# Patient Record
Sex: FEMALE | Race: BLACK OR AFRICAN AMERICAN | Employment: OTHER | URBAN - METROPOLITAN AREA
[De-identification: names, ages, dates, MRNs, and addresses within clinical notes are randomized per-mention and may not be internally consistent; named-entity substitution may affect disease eponyms.]

---

## 2023-04-10 PROBLEM — J31.0 CHRONIC RHINITIS: Status: ACTIVE | Noted: 2023-04-10

## 2023-04-10 PROBLEM — R07.9 CHEST PAIN, EXERTIONAL: Status: ACTIVE | Noted: 2023-04-10

## 2023-04-10 PROBLEM — M50.90 CERVICAL DISC DISEASE: Status: ACTIVE | Noted: 2023-04-10

## 2023-04-10 PROBLEM — I27.20 PULMONARY HYPERTENSION (MULTI): Status: ACTIVE | Noted: 2023-04-10

## 2023-04-10 PROBLEM — D47.2 MONOCLONAL GAMMOPATHY OF UNDETERMINED SIGNIFICANCE: Status: ACTIVE | Noted: 2023-04-10

## 2023-04-10 PROBLEM — M53.9 MULTILEVEL DEGENERATIVE DISC DISEASE: Status: ACTIVE | Noted: 2023-04-10

## 2023-04-10 PROBLEM — R41.3 MEMORY LOSS: Status: ACTIVE | Noted: 2023-04-10

## 2023-04-10 PROBLEM — M79.605 PAIN OF LEFT LOWER EXTREMITY: Status: ACTIVE | Noted: 2023-04-10

## 2023-04-10 PROBLEM — M51.36 LUMBAR DEGENERATIVE DISC DISEASE: Status: ACTIVE | Noted: 2023-04-10

## 2023-04-10 PROBLEM — R06.09 EXERTIONAL DYSPNEA: Status: ACTIVE | Noted: 2023-04-10

## 2023-04-10 PROBLEM — B19.20 HEPATITIS C VIRUS: Status: ACTIVE | Noted: 2023-04-10

## 2023-04-10 PROBLEM — G56.03 CARPAL TUNNEL SYNDROME ON BOTH SIDES: Status: ACTIVE | Noted: 2023-04-10

## 2023-04-10 PROBLEM — N18.9 CHRONIC RENAL INSUFFICIENCY: Status: ACTIVE | Noted: 2023-04-10

## 2023-04-10 PROBLEM — G56.02 CARPAL TUNNEL SYNDROME OF LEFT WRIST: Status: ACTIVE | Noted: 2023-04-10

## 2023-04-10 PROBLEM — E66.01 PSYCHOLOGICAL FACTORS AFFECTING MORBID OBESITY (MULTI): Status: ACTIVE | Noted: 2023-04-10

## 2023-04-10 PROBLEM — L30.9 ECZEMA: Status: ACTIVE | Noted: 2023-04-10

## 2023-04-10 PROBLEM — L60.3 BRITTLE NAILS: Status: ACTIVE | Noted: 2023-04-10

## 2023-04-10 PROBLEM — M54.9 CHRONIC BACK PAIN: Status: ACTIVE | Noted: 2023-04-10

## 2023-04-10 PROBLEM — R51.9 HEADACHE: Status: ACTIVE | Noted: 2023-04-10

## 2023-04-10 PROBLEM — S09.90XA HEAD TRAUMA: Status: ACTIVE | Noted: 2023-04-10

## 2023-04-10 PROBLEM — G62.9 PERIPHERAL NEUROPATHY: Status: ACTIVE | Noted: 2023-04-10

## 2023-04-10 PROBLEM — R82.90 ABNORMAL URINE FINDINGS: Status: ACTIVE | Noted: 2023-04-10

## 2023-04-10 PROBLEM — H91.90 HEARING LOSS: Status: ACTIVE | Noted: 2023-04-10

## 2023-04-10 PROBLEM — M54.2 CERVICALGIA: Status: ACTIVE | Noted: 2023-04-10

## 2023-04-10 PROBLEM — K63.5 COLON POLYP: Status: ACTIVE | Noted: 2023-04-10

## 2023-04-10 PROBLEM — G89.29 CHRONIC BACK PAIN: Status: ACTIVE | Noted: 2023-04-10

## 2023-04-10 PROBLEM — I10 HYPERTENSION: Status: ACTIVE | Noted: 2023-04-10

## 2023-04-10 PROBLEM — M21.40 ACQUIRED PES PLANUS: Status: ACTIVE | Noted: 2023-04-10

## 2023-04-10 PROBLEM — M79.641 PAIN IN BOTH HANDS: Status: ACTIVE | Noted: 2023-04-10

## 2023-04-10 PROBLEM — M19.90 OSTEOARTHRITIS: Status: ACTIVE | Noted: 2023-04-10

## 2023-04-10 PROBLEM — R63.2 POLYPHAGIA: Status: ACTIVE | Noted: 2023-04-10

## 2023-04-10 PROBLEM — D64.9 ANEMIA: Status: ACTIVE | Noted: 2023-04-10

## 2023-04-10 PROBLEM — M76.10: Status: ACTIVE | Noted: 2023-04-10

## 2023-04-10 PROBLEM — R10.11 RIGHT UPPER QUADRANT ABDOMINAL PAIN: Status: ACTIVE | Noted: 2023-04-10

## 2023-04-10 PROBLEM — E04.1 THYROID NODULE: Status: ACTIVE | Noted: 2023-04-10

## 2023-04-10 PROBLEM — M25.552 HIP PAIN, LEFT: Status: ACTIVE | Noted: 2023-04-10

## 2023-04-10 PROBLEM — F54 PSYCHOLOGICAL FACTORS AFFECTING MORBID OBESITY (MULTI): Status: ACTIVE | Noted: 2023-04-10

## 2023-04-10 PROBLEM — L25.9 CONTACT DERMATITIS: Status: ACTIVE | Noted: 2023-04-10

## 2023-04-10 PROBLEM — E04.0 SIMPLE GOITER: Status: ACTIVE | Noted: 2023-04-10

## 2023-04-10 PROBLEM — M17.11 PRIMARY LOCALIZED OSTEOARTHRITIS OF RIGHT KNEE: Status: ACTIVE | Noted: 2023-04-10

## 2023-04-10 PROBLEM — E66.9 OBESITY: Status: ACTIVE | Noted: 2023-04-10

## 2023-04-10 PROBLEM — R73.02 IGT (IMPAIRED GLUCOSE TOLERANCE): Status: ACTIVE | Noted: 2023-04-10

## 2023-04-10 PROBLEM — I35.1 MILD AORTIC INSUFFICIENCY: Status: ACTIVE | Noted: 2023-04-10

## 2023-04-10 PROBLEM — M19.90 ARTHRITIS: Status: ACTIVE | Noted: 2023-04-10

## 2023-04-10 PROBLEM — R80.9 PROTEINURIA: Status: ACTIVE | Noted: 2023-04-10

## 2023-04-10 PROBLEM — M99.09 SEGMENTAL AND SOMATIC DYSFUNCTION: Status: ACTIVE | Noted: 2023-04-10

## 2023-04-10 PROBLEM — K59.09 CHRONIC CONSTIPATION: Status: ACTIVE | Noted: 2023-04-10
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
## 2023-04-10 PROBLEM — G47.33 OBSTRUCTIVE SLEEP APNEA OF ADULT: Status: ACTIVE | Noted: 2023-04-10

## 2023-04-10 PROBLEM — M54.31 SCIATICA OF RIGHT SIDE: Status: ACTIVE | Noted: 2023-04-10

## 2023-04-10 PROBLEM — R79.89 ABNORMAL LFTS: Status: ACTIVE | Noted: 2023-04-10

## 2023-04-10 PROBLEM — R32 URINARY INCONTINENCE: Status: ACTIVE | Noted: 2023-04-10

## 2023-04-10 PROBLEM — M79.642 PAIN IN BOTH HANDS: Status: ACTIVE | Noted: 2023-04-10

## 2023-04-10 PROBLEM — Z96.642 STATUS POST TOTAL REPLACEMENT OF LEFT HIP: Status: ACTIVE | Noted: 2023-04-10

## 2023-04-10 PROBLEM — M16.9 OSTEOARTHRITIS OF HIP: Status: ACTIVE | Noted: 2023-04-10

## 2023-04-10 PROBLEM — M19.041 PRIMARY OSTEOARTHRITIS OF BOTH HANDS: Status: ACTIVE | Noted: 2023-04-10

## 2023-04-10 PROBLEM — R09.89 BRUIT: Status: ACTIVE | Noted: 2023-04-10

## 2023-04-10 PROBLEM — L23.7 POISON IVY DERMATITIS: Status: ACTIVE | Noted: 2023-04-10

## 2023-04-10 PROBLEM — R09.1 PLEURISY: Status: ACTIVE | Noted: 2023-04-10

## 2023-04-10 PROBLEM — M25.561 KNEE PAIN, RIGHT: Status: ACTIVE | Noted: 2023-04-10

## 2023-04-10 PROBLEM — N18.30 CHRONIC RENAL IMPAIRMENT, STAGE 3 (MODERATE) (MULTI): Status: ACTIVE | Noted: 2023-04-10

## 2023-04-10 PROBLEM — M19.042 PRIMARY OSTEOARTHRITIS OF BOTH HANDS: Status: ACTIVE | Noted: 2023-04-10

## 2023-04-10 PROBLEM — M51.369 LUMBAR DEGENERATIVE DISC DISEASE: Status: ACTIVE | Noted: 2023-04-10

## 2023-04-10 PROBLEM — M54.17 LUMBOSACRAL RADICULITIS: Status: ACTIVE | Noted: 2023-04-10

## 2023-04-10 PROBLEM — M53.3 COCCYDYNIA: Status: ACTIVE | Noted: 2023-04-10

## 2023-04-10 PROBLEM — R06.02 SHORTNESS OF BREATH ON EXERTION: Status: ACTIVE | Noted: 2023-04-10

## 2023-04-10 PROBLEM — R63.8 ALTERATION IN NUTRITION: Status: ACTIVE | Noted: 2023-04-10

## 2023-04-10 PROBLEM — E03.9 HYPOTHYROIDISM: Status: ACTIVE | Noted: 2023-04-10

## 2023-04-10 PROBLEM — I65.29 CAROTID ATHEROSCLEROSIS: Status: ACTIVE | Noted: 2023-04-10

## 2023-04-10 PROBLEM — B18.2 CHRONIC HEPATITIS C (MULTI): Status: ACTIVE | Noted: 2023-04-10

## 2023-04-10 PROBLEM — R94.31 ABNORMAL ECG: Status: ACTIVE | Noted: 2023-04-10

## 2023-04-10 PROBLEM — N62 MACROMASTIA: Status: ACTIVE | Noted: 2023-04-10

## 2023-04-10 PROBLEM — M77.8 WRIST TENDONITIS: Status: ACTIVE | Noted: 2023-04-10

## 2023-04-10 RX ORDER — TRAMADOL HYDROCHLORIDE 50 MG/1
1-2 TABLET ORAL EVERY 6 HOURS PRN
COMMUNITY
Start: 2015-10-23 | End: 2023-05-05 | Stop reason: SDUPTHER

## 2023-04-10 RX ORDER — GLUCOSAM/CHONDRO/HERB 149/HYAL 750-100 MG
1 TABLET ORAL 2 TIMES DAILY
COMMUNITY
End: 2023-08-25 | Stop reason: ALTCHOICE

## 2023-04-10 RX ORDER — PEN NEEDLE, DIABETIC 29 G X1/2"
NEEDLE, DISPOSABLE MISCELLANEOUS
COMMUNITY
End: 2023-05-15 | Stop reason: ALTCHOICE

## 2023-04-10 RX ORDER — PROPRANOLOL/HYDROCHLOROTHIAZID 40 MG-25MG
1 TABLET ORAL DAILY
COMMUNITY
End: 2023-08-25 | Stop reason: ALTCHOICE

## 2023-04-10 RX ORDER — ATORVASTATIN CALCIUM 10 MG/1
1 TABLET, FILM COATED ORAL NIGHTLY
COMMUNITY
Start: 2022-07-29 | End: 2023-05-05 | Stop reason: SDUPTHER

## 2023-04-10 RX ORDER — GABAPENTIN 300 MG/1
1 CAPSULE ORAL 3 TIMES DAILY
COMMUNITY
Start: 2020-09-15 | End: 2024-01-02 | Stop reason: SDUPTHER

## 2023-04-10 RX ORDER — ASPIRIN 81 MG/1
1 TABLET ORAL DAILY
COMMUNITY

## 2023-04-10 RX ORDER — MULTIVIT-MIN/FA/LYCOPEN/LUTEIN .4-300-25
1 TABLET ORAL DAILY
COMMUNITY
End: 2023-05-15 | Stop reason: ALTCHOICE

## 2023-04-10 RX ORDER — ACETAMINOPHEN 500 MG/1
2 CAPSULE, LIQUID FILLED ORAL 3 TIMES DAILY PRN
COMMUNITY
Start: 2022-12-30

## 2023-04-10 RX ORDER — LINACLOTIDE 145 UG/1
145 CAPSULE, GELATIN COATED ORAL DAILY
COMMUNITY
Start: 2023-02-07 | End: 2023-05-15 | Stop reason: ALTCHOICE

## 2023-04-10 RX ORDER — LACTULOSE 10 G/15ML
SOLUTION ORAL; RECTAL
COMMUNITY
Start: 2022-07-11 | End: 2023-05-15 | Stop reason: ALTCHOICE

## 2023-04-10 RX ORDER — HYDROCORTISONE ACETATE 0.5 %
1 CREAM (GRAM) TOPICAL 2 TIMES DAILY
COMMUNITY
Start: 2015-09-21 | End: 2024-02-18 | Stop reason: WASHOUT

## 2023-04-10 RX ORDER — SEMAGLUTIDE 2.68 MG/ML
2 INJECTION, SOLUTION SUBCUTANEOUS
COMMUNITY
Start: 2022-10-25 | End: 2023-12-08 | Stop reason: SDUPTHER

## 2023-04-10 RX ORDER — BUTYROSPERMUM PARKII(SHEA BUTTER), SIMMONDSIA CHINENSIS (JOJOBA) SEED OIL, ALOE BARBADENSIS LEAF EXTRACT .01; 1; 3.5 G/100G; G/100G; G/100G
2 LIQUID TOPICAL
COMMUNITY
Start: 2022-12-01 | End: 2024-03-08 | Stop reason: WASHOUT

## 2023-04-10 RX ORDER — LEVOTHYROXINE SODIUM 88 UG/1
1 TABLET ORAL DAILY
COMMUNITY
Start: 2013-06-26 | End: 2024-01-16 | Stop reason: SDUPTHER

## 2023-04-10 RX ORDER — DOCUSATE SODIUM 100 MG/1
3 CAPSULE, LIQUID FILLED ORAL DAILY
COMMUNITY

## 2023-04-11 ENCOUNTER — TELEPHONE (OUTPATIENT)
Dept: PRIMARY CARE | Facility: CLINIC | Age: 78
End: 2023-04-11

## 2023-04-11 NOTE — TELEPHONE ENCOUNTER
Patient tested positive for covid yesterday she's having congestion,headache,body ache she just feel terrible no fever want to know if you can call something in for her

## 2023-05-05 ENCOUNTER — TELEPHONE (OUTPATIENT)
Dept: PRIMARY CARE | Facility: CLINIC | Age: 78
End: 2023-05-05
Payer: MEDICARE

## 2023-05-05 DIAGNOSIS — M54.17 LUMBOSACRAL RADICULITIS: ICD-10-CM

## 2023-05-05 DIAGNOSIS — E78.5 DYSLIPIDEMIA: ICD-10-CM

## 2023-05-05 RX ORDER — ATORVASTATIN CALCIUM 10 MG/1
10 TABLET, FILM COATED ORAL NIGHTLY
Qty: 30 TABLET | Refills: 0 | Status: SHIPPED | OUTPATIENT
Start: 2023-05-05 | End: 2023-11-17 | Stop reason: SDUPTHER

## 2023-05-05 RX ORDER — TRAMADOL HYDROCHLORIDE 50 MG/1
50-100 TABLET ORAL EVERY 6 HOURS PRN
Qty: 40 TABLET | Refills: 0 | Status: SHIPPED | OUTPATIENT
Start: 2023-05-05 | End: 2023-05-15 | Stop reason: SDUPTHER

## 2023-05-05 NOTE — TELEPHONE ENCOUNTER
Patient stated she has been calling since last week for medication refills. Tramadol, Hydrochlorothiazide, and amoxicillin. Pharmacy is in chart also sending an email to Ana.

## 2023-05-09 ENCOUNTER — APPOINTMENT (OUTPATIENT)
Dept: PRIMARY CARE | Facility: CLINIC | Age: 78
End: 2023-05-09
Payer: MEDICARE

## 2023-05-15 ENCOUNTER — OFFICE VISIT (OUTPATIENT)
Dept: PRIMARY CARE | Facility: CLINIC | Age: 78
End: 2023-05-15
Payer: MEDICARE

## 2023-05-15 ENCOUNTER — LAB (OUTPATIENT)
Dept: LAB | Facility: LAB | Age: 78
End: 2023-05-15
Payer: MEDICARE

## 2023-05-15 VITALS
BODY MASS INDEX: 33.31 KG/M2 | SYSTOLIC BLOOD PRESSURE: 122 MMHG | WEIGHT: 188 LBS | DIASTOLIC BLOOD PRESSURE: 80 MMHG | HEIGHT: 63 IN

## 2023-05-15 DIAGNOSIS — E55.9 VITAMIN D DEFICIENCY: ICD-10-CM

## 2023-05-15 DIAGNOSIS — M54.40 CHRONIC LOW BACK PAIN WITH SCIATICA, SCIATICA LATERALITY UNSPECIFIED, UNSPECIFIED BACK PAIN LATERALITY: ICD-10-CM

## 2023-05-15 DIAGNOSIS — F11.90 OPIOID USE: ICD-10-CM

## 2023-05-15 DIAGNOSIS — E03.9 HYPOTHYROIDISM, UNSPECIFIED TYPE: ICD-10-CM

## 2023-05-15 DIAGNOSIS — E78.5 HYPERLIPIDEMIA, UNSPECIFIED HYPERLIPIDEMIA TYPE: ICD-10-CM

## 2023-05-15 DIAGNOSIS — Z96.651 HISTORY OF RIGHT KNEE JOINT REPLACEMENT: ICD-10-CM

## 2023-05-15 DIAGNOSIS — M19.90 ARTHRITIS: ICD-10-CM

## 2023-05-15 DIAGNOSIS — M47.26 OTHER SPONDYLOSIS WITH RADICULOPATHY, LUMBAR REGION: ICD-10-CM

## 2023-05-15 DIAGNOSIS — I10 HTN (HYPERTENSION), BENIGN: ICD-10-CM

## 2023-05-15 DIAGNOSIS — M54.17 LUMBOSACRAL RADICULITIS: ICD-10-CM

## 2023-05-15 DIAGNOSIS — E66.09 OBESITY DUE TO EXCESS CALORIES, UNSPECIFIED CLASSIFICATION, UNSPECIFIED WHETHER SERIOUS COMORBIDITY PRESENT: Primary | ICD-10-CM

## 2023-05-15 DIAGNOSIS — G89.29 CHRONIC LOW BACK PAIN WITH SCIATICA, SCIATICA LATERALITY UNSPECIFIED, UNSPECIFIED BACK PAIN LATERALITY: ICD-10-CM

## 2023-05-15 LAB
AMPHETAMINE (PRESENCE) IN URINE BY SCREEN METHOD: NORMAL
BARBITURATES PRESENCE IN URINE BY SCREEN METHOD: NORMAL
BENZODIAZEPINE (PRESENCE) IN URINE BY SCREEN METHOD: NORMAL
CANNABINOIDS IN URINE BY SCREEN METHOD: NORMAL
COCAINE (PRESENCE) IN URINE BY SCREEN METHOD: NORMAL
DRUG SCREEN COMMENT URINE: NORMAL
FENTANYL URINE: NORMAL
METHADONE (PRESENCE) IN URINE BY SCREEN METHOD: NORMAL
OPIATES (PRESENCE) IN URINE BY SCREEN METHOD: NORMAL
OXYCODONE (PRESENCE) IN URINE BY SCREEN METHOD: NORMAL
PHENCYCLIDINE (PRESENCE) IN URINE BY SCREEN METHOD: NORMAL

## 2023-05-15 PROCEDURE — 99215 OFFICE O/P EST HI 40 MIN: CPT | Performed by: INTERNAL MEDICINE

## 2023-05-15 PROCEDURE — 3074F SYST BP LT 130 MM HG: CPT | Performed by: INTERNAL MEDICINE

## 2023-05-15 PROCEDURE — 1160F RVW MEDS BY RX/DR IN RCRD: CPT | Performed by: INTERNAL MEDICINE

## 2023-05-15 PROCEDURE — 80307 DRUG TEST PRSMV CHEM ANLYZR: CPT

## 2023-05-15 PROCEDURE — 1159F MED LIST DOCD IN RCRD: CPT | Performed by: INTERNAL MEDICINE

## 2023-05-15 PROCEDURE — 3079F DIAST BP 80-89 MM HG: CPT | Performed by: INTERNAL MEDICINE

## 2023-05-15 RX ORDER — LISINOPRIL AND HYDROCHLOROTHIAZIDE 10; 12.5 MG/1; MG/1
1 TABLET ORAL DAILY
COMMUNITY
Start: 2019-04-18 | End: 2023-05-15 | Stop reason: ALTCHOICE

## 2023-05-15 RX ORDER — ACETAMINOPHEN 500 MG
TABLET ORAL
COMMUNITY
End: 2024-03-08 | Stop reason: WASHOUT

## 2023-05-15 RX ORDER — TRAMADOL HYDROCHLORIDE 50 MG/1
50 TABLET ORAL EVERY 6 HOURS PRN
Qty: 40 TABLET | Refills: 2 | Status: SHIPPED | OUTPATIENT
Start: 2023-05-15 | End: 2023-08-25 | Stop reason: ALTCHOICE

## 2023-05-15 ASSESSMENT — ENCOUNTER SYMPTOMS
EYES NEGATIVE: 1
ALLERGIC/IMMUNOLOGIC NEGATIVE: 1
RESPIRATORY NEGATIVE: 1
MUSCULOSKELETAL NEGATIVE: 1
NEUROLOGICAL NEGATIVE: 1
ENDOCRINE NEGATIVE: 1
CONSTIPATION: 1
CARDIOVASCULAR NEGATIVE: 1
CONSTITUTIONAL NEGATIVE: 1

## 2023-05-15 ASSESSMENT — PATIENT HEALTH QUESTIONNAIRE - PHQ9
2. FEELING DOWN, DEPRESSED OR HOPELESS: NOT AT ALL
1. LITTLE INTEREST OR PLEASURE IN DOING THINGS: NOT AT ALL
SUM OF ALL RESPONSES TO PHQ9 QUESTIONS 1 AND 2: 0

## 2023-05-15 NOTE — PROGRESS NOTES
I reviewed with the resident the medical history and the resident’s findings on physical examination.  I discussed with the resident the patient’s diagnosis and concur with the treatment plan as documented in the resident note.     I saw and evaluated the patient. I personally obtained the key and critical portions of the history and physical exam or was physically present for key and critical portions performed by the trainee. I reviewed the trainee's documentation and discussed the patient with the trainee. I agree with the trainee's medical decision making, as documented on the trainee's note.     Comfort Montano MD

## 2023-05-15 NOTE — PROGRESS NOTES
"Subjective   Patient ID: Lucille Holloway is a 77 y.o. female who presents for Establish Care and Med Refill (Tramadol).    HPI   77 yrs old female who is here as new patient, she has remarkable hx of hypothyroidism, HLD, obesity on ozempic, Chronic low back pain on tramadol and gabapentin, she reported being healthy and well since her last visit to her PCP, she eats a healthy diet and exercise regularly, she her low back pain is well managed her current pain regimen, she cont to experience constipation, last colonoscopy on 2022 with polyps and tubular adenoma, advised to repeat in 5 yrs, last mammogram in 07/2022.      Review of Systems   Constitutional: Negative.    HENT: Negative.     Eyes: Negative.    Respiratory: Negative.     Cardiovascular: Negative.    Gastrointestinal:  Positive for constipation.   Endocrine: Negative.    Genitourinary: Negative.    Musculoskeletal: Negative.    Skin: Negative.    Allergic/Immunologic: Negative.    Neurological: Negative.        Objective   /80   Ht 1.6 m (5' 3\")   Wt 85.3 kg (188 lb)   BMI 33.30 kg/m²     Physical Exam  Constitutional:       Appearance: Normal appearance. She is normal weight.   HENT:      Head: Normocephalic and atraumatic.      Nose: Nose normal.      Mouth/Throat:      Mouth: Mucous membranes are dry.   Eyes:      Extraocular Movements: Extraocular movements intact.      Pupils: Pupils are equal, round, and reactive to light.   Cardiovascular:      Rate and Rhythm: Normal rate and regular rhythm.      Pulses: Normal pulses.      Heart sounds: Normal heart sounds.   Pulmonary:      Effort: Pulmonary effort is normal.      Breath sounds: Normal breath sounds.   Abdominal:      General: Abdomen is flat.      Palpations: Abdomen is soft.   Musculoskeletal:         General: Normal range of motion.      Cervical back: Normal range of motion and neck supple.   Skin:     General: Skin is warm and dry.      Capillary Refill: Capillary refill takes less " than 2 seconds.   Neurological:      General: No focal deficit present.      Mental Status: She is alert and oriented to person, place, and time. Mental status is at baseline.   Psychiatric:         Mood and Affect: Mood normal.         Judgment: Judgment normal.         Assessment/Plan   Diagnoses and all orders for this visit:  Obesity due to excess calories, unspecified classification, unspecified whether serious comorbidity present  Hypothyroidism, unspecified type  Arthritis  Hyperlipidemia, unspecified hyperlipidemia type  History of right knee joint replacement  Chronic low back pain with sciatica, sciatica laterality unspecified, unspecified back pain laterality  Lumbosacral radiculitis  -     traMADol (Ultram) 50 mg tablet; Take 1 tablet (50 mg) by mouth every 6 hours if needed (Pain).  Opioid use  -     Drug Screen, Urine With Reflex to Confirmation; Future  Vitamin D deficiency  HTN (hypertension), benign  Other spondylosis with radiculopathy, lumbar region  -     Drug Screen, Urine With Reflex to Confirmation; Future  -    opioids agreement was signed.

## 2023-06-08 ENCOUNTER — LAB (OUTPATIENT)
Dept: LAB | Facility: LAB | Age: 78
End: 2023-06-08
Payer: MEDICARE

## 2023-06-08 ENCOUNTER — OFFICE VISIT (OUTPATIENT)
Dept: PRIMARY CARE | Facility: CLINIC | Age: 78
End: 2023-06-08
Payer: MEDICARE

## 2023-06-08 VITALS
WEIGHT: 187.3 LBS | HEART RATE: 72 BPM | BODY MASS INDEX: 33.19 KG/M2 | TEMPERATURE: 97.2 F | DIASTOLIC BLOOD PRESSURE: 72 MMHG | RESPIRATION RATE: 12 BRPM | SYSTOLIC BLOOD PRESSURE: 130 MMHG | OXYGEN SATURATION: 98 % | HEIGHT: 63 IN

## 2023-06-08 DIAGNOSIS — E03.9 HYPOTHYROIDISM, UNSPECIFIED TYPE: ICD-10-CM

## 2023-06-08 DIAGNOSIS — Z76.89 ESTABLISHING CARE WITH NEW DOCTOR, ENCOUNTER FOR: Primary | ICD-10-CM

## 2023-06-08 DIAGNOSIS — Z78.0 ASYMPTOMATIC MENOPAUSAL STATE: ICD-10-CM

## 2023-06-08 DIAGNOSIS — E66.9 CLASS 1 OBESITY WITHOUT SERIOUS COMORBIDITY WITH BODY MASS INDEX (BMI) OF 33.0 TO 33.9 IN ADULT, UNSPECIFIED OBESITY TYPE: ICD-10-CM

## 2023-06-08 DIAGNOSIS — Z12.31 BREAST CANCER SCREENING BY MAMMOGRAM: ICD-10-CM

## 2023-06-08 PROBLEM — G89.29 ACUTE EXACERBATION OF CHRONIC LOW BACK PAIN: Status: ACTIVE | Noted: 2023-06-08

## 2023-06-08 PROBLEM — E03.8 OTHER SPECIFIED HYPOTHYROIDISM: Status: ACTIVE | Noted: 2023-01-02

## 2023-06-08 PROBLEM — R07.9 CHEST PAIN: Status: ACTIVE | Noted: 2023-06-08

## 2023-06-08 PROBLEM — M99.09 SEGMENTAL AND SOMATIC DYSFUNCTION: Status: ACTIVE | Noted: 2023-01-02

## 2023-06-08 PROBLEM — M54.50 ACUTE EXACERBATION OF CHRONIC LOW BACK PAIN: Status: ACTIVE | Noted: 2023-06-08

## 2023-06-08 PROBLEM — N18.9 CKD (CHRONIC KIDNEY DISEASE): Status: ACTIVE | Noted: 2023-01-02

## 2023-06-08 LAB
ALANINE AMINOTRANSFERASE (SGPT) (U/L) IN SER/PLAS: 29 U/L (ref 7–45)
ALBUMIN (G/DL) IN SER/PLAS: 3.9 G/DL (ref 3.4–5)
ALKALINE PHOSPHATASE (U/L) IN SER/PLAS: 61 U/L (ref 33–136)
ANION GAP IN SER/PLAS: 9 MMOL/L (ref 10–20)
ASPARTATE AMINOTRANSFERASE (SGOT) (U/L) IN SER/PLAS: 29 U/L (ref 9–39)
BASOPHILS (10*3/UL) IN BLOOD BY AUTOMATED COUNT: 0.03 X10E9/L (ref 0–0.1)
BASOPHILS/100 LEUKOCYTES IN BLOOD BY AUTOMATED COUNT: 0.4 % (ref 0–2)
BILIRUBIN TOTAL (MG/DL) IN SER/PLAS: 0.5 MG/DL (ref 0–1.2)
CALCIDIOL (25 OH VITAMIN D3) (NG/ML) IN SER/PLAS: 70 NG/ML
CALCIUM (MG/DL) IN SER/PLAS: 9.7 MG/DL (ref 8.6–10.6)
CARBON DIOXIDE, TOTAL (MMOL/L) IN SER/PLAS: 31 MMOL/L (ref 21–32)
CHLORIDE (MMOL/L) IN SER/PLAS: 104 MMOL/L (ref 98–107)
CREATININE (MG/DL) IN SER/PLAS: 1.05 MG/DL (ref 0.5–1.05)
EOSINOPHILS (10*3/UL) IN BLOOD BY AUTOMATED COUNT: 0.1 X10E9/L (ref 0–0.4)
EOSINOPHILS/100 LEUKOCYTES IN BLOOD BY AUTOMATED COUNT: 1.3 % (ref 0–6)
ERYTHROCYTE DISTRIBUTION WIDTH (RATIO) BY AUTOMATED COUNT: 12.8 % (ref 11.5–14.5)
ERYTHROCYTE MEAN CORPUSCULAR HEMOGLOBIN CONCENTRATION (G/DL) BY AUTOMATED: 31.7 G/DL (ref 32–36)
ERYTHROCYTE MEAN CORPUSCULAR VOLUME (FL) BY AUTOMATED COUNT: 95 FL (ref 80–100)
ERYTHROCYTES (10*6/UL) IN BLOOD BY AUTOMATED COUNT: 4.58 X10E12/L (ref 4–5.2)
GFR FEMALE: 54 ML/MIN/1.73M2
GLUCOSE (MG/DL) IN SER/PLAS: 74 MG/DL (ref 74–99)
HEMATOCRIT (%) IN BLOOD BY AUTOMATED COUNT: 43.5 % (ref 36–46)
HEMOGLOBIN (G/DL) IN BLOOD: 13.8 G/DL (ref 12–16)
IMMATURE GRANULOCYTES/100 LEUKOCYTES IN BLOOD BY AUTOMATED COUNT: 0.3 % (ref 0–0.9)
LEUKOCYTES (10*3/UL) IN BLOOD BY AUTOMATED COUNT: 7.7 X10E9/L (ref 4.4–11.3)
LYMPHOCYTES (10*3/UL) IN BLOOD BY AUTOMATED COUNT: 3.7 X10E9/L (ref 0.8–3)
LYMPHOCYTES/100 LEUKOCYTES IN BLOOD BY AUTOMATED COUNT: 48.3 % (ref 13–44)
MONOCYTES (10*3/UL) IN BLOOD BY AUTOMATED COUNT: 0.58 X10E9/L (ref 0.05–0.8)
MONOCYTES/100 LEUKOCYTES IN BLOOD BY AUTOMATED COUNT: 7.6 % (ref 2–10)
NEUTROPHILS (10*3/UL) IN BLOOD BY AUTOMATED COUNT: 3.23 X10E9/L (ref 1.6–5.5)
NEUTROPHILS/100 LEUKOCYTES IN BLOOD BY AUTOMATED COUNT: 42.1 % (ref 40–80)
NRBC (PER 100 WBCS) BY AUTOMATED COUNT: 0 /100 WBC (ref 0–0)
PLATELETS (10*3/UL) IN BLOOD AUTOMATED COUNT: 263 X10E9/L (ref 150–450)
POTASSIUM (MMOL/L) IN SER/PLAS: 4.3 MMOL/L (ref 3.5–5.3)
PROTEIN TOTAL: 7.2 G/DL (ref 6.4–8.2)
SODIUM (MMOL/L) IN SER/PLAS: 140 MMOL/L (ref 136–145)
THYROTROPIN (MIU/L) IN SER/PLAS BY DETECTION LIMIT <= 0.05 MIU/L: 1.01 MIU/L (ref 0.44–3.98)
UREA NITROGEN (MG/DL) IN SER/PLAS: 20 MG/DL (ref 6–23)

## 2023-06-08 PROCEDURE — 99213 OFFICE O/P EST LOW 20 MIN: CPT | Performed by: STUDENT IN AN ORGANIZED HEALTH CARE EDUCATION/TRAINING PROGRAM

## 2023-06-08 PROCEDURE — 85025 COMPLETE CBC W/AUTO DIFF WBC: CPT

## 2023-06-08 PROCEDURE — 82306 VITAMIN D 25 HYDROXY: CPT

## 2023-06-08 PROCEDURE — 80053 COMPREHEN METABOLIC PANEL: CPT

## 2023-06-08 PROCEDURE — 1160F RVW MEDS BY RX/DR IN RCRD: CPT | Performed by: STUDENT IN AN ORGANIZED HEALTH CARE EDUCATION/TRAINING PROGRAM

## 2023-06-08 PROCEDURE — 3075F SYST BP GE 130 - 139MM HG: CPT | Performed by: STUDENT IN AN ORGANIZED HEALTH CARE EDUCATION/TRAINING PROGRAM

## 2023-06-08 PROCEDURE — 36415 COLL VENOUS BLD VENIPUNCTURE: CPT

## 2023-06-08 PROCEDURE — 3078F DIAST BP <80 MM HG: CPT | Performed by: STUDENT IN AN ORGANIZED HEALTH CARE EDUCATION/TRAINING PROGRAM

## 2023-06-08 PROCEDURE — 1159F MED LIST DOCD IN RCRD: CPT | Performed by: STUDENT IN AN ORGANIZED HEALTH CARE EDUCATION/TRAINING PROGRAM

## 2023-06-08 PROCEDURE — 84443 ASSAY THYROID STIM HORMONE: CPT

## 2023-06-08 PROCEDURE — 1036F TOBACCO NON-USER: CPT | Performed by: STUDENT IN AN ORGANIZED HEALTH CARE EDUCATION/TRAINING PROGRAM

## 2023-06-08 ASSESSMENT — PATIENT HEALTH QUESTIONNAIRE - PHQ9
SUM OF ALL RESPONSES TO PHQ9 QUESTIONS 1 & 2: 0
2. FEELING DOWN, DEPRESSED OR HOPELESS: NOT AT ALL
1. LITTLE INTEREST OR PLEASURE IN DOING THINGS: NOT AT ALL

## 2023-06-08 ASSESSMENT — LIFESTYLE VARIABLES
SKIP TO QUESTIONS 9-10: 1
HOW OFTEN DO YOU HAVE SIX OR MORE DRINKS ON ONE OCCASION: NEVER
HOW MANY STANDARD DRINKS CONTAINING ALCOHOL DO YOU HAVE ON A TYPICAL DAY: 1 OR 2
HOW OFTEN DO YOU HAVE A DRINK CONTAINING ALCOHOL: MONTHLY OR LESS
AUDIT-C TOTAL SCORE: 1

## 2023-06-08 NOTE — PROGRESS NOTES
Subjective   Patient ID: Lucille Holloway is a pleasant 77 y.o. female with hx of HTN, KELLY , hypothyroid who presents for Establish Care.  HPI  She had a right knee replacement in Dec. Doing well.   She has had B/L carpal tunnel surgery  Hysterectomy, bladder reconstruction.   She sees Dr Jimenez for arthritis in the spine.   She take Tramadol as needed for back pain. Discussed about our no-narcotic policy in our office  She has lost some wt about 41 lb. She no longer needs BP medication.  She is not using her CPAP. She was seen by Dr Jurado in March and was advised to lose another 20 lb.   She is taking ozempic for weight loss. She was given the ozempic to lose weight prior to hip replacement.     HM: CN: 2021  [place]: due  Bone density : due    Immunization:   Reviewed and up-to-date    Review of Systems   All other systems reviewed and are negative.      Visit Vitals  /72   Pulse 72   Temp 36.2 °C (97.2 °F)   Resp 12          Objective   Physical Exam  Constitutional:       General: She is not in acute distress.     Appearance: Normal appearance.   HENT:      Head: Normocephalic and atraumatic.   Eyes:      General: No scleral icterus.     Conjunctiva/sclera: Conjunctivae normal.   Cardiovascular:      Rate and Rhythm: Normal rate and regular rhythm.      Heart sounds: Normal heart sounds.   Pulmonary:      Effort: Pulmonary effort is normal.      Breath sounds: Normal breath sounds. No wheezing.   Abdominal:      General: Bowel sounds are normal. There is no distension.      Palpations: Abdomen is soft.      Tenderness: There is no abdominal tenderness.   Musculoskeletal:      Cervical back: Neck supple.      Right lower leg: No edema.      Left lower leg: No edema.   Lymphadenopathy:      Cervical: No cervical adenopathy.   Skin:     General: Skin is warm and dry.   Neurological:      General: No focal deficit present.      Mental Status: She is alert and oriented to person, place, and time.   Psychiatric:          Mood and Affect: Mood normal.         Behavior: Behavior normal.         Assessment/Plan   Problem List Items Addressed This Visit          Endocrine/Metabolic    Obesity    Relevant Orders    Comprehensive Metabolic Panel    CBC and Auto Differential    Vitamin D, Total    Lipid Panel    Hypothyroidism    Relevant Orders    TSH with reflex to Free T4 if abnormal     Other Visit Diagnoses       Establishing care with new doctor, encounter for    -  Primary    Breast cancer screening by mammogram        Relevant Orders    BI mammo bilateral screening tomosynthesis    Asymptomatic menopausal state        Relevant Orders    XR DEXA bone density

## 2023-06-08 NOTE — PATIENT INSTRUCTIONS
Please see Dr Cornelio Jurado in 3 months  Discuss about repeat sleep study   Mammogram next month  Bone density ordered    Continue with current medications.  Blood work before your next visit.  All the nonurgent lab results will be discussed with you at your next office visit.  Please arrive 15 minutes before your appointment.   Return to office in 6 months or as needed   Schedule your MWV in Jan 2024.

## 2023-06-09 ENCOUNTER — LAB (OUTPATIENT)
Dept: LAB | Facility: LAB | Age: 78
End: 2023-06-09
Payer: MEDICARE

## 2023-06-09 DIAGNOSIS — E66.9 CLASS 1 OBESITY WITHOUT SERIOUS COMORBIDITY WITH BODY MASS INDEX (BMI) OF 33.0 TO 33.9 IN ADULT, UNSPECIFIED OBESITY TYPE: ICD-10-CM

## 2023-06-09 LAB
CHOLESTEROL (MG/DL) IN SER/PLAS: 113 MG/DL (ref 0–199)
CHOLESTEROL IN HDL (MG/DL) IN SER/PLAS: 70.7 MG/DL
CHOLESTEROL/HDL RATIO: 1.6
LDL: 25 MG/DL (ref 0–99)
TRIGLYCERIDE (MG/DL) IN SER/PLAS: 85 MG/DL (ref 0–149)
VLDL: 17 MG/DL (ref 0–40)

## 2023-06-09 PROCEDURE — 80061 LIPID PANEL: CPT

## 2023-06-09 PROCEDURE — 36415 COLL VENOUS BLD VENIPUNCTURE: CPT

## 2023-06-27 ENCOUNTER — TELEPHONE (OUTPATIENT)
Dept: PRIMARY CARE | Facility: CLINIC | Age: 78
End: 2023-06-27

## 2023-06-27 NOTE — TELEPHONE ENCOUNTER
Patient stated that she had a fall on Saturday and hit her head. Patient is having headaches and neck pain. Patient was seen by Rheumatology physician and was sent to have an x-ray of her head and neck. Patient requested to schedule an appointment.

## 2023-06-27 NOTE — TELEPHONE ENCOUNTER
Informed patient that she would need to be seen at the ER for the fall per Dr. Man. Patient agreed.

## 2023-07-27 ENCOUNTER — OFFICE VISIT (OUTPATIENT)
Dept: PRIMARY CARE | Facility: CLINIC | Age: 78
End: 2023-07-27
Payer: MEDICARE

## 2023-07-27 VITALS
HEIGHT: 63 IN | WEIGHT: 184 LBS | TEMPERATURE: 98 F | RESPIRATION RATE: 11 BRPM | HEART RATE: 75 BPM | BODY MASS INDEX: 32.6 KG/M2 | OXYGEN SATURATION: 97 % | DIASTOLIC BLOOD PRESSURE: 64 MMHG | SYSTOLIC BLOOD PRESSURE: 132 MMHG

## 2023-07-27 DIAGNOSIS — G44.52 NEW DAILY PERSISTENT HEADACHE: ICD-10-CM

## 2023-07-27 DIAGNOSIS — I10 PRIMARY HYPERTENSION: Primary | ICD-10-CM

## 2023-07-27 PROCEDURE — 3078F DIAST BP <80 MM HG: CPT | Performed by: STUDENT IN AN ORGANIZED HEALTH CARE EDUCATION/TRAINING PROGRAM

## 2023-07-27 PROCEDURE — 99214 OFFICE O/P EST MOD 30 MIN: CPT | Performed by: STUDENT IN AN ORGANIZED HEALTH CARE EDUCATION/TRAINING PROGRAM

## 2023-07-27 PROCEDURE — 1036F TOBACCO NON-USER: CPT | Performed by: STUDENT IN AN ORGANIZED HEALTH CARE EDUCATION/TRAINING PROGRAM

## 2023-07-27 PROCEDURE — 3075F SYST BP GE 130 - 139MM HG: CPT | Performed by: STUDENT IN AN ORGANIZED HEALTH CARE EDUCATION/TRAINING PROGRAM

## 2023-07-27 PROCEDURE — 1125F AMNT PAIN NOTED PAIN PRSNT: CPT | Performed by: STUDENT IN AN ORGANIZED HEALTH CARE EDUCATION/TRAINING PROGRAM

## 2023-07-27 PROCEDURE — 1159F MED LIST DOCD IN RCRD: CPT | Performed by: STUDENT IN AN ORGANIZED HEALTH CARE EDUCATION/TRAINING PROGRAM

## 2023-07-27 PROCEDURE — 1160F RVW MEDS BY RX/DR IN RCRD: CPT | Performed by: STUDENT IN AN ORGANIZED HEALTH CARE EDUCATION/TRAINING PROGRAM

## 2023-07-27 RX ORDER — BENAZEPRIL HYDROCHLORIDE AND HYDROCHLOROTHIAZIDE 5; 6.25 MG/1; MG/1
1 TABLET ORAL
COMMUNITY
End: 2023-07-27 | Stop reason: ALTCHOICE

## 2023-07-27 RX ORDER — VALSARTAN 40 MG/1
40 TABLET ORAL DAILY
Qty: 30 TABLET | Refills: 2 | Status: SHIPPED | OUTPATIENT
Start: 2023-07-27 | End: 2023-10-17

## 2023-07-27 RX ORDER — CALCIUM ACETATE 667 MG/1
1334 CAPSULE ORAL
COMMUNITY
End: 2024-03-08 | Stop reason: WASHOUT

## 2023-07-27 RX ORDER — ALBUTEROL SULFATE 90 UG/1
2 AEROSOL, METERED RESPIRATORY (INHALATION) EVERY 4 HOURS PRN
COMMUNITY
Start: 2022-08-04 | End: 2023-12-08 | Stop reason: SDUPTHER

## 2023-07-27 ASSESSMENT — LIFESTYLE VARIABLES
SKIP TO QUESTIONS 9-10: 1
HOW MANY STANDARD DRINKS CONTAINING ALCOHOL DO YOU HAVE ON A TYPICAL DAY: 1 OR 2
HOW OFTEN DO YOU HAVE SIX OR MORE DRINKS ON ONE OCCASION: NEVER
HOW OFTEN DO YOU HAVE A DRINK CONTAINING ALCOHOL: MONTHLY OR LESS
AUDIT-C TOTAL SCORE: 1

## 2023-07-27 ASSESSMENT — PATIENT HEALTH QUESTIONNAIRE - PHQ9
2. FEELING DOWN, DEPRESSED OR HOPELESS: NOT AT ALL
1. LITTLE INTEREST OR PLEASURE IN DOING THINGS: NOT AT ALL
SUM OF ALL RESPONSES TO PHQ9 QUESTIONS 1 & 2: 0

## 2023-07-27 NOTE — PATIENT INSTRUCTIONS
Hypertension  Your blood pressure is at goal today- BP goal <130/80  Discussed about starting a medication called valsartan  Blood work 1 week after starting this medication.  Check your BP daily at home 1-2 hours after your medications and keep a log of your BP readings for your next visit.   Please follow-up in the office in 4 to 6 weeks for repeat blood pressure check

## 2023-07-27 NOTE — PROGRESS NOTES
Subjective   Patient ID: Lucille Holloway is a pleasant 78 y.o. female with significant history of hypertension, KELLY, hypothyroidism who presents for bp concerns.  HPI  Patient is here to follow-up on hypertension.  She was seen in the office last month to establish care.  Today she reports that she is concerned about her hypertension.  BP stable today.  During her last visit she informed me that she had lost about 41 pounds and she no longer needed any blood pressure medications.  She was previously on benzapril -  hydrochlorothiazide.  She had a fall in June which resulted in head trauma.  She was seen in the emergency room had a CT of the head and cervical spine.  No fracture, dislocation, subluxation or bleeding noted on the CT.. Noted that her BP has been more elevated since then.  She has been checking her blood pressure at home that have been elevated.  She reports that she has daily persistent headache since the head trauma.  Would like to see neurology for this.    Review of Systems   All other systems reviewed and are negative.      Visit Vitals  /64   Pulse 75   Temp 36.7 °C (98 °F)   Resp 11          Objective   Physical Exam  Constitutional:       General: She is not in acute distress.     Appearance: Normal appearance.   HENT:      Head: Normocephalic and atraumatic.   Eyes:      General: No scleral icterus.     Conjunctiva/sclera: Conjunctivae normal.   Cardiovascular:      Rate and Rhythm: Normal rate and regular rhythm.      Heart sounds: Normal heart sounds.   Pulmonary:      Effort: Pulmonary effort is normal.      Breath sounds: Normal breath sounds. No wheezing.   Abdominal:      General: Bowel sounds are normal. There is no distension.      Palpations: Abdomen is soft.      Tenderness: There is no abdominal tenderness.   Musculoskeletal:      Cervical back: Neck supple.      Right lower leg: No edema.      Left lower leg: No edema.   Lymphadenopathy:      Cervical: No cervical adenopathy.    Skin:     General: Skin is warm and dry.   Neurological:      General: No focal deficit present.      Mental Status: She is alert and oriented to person, place, and time.   Psychiatric:         Mood and Affect: Mood normal.         Behavior: Behavior normal.         Assessment/Plan   Problem List Items Addressed This Visit       Headache    Relevant Orders    Referral to Neurology    Hypertension - Primary    Relevant Medications    valsartan (Diovan) 40 mg tablet    Other Relevant Orders    Comprehensive Metabolic Panel

## 2023-08-25 ENCOUNTER — LAB (OUTPATIENT)
Dept: LAB | Facility: LAB | Age: 78
End: 2023-08-25
Payer: MEDICARE

## 2023-08-25 ENCOUNTER — OFFICE VISIT (OUTPATIENT)
Dept: PRIMARY CARE | Facility: CLINIC | Age: 78
End: 2023-08-25
Payer: MEDICARE

## 2023-08-25 VITALS
SYSTOLIC BLOOD PRESSURE: 130 MMHG | RESPIRATION RATE: 16 BRPM | DIASTOLIC BLOOD PRESSURE: 78 MMHG | OXYGEN SATURATION: 97 % | WEIGHT: 184 LBS | HEIGHT: 63 IN | HEART RATE: 72 BPM | BODY MASS INDEX: 32.6 KG/M2 | TEMPERATURE: 97.9 F

## 2023-08-25 DIAGNOSIS — M19.90 ARTHRITIS: ICD-10-CM

## 2023-08-25 DIAGNOSIS — N18.30 CHRONIC RENAL IMPAIRMENT, STAGE 3 (MODERATE), UNSPECIFIED WHETHER STAGE 3A OR 3B CKD (MULTI): ICD-10-CM

## 2023-08-25 DIAGNOSIS — I10 PRIMARY HYPERTENSION: Primary | ICD-10-CM

## 2023-08-25 DIAGNOSIS — I10 PRIMARY HYPERTENSION: ICD-10-CM

## 2023-08-25 LAB
ALANINE AMINOTRANSFERASE (SGPT) (U/L) IN SER/PLAS: 31 U/L (ref 7–45)
ALBUMIN (G/DL) IN SER/PLAS: 3.5 G/DL (ref 3.4–5)
ALKALINE PHOSPHATASE (U/L) IN SER/PLAS: 70 U/L (ref 33–136)
ANION GAP IN SER/PLAS: 10 MMOL/L (ref 10–20)
ASPARTATE AMINOTRANSFERASE (SGOT) (U/L) IN SER/PLAS: 35 U/L (ref 9–39)
BILIRUBIN TOTAL (MG/DL) IN SER/PLAS: 0.3 MG/DL (ref 0–1.2)
CALCIUM (MG/DL) IN SER/PLAS: 8.8 MG/DL (ref 8.6–10.6)
CARBON DIOXIDE, TOTAL (MMOL/L) IN SER/PLAS: 29 MMOL/L (ref 21–32)
CHLORIDE (MMOL/L) IN SER/PLAS: 107 MMOL/L (ref 98–107)
CREATININE (MG/DL) IN SER/PLAS: 0.94 MG/DL (ref 0.5–1.05)
GFR FEMALE: 62 ML/MIN/1.73M2
GLUCOSE (MG/DL) IN SER/PLAS: 78 MG/DL (ref 74–99)
POTASSIUM (MMOL/L) IN SER/PLAS: 4.4 MMOL/L (ref 3.5–5.3)
PROTEIN TOTAL: 6.5 G/DL (ref 6.4–8.2)
SODIUM (MMOL/L) IN SER/PLAS: 142 MMOL/L (ref 136–145)
UREA NITROGEN (MG/DL) IN SER/PLAS: 13 MG/DL (ref 6–23)

## 2023-08-25 PROCEDURE — 1125F AMNT PAIN NOTED PAIN PRSNT: CPT | Performed by: STUDENT IN AN ORGANIZED HEALTH CARE EDUCATION/TRAINING PROGRAM

## 2023-08-25 PROCEDURE — 1160F RVW MEDS BY RX/DR IN RCRD: CPT | Performed by: STUDENT IN AN ORGANIZED HEALTH CARE EDUCATION/TRAINING PROGRAM

## 2023-08-25 PROCEDURE — 36415 COLL VENOUS BLD VENIPUNCTURE: CPT

## 2023-08-25 PROCEDURE — 80053 COMPREHEN METABOLIC PANEL: CPT

## 2023-08-25 PROCEDURE — 3078F DIAST BP <80 MM HG: CPT | Performed by: STUDENT IN AN ORGANIZED HEALTH CARE EDUCATION/TRAINING PROGRAM

## 2023-08-25 PROCEDURE — 1036F TOBACCO NON-USER: CPT | Performed by: STUDENT IN AN ORGANIZED HEALTH CARE EDUCATION/TRAINING PROGRAM

## 2023-08-25 PROCEDURE — 1159F MED LIST DOCD IN RCRD: CPT | Performed by: STUDENT IN AN ORGANIZED HEALTH CARE EDUCATION/TRAINING PROGRAM

## 2023-08-25 PROCEDURE — 99214 OFFICE O/P EST MOD 30 MIN: CPT | Performed by: STUDENT IN AN ORGANIZED HEALTH CARE EDUCATION/TRAINING PROGRAM

## 2023-08-25 PROCEDURE — 3075F SYST BP GE 130 - 139MM HG: CPT | Performed by: STUDENT IN AN ORGANIZED HEALTH CARE EDUCATION/TRAINING PROGRAM

## 2023-08-25 RX ORDER — HYDROCORTISONE 25 MG/G
OINTMENT TOPICAL
COMMUNITY
Start: 2020-09-03 | End: 2024-05-13 | Stop reason: ALTCHOICE

## 2023-08-25 RX ORDER — DULOXETIN HYDROCHLORIDE 30 MG/1
30 CAPSULE, DELAYED RELEASE ORAL DAILY
COMMUNITY
Start: 2023-08-22 | End: 2023-11-21 | Stop reason: SDUPTHER

## 2023-08-25 RX ORDER — KETOCONAZOLE 20 MG/G
CREAM TOPICAL
COMMUNITY
Start: 2019-04-08 | End: 2024-03-08 | Stop reason: WASHOUT

## 2023-08-25 ASSESSMENT — LIFESTYLE VARIABLES
HOW MANY STANDARD DRINKS CONTAINING ALCOHOL DO YOU HAVE ON A TYPICAL DAY: PATIENT DOES NOT DRINK
HOW OFTEN DO YOU HAVE SIX OR MORE DRINKS ON ONE OCCASION: NEVER
AUDIT-C TOTAL SCORE: 0
HOW OFTEN DO YOU HAVE A DRINK CONTAINING ALCOHOL: NEVER
SKIP TO QUESTIONS 9-10: 1

## 2023-08-25 NOTE — PATIENT INSTRUCTIONS
Use flonase daily to help with congestion.   Continue with current medications.  Blood work today  If blood work or imaging were ordered during your visit, all the nonurgent lab results will be discussed with you at your next office visit.  Please arrive 15 minutes before your appointment.   Return to office in 3-4 months for BP check or as needed

## 2023-08-25 NOTE — PROGRESS NOTES
Subjective   Patient ID: Lucille Holloway is a pleasant 78 y.o. female with significant history of hypertension, KELLY, hypothyroidism who presents for Hypertension and Earache.  HPI  Patient is here to follow-up on hypertension.  She was seen in the office last month and was started on valsartan for elevated blood pressure.  She has been taking the medication as directed   Her BP is around 130/70  She is also having pressure in bilateral ears since she returned from the cruise.  Denies any significant congestions.  Feels that her ears have not popped since her trip.  Denies any fever, chills, rhinorrhea.    She was started on Duloxetine 30 mg by Dr Jimenez instead of Tramadol.   She also takes Tylenol as needed.     She is on Ozempic for weight loss. She states she needs to cont w PCP for this medication .       Review of Systems   All other systems reviewed and are negative.      Visit Vitals  /78   Pulse 72   Temp 36.6 °C (97.9 °F)   Resp 16          Objective   Physical Exam  Constitutional:       General: She is not in acute distress.     Appearance: Normal appearance.   HENT:      Head: Normocephalic and atraumatic.   Eyes:      General: No scleral icterus.     Conjunctiva/sclera: Conjunctivae normal.   Cardiovascular:      Rate and Rhythm: Normal rate and regular rhythm.      Heart sounds: Normal heart sounds.   Pulmonary:      Effort: Pulmonary effort is normal.      Breath sounds: Normal breath sounds. No wheezing.   Abdominal:      General: Bowel sounds are normal. There is no distension.      Palpations: Abdomen is soft.      Tenderness: There is no abdominal tenderness.   Musculoskeletal:      Cervical back: Neck supple.      Right lower leg: No edema.      Left lower leg: No edema.   Lymphadenopathy:      Cervical: No cervical adenopathy.   Skin:     General: Skin is warm and dry.   Neurological:      General: No focal deficit present.      Mental Status: She is alert and oriented to person, place, and  time.   Psychiatric:         Mood and Affect: Mood normal.         Behavior: Behavior normal.         Assessment/Plan   Problem List Items Addressed This Visit       Arthritis     Started on Duloxetine by Dr Jimenez instead of Tramadol          Chronic renal impairment, stage 3 (moderate) (CMS/HCC)     Repeat blood work today         Hypertension - Primary     Started on valsartan 40.   Due for repeat blood work

## 2023-09-01 ENCOUNTER — APPOINTMENT (OUTPATIENT)
Dept: PRIMARY CARE | Facility: CLINIC | Age: 78
End: 2023-09-01
Payer: MEDICARE

## 2023-09-11 ENCOUNTER — TELEPHONE (OUTPATIENT)
Dept: PRIMARY CARE | Facility: CLINIC | Age: 78
End: 2023-09-11
Payer: MEDICARE

## 2023-09-15 ENCOUNTER — APPOINTMENT (OUTPATIENT)
Dept: PRIMARY CARE | Facility: CLINIC | Age: 78
End: 2023-09-15
Payer: MEDICARE

## 2023-09-19 RX ORDER — DOXYCYCLINE 100 MG/1
CAPSULE ORAL EVERY 12 HOURS
COMMUNITY
Start: 2023-02-10 | End: 2024-03-08 | Stop reason: WASHOUT

## 2023-10-14 DIAGNOSIS — I10 PRIMARY HYPERTENSION: ICD-10-CM

## 2023-10-17 RX ORDER — VALSARTAN 40 MG/1
40 TABLET ORAL DAILY
Qty: 30 TABLET | Refills: 2 | Status: SHIPPED | OUTPATIENT
Start: 2023-10-17 | End: 2023-11-17 | Stop reason: SDUPTHER

## 2023-10-18 ENCOUNTER — APPOINTMENT (OUTPATIENT)
Dept: PRIMARY CARE | Facility: CLINIC | Age: 78
End: 2023-10-18
Payer: MEDICARE

## 2023-11-17 DIAGNOSIS — E78.5 DYSLIPIDEMIA: ICD-10-CM

## 2023-11-17 DIAGNOSIS — I10 PRIMARY HYPERTENSION: ICD-10-CM

## 2023-11-17 RX ORDER — VALSARTAN 40 MG/1
40 TABLET ORAL DAILY
Qty: 30 TABLET | Refills: 3 | Status: SHIPPED | OUTPATIENT
Start: 2023-11-17 | End: 2024-02-15

## 2023-11-17 RX ORDER — ATORVASTATIN CALCIUM 10 MG/1
10 TABLET, FILM COATED ORAL NIGHTLY
Qty: 30 TABLET | Refills: 3 | Status: SHIPPED | OUTPATIENT
Start: 2023-11-17 | End: 2024-01-15

## 2023-11-21 DIAGNOSIS — M54.50 CHRONIC MIDLINE LOW BACK PAIN WITHOUT SCIATICA: Primary | ICD-10-CM

## 2023-11-21 DIAGNOSIS — G89.29 CHRONIC MIDLINE LOW BACK PAIN WITHOUT SCIATICA: Primary | ICD-10-CM

## 2023-11-21 RX ORDER — DULOXETIN HYDROCHLORIDE 30 MG/1
30 CAPSULE, DELAYED RELEASE ORAL DAILY
Qty: 90 CAPSULE | Refills: 2 | Status: SHIPPED | OUTPATIENT
Start: 2023-11-21 | End: 2024-01-02 | Stop reason: SDUPTHER

## 2023-11-30 ENCOUNTER — APPOINTMENT (OUTPATIENT)
Dept: CARDIOLOGY | Facility: CLINIC | Age: 78
End: 2023-11-30
Payer: MEDICARE

## 2023-12-07 ENCOUNTER — APPOINTMENT (OUTPATIENT)
Dept: CARDIOLOGY | Facility: CLINIC | Age: 78
End: 2023-12-07
Payer: MEDICARE

## 2023-12-08 ENCOUNTER — OFFICE VISIT (OUTPATIENT)
Dept: PRIMARY CARE | Facility: CLINIC | Age: 78
End: 2023-12-08
Payer: MEDICARE

## 2023-12-08 ENCOUNTER — PHARMACY VISIT (OUTPATIENT)
Dept: PHARMACY | Facility: CLINIC | Age: 78
End: 2023-12-08
Payer: COMMERCIAL

## 2023-12-08 VITALS
HEIGHT: 63 IN | WEIGHT: 184 LBS | BODY MASS INDEX: 32.6 KG/M2 | TEMPERATURE: 97.7 F | RESPIRATION RATE: 15 BRPM | OXYGEN SATURATION: 98 % | SYSTOLIC BLOOD PRESSURE: 120 MMHG | DIASTOLIC BLOOD PRESSURE: 62 MMHG | HEART RATE: 74 BPM

## 2023-12-08 DIAGNOSIS — R06.02 SHORTNESS OF BREATH ON EXERTION: ICD-10-CM

## 2023-12-08 DIAGNOSIS — I27.20 PULMONARY HYPERTENSION (MULTI): ICD-10-CM

## 2023-12-08 DIAGNOSIS — E55.9 VITAMIN D DEFICIENCY: ICD-10-CM

## 2023-12-08 DIAGNOSIS — Z23 NEED FOR PNEUMOCOCCAL VACCINATION: ICD-10-CM

## 2023-12-08 DIAGNOSIS — L60.0 INGROWING NAIL, RIGHT GREAT TOE: ICD-10-CM

## 2023-12-08 DIAGNOSIS — E03.9 HYPOTHYROIDISM, UNSPECIFIED TYPE: ICD-10-CM

## 2023-12-08 DIAGNOSIS — E66.9 CLASS 1 OBESITY WITH SERIOUS COMORBIDITY AND BODY MASS INDEX (BMI) OF 32.0 TO 32.9 IN ADULT, UNSPECIFIED OBESITY TYPE: ICD-10-CM

## 2023-12-08 DIAGNOSIS — Z00.00 MEDICARE ANNUAL WELLNESS VISIT, SUBSEQUENT: Primary | ICD-10-CM

## 2023-12-08 DIAGNOSIS — L98.7 EXCESS SKIN OF ABDOMINAL WALL: ICD-10-CM

## 2023-12-08 DIAGNOSIS — Z13.89 ENCOUNTER FOR SCREENING FOR OTHER DISORDER: ICD-10-CM

## 2023-12-08 PROCEDURE — 1160F RVW MEDS BY RX/DR IN RCRD: CPT | Performed by: STUDENT IN AN ORGANIZED HEALTH CARE EDUCATION/TRAINING PROGRAM

## 2023-12-08 PROCEDURE — RXMED WILLOW AMBULATORY MEDICATION CHARGE

## 2023-12-08 PROCEDURE — 3074F SYST BP LT 130 MM HG: CPT | Performed by: STUDENT IN AN ORGANIZED HEALTH CARE EDUCATION/TRAINING PROGRAM

## 2023-12-08 PROCEDURE — 99214 OFFICE O/P EST MOD 30 MIN: CPT | Performed by: STUDENT IN AN ORGANIZED HEALTH CARE EDUCATION/TRAINING PROGRAM

## 2023-12-08 PROCEDURE — G0439 PPPS, SUBSEQ VISIT: HCPCS | Performed by: STUDENT IN AN ORGANIZED HEALTH CARE EDUCATION/TRAINING PROGRAM

## 2023-12-08 PROCEDURE — 1036F TOBACCO NON-USER: CPT | Performed by: STUDENT IN AN ORGANIZED HEALTH CARE EDUCATION/TRAINING PROGRAM

## 2023-12-08 PROCEDURE — 3078F DIAST BP <80 MM HG: CPT | Performed by: STUDENT IN AN ORGANIZED HEALTH CARE EDUCATION/TRAINING PROGRAM

## 2023-12-08 PROCEDURE — 1125F AMNT PAIN NOTED PAIN PRSNT: CPT | Performed by: STUDENT IN AN ORGANIZED HEALTH CARE EDUCATION/TRAINING PROGRAM

## 2023-12-08 PROCEDURE — G0009 ADMIN PNEUMOCOCCAL VACCINE: HCPCS | Performed by: STUDENT IN AN ORGANIZED HEALTH CARE EDUCATION/TRAINING PROGRAM

## 2023-12-08 PROCEDURE — 1170F FXNL STATUS ASSESSED: CPT | Performed by: STUDENT IN AN ORGANIZED HEALTH CARE EDUCATION/TRAINING PROGRAM

## 2023-12-08 PROCEDURE — 1159F MED LIST DOCD IN RCRD: CPT | Performed by: STUDENT IN AN ORGANIZED HEALTH CARE EDUCATION/TRAINING PROGRAM

## 2023-12-08 PROCEDURE — G0444 DEPRESSION SCREEN ANNUAL: HCPCS | Performed by: STUDENT IN AN ORGANIZED HEALTH CARE EDUCATION/TRAINING PROGRAM

## 2023-12-08 PROCEDURE — 90677 PCV20 VACCINE IM: CPT | Performed by: STUDENT IN AN ORGANIZED HEALTH CARE EDUCATION/TRAINING PROGRAM

## 2023-12-08 PROCEDURE — 99397 PER PM REEVAL EST PAT 65+ YR: CPT | Performed by: STUDENT IN AN ORGANIZED HEALTH CARE EDUCATION/TRAINING PROGRAM

## 2023-12-08 RX ORDER — SEMAGLUTIDE 2.68 MG/ML
2 INJECTION, SOLUTION SUBCUTANEOUS
Qty: 3 ML | Refills: 3 | Status: SHIPPED | OUTPATIENT
Start: 2023-12-08 | End: 2024-01-09 | Stop reason: SDUPTHER

## 2023-12-08 RX ORDER — ALBUTEROL SULFATE 90 UG/1
2 AEROSOL, METERED RESPIRATORY (INHALATION) EVERY 4 HOURS PRN
Qty: 18 G | Refills: 3 | Status: SHIPPED | OUTPATIENT
Start: 2023-12-08 | End: 2024-01-22 | Stop reason: SDUPTHER

## 2023-12-08 ASSESSMENT — PATIENT HEALTH QUESTIONNAIRE - PHQ9
2. FEELING DOWN, DEPRESSED OR HOPELESS: NOT AT ALL
SUM OF ALL RESPONSES TO PHQ9 QUESTIONS 1 & 2: 0
1. LITTLE INTEREST OR PLEASURE IN DOING THINGS: NOT AT ALL

## 2023-12-08 ASSESSMENT — ACTIVITIES OF DAILY LIVING (ADL)
DRESSING: INDEPENDENT
DOING_HOUSEWORK: INDEPENDENT
GROCERY_SHOPPING: INDEPENDENT
BATHING: INDEPENDENT
MANAGING_FINANCES: INDEPENDENT
TAKING_MEDICATION: INDEPENDENT

## 2023-12-08 ASSESSMENT — ENCOUNTER SYMPTOMS
DEPRESSION: 0
LOSS OF SENSATION IN FEET: 0
OCCASIONAL FEELINGS OF UNSTEADINESS: 0

## 2023-12-08 NOTE — PATIENT INSTRUCTIONS
Prevnar vaccine today   Continue with current medications.  Blood work before your next visit.  If blood work or imaging were ordered during your visit, all the nonurgent lab results will be discussed with you at your next office visit.  Please arrive 15 minutes before your appointment.   Return to office in March or as needed

## 2023-12-08 NOTE — PROGRESS NOTES
"Subjective   Reason for Visit: Lucille Holloway is a pleasant 78 y.o. female here for a Medicare Wellness visit.     Past Medical, Surgical, and Family History reviewed and updated in chart.    Reviewed all medications by prescribing practitioner or clinical pharmacist (such as prescriptions, OTCs, herbal therapies and supplements) and documented in the medical record.    HPI    Health Maintenance:  -   Colonoscopy: 2021, repeat in 5 yrs   -   Mammogram: 6/2023  -   PAP: NA   -   Bone density DEXA: 7/2022  Immunizations:  - COVID vaccination status: UTD  - Influenza: UTD   - Shingles: UTD  - TDAP: 2019  - Pneumo Vaccine: due for prevnar 20 today     She also reports that her toenail has split in half and would like to see podiatry for that.  She also reports that as she is losing weight on Ozempic and she has excess skin in the abdomen that often is chafing.  We discussed about using zinc oxide for that.  She would like to see plastic surgery for further evaluation and possible removal of excess skin.    Patient Care Team:  Vero Man MD as PCP - General (Internal Medicine)  Vero Man MD as PCP - [employer] PCP     Review of Systems   All other systems reviewed and are negative.      Objective   Vitals:  /62   Pulse 74   Temp 36.5 °C (97.7 °F)   Resp 15   Ht 1.6 m (5' 3\")   Wt 83.5 kg (184 lb)   SpO2 98%   BMI 32.59 kg/m²       Physical Exam  Constitutional:       General: She is not in acute distress.     Appearance: Normal appearance.   HENT:      Head: Normocephalic and atraumatic.   Eyes:      General: No scleral icterus.     Conjunctiva/sclera: Conjunctivae normal.   Cardiovascular:      Rate and Rhythm: Normal rate and regular rhythm.      Heart sounds: Normal heart sounds.   Pulmonary:      Effort: Pulmonary effort is normal.      Breath sounds: Normal breath sounds. No wheezing.   Abdominal:      General: Bowel sounds are normal. There is no distension.      Palpations: " Abdomen is soft.      Tenderness: There is no abdominal tenderness.   Musculoskeletal:      Cervical back: Neck supple.      Right lower leg: No edema.      Left lower leg: No edema.   Lymphadenopathy:      Cervical: No cervical adenopathy.   Skin:     General: Skin is warm and dry.   Neurological:      General: No focal deficit present.      Mental Status: She is alert and oriented to person, place, and time.   Psychiatric:         Mood and Affect: Mood normal.         Behavior: Behavior normal.         Assessment/Plan   Problem List Items Addressed This Visit       Obesity    Relevant Medications    Ozempic 2 mg/dose (8 mg/3 mL) pen injector    Other Relevant Orders    Comprehensive Metabolic Panel    CBC and Auto Differential    Lipid Panel    Pulmonary hypertension (CMS/HCC)    Relevant Medications    albuterol 90 mcg/actuation inhaler    Shortness of breath on exertion    Relevant Medications    albuterol 90 mcg/actuation inhaler    Hypothyroidism    Relevant Orders    TSH with reflex to Free T4 if abnormal     Other Visit Diagnoses       Medicare annual wellness visit, subsequent    -  Primary    Encounter for screening for other disorder        Vitamin D deficiency        Need for pneumococcal vaccination        Relevant Orders    Pneumococcal conjugate vaccine, 20-valent (PREVNAR 20) (Completed)    Ingrowing nail, right great toe        Relevant Orders    Referral to Podiatry    Excess skin of abdominal wall        Relevant Orders    Referral to Plastic Surgery

## 2024-01-01 NOTE — PROGRESS NOTES
PP: 78 year-old female with history of hepatitis C, hypertension, osteoarthritis.  CC: She has been feeling well.   [place]: She had been having pain in the lower back intermittently with extension down the lateral aspect of either thigh.  Recently she has noted the discomfort to involve the left.  She has noted significant improvement in symptoms with taking Cymbalta and gabapentin.  She previously had pain in the lower back extending into the left lower extremity associated with numbness along the lateral aspect of the left lower extremity. The lower back pain and left leg pain improved following lumbar epidural nerve block. The last lumbar epidural nerve block was prior to her left hip arthroplasty. The left hip arthroplasty was January 13 2014. There is no significant joint swelling, rashes, or muscle weakness in the upper extremities.She notes some very sensitivity in her hands.  PH: Allergies: No known drug allergies; illnesses That: Hepatitis see genotype 1 A, hypertension, hypothyroidism, lumbar spondylosis, degenerative arthritis of the hip, peripheral neuropathy, urinary incontinence, right plantar fasciitis; surgeries That: Bladder suspension, hysterectomy, right carpal tunnel release surgery, left total hip arthroplasty (1/13/2014), epidural nerve blocks.  SH: She quit tobacco smoking. She denies alcohol occasionally. She denies any illicit drug use. She is retired.  FH: Father has coronary artery disease. Mother has diabetes mellitus. Her brother was killed. She has a sister who has diabetes mellitus and hypertension. She has 3 sons and 2 daughters all of whom are healthy.   PX:She is a well-developed, well-nourished, black female. The lungs, heart, abdomen, and extremities are benign. The musculoskeletal examination does not show any joint effusions. There is slight limitation to internal rotation and abduction of the left shoulder. That to finger drop test is normal. The straight leg raises normal  bilaterally in the seated position. There is normal range of motion of the cervical spine. There is mild tenderness in the left supraspinatus muscle. The neurological examination shows a deep tendon reflexes to be on + at the elbows and 2 + at the left patella and 1 + at the right patella.   CT scan cervical spine (6/27/20/2023) multilevel degenerative changes with mild spinal canal stenosis at C2/C3, trace anterior listhesis of C3 on C4, C4 on C5, and C5 on C6 and moderate canal stenosis at C7/T1.  Head CT scan (6/27/2023) normal.  Laboratory (8/25/2023) glucose 78, potassium 4.4,BUN 13, creatinine 0.94, calcium 8.8, albumin 3.5, alkaline phosphatase 70, AST 35, ALT 31.  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 She has slight normocytic anemia and has mildly elevated serum creatinine that may be related to the meloxicam.    Plan: She continue duloxetine 30 mg daily and gabapentin 300 mg 3 times per day.  She is to have laboratory for ESR and CRP.  She is to return at the next available office appointment.

## 2024-01-02 ENCOUNTER — OFFICE VISIT (OUTPATIENT)
Dept: RHEUMATOLOGY | Facility: CLINIC | Age: 79
End: 2024-01-02
Payer: MEDICARE

## 2024-01-02 VITALS
HEART RATE: 72 BPM | SYSTOLIC BLOOD PRESSURE: 132 MMHG | BODY MASS INDEX: 33.2 KG/M2 | TEMPERATURE: 96.1 F | HEIGHT: 63 IN | WEIGHT: 187.4 LBS | DIASTOLIC BLOOD PRESSURE: 76 MMHG

## 2024-01-02 DIAGNOSIS — M54.50 CHRONIC MIDLINE LOW BACK PAIN WITHOUT SCIATICA: ICD-10-CM

## 2024-01-02 DIAGNOSIS — G89.29 CHRONIC MIDLINE LOW BACK PAIN WITHOUT SCIATICA: ICD-10-CM

## 2024-01-02 PROCEDURE — 1159F MED LIST DOCD IN RCRD: CPT | Performed by: INTERNAL MEDICINE

## 2024-01-02 PROCEDURE — 99213 OFFICE O/P EST LOW 20 MIN: CPT | Performed by: INTERNAL MEDICINE

## 2024-01-02 PROCEDURE — 1036F TOBACCO NON-USER: CPT | Performed by: INTERNAL MEDICINE

## 2024-01-02 PROCEDURE — 3075F SYST BP GE 130 - 139MM HG: CPT | Performed by: INTERNAL MEDICINE

## 2024-01-02 PROCEDURE — 1126F AMNT PAIN NOTED NONE PRSNT: CPT | Performed by: INTERNAL MEDICINE

## 2024-01-02 PROCEDURE — 3078F DIAST BP <80 MM HG: CPT | Performed by: INTERNAL MEDICINE

## 2024-01-02 RX ORDER — DULOXETIN HYDROCHLORIDE 30 MG/1
30 CAPSULE, DELAYED RELEASE ORAL DAILY
Qty: 90 CAPSULE | Refills: 2 | Status: SHIPPED | OUTPATIENT
Start: 2024-01-02 | End: 2025-01-01

## 2024-01-02 RX ORDER — GABAPENTIN 300 MG/1
300 CAPSULE ORAL 3 TIMES DAILY
Qty: 270 CAPSULE | Refills: 3 | Status: SHIPPED | OUTPATIENT
Start: 2024-01-02 | End: 2025-01-01

## 2024-01-02 ASSESSMENT — PAIN SCALES - GENERAL: PAINLEVEL: 0-NO PAIN

## 2024-01-03 ENCOUNTER — TELEPHONE (OUTPATIENT)
Dept: PRIMARY CARE | Facility: CLINIC | Age: 79
End: 2024-01-03
Payer: MEDICARE

## 2024-01-03 DIAGNOSIS — E66.9 CLASS 1 OBESITY WITH SERIOUS COMORBIDITY AND BODY MASS INDEX (BMI) OF 32.0 TO 32.9 IN ADULT, UNSPECIFIED OBESITY TYPE: ICD-10-CM

## 2024-01-03 PROBLEM — L98.7 EXCESS SKIN OF ABDOMINAL WALL: Status: ACTIVE | Noted: 2024-01-03

## 2024-01-03 PROBLEM — L60.0 INGROWN NAIL OF GREAT TOE: Status: ACTIVE | Noted: 2024-01-03

## 2024-01-03 PROBLEM — H02.402 PTOSIS OF LEFT EYELID: Status: ACTIVE | Noted: 2024-01-03

## 2024-01-03 NOTE — TELEPHONE ENCOUNTER
Patient called in concerning Ozempic 2 mg/dose (8 mg/3 mL) pen injector but the Rx refill was Denied patient would like to know if their is something else that she may take in the place of that RX refill

## 2024-01-04 ENCOUNTER — OFFICE VISIT (OUTPATIENT)
Dept: CARDIOLOGY | Facility: CLINIC | Age: 79
End: 2024-01-04
Payer: MEDICARE

## 2024-01-04 ENCOUNTER — LAB (OUTPATIENT)
Dept: LAB | Facility: LAB | Age: 79
End: 2024-01-04
Payer: MEDICARE

## 2024-01-04 VITALS
HEIGHT: 63 IN | BODY MASS INDEX: 32.48 KG/M2 | SYSTOLIC BLOOD PRESSURE: 137 MMHG | OXYGEN SATURATION: 96 % | HEART RATE: 67 BPM | WEIGHT: 183.31 LBS | DIASTOLIC BLOOD PRESSURE: 85 MMHG

## 2024-01-04 DIAGNOSIS — R06.02 SHORTNESS OF BREATH ON EXERTION: ICD-10-CM

## 2024-01-04 DIAGNOSIS — G89.29 CHRONIC MIDLINE LOW BACK PAIN WITHOUT SCIATICA: ICD-10-CM

## 2024-01-04 DIAGNOSIS — I10 HYPERTENSION, UNSPECIFIED TYPE: Primary | ICD-10-CM

## 2024-01-04 DIAGNOSIS — E03.9 HYPOTHYROIDISM, UNSPECIFIED TYPE: ICD-10-CM

## 2024-01-04 DIAGNOSIS — E66.9 CLASS 1 OBESITY WITH SERIOUS COMORBIDITY AND BODY MASS INDEX (BMI) OF 32.0 TO 32.9 IN ADULT, UNSPECIFIED OBESITY TYPE: ICD-10-CM

## 2024-01-04 DIAGNOSIS — M54.50 CHRONIC MIDLINE LOW BACK PAIN WITHOUT SCIATICA: ICD-10-CM

## 2024-01-04 LAB
ALBUMIN SERPL BCP-MCNC: 3.6 G/DL (ref 3.4–5)
ALP SERPL-CCNC: 55 U/L (ref 33–136)
ALT SERPL W P-5'-P-CCNC: 40 U/L (ref 7–45)
ANION GAP SERPL CALC-SCNC: 12 MMOL/L (ref 10–20)
AST SERPL W P-5'-P-CCNC: 34 U/L (ref 9–39)
BASOPHILS # BLD AUTO: 0.03 X10*3/UL (ref 0–0.1)
BASOPHILS NFR BLD AUTO: 0.4 %
BILIRUB SERPL-MCNC: 0.5 MG/DL (ref 0–1.2)
BUN SERPL-MCNC: 22 MG/DL (ref 6–23)
CALCIUM SERPL-MCNC: 9.4 MG/DL (ref 8.6–10.6)
CHLORIDE SERPL-SCNC: 104 MMOL/L (ref 98–107)
CHOLEST SERPL-MCNC: 132 MG/DL (ref 0–199)
CHOLESTEROL/HDL RATIO: 1.8
CO2 SERPL-SCNC: 29 MMOL/L (ref 21–32)
CREAT SERPL-MCNC: 0.99 MG/DL (ref 0.5–1.05)
CRP SERPL-MCNC: <0.1 MG/DL
EOSINOPHIL # BLD AUTO: 0.2 X10*3/UL (ref 0–0.4)
EOSINOPHIL NFR BLD AUTO: 2.5 %
ERYTHROCYTE [DISTWIDTH] IN BLOOD BY AUTOMATED COUNT: 12.3 % (ref 11.5–14.5)
GFR SERPL CREATININE-BSD FRML MDRD: 58 ML/MIN/1.73M*2
GLUCOSE SERPL-MCNC: 83 MG/DL (ref 74–99)
HCT VFR BLD AUTO: 40.7 % (ref 36–46)
HDLC SERPL-MCNC: 74.2 MG/DL
HGB BLD-MCNC: 13.3 G/DL (ref 12–16)
IMM GRANULOCYTES # BLD AUTO: 0.02 X10*3/UL (ref 0–0.5)
IMM GRANULOCYTES NFR BLD AUTO: 0.2 % (ref 0–0.9)
LDLC SERPL CALC-MCNC: 39 MG/DL
LYMPHOCYTES # BLD AUTO: 3.44 X10*3/UL (ref 0.8–3)
LYMPHOCYTES NFR BLD AUTO: 42.5 %
MCH RBC QN AUTO: 30.7 PG (ref 26–34)
MCHC RBC AUTO-ENTMCNC: 32.7 G/DL (ref 32–36)
MCV RBC AUTO: 94 FL (ref 80–100)
MONOCYTES # BLD AUTO: 0.71 X10*3/UL (ref 0.05–0.8)
MONOCYTES NFR BLD AUTO: 8.8 %
NEUTROPHILS # BLD AUTO: 3.69 X10*3/UL (ref 1.6–5.5)
NEUTROPHILS NFR BLD AUTO: 45.6 %
NON HDL CHOLESTEROL: 58 MG/DL (ref 0–149)
NRBC BLD-RTO: 0 /100 WBCS (ref 0–0)
PLATELET # BLD AUTO: 275 X10*3/UL (ref 150–450)
POTASSIUM SERPL-SCNC: 4.7 MMOL/L (ref 3.5–5.3)
PROT SERPL-MCNC: 6.6 G/DL (ref 6.4–8.2)
RBC # BLD AUTO: 4.33 X10*6/UL (ref 4–5.2)
SODIUM SERPL-SCNC: 140 MMOL/L (ref 136–145)
TRIGL SERPL-MCNC: 93 MG/DL (ref 0–149)
TSH SERPL-ACNC: 1.61 MIU/L (ref 0.44–3.98)
VLDL: 19 MG/DL (ref 0–40)
WBC # BLD AUTO: 8.1 X10*3/UL (ref 4.4–11.3)

## 2024-01-04 PROCEDURE — 3079F DIAST BP 80-89 MM HG: CPT | Performed by: INTERNAL MEDICINE

## 2024-01-04 PROCEDURE — 1036F TOBACCO NON-USER: CPT | Performed by: INTERNAL MEDICINE

## 2024-01-04 PROCEDURE — 80061 LIPID PANEL: CPT

## 2024-01-04 PROCEDURE — 93010 ELECTROCARDIOGRAM REPORT: CPT | Performed by: INTERNAL MEDICINE

## 2024-01-04 PROCEDURE — 1126F AMNT PAIN NOTED NONE PRSNT: CPT | Performed by: INTERNAL MEDICINE

## 2024-01-04 PROCEDURE — 99213 OFFICE O/P EST LOW 20 MIN: CPT | Performed by: INTERNAL MEDICINE

## 2024-01-04 PROCEDURE — 86140 C-REACTIVE PROTEIN: CPT

## 2024-01-04 PROCEDURE — 36415 COLL VENOUS BLD VENIPUNCTURE: CPT

## 2024-01-04 PROCEDURE — 80053 COMPREHEN METABOLIC PANEL: CPT

## 2024-01-04 PROCEDURE — 85025 COMPLETE CBC W/AUTO DIFF WBC: CPT

## 2024-01-04 PROCEDURE — 93005 ELECTROCARDIOGRAM TRACING: CPT | Performed by: INTERNAL MEDICINE

## 2024-01-04 PROCEDURE — 1159F MED LIST DOCD IN RCRD: CPT | Performed by: INTERNAL MEDICINE

## 2024-01-04 PROCEDURE — 84443 ASSAY THYROID STIM HORMONE: CPT

## 2024-01-04 PROCEDURE — 3075F SYST BP GE 130 - 139MM HG: CPT | Performed by: INTERNAL MEDICINE

## 2024-01-04 ASSESSMENT — ENCOUNTER SYMPTOMS
OCCASIONAL FEELINGS OF UNSTEADINESS: 0
DEPRESSION: 0
LOSS OF SENSATION IN FEET: 0

## 2024-01-04 ASSESSMENT — COLUMBIA-SUICIDE SEVERITY RATING SCALE - C-SSRS
2. HAVE YOU ACTUALLY HAD ANY THOUGHTS OF KILLING YOURSELF?: NO
6. HAVE YOU EVER DONE ANYTHING, STARTED TO DO ANYTHING, OR PREPARED TO DO ANYTHING TO END YOUR LIFE?: NO
1. IN THE PAST MONTH, HAVE YOU WISHED YOU WERE DEAD OR WISHED YOU COULD GO TO SLEEP AND NOT WAKE UP?: NO

## 2024-01-04 ASSESSMENT — PAIN SCALES - GENERAL: PAINLEVEL: 0-NO PAIN

## 2024-01-04 NOTE — PATIENT INSTRUCTIONS
You were seen in the [facility] for your shortness of breath and recent echocardiogram findings.     Your July 2022 echocardiogram pictures showed that the right side of your heart is now normal sized. Continue to treat your obstructive sleep apnea to protect your heart. Your 2019 sleep study showed that your night time oxygen levels dropped to 82% during apnea events. During apnea, the soft tissues of the neck relax and block your airway. This causes the muscles of your chest wall to place negative pressure on your heart and lungs. Your sleep doctor is doing a great job helping you treat your sleep apnea.      Aerobic exercise 30 minutes 3-5 times a week can help lower blood pressure and protect your heart. Start with walking a few minutes a day and slowly build up your muscle conditioning after you complete the physical therapy program you are doing for after December right knee replacement surgery.     Your blood pressure is borderline controlled at our visit today with BP reading 137/85 mm Hg. Your primary care doctor held your lisinopril 10 mg and hydrochlorothiazide (HCTZ) 12.5 mg combination tablet before our summer 2022 office visit for low blood pressure. Monitor home blood pressure. Goal range 1201-30 mm Hg. We will need to restart medication for blood pressure > 140 mm Hg.     Atorvastatin 10 mg a day will help lower your cholesterol levels and protect your heart arteries.      Follow up with Dr. Rock in 6 months.

## 2024-01-04 NOTE — PROGRESS NOTES
Subjective   Lucille Holloway is a 78 y.o. female who presents to the [facility] for follow up of abnormal echocardiogram findings and dyspnea on exertion. Last seen in June 2023.     Since our last vist, Ms. Holloway has no active cardiac symptoms of chest pain, PND, orthopnea, MICHAEL, palpitations, syncope, or claudication. Her exertional dyspnea has improved compared to 4 months ago. Had been able to use CPAP full face mask prior to 12/30/2022 R TKR. Fully recovered from knee surgery with completion of post-op PT program. Now doing walking and daily exercise. Lost 50 lb taking Ozempic 1 mg injections since early 2022 through end of 2023.     BP was low normal at summer 2022 visit with Dr. Cardona and HCTZ/lisinopril combo held since then with SBP < 130 mm Hg. Recent office BP readings 130s/70s mm Hg. Home readings now 120s-130 mm Hg.     Prior dyspnea history:  She had a 6/29/2021 echocardiogram that showed slight enlargement of the right side of her heart with increased estimated PA pressure (RVSP 46 mm Hg) with mild-moderate TR. These findings were new compared to prior testing in 2019. Of note, she was diagnosed with obstructive sleep apnea on 2019 sleep study with AHI of 67 and SpO2 george of 82%. Repeat echocardiogram today shows normal RV/RA size and function and reduction in RVSP to borderline level of 35 mm Hg. Reduction due to improved KELLY treatment.     Past Medical History:  1. Hypertension  2. KELLY     Social History:  Nonsmoker     Family History:  No premature CAD in 1st degree relatives ( 55 years of age for male relatives, 65 years of age for female relatives)     Review of Systems    A 14 point review of systems was asked. All questions were negative except for pertinent positives listed in the HPI.     Current Outpatient Medications on File Prior to Visit   Medication Sig Dispense Refill    acetaminophen 500 mg capsule Take 2 capsules (1,000 mg) by mouth 3 times a day as  needed.      albuterol 90 mcg/actuation inhaler Inhale 2 puffs every 4 hours if needed for wheezing or shortness of breath. 18 g 3    aspirin 81 mg EC tablet Take 1 tablet (81 mg) by mouth once daily.      atorvastatin (Lipitor) 10 mg tablet Take 1 tablet (10 mg) by mouth once daily at bedtime. 30 tablet 3    docusate sodium (Colace) 100 mg capsule Take 3 capsules (300 mg) by mouth once daily.      DULoxetine (Cymbalta) 30 mg DR capsule Take 1 capsule (30 mg) by mouth once daily. AS DIRECTED 90 capsule 2    gabapentin (Neurontin) 300 mg capsule Take 1 capsule (300 mg) by mouth 3 times a day. 270 capsule 3    hydrocortisone 2.5 % ointment 1 Application      levothyroxine (Synthroid, Levoxyl) 88 mcg tablet Take 1 tablet (88 mcg) by mouth once daily.      multivitamin with minerals iron-free (Centrum Silver) Take 1 tablet by mouth once daily.      Ozempic 2 mg/dose (8 mg/3 mL) pen injector Inject 2 mg under the skin 1 (one) time per week. 3 mL 3    valsartan (Diovan) 40 mg tablet Take 1 tablet (40 mg) by mouth once daily. 30 tablet 3    calcium acetate (Phoslo) 667 mg capsule Take 2 capsules (1,334 mg) by mouth.      CALCIUM CITRATE-VITAMIN D3 ORAL Take 1 tablet by mouth once daily.      cholecalciferol (Vitamin D-3) 50 mcg (2,000 unit) capsule Take by mouth.      doxycycline (Vibramycin) 100 mg capsule Take by mouth every 12 hours.      glucosam acevedo dip-chondroit-C-Mn 015-527-29-3 mg capsule Take 1 capsule by mouth in the morning and 1 capsule before bedtime.      ketoconazole (NIZOral) 2 % cream 1 Application      magnesium citrate 100 mg capsule Take 2 capsules by mouth once daily.      [DISCONTINUED] DULoxetine (Cymbalta) 30 mg DR capsule Take 1 capsule (30 mg) by mouth once daily. AS DIRECTED 90 capsule 2    [DISCONTINUED] gabapentin (Neurontin) 300 mg capsule Take 1 capsule (300 mg) by mouth 3 times a day.       No current facility-administered medications on file prior to visit.          Objective   Physical  "Exam  BP Readings from Last 3 Encounters:   24 137/85   24 132/76   23 120/62      Wt Readings from Last 3 Encounters:   24 83.2 kg (183 lb 5 oz)   24 85 kg (187 lb 6.4 oz)   23 83.5 kg (184 lb)      BMI: Estimated body mass index is 32.47 kg/m² as calculated from the following:    Height as of this encounter: 1.6 m (5' 3\").    Weight as of this encounter: 83.2 kg (183 lb 5 oz).  BSA: Estimated body surface area is 1.92 meters squared as calculated from the following:    Height as of this encounter: 1.6 m (5' 3\").    Weight as of this encounter: 83.2 kg (183 lb 5 oz).    General: no acute distress  HEENT: EOMI, no scleral icterus.  Lungs: Clear to auscultation bilaterally without wheezing, rales, or rhonchi.  Cardiovascular: Regular rhythm and rate. Normal S1 and S2. No murmurs, rubs, or gallops are appreciated. JVP normal.  Abdomen: Soft, nontender, nondistended. Bowel sounds present.  Extremities: Warm and well perfused with equal 2+ pulses bilaterally.  No edema present.  Neurologic: Alert and oriented x3.    I have personally reviewed the following images and laboratory findings:  Last echocardiogram: 2021 TTE: LV EF 65-70%, LV conc remodeling (LVMI 72 gm/m2), impaired relaxation diastology (E/e' 10), RV size upper limits of normal, normal RV function (TAPSE 2.1 cm), mild-mod AI, mild MR, mild-mod TR, RVSP 46 mm Hg, normal IVC size (RA pressure 3 mm Hg).    Last cath / stress test / CACS: 10/21/2019 CT chest: protocol negative for PE, no coronary artery calcification, normal PA size, normal heart size, no pulmonary edema or fibrosis     Dyspnea Work up:  1. 2019 PFTs: FEV1 1.94 L (108% pred), FVC 2.72 L (117% pred), FEV1/FVC ratio 71%, TLC 4.38 L (104%), DLCO 12.95 (65% pred)    Most recent EC2024 ECG: Sinus rhythm, 67 bpm, normal ECG. Personally reviewed in office.    Lab Results   Component Value Date    CHOL 113 2023    CHOL 115 10/10/2022    CHOL " "202 (H) 07/11/2022     Lab Results   Component Value Date    HDL 70.7 06/09/2023    HDL 55.5 10/10/2022    HDL 63.8 07/11/2022     No results found for: \"LDLCALC\"  Lab Results   Component Value Date    TRIG 85 06/09/2023    TRIG 88 10/10/2022    TRIG 128 07/11/2022     No components found for: \"CHOLHDL\"   6/9/2023 LDL 25     Assessment/Plan   1. Shortness of breath on exertion:  Multi-factorial: KELLY, diet controlled hypertension, physical deconditioning.     Exertional dyspnea has improved with treating KELLY with nightly CPAP, good blood pressure control, and 50 lb weight loss from Ozempic. She had R TKR 12/30/2022 and is now fully recovered after doing post-surgery PT program. Repeat July 2022 echocardiogram showed that her RV size and function were normal and RVSP was reduced to 35 mm Hg from 46 mm Hg on prior study. I recommend that Ms. Holloway continue CPAP mask use for KELLY to help lower RVSP and prevent stress on the right side of the heart. She is holding BP medications since summer 2022 for BP < 130 mm Hg. Should restart for SBP > 140 mm Hg.      2. CAD risk factors / Dyslipidemia:  No coronary artery calcification on October 2019 CT chest scan. Blood pressure at goal. Cholesterol at goal for given risk level taking atorvastatin 10 mg a day. Low (< 5%) risk of MI in the next 10 years given current risks. June 2023 LDL well controlled at 25.     Follow up with Dr. Rock in 6 months.           SIGNATURE: Chas Rock MD PATIENT NAME: Lucille Holloway   DATE/TIME: January 4, 2024 9:49 AM MRN: [id number]     "

## 2024-01-09 RX ORDER — SEMAGLUTIDE 2.68 MG/ML
2 INJECTION, SOLUTION SUBCUTANEOUS
Qty: 3 ML | Refills: 3 | Status: SHIPPED | OUTPATIENT
Start: 2024-01-09 | End: 2024-05-13

## 2024-01-09 NOTE — TELEPHONE ENCOUNTER
Patient stated authorization is needed for Ozempic. She called on 1/3/24 and without medicine since 1/1/24.

## 2024-01-12 DIAGNOSIS — E78.5 DYSLIPIDEMIA: ICD-10-CM

## 2024-01-12 LAB
ATRIAL RATE: 67 BPM
P AXIS: 67 DEGREES
P OFFSET: 206 MS
P ONSET: 155 MS
PR INTERVAL: 134 MS
Q ONSET: 222 MS
QRS COUNT: 11 BEATS
QRS DURATION: 74 MS
QT INTERVAL: 394 MS
QTC CALCULATION(BAZETT): 416 MS
QTC FREDERICIA: 409 MS
R AXIS: 85 DEGREES
T AXIS: 85 DEGREES
T OFFSET: 419 MS
VENTRICULAR RATE: 67 BPM

## 2024-01-15 RX ORDER — ATORVASTATIN CALCIUM 10 MG/1
10 TABLET, FILM COATED ORAL NIGHTLY
Qty: 100 TABLET | Refills: 0 | Status: SHIPPED | OUTPATIENT
Start: 2024-01-15 | End: 2024-04-01

## 2024-01-16 ENCOUNTER — TELEPHONE (OUTPATIENT)
Dept: PRIMARY CARE | Facility: CLINIC | Age: 79
End: 2024-01-16
Payer: MEDICARE

## 2024-01-16 DIAGNOSIS — E03.9 HYPOTHYROIDISM, UNSPECIFIED TYPE: Primary | ICD-10-CM

## 2024-01-16 RX ORDER — LEVOTHYROXINE SODIUM 88 UG/1
88 TABLET ORAL DAILY
Qty: 90 TABLET | Refills: 1 | Status: SHIPPED | OUTPATIENT
Start: 2024-01-16 | End: 2024-02-02 | Stop reason: SDUPTHER

## 2024-01-16 NOTE — TELEPHONE ENCOUNTER
Patient called and said that she would like to know if she can a small supply of her pills until her mail order comes Rx refill  on levothyroxine (Synthroid, Levoxyl) 88 mcg tablet       Pharmacy; [facility]  [address] [address], [place]  [phone]

## 2024-01-16 NOTE — TELEPHONE ENCOUNTER
Rx refill.   levothyroxine (Synthroid, Levoxyl) 88 mcg tablet   Patient took last pill this morning.  [facility] - [place], [place]  Phone: [phone]   Fax: [phone]   Patient was last seen 8/25/23. Next appointment is 3/8/24.

## 2024-01-17 DIAGNOSIS — R06.02 SHORTNESS OF BREATH ON EXERTION: ICD-10-CM

## 2024-01-17 DIAGNOSIS — I27.20 PULMONARY HYPERTENSION (MULTI): ICD-10-CM

## 2024-01-19 DIAGNOSIS — E66.9 CLASS 1 OBESITY WITH SERIOUS COMORBIDITY AND BODY MASS INDEX (BMI) OF 32.0 TO 32.9 IN ADULT, UNSPECIFIED OBESITY TYPE: Primary | ICD-10-CM

## 2024-01-22 RX ORDER — ALBUTEROL SULFATE 90 UG/1
2 AEROSOL, METERED RESPIRATORY (INHALATION) EVERY 4 HOURS PRN
Qty: 51 G | Refills: 2 | Status: SHIPPED | OUTPATIENT
Start: 2024-01-22

## 2024-01-25 ENCOUNTER — HOSPITAL ENCOUNTER (OUTPATIENT)
Dept: RADIOLOGY | Facility: HOSPITAL | Age: 79
Discharge: HOME | End: 2024-01-25
Payer: MEDICARE

## 2024-01-25 ENCOUNTER — HOSPITAL ENCOUNTER (OUTPATIENT)
Dept: RADIOLOGY | Facility: CLINIC | Age: 79
Discharge: HOME | End: 2024-01-25
Payer: MEDICARE

## 2024-01-25 DIAGNOSIS — M21.70 UNEQUAL LIMB LENGTH (ACQUIRED), UNSPECIFIED SITE: ICD-10-CM

## 2024-01-25 PROCEDURE — 77073 BONE LENGTH STUDIES: CPT | Performed by: RADIOLOGY

## 2024-01-25 PROCEDURE — 77073 BONE LENGTH STUDIES: CPT

## 2024-02-02 DIAGNOSIS — E03.9 HYPOTHYROIDISM, UNSPECIFIED TYPE: ICD-10-CM

## 2024-02-02 RX ORDER — LEVOTHYROXINE SODIUM 88 UG/1
88 TABLET ORAL DAILY
Qty: 90 TABLET | Refills: 2 | Status: SHIPPED | OUTPATIENT
Start: 2024-02-02 | End: 2024-02-06 | Stop reason: SDUPTHER

## 2024-02-06 DIAGNOSIS — E03.9 HYPOTHYROIDISM, UNSPECIFIED TYPE: ICD-10-CM

## 2024-02-06 RX ORDER — LEVOTHYROXINE SODIUM 88 UG/1
88 TABLET ORAL DAILY
Qty: 90 TABLET | Refills: 2 | Status: SHIPPED | OUTPATIENT
Start: 2024-02-06 | End: 2024-02-19 | Stop reason: SDUPTHER

## 2024-02-13 DIAGNOSIS — I10 PRIMARY HYPERTENSION: ICD-10-CM

## 2024-02-15 RX ORDER — VALSARTAN 40 MG/1
40 TABLET ORAL DAILY
Qty: 100 TABLET | Refills: 2 | Status: SHIPPED | OUTPATIENT
Start: 2024-02-15

## 2024-02-16 ENCOUNTER — OFFICE VISIT (OUTPATIENT)
Dept: RHEUMATOLOGY | Facility: CLINIC | Age: 79
End: 2024-02-16
Payer: MEDICARE

## 2024-02-16 VITALS
TEMPERATURE: 96.3 F | HEART RATE: 71 BPM | SYSTOLIC BLOOD PRESSURE: 123 MMHG | BODY MASS INDEX: 33.66 KG/M2 | HEIGHT: 63 IN | DIASTOLIC BLOOD PRESSURE: 76 MMHG | WEIGHT: 190 LBS

## 2024-02-16 DIAGNOSIS — M17.12 PRIMARY OSTEOARTHRITIS OF LEFT KNEE: Primary | ICD-10-CM

## 2024-02-16 PROCEDURE — 3078F DIAST BP <80 MM HG: CPT | Performed by: INTERNAL MEDICINE

## 2024-02-16 PROCEDURE — 1125F AMNT PAIN NOTED PAIN PRSNT: CPT | Performed by: INTERNAL MEDICINE

## 2024-02-16 PROCEDURE — 3074F SYST BP LT 130 MM HG: CPT | Performed by: INTERNAL MEDICINE

## 2024-02-16 PROCEDURE — 99213 OFFICE O/P EST LOW 20 MIN: CPT | Performed by: INTERNAL MEDICINE

## 2024-02-16 PROCEDURE — 1036F TOBACCO NON-USER: CPT | Performed by: INTERNAL MEDICINE

## 2024-02-16 PROCEDURE — 1159F MED LIST DOCD IN RCRD: CPT | Performed by: INTERNAL MEDICINE

## 2024-02-16 PROCEDURE — 1160F RVW MEDS BY RX/DR IN RCRD: CPT | Performed by: INTERNAL MEDICINE

## 2024-02-16 PROCEDURE — 1123F ACP DISCUSS/DSCN MKR DOCD: CPT | Performed by: INTERNAL MEDICINE

## 2024-02-16 RX ORDER — AMOXICILLIN 500 MG/1
CAPSULE ORAL
COMMUNITY
Start: 2024-01-08

## 2024-02-16 ASSESSMENT — PAIN SCALES - GENERAL: PAINLEVEL: 4

## 2024-02-18 NOTE — PROGRESS NOTES
She complains of muscle cramps involving the lower extremities.  She does note some improvement in the musculoskeletal pain with taking Cymbalta 30 mg daily and gabapentin 300 mg 3 times per day. She stopped Ozempic due to abdominal discomfort.    The lungs, heart, abdomen, and extremities are benign.  Pedal pulses are 1+ bilaterally.  The musculoskeletal examination does not show any joint effusions.  The straight leg raise test is normal bilaterally in the seated position.    X-ray lower extremities (1/25/2024) left total hip and right total knee arthroplasty.  There is degenerative change in the left knee with chondrocalcinosis and degenerative change in the right hip.  No evidence of fracture.  Laboratory (1/4/2024) WBC 8.1, hemoglobin 13.3, hematocrit 40.7, MCV 94, MCHC 32.7, platelets 275, BUN 22, creatinine 0.99, glucose 83, sodium 140, potassium 4.7, alkaline phosphatase 55, ALT 40, AST 34, TSH 1.61, CRP <0.10.    Recurrent muscle cramping in the lower extremities may be neurogenic in etiology.  She has history of hepatitis C, hypertension, osteoarthritis.    She is to continue Cymbalta 30 mg daily and gabapentin 300 mg 3 times per day.  She is to do stretching exercises of the lower extremities as tolerated.  She is to return at the next available office appointment.

## 2024-02-19 DIAGNOSIS — E03.9 HYPOTHYROIDISM, UNSPECIFIED TYPE: ICD-10-CM

## 2024-02-19 RX ORDER — LEVOTHYROXINE SODIUM 88 UG/1
88 TABLET ORAL DAILY
Qty: 90 TABLET | Refills: 2 | Status: SHIPPED | OUTPATIENT
Start: 2024-02-19

## 2024-03-04 ENCOUNTER — OFFICE VISIT (OUTPATIENT)
Dept: NEUROLOGY | Facility: CLINIC | Age: 79
End: 2024-03-04
Payer: MEDICARE

## 2024-03-04 ENCOUNTER — LAB (OUTPATIENT)
Dept: LAB | Facility: LAB | Age: 79
End: 2024-03-04
Payer: MEDICARE

## 2024-03-04 VITALS
BODY MASS INDEX: 34.02 KG/M2 | WEIGHT: 192 LBS | DIASTOLIC BLOOD PRESSURE: 75 MMHG | HEIGHT: 63 IN | SYSTOLIC BLOOD PRESSURE: 129 MMHG | HEART RATE: 78 BPM

## 2024-03-04 DIAGNOSIS — R41.3 MEMORY DYSFUNCTION: Primary | ICD-10-CM

## 2024-03-04 DIAGNOSIS — Z86.69 HISTORY OF PTOSIS OF EYELID: ICD-10-CM

## 2024-03-04 DIAGNOSIS — R41.3 MEMORY DYSFUNCTION: ICD-10-CM

## 2024-03-04 DIAGNOSIS — Z87.820 HISTORY OF CONCUSSION: ICD-10-CM

## 2024-03-04 PROCEDURE — 83519 RIA NONANTIBODY: CPT

## 2024-03-04 PROCEDURE — 82607 VITAMIN B-12: CPT

## 2024-03-04 PROCEDURE — 3074F SYST BP LT 130 MM HG: CPT | Performed by: PSYCHIATRY & NEUROLOGY

## 2024-03-04 PROCEDURE — 99205 OFFICE O/P NEW HI 60 MIN: CPT | Performed by: PSYCHIATRY & NEUROLOGY

## 2024-03-04 PROCEDURE — 1123F ACP DISCUSS/DSCN MKR DOCD: CPT | Performed by: PSYCHIATRY & NEUROLOGY

## 2024-03-04 PROCEDURE — 1036F TOBACCO NON-USER: CPT | Performed by: PSYCHIATRY & NEUROLOGY

## 2024-03-04 PROCEDURE — 36415 COLL VENOUS BLD VENIPUNCTURE: CPT

## 2024-03-04 PROCEDURE — 1159F MED LIST DOCD IN RCRD: CPT | Performed by: PSYCHIATRY & NEUROLOGY

## 2024-03-04 PROCEDURE — 1125F AMNT PAIN NOTED PAIN PRSNT: CPT | Performed by: PSYCHIATRY & NEUROLOGY

## 2024-03-04 PROCEDURE — 3078F DIAST BP <80 MM HG: CPT | Performed by: PSYCHIATRY & NEUROLOGY

## 2024-03-04 PROCEDURE — 1160F RVW MEDS BY RX/DR IN RCRD: CPT | Performed by: PSYCHIATRY & NEUROLOGY

## 2024-03-04 NOTE — PROGRESS NOTES
"Subjective     Lucille Holloway is a 78 y.o. year old right handed female presenting as a new patient for memory concerns after concussion, transient postconcussion ptosis OS.    HPI    She has past medical history significant for hypertension, hypothyroidism on replacement, chronic kidney disease, MGUS, left hip replacement, obstructive sleep apnea, bilateral carpal tunnel syndrome, cataract surgery, knee replacement.    She is evaluated in the office today as a new patient referred by Alejandra Bunch CNP, previously of this department, who saw her for headache evaluation on 9/21/2023.    She indicates falling at home in June 2023, striking her head (left frontal vertex region) on a wall.  She is not sure if she lost consciousness.  She was helped up by her daughter.  If she did lose consciousness it was apparently very brief.  However, she indicates it was a hard impact.    After the above incident she noted \"terrible headaches\" for about 2 weeks with subsequent improvement.  She also noted a painful stiff neck with very limited range of motion for about 10 days, with resolution subsequently.    Additionally since the time of the head impact and for about 1 month thereafter she noted a severe, nonfluctuating ptosis OS which by her description was worse than half mast but not total; it sounds as if it was enough to occlude vision OS.  She did not note diplopia.  She saw an ophthalmologist and the option of corrective surgery for ptosis was apparently discussed but she is not sure that an etiology was determined.    Her ptosis has resolved and has not recurred.    She does not think she can attribute the ptosis to periorbital swelling/ecchymosis.    Additional symptoms that she noted after the concussion include blurry vision in both eyes when watching TV at night, tinnitus, and allodynia over the scalp bilaterally.    She denies any prior history of ptosis and any history of diplopia, dysphagia or dysarthria.    She is " not significantly bothered by headaches at this point.    She additionally presents for memory concerns.  She indicates some difficulty with word searching when she is doing Bible study.  She remembers subject matter but not specific Bible verses.  She has to write notes more frequently.  These difficulties are new since her concussion in June 2023.    She indicates no trouble with driving/navigation.  She remembers to take her medications.  She occasionally misplaces objects but not on a frequent basis.  She reports good quality of sleep which is restorative.  She does take occasional naps.    Formal education is an associates degree.  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  She retired in 2010.    I reviewed neuroimaging from 6/27/2023.  Noncontrast head CT shows a moderate burden of patchy subcortical white matter change, including right greater than left frontal shallow subcortical hypodensity.  No stigmata of hydrocephalus.  Cervical spine CT from the same date shows essentially preserved sagittal alignment but extensive multilevel spondylosis.    Review of Systems    Review of Systems:  Neurologic:  As per the history of present illness.  Constitutional:  Negative for fevers, chills, or weight loss.  Musculoskeletal: As per the history of present illness. Cardiovascular:  Negative for chest pain or palpitations.  Respiratory:  Negative for dyspnea.  Eyes:  Negative for vision loss or diplopia.  ENT:  Negative for hearing loss, positive for tinnitus.  GI:  Negative for bowel incontinence.  :  Negative for bladder incontinence.  Dermatologic:  Negative for rash.        Patient Active Problem List   Diagnosis    Abnormal ECG    Abnormal LFTs    Abnormal urine findings    Acquired pes planus    Chronic back pain    Anemia    Alteration in nutrition    Arthritis    Brittle nails    Bruit    Carotid atherosclerosis    Carpal tunnel  syndrome of left wrist    Carpal tunnel syndrome on both sides    Coccydynia    Chest pain, exertional    Chronic constipation    Chronic hepatitis C (CMS/HCC)    Hepatitis C virus    Chronic renal impairment, stage 3 (moderate) (CMS/HCC)    Chronic renal insufficiency    Chronic rhinitis    Colon polyp    Contact dermatitis    Eczema    Fall    Head trauma    Cervicalgia    Headache    Hearing loss    Hypertension    IGT (impaired glucose tolerance)    Knee pain, right    Cervical disc disease    Lumbar degenerative disc disease    Multilevel degenerative disc disease    Lumbosacral radiculitis    Macromastia    Memory loss    Mild aortic insufficiency    Monoclonal gammopathy of undetermined significance    Obesity    Obstructive sleep apnea of adult    Pain in both hands    Hip pain, left    Pain of left lower extremity    Peripheral neuropathy    Poison ivy dermatitis    Polyphagia    Primary osteoarthritis of both hands    Proteinuria    Psychological factors affecting morbid obesity (CMS/HCC)    Pulmonary hypertension (CMS/HCC)    Right upper quadrant abdominal pain    Sciatica of right side    Segmental and somatic dysfunction    Exertional dyspnea    Shortness of breath on exertion    Simple goiter    Status post total replacement of left hip    Hypothyroidism    Thyroid nodule    Urinary incontinence    Psoas tendonitis    Wrist tendonitis    Osteoarthritis of hip    Osteoarthritis    Pleurisy    Primary localized osteoarthritis of right knee    Chest pain    CKD (chronic kidney disease)    Acute exacerbation of chronic low back pain    Other specified hypothyroidism    Ptosis of left eyelid    Ingrown nail of great toe    Excess skin of abdominal wall     Past Medical History:   Diagnosis Date    Acute upper respiratory infection, unspecified 03/05/2019    Acute URI    Bariatric surgery status 11/20/2019    Bariatric surgery status    Encounter for general adult medical examination without abnormal  findings 08/05/2021    Encounter for preventive health examination    Other general symptoms and signs 03/05/2019    Flu-like symptoms    Other neuromuscular dysfunction of bladder     Hyperactivity of bladder    Pain in right foot 09/17/2015    Foot pain, right    Personal history of other diseases of the circulatory system     History of hypertension    Personal history of other diseases of the respiratory system 03/05/2019    History of acute sinusitis    Personal history of other endocrine, nutritional and metabolic disease     History of thyroid disease    Personal history of other infectious and parasitic diseases 08/28/2019    History of hepatitis    Plantar fascial fibromatosis 09/21/2015    Plantar fasciitis, right    Syncope and collapse 10/16/2015    Vasovagal syncope    Vitamin D deficiency, unspecified 08/28/2019    Mild vitamin D deficiency     Past Surgical History:   Procedure Laterality Date    BLADDER SURGERY  08/28/2019    Bladder Surgery    CARPAL TUNNEL RELEASE  08/28/2019    Neuroplasty Decompression Median Nerve At Carpal Tunnel    HYSTERECTOMY  08/28/2019    Hysterectomy    OTHER SURGICAL HISTORY  08/28/2019    Carpal tunnel surgery    TOTAL HIP ARTHROPLASTY  07/16/2019    Total Hip Replacement    TOTAL KNEE ARTHROPLASTY Right 12/30/2022     Social History     Tobacco Use    Smoking status: Former     Types: Cigarettes    Smokeless tobacco: Never   Substance Use Topics    Alcohol use: Not Currently     family history includes Diabetes in her mother; Hypertension in her mother; Thyroid disease in her mother.    Current Outpatient Medications:     acetaminophen 500 mg capsule, Take 2 capsules (1,000 mg) by mouth 3 times a day as needed., Disp: , Rfl:     albuterol 90 mcg/actuation inhaler, USE 2 INHALATIONS BY MOUTH EVERY 4 HOURS IF NEEDED FOR WHEEZING  OR SHORTNESS OF BREATH, Disp: 51 g, Rfl: 2    amoxicillin (Amoxil) 500 mg capsule, TAKE FOUR CAPSULES BY MOUTH 1 HOUR BEFORE APPOINTMENT, Disp:  , Rfl:     aspirin 81 mg EC tablet, Take 1 tablet (81 mg) by mouth once daily., Disp: , Rfl:     atorvastatin (Lipitor) 10 mg tablet, TAKE 1 TABLET BY MOUTH ONCE  DAILY AT BEDTIME, Disp: 100 tablet, Rfl: 0    calcium acetate (Phoslo) 667 mg capsule, Take 2 capsules (1,334 mg) by mouth., Disp: , Rfl:     CALCIUM CITRATE-VITAMIN D3 ORAL, Take 1 tablet by mouth once daily., Disp: , Rfl:     cholecalciferol (Vitamin D-3) 50 mcg (2,000 unit) capsule, Take by mouth., Disp: , Rfl:     docusate sodium (Colace) 100 mg capsule, Take 3 capsules (300 mg) by mouth once daily., Disp: , Rfl:     doxycycline (Vibramycin) 100 mg capsule, Take by mouth every 12 hours., Disp: , Rfl:     DULoxetine (Cymbalta) 30 mg DR capsule, Take 1 capsule (30 mg) by mouth once daily. AS DIRECTED, Disp: 90 capsule, Rfl: 2    gabapentin (Neurontin) 300 mg capsule, Take 1 capsule (300 mg) by mouth 3 times a day., Disp: 270 capsule, Rfl: 3    hydrocortisone 2.5 % ointment, 1 Application, Disp: , Rfl:     ketoconazole (NIZOral) 2 % cream, 1 Application, Disp: , Rfl:     levothyroxine (Synthroid, Levoxyl) 88 mcg tablet, Take 1 tablet (88 mcg) by mouth once daily., Disp: 90 tablet, Rfl: 2    magnesium citrate 100 mg capsule, Take 2 capsules by mouth once daily., Disp: , Rfl:     multivitamin with minerals iron-free (Centrum Silver), Take 1 tablet by mouth once daily., Disp: , Rfl:     Ozempic 2 mg/dose (8 mg/3 mL) pen injector, Inject 2 mg under the skin 1 (one) time per week. (Patient not taking: Reported on 2/16/2024), Disp: 3 mL, Rfl: 3    valsartan (Diovan) 40 mg tablet, TAKE 1 TABLET BY MOUTH ONCE  DAILY, Disp: 100 tablet, Rfl: 2  Allergies   Allergen Reactions    Soap Swelling and Rash     Oil of Olay       Objective   Neurological Exam  Physical Exam    Physical Examination:    General: Alert woman who was ambulatory without assistive devices.  Masked due to pandemic.    Cardiovascular: Carotid auscultation revealed no bruits.    Mental Status:  Clear sensorium without fluctuation.  Appropriate and oriented in conversation. Fluent unremarkable speech without paraphasic errors or hesitancy.  30/38 on Short Test of Mental Status.  7/8 orientation, 7/7 attention (digit span), 4/4 registration with 1 learning trial, 2/4 calculation, 3/3 abstraction, 2/2 clock, 1/2 cube, 4/4 fund of knowledge, 0/4 delayed recall without cueing.    Cranial Nerves: Exam was conducted with patient masked due to pandemic.  Funduscopic exam with sharp temporal disc margin OD and was not as well visualized OS on nondilated exam.  Pupils were equal, round and reactive to light with no relative afferent pupillary defect.  Extraocular movements were intact and conjugate without nystagmus.  No ptosis.  Visual fields were full to confrontation tested binocularly.  Facial sensation was symmetric to pin over the forehead and lateral to the mask.  Forehead and periocular facial motor function was symmetrically intact, but perioral facial motor function was not assessed due to mask.  Hearing was grossly intact.  No dysarthria.  Shoulder shrug was symmetric.  Tongue protrusion was not assessed due to mask.    Motor: Muscle bulk and tone were normal throughout. There was no pronator drift or asymmetry of finger taps. Confrontation strength was symmetrically 5/5 throughout the upper and lower extremities.     Coordination: Finger to finger, heel to shin, and alternating hand movements were rapid and accurate without dysmetria. There was no tremor, myoclonus or dystonic posturing.    Tendon Reflexes: Symmetrically trace biceps and triceps, 1+ brachioradialis, absent ankles, neutral plantars.  Patellar was trace right and absent left.    Sensation: Pin and light touch were symmetric over the hands. Vibration thresholds were normal at the index fingers. Romberg sign was absent.     Station: Intact and stable.    Gait: Stable and unremarkable.  Intact tandem.      Assessment/Plan     This woman  endorses new cognitive complaints since a concussion in June 2023.  She scores 30/38 on Short Test of Mental Status.  Given the prolonged nature of her complaints after concussion and some atypical appearing white matter hypodensities on CT I recommended further evaluation with a brain MRI.      I additionally recommended checking a vitamin B12 level with regard to her cognitive complaints.    She also endorses nonfluctuating ptosis lasting for roughly 1 month after the concussion, which was presumably a traumatic phenomenon.  It has not recurred but I did recommend checking acetylcholine receptor binding and modulating antibodies.    I will contact her with study results.    We will determine further evaluation and management steps and timing of her next office visit after her studies are completed.

## 2024-03-04 NOTE — PATIENT INSTRUCTIONS
As we discussed, your event in June 2023 was consistent with a concussion.    Probably the transient ptosis (drooping) of the left eyelid was due to trauma, but I am checking 2 blood tests to look into another possibility called myasthenia.    Because of the persistence of your memory issues after the concussion, and some patchy change in the white matter including both frontal lobes on the head CT, I recommend evaluating further with a brain MRI.  I am also checking a vitamin B12 level which is pertinent to your memory.    I will call with study results.    We will determine further evaluation and management steps and timing of your next office visit after the studies are completed.

## 2024-03-05 LAB — VIT B12 SERPL-MCNC: 1250 PG/ML (ref 211–911)

## 2024-03-07 LAB
ACHR BIND AB SER-SCNC: 0 NMOL/L (ref 0–0.4)
ACHR MOD AB/ACHR TOTAL SFR SER: 0 %

## 2024-03-08 ENCOUNTER — OFFICE VISIT (OUTPATIENT)
Dept: PRIMARY CARE | Facility: CLINIC | Age: 79
End: 2024-03-08
Payer: MEDICARE

## 2024-03-08 VITALS
HEART RATE: 73 BPM | TEMPERATURE: 97.2 F | HEIGHT: 63 IN | RESPIRATION RATE: 14 BRPM | DIASTOLIC BLOOD PRESSURE: 58 MMHG | OXYGEN SATURATION: 98 % | WEIGHT: 190 LBS | SYSTOLIC BLOOD PRESSURE: 120 MMHG | BODY MASS INDEX: 33.66 KG/M2

## 2024-03-08 DIAGNOSIS — E66.09 CLASS 1 OBESITY DUE TO EXCESS CALORIES WITH SERIOUS COMORBIDITY AND BODY MASS INDEX (BMI) OF 33.0 TO 33.9 IN ADULT: ICD-10-CM

## 2024-03-08 DIAGNOSIS — I27.20 PULMONARY HYPERTENSION (MULTI): ICD-10-CM

## 2024-03-08 DIAGNOSIS — N18.31 CHRONIC RENAL IMPAIRMENT, STAGE 3A (MULTI): ICD-10-CM

## 2024-03-08 DIAGNOSIS — Z00.00 MEDICARE ANNUAL WELLNESS VISIT, SUBSEQUENT: Primary | ICD-10-CM

## 2024-03-08 DIAGNOSIS — M06.9 RHEUMATOID ARTHRITIS, INVOLVING UNSPECIFIED SITE, UNSPECIFIED WHETHER RHEUMATOID FACTOR PRESENT (MULTI): ICD-10-CM

## 2024-03-08 DIAGNOSIS — Z13.89 ENCOUNTER FOR SCREENING FOR OTHER DISORDER: ICD-10-CM

## 2024-03-08 PROBLEM — F54 PSYCHOLOGICAL FACTORS AFFECTING MORBID OBESITY (MULTI): Status: RESOLVED | Noted: 2023-04-10 | Resolved: 2024-03-08

## 2024-03-08 PROBLEM — B18.2 CHRONIC HEPATITIS C (MULTI): Status: RESOLVED | Noted: 2023-04-10 | Resolved: 2024-03-08

## 2024-03-08 PROBLEM — E66.01 PSYCHOLOGICAL FACTORS AFFECTING MORBID OBESITY (MULTI): Status: RESOLVED | Noted: 2023-04-10 | Resolved: 2024-03-08

## 2024-03-08 PROBLEM — E66.811 CLASS 1 OBESITY DUE TO EXCESS CALORIES WITH SERIOUS COMORBIDITY AND BODY MASS INDEX (BMI) OF 33.0 TO 33.9 IN ADULT: Status: ACTIVE | Noted: 2023-04-10

## 2024-03-08 PROCEDURE — 1036F TOBACCO NON-USER: CPT | Performed by: STUDENT IN AN ORGANIZED HEALTH CARE EDUCATION/TRAINING PROGRAM

## 2024-03-08 PROCEDURE — 1159F MED LIST DOCD IN RCRD: CPT | Performed by: STUDENT IN AN ORGANIZED HEALTH CARE EDUCATION/TRAINING PROGRAM

## 2024-03-08 PROCEDURE — 1160F RVW MEDS BY RX/DR IN RCRD: CPT | Performed by: STUDENT IN AN ORGANIZED HEALTH CARE EDUCATION/TRAINING PROGRAM

## 2024-03-08 PROCEDURE — 1170F FXNL STATUS ASSESSED: CPT | Performed by: STUDENT IN AN ORGANIZED HEALTH CARE EDUCATION/TRAINING PROGRAM

## 2024-03-08 PROCEDURE — 1125F AMNT PAIN NOTED PAIN PRSNT: CPT | Performed by: STUDENT IN AN ORGANIZED HEALTH CARE EDUCATION/TRAINING PROGRAM

## 2024-03-08 PROCEDURE — 1158F ADVNC CARE PLAN TLK DOCD: CPT | Performed by: STUDENT IN AN ORGANIZED HEALTH CARE EDUCATION/TRAINING PROGRAM

## 2024-03-08 PROCEDURE — 3078F DIAST BP <80 MM HG: CPT | Performed by: STUDENT IN AN ORGANIZED HEALTH CARE EDUCATION/TRAINING PROGRAM

## 2024-03-08 PROCEDURE — 3074F SYST BP LT 130 MM HG: CPT | Performed by: STUDENT IN AN ORGANIZED HEALTH CARE EDUCATION/TRAINING PROGRAM

## 2024-03-08 PROCEDURE — G0439 PPPS, SUBSEQ VISIT: HCPCS | Performed by: STUDENT IN AN ORGANIZED HEALTH CARE EDUCATION/TRAINING PROGRAM

## 2024-03-08 PROCEDURE — 1123F ACP DISCUSS/DSCN MKR DOCD: CPT | Performed by: STUDENT IN AN ORGANIZED HEALTH CARE EDUCATION/TRAINING PROGRAM

## 2024-03-08 PROCEDURE — 99397 PER PM REEVAL EST PAT 65+ YR: CPT | Performed by: STUDENT IN AN ORGANIZED HEALTH CARE EDUCATION/TRAINING PROGRAM

## 2024-03-08 ASSESSMENT — PATIENT HEALTH QUESTIONNAIRE - PHQ9
2. FEELING DOWN, DEPRESSED OR HOPELESS: NOT AT ALL
SUM OF ALL RESPONSES TO PHQ9 QUESTIONS 1 AND 2: 0
1. LITTLE INTEREST OR PLEASURE IN DOING THINGS: NOT AT ALL

## 2024-03-08 ASSESSMENT — ENCOUNTER SYMPTOMS
DEPRESSION: 0
LOSS OF SENSATION IN FEET: 0
OCCASIONAL FEELINGS OF UNSTEADINESS: 1

## 2024-03-08 ASSESSMENT — LIFESTYLE VARIABLES
HOW OFTEN DO YOU HAVE SIX OR MORE DRINKS ON ONE OCCASION: NEVER
HOW MANY STANDARD DRINKS CONTAINING ALCOHOL DO YOU HAVE ON A TYPICAL DAY: PATIENT DOES NOT DRINK
AUDIT-C TOTAL SCORE: 0
SKIP TO QUESTIONS 9-10: 1
HOW OFTEN DO YOU HAVE A DRINK CONTAINING ALCOHOL: NEVER

## 2024-03-08 ASSESSMENT — ACTIVITIES OF DAILY LIVING (ADL)
MANAGING_FINANCES: INDEPENDENT
BATHING: INDEPENDENT
GROCERY_SHOPPING: INDEPENDENT
TAKING_MEDICATION: INDEPENDENT
DOING_HOUSEWORK: INDEPENDENT
DRESSING: INDEPENDENT

## 2024-03-08 NOTE — ASSESSMENT & PLAN NOTE
>>ASSESSMENT AND PLAN FOR CLASS 1 OBESITY DUE TO EXCESS CALORIES WITH SERIOUS COMORBIDITY AND BODY MASS INDEX (BMI) OF 33.0 TO 33.9 IN ADULT WRITTEN ON 3/8/2024 11:00 AM BY JOCY GLASGOW MD    Discussed with patient.  She was taking Ozempic 2 mg weekly until December 2023.  Due to insurance coverage patient has not been taking Ozempic.  She is requesting an appeal through her insurance company.  She reports that she is physically active and exercises regularly.  Discussed about adhering to a low calorie diet.  She has also been referred to endocrinology

## 2024-03-08 NOTE — PATIENT INSTRUCTIONS
BMI was above normal measurement. Current weight: 86.2 kg (190 lb)  Weight change since last visit (-) denotes wt loss -2 lbs   Weight loss needed to achieve BMI 25: 49.2 Lbs  Weight loss needed to achieve BMI 30: 21 Lbs  Provided instructions on dietary changes  Provided instructions on exercise  Advised to Increase physical activity  Referred to Endocrinology .     Continue with current medications.  If you receive medical information from My Chart, your results will be released into your online chart. This means you may view or see results before someone from our office contact you directly.  Please keep in mind that if blood work or imaging were ordered during your visit, all the nonurgent lab results will be discussed with you at your next office visit.  Please arrive 15 minutes before your appointment.   Follow-up with primary care in 6 months or as needed

## 2024-03-08 NOTE — ASSESSMENT & PLAN NOTE
Discussed with patient.  She was taking Ozempic 2 mg weekly until December 2023.  Due to insurance coverage patient has not been taking Ozempic.  She is requesting an appeal through her insurance company.  She reports that she is physically active and exercises regularly.  Discussed about adhering to a low calorie diet.  She has also been referred to endocrinology

## 2024-03-08 NOTE — PROGRESS NOTES
"Subjective   Reason for Visit: Lucille Holloway is an 78 y.o. female here for a Medicare Wellness visit.     Past Medical, Surgical, and Family History reviewed and updated in chart.    Reviewed all medications by prescribing practitioner or clinical pharmacist (such as prescriptions, OTCs, herbal therapies and supplements) and documented in the medical record.    HPI    Patient Care Team:  Vero Man MD as PCP - General (Internal Medicine)  Vero Man MD as PCP - [employer] PCP     Review of Systems   All other systems reviewed and are negative.      Objective   Vitals:  /58 (Patient Position: Sitting)   Pulse 73   Temp 36.2 °C (97.2 °F)   Resp 14   Ht 1.6 m (5' 3\")   Wt 86.2 kg (190 lb)   SpO2 98%   BMI 33.66 kg/m²       Physical Exam  Constitutional:       General: She is not in acute distress.     Appearance: Normal appearance. She is obese.   HENT:      Head: Normocephalic and atraumatic.   Eyes:      General: No scleral icterus.     Conjunctiva/sclera: Conjunctivae normal.   Cardiovascular:      Rate and Rhythm: Normal rate and regular rhythm.      Heart sounds: Normal heart sounds.   Pulmonary:      Effort: Pulmonary effort is normal.      Breath sounds: Normal breath sounds. No wheezing.   Abdominal:      General: Bowel sounds are normal. There is no distension.      Palpations: Abdomen is soft.      Tenderness: There is no abdominal tenderness.   Musculoskeletal:      Cervical back: Neck supple.      Right lower leg: No edema.      Left lower leg: No edema.   Lymphadenopathy:      Cervical: No cervical adenopathy.   Skin:     General: Skin is warm and dry.   Neurological:      General: No focal deficit present.      Mental Status: She is alert and oriented to person, place, and time.   Psychiatric:         Mood and Affect: Mood normal.         Behavior: Behavior normal.         Assessment/Plan   Problem List Items Addressed This Visit       Rheumatoid arthritis, involving " unspecified site, unspecified whether rheumatoid factor present (CMS/[facility])    Current Assessment & Plan     Continue to follow-up with rheumatology         Chronic renal impairment, stage 3 (moderate) (CMS/[facility])    Relevant Orders    Follow Up In Advanced Primary Care - Pharmacy    Class 1 obesity due to excess calories with serious comorbidity and body mass index (BMI) of 33.0 to 33.9 in adult    Current Assessment & Plan     Discussed with patient.  She was taking Ozempic 2 mg weekly until December 2023.  Due to insurance coverage patient has not been taking Ozempic.  She is requesting an appeal through her insurance company.  She reports that she is physically active and exercises regularly.  Discussed about adhering to a low calorie diet.  She has also been referred to endocrinology         Pulmonary hypertension (CMS/[facility])    Current Assessment & Plan     Continue with the current medication          Other Visit Diagnoses       Medicare annual wellness visit, subsequent    -  Primary    Encounter for screening for other disorder

## 2024-03-14 ENCOUNTER — HOSPITAL ENCOUNTER (OUTPATIENT)
Dept: RADIOLOGY | Facility: HOSPITAL | Age: 79
Discharge: HOME | End: 2024-03-14
Payer: MEDICARE

## 2024-03-14 DIAGNOSIS — R41.3 MEMORY DYSFUNCTION: ICD-10-CM

## 2024-03-14 DIAGNOSIS — Z87.820 HISTORY OF CONCUSSION: ICD-10-CM

## 2024-03-14 PROCEDURE — 70551 MRI BRAIN STEM W/O DYE: CPT

## 2024-03-14 PROCEDURE — 70551 MRI BRAIN STEM W/O DYE: CPT | Performed by: RADIOLOGY

## 2024-03-20 ENCOUNTER — OFFICE VISIT (OUTPATIENT)
Dept: ORTHOPEDIC SURGERY | Facility: CLINIC | Age: 79
End: 2024-03-20
Payer: MEDICARE

## 2024-03-20 ENCOUNTER — HOSPITAL ENCOUNTER (OUTPATIENT)
Dept: RADIOLOGY | Facility: CLINIC | Age: 79
Discharge: HOME | End: 2024-03-20
Payer: MEDICARE

## 2024-03-20 DIAGNOSIS — M25.552 CHRONIC HIP PAIN AFTER TOTAL REPLACEMENT OF LEFT HIP JOINT: Primary | ICD-10-CM

## 2024-03-20 DIAGNOSIS — G89.29 CHRONIC HIP PAIN AFTER TOTAL REPLACEMENT OF LEFT HIP JOINT: Primary | ICD-10-CM

## 2024-03-20 DIAGNOSIS — M25.552 HIP PAIN, LEFT: ICD-10-CM

## 2024-03-20 DIAGNOSIS — Z96.642 CHRONIC HIP PAIN AFTER TOTAL REPLACEMENT OF LEFT HIP JOINT: Primary | ICD-10-CM

## 2024-03-20 PROCEDURE — 1159F MED LIST DOCD IN RCRD: CPT | Performed by: STUDENT IN AN ORGANIZED HEALTH CARE EDUCATION/TRAINING PROGRAM

## 2024-03-20 PROCEDURE — 99214 OFFICE O/P EST MOD 30 MIN: CPT | Performed by: STUDENT IN AN ORGANIZED HEALTH CARE EDUCATION/TRAINING PROGRAM

## 2024-03-20 PROCEDURE — 73502 X-RAY EXAM HIP UNI 2-3 VIEWS: CPT | Mod: LEFT SIDE | Performed by: RADIOLOGY

## 2024-03-20 PROCEDURE — 1160F RVW MEDS BY RX/DR IN RCRD: CPT | Performed by: STUDENT IN AN ORGANIZED HEALTH CARE EDUCATION/TRAINING PROGRAM

## 2024-03-20 PROCEDURE — 73502 X-RAY EXAM HIP UNI 2-3 VIEWS: CPT | Mod: LT

## 2024-03-20 PROCEDURE — 1036F TOBACCO NON-USER: CPT | Performed by: STUDENT IN AN ORGANIZED HEALTH CARE EDUCATION/TRAINING PROGRAM

## 2024-03-20 PROCEDURE — 1123F ACP DISCUSS/DSCN MKR DOCD: CPT | Performed by: STUDENT IN AN ORGANIZED HEALTH CARE EDUCATION/TRAINING PROGRAM

## 2024-03-20 NOTE — PROGRESS NOTES
PRIMARY CARE PHYSICIAN: Vero Man MD  REFERRING PROVIDER: No referring provider defined for this encounter.       SUBJECTIVE  CHIEF COMPLAINT: Left hip pain    HPI: Lucille Holloway is a pleasant 78 y.o. year-old female who is seen today for evaluation of left hip pain.  Patient is known to me as I performed a right total knee arthroplasty on her in 2022.  She has recovered well from that surgery.  No complaints with respect to her right knee replacement.    Patient had her left hip replaced by an outside surgeon in 2011.  She has done well since then.  However, about 3 weeks ago, she started to notice pain in the groin, buttock and lateral hip.  She did  do a Pilates class which was first for her.  However, the pain has been persistent and interfering with her ability to exercise and perform certain activities.  She denies any recent infection.  She does have a split toenail which she has seen a podiatrist.  The podiatrist did not feel that there was an active infection in her toe.  No recent hospitalizations.  No drainage from surgical incision.    REVIEW OF SYSTEMS  There has been no interval change in this patient's past medical, surgical, medications, allergies, family history or social history since the most recent visit to a provider within our department.  14 point review of systems was performed, reviewed, and negative except for pertinent positives documented in the history of present illness.    Past Medical History:   Diagnosis Date    Acute upper respiratory infection, unspecified 03/05/2019    Acute URI    Bariatric surgery status 11/20/2019    Bariatric surgery status    Encounter for general adult medical examination without abnormal findings 08/05/2021    Encounter for preventive health examination    Other general symptoms and signs 03/05/2019    Flu-like symptoms    Other neuromuscular dysfunction of bladder     Hyperactivity of bladder    Pain in right foot 09/17/2015    Foot pain, right     Personal history of other diseases of the circulatory system     History of hypertension    Personal history of other diseases of the respiratory system 03/05/2019    History of acute sinusitis    Personal history of other endocrine, nutritional and metabolic disease     History of thyroid disease    Personal history of other infectious and parasitic diseases 08/28/2019    History of hepatitis    Plantar fascial fibromatosis 09/21/2015    Plantar fasciitis, right    Syncope and collapse 10/16/2015    Vasovagal syncope    Vitamin D deficiency, unspecified 08/28/2019    Mild vitamin D deficiency        Allergies   Allergen Reactions    Soap Swelling and Rash     Oil of Olay        Past Surgical History:   Procedure Laterality Date    BLADDER SURGERY  08/28/2019    Bladder Surgery    CARPAL TUNNEL RELEASE  08/28/2019    Neuroplasty Decompression Median Nerve At Carpal Tunnel    HYSTERECTOMY  08/28/2019    Hysterectomy    OTHER SURGICAL HISTORY  08/28/2019    Carpal tunnel surgery    TOTAL HIP ARTHROPLASTY  07/16/2019    Total Hip Replacement    TOTAL KNEE ARTHROPLASTY Right 12/30/2022        Family History   Problem Relation Name Age of Onset    Diabetes Mother      Hypertension Mother      Thyroid disease Mother          Social History     Socioeconomic History    Marital status:      Spouse name: Not on file    Number of children: Not on file    Years of education: Not on file    Highest education level: Not on file   Occupational History    Not on file   Tobacco Use    Smoking status: Former     Types: Cigarettes     Passive exposure: Never    Smokeless tobacco: Never   Substance and Sexual Activity    Alcohol use: Not Currently    Drug use: Never    Sexual activity: Not on file   Other Topics Concern    Not on file   Social History Narrative    Not on file     Social Determinants of Health     Financial Resource Strain: Not on file   Food Insecurity: Not on file   Transportation Needs: Not on file    Physical Activity: Not on file   Stress: Not on file   Social Connections: Not on file   Intimate Partner Violence: Not on file   Housing Stability: Not on file        CURRENT MEDICATIONS:   Current Outpatient Medications   Medication Sig Dispense Refill    acetaminophen 500 mg capsule Take 2 capsules (1,000 mg) by mouth 3 times a day as needed.      albuterol 90 mcg/actuation inhaler USE 2 INHALATIONS BY MOUTH EVERY 4 HOURS IF NEEDED FOR WHEEZING  OR SHORTNESS OF BREATH 51 g 2    amoxicillin (Amoxil) 500 mg capsule TAKE FOUR CAPSULES BY MOUTH 1 HOUR BEFORE APPOINTMENT      aspirin 81 mg EC tablet Take 1 tablet (81 mg) by mouth once daily.      atorvastatin (Lipitor) 10 mg tablet TAKE 1 TABLET BY MOUTH ONCE  DAILY AT BEDTIME 100 tablet 0    docusate sodium (Colace) 100 mg capsule Take 3 capsules (300 mg) by mouth once daily.      DULoxetine (Cymbalta) 30 mg DR capsule Take 1 capsule (30 mg) by mouth once daily. AS DIRECTED 90 capsule 2    gabapentin (Neurontin) 300 mg capsule Take 1 capsule (300 mg) by mouth 3 times a day. 270 capsule 3    hydrocortisone 2.5 % ointment 1 Application      levothyroxine (Synthroid, Levoxyl) 88 mcg tablet Take 1 tablet (88 mcg) by mouth once daily. 90 tablet 2    multivitamin with minerals iron-free (Centrum Silver) Take 1 tablet by mouth once daily.      Ozempic 2 mg/dose (8 mg/3 mL) pen injector Inject 2 mg under the skin 1 (one) time per week. (Patient not taking: Reported on 2/16/2024) 3 mL 3    valsartan (Diovan) 40 mg tablet TAKE 1 TABLET BY MOUTH ONCE  DAILY 100 tablet 2     No current facility-administered medications for this visit.        OBJECTIVE    PHYSICAL EXAM  There is no height or weight on file to calculate BMI.    General: Well-appearing female in no acute distress.  Awake, alert and oriented.  Pleasant and cooperative.  Respiratory: Non-labored breathing  Mood: Euthymic   Gait: Mild antalgia  Assistive Device: None     Affected Left Hip  Through lateral surgical  incision is well-healed without signs of infection  Range of motion:   Full and painless range of motion of the hip.  Hip Flexor Strength: 5/5  Abductor Strength: 5/5  Adductor Strength: 5/5  Tenderness: No tenderness in the area of the greater trochanter  Sensation: Intact to light touch distally  Motor function: Able to fire TA, EHL, G/S  Pulses: Palpable DP pulse    IMAGING:  Multiple views of the left hip were obtained independently reviewed.  I discussed results with patient.  She has a press-fit fully coated stem as well as screws in her acetabular component.  As compared to previous radiographs from 2 years ago, I cannot appreciate any change.  No evidence of eccentric polyethylene wear or osteolysis around a B cup or stem    ASSESSMENT & PLAN    IMPRESSION:  Lucille Holloway is a 78 y.o. female who presents to my office today with left hip pain in the setting of a previous total hip arthroplasty.  Radiographically, I cannot appreciate any signs of interval change which would suggest prosthetic loosening.  Clinically, I have a low suspicion for infection.  PLAN:  Given the onset of her symptoms, I recommended that we obtain an ESR and CRP.  While the likelihood of infection is low, I do believe it is medically necessary to obtain the lab test to rule out infection given the fact that she has had onset of pain so far out from total hip arthroplasty.    Radiographically, I do not believe that the patient has any evidence of prosthetic loosening.     The other item on the differential diagnosis is lumbar spine DJD.  Patient does have evidence of extensive arthritis in her lumbar spine.  The patient is maintained on gabapentin by her rheumatologist.  She does not have a provider who is physically responsible for her low back.  I have referred her to one of our spine colleagues to discuss surgical and nonsurgical treatments of her low back.    I will contact the patient with results of her inflammatory  markers.    Follow-up within 2 years for routine evaluation of her knee.  Radiographs of both the right knee and left hip at that time.    *This note was created using voice recognition software and was not corrected for typographical or grammatical errors.*

## 2024-03-22 ENCOUNTER — TELEPHONE (OUTPATIENT)
Dept: NEUROLOGY | Facility: CLINIC | Age: 79
End: 2024-03-22
Payer: MEDICARE

## 2024-03-25 ENCOUNTER — TELEPHONE (OUTPATIENT)
Dept: PHARMACY | Facility: HOSPITAL | Age: 79
End: 2024-03-25

## 2024-03-25 ENCOUNTER — LAB (OUTPATIENT)
Dept: LAB | Facility: LAB | Age: 79
End: 2024-03-25
Payer: MEDICARE

## 2024-03-25 DIAGNOSIS — M25.552 HIP PAIN, LEFT: ICD-10-CM

## 2024-03-25 DIAGNOSIS — N18.9 CHRONIC KIDNEY DISEASE, UNSPECIFIED CKD STAGE: Primary | ICD-10-CM

## 2024-03-25 LAB
CRP SERPL-MCNC: <0.1 MG/DL
ERYTHROCYTE [SEDIMENTATION RATE] IN BLOOD BY WESTERGREN METHOD: 10 MM/H (ref 0–30)

## 2024-03-25 PROCEDURE — 86140 C-REACTIVE PROTEIN: CPT

## 2024-03-25 PROCEDURE — 36415 COLL VENOUS BLD VENIPUNCTURE: CPT

## 2024-03-25 PROCEDURE — 85652 RBC SED RATE AUTOMATED: CPT

## 2024-03-25 NOTE — TELEPHONE ENCOUNTER
Patient ID: Lucille Holloway is a 78 y.o. female who presents for CKD medication check.     Referring Provider: Vero Man MD       Problem List Items Addressed This Visit       CKD (chronic kidney disease) - Primary    Relevant Orders    Albumin, urine, random   No medications changes are needed with current kidney function.  Monitor for albumin in urine.  Patient qualifies for  PAP to cover SGLT2i medications. If lab work comes back positive for protein in the urine we can start the process for  PAP.     Follow up as needed by patient or provider with clinical pharmacy team.    Rashard BallesterosD  PGY-1 Resident    Continue all meds under the continuation of care with the referring provider and clinical pharmacy team

## 2024-03-26 ENCOUNTER — APPOINTMENT (OUTPATIENT)
Dept: PHARMACY | Facility: HOSPITAL | Age: 79
End: 2024-03-26
Payer: MEDICARE

## 2024-03-26 ENCOUNTER — LAB (OUTPATIENT)
Dept: LAB | Facility: LAB | Age: 79
End: 2024-03-26
Payer: MEDICARE

## 2024-03-26 DIAGNOSIS — N18.9 CHRONIC KIDNEY DISEASE, UNSPECIFIED CKD STAGE: ICD-10-CM

## 2024-03-26 LAB
CREAT UR-MCNC: 123.9 MG/DL (ref 20–320)
MICROALBUMIN UR-MCNC: <7 MG/L
MICROALBUMIN/CREAT UR: NORMAL MG/G{CREAT}

## 2024-03-26 PROCEDURE — 82043 UR ALBUMIN QUANTITATIVE: CPT

## 2024-03-26 PROCEDURE — 82570 ASSAY OF URINE CREATININE: CPT

## 2024-03-28 ENCOUNTER — HOSPITAL ENCOUNTER (OUTPATIENT)
Dept: RADIOLOGY | Facility: CLINIC | Age: 79
Discharge: HOME | End: 2024-03-28
Payer: MEDICARE

## 2024-03-28 ENCOUNTER — OFFICE VISIT (OUTPATIENT)
Dept: ORTHOPEDIC SURGERY | Facility: CLINIC | Age: 79
End: 2024-03-28
Payer: MEDICARE

## 2024-03-28 VITALS — HEIGHT: 63 IN | BODY MASS INDEX: 33.66 KG/M2 | WEIGHT: 190 LBS

## 2024-03-28 DIAGNOSIS — M51.36 LUMBAR DEGENERATIVE DISC DISEASE: ICD-10-CM

## 2024-03-28 PROCEDURE — 1160F RVW MEDS BY RX/DR IN RCRD: CPT | Performed by: PHYSICIAN ASSISTANT

## 2024-03-28 PROCEDURE — 72110 X-RAY EXAM L-2 SPINE 4/>VWS: CPT

## 2024-03-28 PROCEDURE — 72110 X-RAY EXAM L-2 SPINE 4/>VWS: CPT | Performed by: RADIOLOGY

## 2024-03-28 PROCEDURE — 99213 OFFICE O/P EST LOW 20 MIN: CPT | Mod: ZK | Performed by: PHYSICIAN ASSISTANT

## 2024-03-28 PROCEDURE — 99213 OFFICE O/P EST LOW 20 MIN: CPT | Performed by: PHYSICIAN ASSISTANT

## 2024-03-28 PROCEDURE — 1159F MED LIST DOCD IN RCRD: CPT | Performed by: PHYSICIAN ASSISTANT

## 2024-03-28 PROCEDURE — 1123F ACP DISCUSS/DSCN MKR DOCD: CPT | Performed by: PHYSICIAN ASSISTANT

## 2024-03-28 PROCEDURE — 1036F TOBACCO NON-USER: CPT | Performed by: PHYSICIAN ASSISTANT

## 2024-03-28 PROCEDURE — 1125F AMNT PAIN NOTED PAIN PRSNT: CPT | Performed by: PHYSICIAN ASSISTANT

## 2024-03-28 RX ORDER — PREDNISONE 20 MG/1
20 TABLET ORAL DAILY
Qty: 7 TABLET | Refills: 0 | Status: SHIPPED | OUTPATIENT
Start: 2024-03-28 | End: 2024-04-04

## 2024-03-28 ASSESSMENT — PAIN SCALES - GENERAL: PAINLEVEL_OUTOF10: 3

## 2024-03-28 ASSESSMENT — PAIN DESCRIPTION - DESCRIPTORS: DESCRIPTORS: ACHING;DULL;DISCOMFORT;RADIATING

## 2024-03-28 ASSESSMENT — PAIN - FUNCTIONAL ASSESSMENT: PAIN_FUNCTIONAL_ASSESSMENT: 0-10

## 2024-03-28 NOTE — PROGRESS NOTES
Lucille is a 78-year-old female that presents today to be evaluated for her ongoing low back pain with left hip and groin pain and left lower extremity radiculopathy.      The patient was referred to our spine team by her hip surgeon, Dr. Walter Teran who did her left hip Surgery.  She followed up with him initially because the symptoms were presenting very hip like in nature and he ruled out any problems with her hardware.    The patient states that for the last 3 months or so she has been dealing with increasing low back pain predominantly left-sided in nature with left hip and groin pain and symptoms radiating in a posterior lateral distribution down to her knee.  Symptoms are worse with any activity and walking.  She can only walk about 15-20 minutes and then needs to take breaks.  She does state that she has fatigue in her legs and she often finds herself dragging due to pain as well.  Thankfully she is not tripping and has not had any falls.  She ambulates without assistance.  She has full control of her bowel and bladder.    From a treatment standpoint she has been on gabapentin and duloxetine but very minimal changes.  No formal PT or injections.    Family, social, and medical histories are obtained and reviewed.    I reviewed the complete 30-point review of systems that was documented on the scanned patient intake form.  All other systems are non-contributory except as defined in history of present illness.    Const: Well-appearing, well-nourished female in no distress.  Eyes: Normal appearing sclera and conjunctiva, no jaundice, pupils normal in appearance.  Resp: breathing comfortably, normal respiratory rate.  CV: No upper or lower extremity edema.  Musculoskeletal: Normal gait.  Able to heel/toe walk without difficulty.  Lumbar ROM is supple.  Strength exam of the lower extremities reveals 5/5 strength in all major muscle groups .  Positive straight leg raise left lower extremity.  Neuro: Sensation is  intact and equal bilaterally. Deep tendon reflexes are normal and symmetric.  No clonus.  Skin: Intact without any lesions, normal turgor.  Psych: Alert and oriented x3, normal mood and affect.      Patient had x-rays done today.  The results were reviewed.  It does show that she has significant degenerative changes throughout her lumbar spine.  She also has like a autofusion at the L5-S1.  She also has a spondylolisthesis at the L3-4.    Moving forward, informed the patient that I would like to manage this conservatively.  She was given a referral for physical therapy and will follow-up with us after my wedding in about 6 weeks.  If she does not improve we will then discuss ordering her an MRI.    This note was dictated using speech recognition software and was not corrected for spelling or grammatical errors

## 2024-03-29 ENCOUNTER — TELEPHONE (OUTPATIENT)
Dept: PRIMARY CARE | Facility: CLINIC | Age: 79
End: 2024-03-29
Payer: MEDICARE

## 2024-03-29 DIAGNOSIS — R76.8 ELEVATED SERUM IMMUNOGLOBULIN FREE LIGHT CHAIN LEVEL: Primary | ICD-10-CM

## 2024-03-29 NOTE — TELEPHONE ENCOUNTER
Oscar Rodriguez NP, stated patient needs to see a hematologist based on [facility] lab results from 3/20/24. Please, review the results. Will you place a hematology referral or can Jennifer send her to hematology at [facility]?  Please, call Jennifer at [phone].

## 2024-03-31 DIAGNOSIS — E78.5 DYSLIPIDEMIA: ICD-10-CM

## 2024-04-01 RX ORDER — ATORVASTATIN CALCIUM 10 MG/1
10 TABLET, FILM COATED ORAL NIGHTLY
Qty: 100 TABLET | Refills: 2 | Status: SHIPPED | OUTPATIENT
Start: 2024-04-01

## 2024-04-25 ENCOUNTER — EVALUATION (OUTPATIENT)
Dept: PHYSICAL THERAPY | Facility: CLINIC | Age: 79
End: 2024-04-25
Payer: MEDICARE

## 2024-04-25 DIAGNOSIS — M51.36 LUMBAR DEGENERATIVE DISC DISEASE: ICD-10-CM

## 2024-04-25 DIAGNOSIS — M54.16 LUMBAR RADICULOPATHY, CHRONIC: Primary | ICD-10-CM

## 2024-04-25 PROCEDURE — 97110 THERAPEUTIC EXERCISES: CPT | Mod: GP | Performed by: PHYSICAL THERAPIST

## 2024-04-25 PROCEDURE — 97161 PT EVAL LOW COMPLEX 20 MIN: CPT | Mod: GP | Performed by: PHYSICAL THERAPIST

## 2024-04-25 ASSESSMENT — ENCOUNTER SYMPTOMS
OCCASIONAL FEELINGS OF UNSTEADINESS: 0
DEPRESSION: 0
LOSS OF SENSATION IN FEET: 0

## 2024-04-25 ASSESSMENT — PAIN SCALES - GENERAL: PAINLEVEL_OUTOF10: 5 - MODERATE PAIN

## 2024-04-25 NOTE — PROGRESS NOTES
"  Physical Therapy  Physical Therapy Orthopedic Evaluation    Patient Name: Lucille Holloway  MRN: [id number]  Today's Date: 4/25/2024  Time Calculation  Start Time: 0710  Stop Time: 0749  Time Calculation (min): 39 min      Insurance:  Visit number: 1 of [place]  Authorization info: No auth needed  Insurance Type: [facility] Medicare  Medicare cert dates: 4/25/2024 - 7/24/2024    General:  Reason for visit:  LBP  Referred by: Pippa Peralta PA-C    Current Problem:  1. Lumbar radiculopathy, chronic        2. Lumbar degenerative disc disease  Referral to Physical Therapy          Precautions: OA, RA  Precautions  STEADI Fall Risk Score (The score of 4 or more indicates an increased risk of falling): 2      Medical History Form: Reviewed (scanned into chart)  H/o L hip replacement, R TKA    Subjective:     Chief Complaint: Patient presents to clinic with low back pain. She has both RA and OA, and was told she has arthritis in her spine. She has had ongoing pain for multiple years, and it has progressively worsened and spread in terms of location. Her most recent symptom exacerbation occurred in February of this year when she took a Yoga class and was doing a specific stretch when she feels that she may have either over stretched or irritated her symptoms. Her symptoms come and go, but never completely resolve.   Onset Date: 2/1/2024  SILVIA: Overuse    Current Condition:   Worse    Pain:  Pain Score: 5 - Moderate pain  Location: low back, radiates to the L buttock and posterior lateral thigh. Pain goes to the L knee   Description: tight  Aggravating Factors: Walking, standing, stairs  Relieving Factors:  sitting, lying down    Relevant Information (PMH & Previous Tests/Imaging): xray results per PA: \"significant degenerative changes throughout her lumbar spine. She also has like a autofusion at the L5-S1. She also has a spondylolisthesis at the L3-4. \"  Previous Interventions/Treatments: Injections and Chiropractic    Prior Level of " "Function (PLOF): 40%  Patient previously independent with all ADLs  Exercise/Physical Activity: exercising at the gym (walking), walking outdoors, yoga  Work/School: retired    Patients Living Environment: Reviewed and no concern    Primary Language: English    Patient's Goal(s) for Therapy: \"I would love to be able to walk without pain and do my exercises.\"    Red Flags: Do you have any of the following? Yes h/o night sweats since patient was in her 30s   Fever/chills, unexplained weight changes, dizziness/fainting, unexplained change in bowel or bladder functions, unexplained malaise or muscle weakness, night pain/sweats, numbness or tingling    Objective:  Objective       ROM    Lumbar AROM (%)    Flexion: WNL  Extension: to neutral   (L) Side Bend: 25% limitation   (R) Side Bend: 25% limitation  (L) Rotation: 25% limitation  (R) Rotation: 25% limitation      Hip AROM (Degrees)  WNL        Strength Testing    Core/Abdominals: 3/5    Hip    (R)  (L)  Flexion: 5-/5  5-/5     Extension: 4/5  4/5    Abduction: 4+/5  4+/5    Adduction: 5/5  5/5    ER:  4+/5  4+/5    IR:  4+/5  4+/5        Posture: mild kyphotic posture       Balance:   R SL balance: 5 seconds  L SL balance: 9 seconds       Palpation: +TTP L lumbar paraspinals, glute med, pifirom      Flexibility: Mild hamstring flexibility limitation BL       Orthotics: Custom orthotics       Gait: mild L trendelenburg     SLR test: negative bilaterally     Radicular Symptoms: radiating pain to L posterior thigh and lateral L knee       Outcome Measures:  Other Measures  Oswestry Disablity Index (RACHID): 30%       EDUCATION: Home exercise program, plan of care, activity modifications, pain management, and injury pathology       Goals: Set and discussed today  Active       PT Problem       PT Goal 1       Start:  04/25/24    Expected End:  07/24/24       In 4 weeks, patient will improve hamstring flexibility to minimal limitation.  In 4 weeks, patient will improve pain " rating to <3/10 in order to tolerate walking for exercise or grocery shopping.  In 4 weeks, patient will report centralization of radicular symptoms to at least her hips.  In 4 weeks, patient will demonstrate improved standing tolerance in order to perform household tasks such as laundry in her basement.   In 4 weeks, patient will improve Modified RACHID score by 6.  In 4 weeks, patient will be independent with a final HEP.                Plan of care was developed with input and agreement by the patient      Treatment Performed:    Access Code: [email or web address]  URL: [email or web address]  Date: 04/25/2024  Prepared by: Beti Langley    Exercises  - Supine Posterior Pelvic Tilt  - 2 x daily - 7 x weekly - 2 sets - 10 reps  - Supine Lower Trunk Rotation  - 2 x daily - 7 x weekly - 2 sets - 10 reps  - Seated Hamstring Stretch  - 3 x daily - 7 x weekly - 1 sets - 2 reps - 30 second hold      Assessment: Patient presents with signs and symptoms consistent with lumbar radiculopathy, resulting in limited participation in pain-free ADLs and inability to perform at their prior level of function. Pt would benefit from physical therapy to address the impairments found & listed previously in the objective section in order to return to safe and pain-free ADLs and prior level of function.         Plan:     Planned Interventions include: therapeutic exercise, self-care home management, manual therapy, therapeutic activities, gait training, neuromuscular coordination, vasopneumatic, dry needling, aquatic therapy  Frequency: 1-2 x Week  Duration: 8 Weeks    Start with aquatics, transition to land as able.      Beti Langley, PT

## 2024-04-29 ENCOUNTER — APPOINTMENT (OUTPATIENT)
Dept: RHEUMATOLOGY | Facility: CLINIC | Age: 79
End: 2024-04-29
Payer: MEDICARE

## 2024-04-30 ENCOUNTER — TREATMENT (OUTPATIENT)
Dept: PHYSICAL THERAPY | Facility: CLINIC | Age: 79
End: 2024-04-30
Payer: MEDICARE

## 2024-04-30 DIAGNOSIS — M51.36 LUMBAR DEGENERATIVE DISC DISEASE: Primary | ICD-10-CM

## 2024-04-30 DIAGNOSIS — M54.16 LUMBAR RADICULOPATHY, CHRONIC: ICD-10-CM

## 2024-04-30 PROCEDURE — 97113 AQUATIC THERAPY/EXERCISES: CPT | Mod: GP,CQ | Performed by: SPECIALIST/TECHNOLOGIST

## 2024-04-30 ASSESSMENT — PAIN SCALES - GENERAL: PAINLEVEL_OUTOF10: 7

## 2024-04-30 ASSESSMENT — PAIN - FUNCTIONAL ASSESSMENT: PAIN_FUNCTIONAL_ASSESSMENT: 0-10

## 2024-04-30 NOTE — PROGRESS NOTES
Physical Therapy  Physical Therapy Treatment    Patient Name: Lucille Holloway  MRN: [id number]  Today's Date: 4/30/2024  Time Calculation  Start Time: 0746  Stop Time: 0829  Time Calculation (min): 43 min    Insurance:  Visit number: 2 of Med [employer]  Authorization info: No Auth Needed  Insurance Type: [facility] Medicare  Cert date start: 4/25/24        Cert date end: 7/24/24                General  Reason for Referral: LBP  Referred By: JOSSUE Peralta PA-C    Current Problem  1. Lumbar degenerative disc disease        2. Lumbar radiculopathy, chronic            Precautions  STEADI Fall Risk Score (The score of 4 or more indicates an increased risk of falling): 2    Pain Assessment: 0-10  Pain Score: 7  Pain Location: Back    Subjective:   Patient reports back pain still present and moves side to side. Patient notes pain down L leg from hip.  Patient noted lifetime changed senior memberships to allow them to be present 10-3 daily which has made it more challenging.  (Lucille usually works out earlier in AM). HEP Performed:  Yes    Objective:   Slightly forward posture.       Treatments:   Pool Depth: 4 foot, Temperature 92°  Forward/retro walk 3'   Side Step 4'   MIP 4'    5 foot with white noodle  Hang 4'   Alt Hams 4'  Hip ext R/L 2' each   Hip abd/add R/L 2' each   Hang 4'     Standing HF stretch R/L 1'  Standing Hams stretch on stair R/L 1'     Charges: AQ x3     Assessment:   Patient tolerated intensity noting no pain post treatment and no hip symptoms post treatment including with trunk rotation.  Since patient had back pain and is a non-swimmer, did deep water work in 5 foot rather than 7 foot.     Plan: Continue aquatic sessions to decrease back pain. Once symptoms improve transition to land based therapy.     Bart Vital, PTA

## 2024-05-07 ENCOUNTER — TREATMENT (OUTPATIENT)
Dept: PHYSICAL THERAPY | Facility: CLINIC | Age: 79
End: 2024-05-07
Payer: MEDICARE

## 2024-05-07 PROCEDURE — 97113 AQUATIC THERAPY/EXERCISES: CPT | Mod: GP,CQ | Performed by: SPECIALIST/TECHNOLOGIST

## 2024-05-07 ASSESSMENT — PAIN SCALES - GENERAL: PAINLEVEL_OUTOF10: 6

## 2024-05-07 ASSESSMENT — PAIN - FUNCTIONAL ASSESSMENT: PAIN_FUNCTIONAL_ASSESSMENT: 0-10

## 2024-05-07 NOTE — PROGRESS NOTES
Physical Therapy  Physical Therapy Treatment    Patient Name: Lucille Holloway  MRN: [id number]  Today's Date: 5/7/2024  Time Calculation  Start Time: 0745  Stop Time: 0826  Time Calculation (min): 41 min    Insurance:  Visit number: 2 of [facility] Nec  Authorization info: No Auth Needed  Insurance Type: [facility] Medicare  Cert date start: 4/25/24        Cert date end: 7/24/24                General  Reason for Referral: LBP  Referred By: JOSSUE Peralta PA-C    Current Problem  No diagnosis found.      Precautions  STEADI Fall Risk Score (The score of 4 or more indicates an increased risk of falling): 2    Pain Assessment: 0-10  Pain Score: 6  Pain Location: Back    Subjective:   Patient noted feeling great immediately after last session although did have soreness the morning after.  Patient notes pain mildly improved on arrival today.      HEP Performed:  Yes    Objective:   Slightly forward posture.       Treatments:   Pool Depth: 4 foot, Temperature 92°  Forward/retro walk 5'   Side Step 5'     5 foot with white noodle  Hang 4'   Alt Hams 4'  Hip ext R/L 2.5' each   Hip abd/add R/L 2.5' each   Hang 4'     Standing HF stretch R/L 1'  Standing Hams stretch on stair R/L 1'     Charges: AQ x3     Assessment:   Patient tolerated intensity without problems feeling good post treatment.   Since patient had back pain and is a non-swimmer, did deep water work in 5 foot rather than 7 foot.     Plan: Continue aquatic sessions to decrease back pain. Once symptoms improve transition to land based therapy.     Bart Vital, PTA

## 2024-05-13 ENCOUNTER — OFFICE VISIT (OUTPATIENT)
Dept: ENDOCRINOLOGY | Facility: CLINIC | Age: 79
End: 2024-05-13
Payer: MEDICARE

## 2024-05-13 VITALS
HEIGHT: 63 IN | TEMPERATURE: 98.1 F | SYSTOLIC BLOOD PRESSURE: 123 MMHG | DIASTOLIC BLOOD PRESSURE: 62 MMHG | BODY MASS INDEX: 35.24 KG/M2 | WEIGHT: 198.9 LBS | HEART RATE: 72 BPM

## 2024-05-13 DIAGNOSIS — Z76.89 ENCOUNTER FOR WEIGHT MANAGEMENT: ICD-10-CM

## 2024-05-13 DIAGNOSIS — E66.9 CLASS 1 OBESITY WITH SERIOUS COMORBIDITY AND BODY MASS INDEX (BMI) OF 32.0 TO 32.9 IN ADULT, UNSPECIFIED OBESITY TYPE: ICD-10-CM

## 2024-05-13 PROCEDURE — 1123F ACP DISCUSS/DSCN MKR DOCD: CPT | Performed by: INTERNAL MEDICINE

## 2024-05-13 PROCEDURE — 3074F SYST BP LT 130 MM HG: CPT | Performed by: INTERNAL MEDICINE

## 2024-05-13 PROCEDURE — 99204 OFFICE O/P NEW MOD 45 MIN: CPT | Performed by: INTERNAL MEDICINE

## 2024-05-13 PROCEDURE — 3078F DIAST BP <80 MM HG: CPT | Performed by: INTERNAL MEDICINE

## 2024-05-13 PROCEDURE — 1160F RVW MEDS BY RX/DR IN RCRD: CPT | Performed by: INTERNAL MEDICINE

## 2024-05-13 PROCEDURE — 1159F MED LIST DOCD IN RCRD: CPT | Performed by: INTERNAL MEDICINE

## 2024-05-13 NOTE — PROGRESS NOTES
Patient is seen at the request of Dr. Vero Man for my opinion regarding Weight Management. My final recommendations will be communicated back to the requesting provider by way of shared medical record.    NEW  Subjective   Lucille Holloway is a 78 y.o. female with a hx of CKD, IGT, HTN, hypothyroidism, goiter, KELLY, obesity, hip replacement, arthritis - osteo and rheumatoid, cataract surgery, who presents for weight management and obesity.    1. Weight history: Weight has been an issue off and on throughout whole life  --Any previous programs: Tried WW in the past- not successful   --was on ozempic for 6 months (223-->180lb), off since 2023, now 198lb.    2. Sleep: history of KELLY     3. Stress: 7-8/10, worried about weight gain, , 5 kids, 10 grandchildren     4. Exercise: Doing PT for Arthritis     5. Appetite control: depends on the day whether it is high or controlled   Breakfast: coffee with english muffin and hard boiled egg   Lunch: salad with a protein/PB&J sandwich/fast food   Dinner: protein with vegetable and carb  Snacks: mostly in the evening. Yogurt/nuts/popcorn.If appetite is high will have ice cream/sweets   Dine in/take out: 3-4 times per week     Any personal history of pancreatitis?: no  Any personal or family history of medullary thyroid cancer or MEN (multiple endocrine neoplasia)?: ni      Current Outpatient Medications:     acetaminophen 500 mg capsule, Take 2 capsules (1,000 mg) by mouth 3 times a day as needed., Disp: , Rfl:     albuterol 90 mcg/actuation inhaler, USE 2 INHALATIONS BY MOUTH EVERY 4 HOURS IF NEEDED FOR WHEEZING  OR SHORTNESS OF BREATH, Disp: 51 g, Rfl: 2    amoxicillin (Amoxil) 500 mg capsule, TAKE FOUR CAPSULES BY MOUTH 1 HOUR BEFORE APPOINTMENT, Disp: , Rfl:     aspirin 81 mg EC tablet, Take 1 tablet (81 mg) by mouth once daily., Disp: , Rfl:     atorvastatin (Lipitor) 10 mg tablet, TAKE 1 TABLET BY MOUTH ONCE  DAILY AT BEDTIME, Disp: 100 tablet, Rfl: 2    docusate  sodium (Colace) 100 mg capsule, Take 3 capsules (300 mg) by mouth once daily., Disp: , Rfl:     DULoxetine (Cymbalta) 30 mg DR capsule, Take 1 capsule (30 mg) by mouth once daily. AS DIRECTED, Disp: 90 capsule, Rfl: 2    gabapentin (Neurontin) 300 mg capsule, Take 1 capsule (300 mg) by mouth 3 times a day., Disp: 270 capsule, Rfl: 3    levothyroxine (Synthroid, Levoxyl) 88 mcg tablet, Take 1 tablet (88 mcg) by mouth once daily., Disp: 90 tablet, Rfl: 2    multivitamin with minerals iron-free (Centrum Silver), Take 1 tablet by mouth once daily., Disp: , Rfl:     valsartan (Diovan) 40 mg tablet, TAKE 1 TABLET BY MOUTH ONCE  DAILY, Disp: 100 tablet, Rfl: 2    ROS:  System: normal  Eyes : no visual changes  Neck : no tenderness, no new lumps/bumps  Respiratory : no SOB  Cardiovascular : no chest pain, no palpitations  Gastro-Intestinal : no abdominal concerns  Neurological : no numbness or tingling in the extremities  Musculoskeletal : no joint paint, no muscle pain  Skin : no unusual rashes  Psychiatric : no depression, no anxiety  See HPI for Endocrine ROS    Past Medical History:   Diagnosis Date    Acute upper respiratory infection, unspecified 03/05/2019    Acute URI    Bariatric surgery status 11/20/2019    Bariatric surgery status    Encounter for general adult medical examination without abnormal findings 08/05/2021    Encounter for preventive health examination    Other general symptoms and signs 03/05/2019    Flu-like symptoms    Other neuromuscular dysfunction of bladder     Hyperactivity of bladder    Pain in right foot 09/17/2015    Foot pain, right    Personal history of other diseases of the circulatory system     History of hypertension    Personal history of other diseases of the respiratory system 03/05/2019    History of acute sinusitis    Personal history of other endocrine, nutritional and metabolic disease     History of thyroid disease    Personal history of other infectious and parasitic diseases  "08/28/2019    History of hepatitis    Plantar fascial fibromatosis 09/21/2015    Plantar fasciitis, right    Syncope and collapse 10/16/2015    Vasovagal syncope    Vitamin D deficiency, unspecified 08/28/2019    Mild vitamin D deficiency       Past Surgical History:   Procedure Laterality Date    BLADDER SURGERY  08/28/2019    Bladder Surgery    CARPAL TUNNEL RELEASE  08/28/2019    Neuroplasty Decompression Median Nerve At Carpal Tunnel    HYSTERECTOMY  08/28/2019    Hysterectomy    OTHER SURGICAL HISTORY  08/28/2019    Carpal tunnel surgery    TOTAL HIP ARTHROPLASTY  07/16/2019    Total Hip Replacement    TOTAL KNEE ARTHROPLASTY Right 12/30/2022       Social History     Socioeconomic History    Marital status:      Spouse name: Not on file    Number of children: Not on file    Years of education: Not on file    Highest education level: Not on file   Occupational History    Not on file   Tobacco Use    Smoking status: Former     Types: Cigarettes     Passive exposure: Never    Smokeless tobacco: Never   Substance and Sexual Activity    Alcohol use: Not Currently    Drug use: Never    Sexual activity: Not on file   Other Topics Concern    Not on file   Social History Narrative    Not on file     Social Determinants of Health     Financial Resource Strain: Not on file   Food Insecurity: Not on file   Transportation Needs: Not on file   Physical Activity: Not on file   Stress: Not on file   Social Connections: Not on file   Intimate Partner Violence: Not on file   Housing Stability: Not on file       Objective    Physical Exam:  Blood pressure 123/62, pulse 72, temperature 36.7 °C (98.1 °F), height 1.6 m (5' 3\"), weight 90.2 kg (198 lb 14.4 oz).  General : alert and oriented X3, no acute distress  Eyes : EOMI   Neck : non tender, supple  Thyroid : no palpable nodules  Heart : regular rate and rhythm  ABD : no obvious abnormalities, soft to palpation  Extremities : no edema  Neuro : normal  Other " ":    Assessment/Plan   Lucille Holloway is a 78 y.o. female with a hx of CKD, IGT, HTN, hypothyroidism, goiter, KELLY, obesity, hip replacement, arthritis - osteo and rheumatoid, cataract surgery, who presents for weight management and obesity.  No h/o kidney stones or glaucoma.    1. Weight Management : Reviewed the principles of energy metabolism, caloric intake and expenditure, and rationale for a treatment program.  Also reinforced need for reduced calorie, low fat diet and increased physical activity.    We reviewed the possibility of starting an interdisciplinary lifestyle intervention program involving improvement of the diet, a personalized exercise program, efforts to reduce the stress and the possibility of using appetite suppressant medications in an effort to help with the weight loss process.  The patient expressed interest in the plan.    2. Nutrition : I discussed trying one of the diet approaches we have here in the program : Mediterranean lifestyle, ketosis diet.  The patient will be referred to the Registered Dietician for education on their diet of choice.  The dietary booklet was provided in the visit today.    5/13/24: 198lb, 35.23kg/m2    3. Sleep : mild KELLY    4. Stress : 7-8/10, worried about weight gain, , 5 kids, 10 grandchildren    5. Exercise : does PT for her arthritis  Encouraged she do the \"homework\" they give her    6. Appetite : high  Was on ozempic for 6 months, loved it, but lost coverage  Discussed other AOMs.    Follow up in a group visit.  Glenn Brooks MD    "

## 2024-05-14 ENCOUNTER — TREATMENT (OUTPATIENT)
Dept: PHYSICAL THERAPY | Facility: CLINIC | Age: 79
End: 2024-05-14
Payer: MEDICARE

## 2024-05-14 DIAGNOSIS — M51.36 LUMBAR DEGENERATIVE DISC DISEASE: Primary | ICD-10-CM

## 2024-05-14 DIAGNOSIS — M54.16 LUMBAR RADICULOPATHY, CHRONIC: ICD-10-CM

## 2024-05-14 PROCEDURE — 97113 AQUATIC THERAPY/EXERCISES: CPT | Mod: GP,CQ | Performed by: SPECIALIST/TECHNOLOGIST

## 2024-05-14 ASSESSMENT — PAIN - FUNCTIONAL ASSESSMENT: PAIN_FUNCTIONAL_ASSESSMENT: 0-10

## 2024-05-14 ASSESSMENT — PAIN SCALES - GENERAL: PAINLEVEL_OUTOF10: 4

## 2024-05-14 NOTE — PROGRESS NOTES
Physical Therapy  Physical Therapy Treatment    Patient Name: Lucille Holloway  MRN: [id number]  Today's Date: 5/14/2024  Time Calculation  Start Time: 1130  Stop Time: 1213  Time Calculation (min): 43 min    Insurance:  Visit number: 4 of Med Nec  Authorization info: No Auth Needed  Insurance Type: [facility] Medicare  Cert date start: 4/25/24        Cert date end: 7/24/24                General  Reason for Referral: LBP  Referred By: JOSSUE Peralta PA-C    Current Problem  1. Lumbar degenerative disc disease        2. Lumbar radiculopathy, chronic              Precautions  STEADI Fall Risk Score (The score of 4 or more indicates an increased risk of falling): 2    Pain Assessment: 0-10  Pain Score: 4  Pain Location: Back    Subjective:   Patient noted knee swelled for about 1-2 days after last session.  Patient took pain medicine this AM so back pain on arrival is 3-4 of 10.        HEP Performed:  Yes    Objective:   Slightly forward posture.       Treatments:   Pool Depth: 4 foot, Temperature 92°  Forward/retro walk 5'   Side Step 5'     5 foot with white noodle  Hang 4'   Alt Hams 4'  Hip ext R/L 2.5' each   Hip abd/add R/L 2.5' each   Hang 4'     Standing HF stretch R/L 1'  Standing Hams stretch on stair R/L 1'     Charges: AQ x3     Assessment:   Patient tolerated intensity noting 5 foot deeper work really feels good.   Since patient had back pain and is a non-swimmer, did deep water work in 5 foot rather than 7 foot.     Plan: Continue aquatic sessions to decrease back pain. Once symptoms improve transition to land based therapy.     Bart Vital, PTA

## 2024-05-16 ENCOUNTER — APPOINTMENT (OUTPATIENT)
Dept: PHYSICAL THERAPY | Facility: CLINIC | Age: 79
End: 2024-05-16
Payer: MEDICARE

## 2024-05-20 ENCOUNTER — PATIENT MESSAGE (OUTPATIENT)
Dept: ENDOCRINOLOGY | Facility: CLINIC | Age: 79
End: 2024-05-20

## 2024-05-20 ENCOUNTER — TELEMEDICINE CLINICAL SUPPORT (OUTPATIENT)
Dept: ENDOCRINOLOGY | Facility: CLINIC | Age: 79
End: 2024-05-20
Payer: MEDICARE

## 2024-05-20 ENCOUNTER — APPOINTMENT (OUTPATIENT)
Dept: ENDOCRINOLOGY | Facility: CLINIC | Age: 79
End: 2024-05-20
Payer: MEDICARE

## 2024-05-20 VITALS — BODY MASS INDEX: 35.08 KG/M2 | WEIGHT: 198 LBS | HEIGHT: 63 IN

## 2024-05-20 DIAGNOSIS — E66.09 CLASS 1 OBESITY DUE TO EXCESS CALORIES WITH SERIOUS COMORBIDITY AND BODY MASS INDEX (BMI) OF 33.0 TO 33.9 IN ADULT: ICD-10-CM

## 2024-05-20 DIAGNOSIS — E66.9 CLASS 1 OBESITY WITH SERIOUS COMORBIDITY AND BODY MASS INDEX (BMI) OF 32.0 TO 32.9 IN ADULT, UNSPECIFIED OBESITY TYPE: ICD-10-CM

## 2024-05-20 DIAGNOSIS — Z71.3 DIETARY COUNSELING: Primary | ICD-10-CM

## 2024-05-20 PROCEDURE — 97802 MEDICAL NUTRITION INDIV IN: CPT | Performed by: DIETITIAN, REGISTERED

## 2024-05-20 RX ORDER — NALTREXONE HYDROCHLORIDE AND BUPROPION HYDROCHLORIDE 8; 90 MG/1; MG/1
1 TABLET, EXTENDED RELEASE ORAL DAILY
COMMUNITY
End: 2024-05-20 | Stop reason: SDUPTHER

## 2024-05-20 NOTE — TELEPHONE ENCOUNTER
From: Lucille Holloway  To: Glenn Brooks  Sent: 5/20/2024 3:47 PM EDT  Subject: Contrave    Dr. Dada Brooks, I have decided to request a prescription for Contrave. After discussing this with my pharmacist. Please send the prescription to [facility],[phone]. Do you know what it cost? What is the dosage?   Thank You

## 2024-05-20 NOTE — PROGRESS NOTES
"Initial Nutrition Assessment    Patient was referred to nutrition by Dr. Dada Brooks for weight management/desire to lose weight. Other PMHX significant for CKD, HTN, Hypothyroidism, Goiter, KELLY, and Arthritis.     Diet recall reveals a consistent meal pattern with rare missed meals; however, possible intakes of larger portions than required for desired weight loss likely contributing to lack of desired weight loss at this time. Fluids meeting recommendations in type and amount with water as primary beverage. Pt is not incorporating consistent physical activity at this time and would likely benefit from increased structured activity to assist in achieving goals; however, increased limitations with such currently. Active in weekly PT. See all interventions/recommendations below as discussed during visit this day.      Patient reported symptoms: Difficulty losing weight    Anthropometrics:  Height:   Ht Readings from Last 1 Encounters:   05/13/24 1.6 m (5' 3\")      Weight:   Wt Readings from Last 10 Encounters:   05/13/24 90.2 kg (198 lb 14.4 oz)   03/28/24 86.2 kg (190 lb)   03/08/24 86.2 kg (190 lb)   03/04/24 87.1 kg (192 lb)   02/16/24 86.2 kg (190 lb)   01/04/24 83.2 kg (183 lb 5 oz)   01/02/24 85 kg (187 lb 6.4 oz)   12/08/23 83.5 kg (184 lb)   09/21/23 81.6 kg (180 lb)   09/12/23 82.1 kg (181 lb)      Current BMI:   BMI Readings from Last 1 Encounters:   05/13/24 35.23 kg/m²        Labs:  Lab Results   Component Value Date    HGBA1C 4.9 12/16/2022      Lab Results   Component Value Date    CHOL 132 01/04/2024    CHOL 113 06/09/2023    CHOL 115 10/10/2022     Lab Results   Component Value Date    HDL 74.2 01/04/2024    HDL 70.7 06/09/2023    HDL 55.5 10/10/2022     Lab Results   Component Value Date    LDLCALC 39 01/04/2024     Lab Results   Component Value Date    TRIG 93 01/04/2024    TRIG 85 06/09/2023    TRIG 88 10/10/2022       Malnutrition Screening:  Significant Unintentional weight loss: No  Eating " less than 75% of usual intake for more than 2 weeks: No  Potential Signs of Inflammation: No    Recommended Malnutrition Diagnosis: No malnutrition identified    Diet Recall-  Breakfast- HB egg and English muffin  Lunch- salad with various proteins OR fast food if out only OR has a sandwich (PB&J)  Dinner- various proteins, starches and vegetables  Daily Snacks- nuts OR popcorn OR yogurt and occasionally will have sweets such as ice cream  Beverages- coffee with cream in am, water throughout the day (plain or flavored approximately -50 ounces daily)  Alcohol- glass of wine most nights by report  Physical Activity- Currently in PT for arthritis and states standing and walking are often problematic which makes weight loss efforts difficult     Other pertinent patient reported Information:  - Past successful weight loss attempts include: Ozempic- lost 43 lbs but has regained now approximately 18 lbs  - Feels barriers to weight loss include inability to be as active as she was in the past.  - Researched medications and wants to start medication Contrave  - Having a difficult time using the [employer] system to let Dr. Garland know. This writer will assist with relaying message.    Nutrition Diagnosis: Food and nutrition-related knowledge deficit related to lack of recent exposure to information as evidenced by verbalizes inaccurate or incomplete information.    Readiness to Learn:  Cognitive ability: Alert and oriented  Motivation to learn: Interested  Family Support: Unable to assess- family not present  Instruction provided to: Patient  Patient learns best by: Multiple methods  Factors affecting learning: None   Physical limitations affecting learning: None    Education Materials Provided:   Your Mediterranean Meal Plan Booklet provided during recent visit with Dr. Dada Brooks and reviewed during visit this day.    Nutrition Interventions/Recommendations for 5/20/2024:  - Please refer to your book  entitled: Your Mediterranean Meal Plan, and follow Mediterranean Diet eating guidelines as reviewed.  - The Healthy Plate style of eating can be a helpful tool for incorporating healthy balanced meals in appropriate portions. (Healthy Plate: Start with a 9-inch diameter plate. Fill 1/2 the plate with non-starchy vegetables, 1/4 of the plate with whole grains or starchy vegetables, and 1/4 of the plate with a lean source of protein.   - Consider pre-planning healthy meals for the week. Refer to your book for both menu and recipe ideas to get you started.  - Incorporate strategies of mindful eating every day. Practice staying in tune with your body's hunger cues and eat only when truly hungry. Avoid emotional eating/eating when not hungry.  - Aim for 50-64 ounces of water daily.  - Continue to do weekly PT exercises as you are doing.  - Follow-up as scheduled for the group classes with Dr. Dada Brooks.  - Follow-up with nutrition in 6 weeks.      Nutrition Monitoring & Evaluation: adherence to recommendations and patient stated goals    Need for follow-up: As scheduled for Dr. Samuels's Shared Medical Appointment (SMA) and Individual Nutrition Visit in 4-6 weeks    Referred by: Dr. Dada Brooks    MNT Billing Type: Medical Nutrition Assessment, each 15 min increment, for 3 increments.    SIGNATURE:   Hafsa Lambert RD, LD, [facility]                                                             DATE:   5/20/2024

## 2024-05-20 NOTE — PATIENT INSTRUCTIONS
- Please refer to your book entitled: Your Mediterranean Meal Plan, and follow Mediterranean Diet eating guidelines as reviewed.  - The Healthy Plate style of eating can be a helpful tool for incorporating healthy balanced meals in appropriate portions. (Healthy Plate: Start with a 9-inch diameter plate. Fill 1/2 the plate with non-starchy vegetables, 1/4 of the plate with whole grains or starchy vegetables, and 1/4 of the plate with a lean source of protein.   - Consider pre-planning healthy meals for the week. Refer to your book for both menu and recipe ideas to get you started.  - Incorporate strategies of mindful eating every day. Practice staying in tune with your body's hunger cues and eat only when truly hungry. Avoid emotional eating/eating when not hungry.  - Aim for 50-64 ounces of water daily.  - Continue to do weekly PT exercises as you are doing.  - Follow-up as scheduled for the group classes with Dr. Dada Brooks.  - Follow-up with nutrition in 6 weeks.

## 2024-05-21 RX ORDER — NALTREXONE HYDROCHLORIDE AND BUPROPION HYDROCHLORIDE 8; 90 MG/1; MG/1
1 TABLET, EXTENDED RELEASE ORAL DAILY
Qty: 30 TABLET | Refills: 11 | Status: SHIPPED | OUTPATIENT
Start: 2024-05-21

## 2024-05-22 NOTE — TELEPHONE ENCOUNTER
----- Message from Hafsa Lambert RD, LD sent at 5/20/2024  3:42 PM EDT -----  Hi,    I met with this pt today and she said she is having a difficult time using the Mychart messaging system, but wanted to request moving forward with medication Contrave. She spoke to her pharmacist about the three medications that were suggested during her visit on 5/13/24 and this was the one the pharmacist recommended. She confirmed that the primary pharmacy of [facility] listed in the chart is correct.     Thank you  Hafsa

## 2024-05-22 NOTE — TELEPHONE ENCOUNTER
LOV: 5/13/24  NOV: 10/8/24  Patient wanting to try Contrave  Left patient a detailed message on dosing instructions and options regarding mail order pharmacy. Will also send [employer] message.

## 2024-05-23 ENCOUNTER — TREATMENT (OUTPATIENT)
Dept: PHYSICAL THERAPY | Facility: CLINIC | Age: 79
End: 2024-05-23
Payer: MEDICARE

## 2024-05-23 DIAGNOSIS — M54.16 LUMBAR RADICULOPATHY, CHRONIC: ICD-10-CM

## 2024-05-23 DIAGNOSIS — M51.36 LUMBAR DEGENERATIVE DISC DISEASE: Primary | ICD-10-CM

## 2024-05-23 PROCEDURE — 97112 NEUROMUSCULAR REEDUCATION: CPT | Mod: GP | Performed by: PHYSICAL THERAPIST

## 2024-05-23 PROCEDURE — 97110 THERAPEUTIC EXERCISES: CPT | Mod: GP | Performed by: PHYSICAL THERAPIST

## 2024-05-23 NOTE — PROGRESS NOTES
Physical Therapy  Physical Therapy Treatment    Patient Name: Lucille Holloway  MRN: [id number]  Today's Date: 5/23/2024  Time Calculation  Start Time: 0800  Stop Time: 0845  Time Calculation (min): 45 min    Insurance:  Visit number: 5 of [facility]  Authorization info: No Auth Needed  Insurance Type: [facility] Medicare  Cert date start: 4/25/24        Cert date end: 7/24/24                General  Reason for Referral: LBP  Referred By: JOSSUE Peralta PA-C    Current Problem  1. Lumbar degenerative disc disease        2. Lumbar radiculopathy, chronic            Subjective:   Patient notes the pool helps alleviate her symptoms for about 6-8 hours. She does still have back pain in the mornings and sometimes in her posterior legs.      HEP Performed:  Yes    Objective:   Slightly forward posture.       Treatments:   - stepper x 5'  - DBE 2 x 2'   - Airex MIP x 2' (with UE support)  - Seated hip add iso x 20   - Seated marching x 20  - Supine TA sets x 20, 5 second holds   - LTR x 15 B   - Seated on PB with narrow SARAH x 2'  - Seated on PB: heel raises x 20    Charges:  1- TE  2- NME       Assessment:   The focus of today's session was core strength/stability and pain improvement. The pt demonstrated excellent tolerance to the noted exercises today. The patient demonstrates good progress towards their goals at this time as demonstrated by ability to tolerate land based treatment.  No pain was reported during the treatment session today, infact patient reports complete resolution of symptoms and pain at 0/10 post treatment. The patient continues to present with limitations in core strength/stability, LE strength, and LE flexibility  at this time therefore would continue to benefit from skilled PT.          Plan: Alternate land and aquatic visits.    Beti Langley, PT

## 2024-05-24 ENCOUNTER — APPOINTMENT (OUTPATIENT)
Dept: HEMATOLOGY/ONCOLOGY | Facility: HOSPITAL | Age: 79
End: 2024-05-24
Payer: MEDICARE

## 2024-05-29 ENCOUNTER — TREATMENT (OUTPATIENT)
Dept: PHYSICAL THERAPY | Facility: CLINIC | Age: 79
End: 2024-05-29
Payer: MEDICARE

## 2024-05-29 DIAGNOSIS — M54.16 LUMBAR RADICULOPATHY, CHRONIC: ICD-10-CM

## 2024-05-29 DIAGNOSIS — M51.36 LUMBAR DEGENERATIVE DISC DISEASE: Primary | ICD-10-CM

## 2024-05-29 PROCEDURE — 97110 THERAPEUTIC EXERCISES: CPT | Mod: GP | Performed by: PHYSICAL THERAPIST

## 2024-05-29 PROCEDURE — 97112 NEUROMUSCULAR REEDUCATION: CPT | Mod: GP | Performed by: PHYSICAL THERAPIST

## 2024-05-29 NOTE — PROGRESS NOTES
Physical Therapy  Physical Therapy Treatment    Patient Name: Lucille Holloway  MRN: [id number]  Today's Date: 5/29/2024  Time Calculation  Start Time: 1002  Stop Time: 1044  Time Calculation (min): 42 min    Insurance:  Visit number: 6 of Med Nec  Authorization info: No Auth Needed  Insurance Type: [facility] Medicare  Cert date start: 4/25/24        Cert date end: 7/24/24                General  Reason for Referral: LBP  Referred By: JOSSUE Peralta PA-C    Current Problem  1. Lumbar degenerative disc disease        2. Lumbar radiculopathy, chronic              Subjective:   Patient reports she has mostly been symptoms free since her previous appointment. She was able to go on a bus trip with her Religion group and walk about 10,000 steps a day without an increase in back pain.      HEP Performed:  Yes    Objective:   Slightly forward posture.       Treatments:   - stepper x 7'  - DBE 2 x 2'   - Airex MIP x 2'   - Seated hip add iso x 20   - Seated marching x 20  - Supine TA sets x 20, 5 second holds   - LTR x 15 B     Not performed this visit:   Seated on PB with narrow SARAH x 2'   Seated on PB: heel raises x 20    Charges:  1- TE  2- NME       Assessment:    Patient demonstrates a significant improvement this visit as she was able to perform airex MIP without any UE support until the last 20 seconds, and reported  nearly no pain throughout the entire session. Minimal cueing was required this visit as patient is gaining independence with all her exercises.       Plan: Recheck next visit.     Beti Langley, PT

## 2024-05-30 ENCOUNTER — OFFICE VISIT (OUTPATIENT)
Dept: ORTHOPEDIC SURGERY | Facility: CLINIC | Age: 79
End: 2024-05-30
Payer: MEDICARE

## 2024-05-30 VITALS — BODY MASS INDEX: 35.08 KG/M2 | HEIGHT: 63 IN | WEIGHT: 198 LBS

## 2024-05-30 DIAGNOSIS — M51.36 LUMBAR DEGENERATIVE DISC DISEASE: Primary | ICD-10-CM

## 2024-05-30 DIAGNOSIS — M25.552 HIP PAIN, LEFT: ICD-10-CM

## 2024-05-30 PROCEDURE — 99212 OFFICE O/P EST SF 10 MIN: CPT | Performed by: PHYSICIAN ASSISTANT

## 2024-05-30 PROCEDURE — 1036F TOBACCO NON-USER: CPT | Performed by: PHYSICIAN ASSISTANT

## 2024-05-30 PROCEDURE — 1160F RVW MEDS BY RX/DR IN RCRD: CPT | Performed by: PHYSICIAN ASSISTANT

## 2024-05-30 PROCEDURE — 1123F ACP DISCUSS/DSCN MKR DOCD: CPT | Performed by: PHYSICIAN ASSISTANT

## 2024-05-30 PROCEDURE — 1159F MED LIST DOCD IN RCRD: CPT | Performed by: PHYSICIAN ASSISTANT

## 2024-05-30 ASSESSMENT — PAIN - FUNCTIONAL ASSESSMENT: PAIN_FUNCTIONAL_ASSESSMENT: NO/DENIES PAIN

## 2024-05-30 NOTE — PROGRESS NOTES
Lucille returns today for a follow-up.    We have been following her in regards to her low back pain with left hip and left lower extremity radiculopathy for the last 6 weeks.    The patient has been working extremely thorough with physical therapy and has seen almost complete improvement of her symptoms.  She is so happy with the improvement in her quality of life and being able to slowly get back to her regular activities.    With that being said, I strongly encouraged the patient to continue with physical therapy and incorporate a routine stretching exercise program.  She is also going to ask for specific stretches once or therapy and so that she is able to continue this at home.    She will follow-up with us as needed.  She was given my direct contact number and instructed to reach out if she needs anything at all.    I reviewed the complete 30-point review of systems that was documented on the scanned patient intake form.  All other systems are non-contributory except as defined in history of present illness.    This note was dictated using speech recognition software and was not corrected for spelling or grammatical errors   wine/liquor

## 2024-06-05 ENCOUNTER — APPOINTMENT (OUTPATIENT)
Dept: PRIMARY CARE | Facility: CLINIC | Age: 79
End: 2024-06-05
Payer: MEDICARE

## 2024-06-05 ENCOUNTER — OFFICE VISIT (OUTPATIENT)
Dept: PRIMARY CARE | Facility: CLINIC | Age: 79
End: 2024-06-05
Payer: MEDICARE

## 2024-06-05 VITALS
DIASTOLIC BLOOD PRESSURE: 82 MMHG | SYSTOLIC BLOOD PRESSURE: 143 MMHG | HEART RATE: 76 BPM | WEIGHT: 199 LBS | BODY MASS INDEX: 35.25 KG/M2

## 2024-06-05 DIAGNOSIS — E66.01 OBESITY, MORBID (MULTI): ICD-10-CM

## 2024-06-05 DIAGNOSIS — L23.7 POISON IVY: Primary | ICD-10-CM

## 2024-06-05 PROCEDURE — 1159F MED LIST DOCD IN RCRD: CPT | Performed by: INTERNAL MEDICINE

## 2024-06-05 PROCEDURE — 3077F SYST BP >= 140 MM HG: CPT | Performed by: INTERNAL MEDICINE

## 2024-06-05 PROCEDURE — 3079F DIAST BP 80-89 MM HG: CPT | Performed by: INTERNAL MEDICINE

## 2024-06-05 PROCEDURE — 99213 OFFICE O/P EST LOW 20 MIN: CPT | Performed by: INTERNAL MEDICINE

## 2024-06-05 PROCEDURE — 1160F RVW MEDS BY RX/DR IN RCRD: CPT | Performed by: INTERNAL MEDICINE

## 2024-06-05 PROCEDURE — 1123F ACP DISCUSS/DSCN MKR DOCD: CPT | Performed by: INTERNAL MEDICINE

## 2024-06-05 PROCEDURE — 1036F TOBACCO NON-USER: CPT | Performed by: INTERNAL MEDICINE

## 2024-06-05 RX ORDER — METHYLPREDNISOLONE 4 MG/1
TABLET ORAL
Qty: 21 TABLET | Refills: 0 | Status: SHIPPED | OUTPATIENT
Start: 2024-06-05 | End: 2024-06-12

## 2024-06-05 RX ORDER — TRIAMCINOLONE ACETONIDE 1 MG/G
OINTMENT TOPICAL 2 TIMES DAILY PRN
Qty: 80 G | Refills: 0 | Status: SHIPPED | OUTPATIENT
Start: 2024-06-05 | End: 2024-10-03

## 2024-06-05 ASSESSMENT — ENCOUNTER SYMPTOMS
VOMITING: 0
ANOREXIA: 0
RHINORRHEA: 0
SHORTNESS OF BREATH: 0
FEVER: 0
COUGH: 0
SORE THROAT: 0
FATIGUE: 0
EYE PAIN: 0
NAIL CHANGES: 0
DIARRHEA: 0

## 2024-06-05 NOTE — PROGRESS NOTES
Subjective   Patient ID: Lucille Holloway is a 78 y.o. female who presents for New Patient Visit and Poison Ivy.    HPI     Patient is a 70-year-old female with past medical history of hypertension and obesity presents with chief complaint of rash affecting the forearms.  She was exposed to poison ivy.  There are vesicular lesions that are linear.  She has not taken any medications for treatment    Review of Systems  Constitutional: No fever or chills  Cardiovascular: no chest pain, no palpitations and no syncope.   Respiratory: no cough, no shortness of breath during exertion and no shortness of breath at rest.   Gastrointestinal: no abdominal pain, no nausea and no vomiting.  Neuro: No Headache, no dizziness  Skn rash     Objective   /82   Pulse 76   Wt 90.3 kg (199 lb)   BMI 35.25 kg/m²     Physical Exam  Constitutional: Alert and in no acute distress. Well developed, well nourished  Head and Face: Head and face: Normal.    Cardiovascular: Heart rate and rhythm were normal, normal S1 and S2. No peripheral edema.   Pulmonary: No respiratory distress. Clear bilateral breath sounds.  Musculoskeletal: Gait and station: Normal. Muscle strength/tone: Normal.   Skin: Linear vesicular rashes on the forearms  Psychiatric: Judgment and insight: Intact. Mood and affect: Normal.    Procedures    Lab Results   Component Value Date    WBC 8.1 01/04/2024    HGB 13.3 01/04/2024    HCT 40.7 01/04/2024     01/04/2024    CHOL 132 01/04/2024    TRIG 93 01/04/2024    HDL 74.2 01/04/2024    ALT 40 01/04/2024    AST 34 01/04/2024     01/04/2024    K 4.7 01/04/2024     01/04/2024    CREATININE 0.99 01/04/2024    BUN 22 01/04/2024    CO2 29 01/04/2024    TSH 1.61 01/04/2024    INR 1.0 11/20/2019    HGBA1C 4.9 12/16/2022       XR lumbar spine complete 4+ views  Narrative: Interpreted By:  Alverto Webber,   STUDY:  XR LUMBAR SPINE COMPLETE 4+ VIEWS      INDICATION:  Signs/Symptoms:lumbar pain.       COMPARISON:  January 13, 2022      ACCESSION NUMBER(S):  [id number]      ORDERING CLINICIAN:  CATHI TAMAYO      FINDINGS:  Fairly advanced lumbar degenerative changes at all levels, similar to  prior study, worst L4-S1.      Mild L3-4 anterolisthesis similar to previous exam without pathologic  motion.      No evidence of fracture      Impression: Fairly advanced lumbar degenerative changes at all levels, similar to  prior study, worst L4-S1.      Mild L3-4 anterolisthesis similar to previous exam without pathologic  motion.      Signed by: Alverto Webber 3/31/2024 8:30 AM  Dictation workstation:   [id number]            Assessment/Plan   Problem List Items Addressed This Visit             ICD-10-CM    Obesity, morbid (Multi) E66.01     Other Visit Diagnoses         Codes    Poison ivy    -  Primary L23.7    Relevant Medications    methylPREDNISolone (Medrol Dospak) 4 mg tablets    triamcinolone (Kenalog) 0.1 % ointment          Poison ivy dermatitis.  Start triamcinolone cream twice daily for 5 days.  Start Medrol Dosepak.  Follow-up in 3 to 5 days if no improvement.  Avoid itching the area

## 2024-06-06 ENCOUNTER — APPOINTMENT (OUTPATIENT)
Dept: PHYSICAL THERAPY | Facility: CLINIC | Age: 79
End: 2024-06-06
Payer: MEDICARE

## 2024-06-11 ENCOUNTER — DOCUMENTATION (OUTPATIENT)
Dept: CARE COORDINATION | Facility: CLINIC | Age: 79
End: 2024-06-11
Payer: MEDICARE

## 2024-06-11 NOTE — PROGRESS NOTES
Patient referred to [facility] pool. After reviewing chart, this RD noted she is working with RD at  [facility]. Will close referral for [facility].

## 2024-06-24 ENCOUNTER — APPOINTMENT (OUTPATIENT)
Dept: ENDOCRINOLOGY | Facility: CLINIC | Age: 79
End: 2024-06-24
Payer: MEDICARE

## 2024-06-24 VITALS — HEIGHT: 63 IN | BODY MASS INDEX: 34.02 KG/M2 | WEIGHT: 192 LBS

## 2024-06-24 DIAGNOSIS — Z71.3 DIETARY COUNSELING: ICD-10-CM

## 2024-06-24 DIAGNOSIS — N18.9 CHRONIC KIDNEY DISEASE, UNSPECIFIED CKD STAGE: Primary | ICD-10-CM

## 2024-06-24 PROCEDURE — 97803 MED NUTRITION INDIV SUBSEQ: CPT | Performed by: DIETITIAN, REGISTERED

## 2024-06-24 NOTE — PATIENT INSTRUCTIONS
- Please continue to follow the Mediterranean Meal Plan guidelines.  - Continue to use the Healthy Plate style of eating for incorporating healthy balanced meals in appropriate portions.   - Continue to pre-plan healthy meals for the week.   - Continue to incorporate strategies of mindful eating every day. Practice staying in tune with your body's hunger cues and eat only when truly hungry. Avoid emotional eating/eating when not hungry.  - Keep up the great work with your daily water intakes.  - Aim for weekly physical activity.  - Follow-up as scheduled for the group classes with Dr. Dada Brooks.  - Follow-up with nutrition in one month.

## 2024-06-24 NOTE — PROGRESS NOTES
Follow-up Nutrition Assessment    Interval History: Patient presents for follow-up nutrition appointment for weight management/desire as well as for consideration of CKD. Since last nutrition visit on 5/20/24, pt with a 6 lbs weight loss. Diet recall reveals a consistent meal pattern with rare missed meals and closer attention to incorporating lean proteins at meals, increased fruits and vegetables, and implementing good portions control. Pt has been on medication Contrave now for approximately three weeks and feels it is helping greatly with goals. Fluids continue to meet recommendations in type and amount with water as primary beverage. Pt is not incorporating consistent physical activity at this time due to recent poison ivy but planning to get back to the gym tomorrow bu report. See all interventions/recommendations below as discussed during visit this day.     Nutrition Interventions/Recommendations from last visit scheduled on 5/20/24:  - Please refer to your book entitled: Your Mediterranean Meal Plan, and follow Mediterranean Diet eating guidelines as reviewed.  - The Healthy Plate style of eating can be a helpful tool for incorporating healthy balanced meals in appropriate portions. (Healthy Plate: Start with a 9-inch diameter plate. Fill 1/2 the plate with non-starchy vegetables, 1/4 of the plate with whole grains or starchy vegetables, and 1/4 of the plate with a lean source of protein.   - Consider pre-planning healthy meals for the week. Refer to your book for both menu and recipe ideas to get you started.  - Incorporate strategies of mindful eating every day. Practice staying in tune with your body's hunger cues and eat only when truly hungry. Avoid emotional eating/eating when not hungry.  - Aim for 50-64 ounces of water daily.  - Continue to do weekly PT exercises as you are doing.  - Follow-up as scheduled for the group classes with Dr. Dada Brooks.  - Follow-up with nutrition in 6 weeks.   "    Anthropometrics:  Height:   Ht Readings from Last 1 Encounters:   05/30/24 1.6 m (5' 3\")      Weight:   Wt Readings from Last 10 Encounters:   06/05/24 90.3 kg (199 lb)   05/30/24 89.8 kg (198 lb)   05/20/24 89.8 kg (198 lb)   05/13/24 90.2 kg (198 lb 14.4 oz)   03/28/24 86.2 kg (190 lb)   03/08/24 86.2 kg (190 lb)   03/04/24 87.1 kg (192 lb)   02/16/24 86.2 kg (190 lb)   01/04/24 83.2 kg (183 lb 5 oz)   01/02/24 85 kg (187 lb 6.4 oz)      Current BMI:   BMI Readings from Last 1 Encounters:   06/05/24 35.25 kg/m²        Malnutrition Screening:  Significant Unintentional weight loss: No  Eating less than 75% of usual intake for more than 2 weeks: No  Potential Signs of Inflammation: No    Recommended Malnutrition Diagnosis: No malnutrition identified    Diet Recall-  Breakfast- HB egg and English muffin/wheat bread OR cottage cheese and fruit  Lunch- salad with various proteins OR take-out only if out and about OR has a sandwich   Dinner- various proteins, starches and vegetables   Snacks- fruit mostly   Beverages- coffee with cream in am, water throughout the day (plain or flavored)   Alcohol- glass of wine on weekends   Physical Activity- PT and although has not able to get to the gym consistently due to poison ivy planning to go back to the gym tomorrow     Other pertinent patient reported Information:  - Has been working hard to incorporate protein and vegetables at meals and control overall portions  - On medication Contrave for three weeks now and pleased with the results with helping with portion control.    Nutrition Diagnosis: Food and nutrition-related knowledge deficit related to lack of recent exposure to information as evidenced by verbalizes inaccurate or incomplete information.     Readiness to Learn:  Cognitive ability: Alert and oriented  Motivation to learn: Interested  Family Support: Unable to assess- family not present  Instruction provided to: Patient  Patient learns best by: Multiple " methods  Factors affecting learning: None   Physical limitations affecting learning: None    Education Materials Provided:   None this visit    Nutrition Interventions/Recommendations for 6/24/2024:  - Please continue to follow the Mediterranean Meal Plan guidelines.  - Continue to use the Healthy Plate style of eating for incorporating healthy balanced meals in appropriate portions.   - Continue to pre-plan healthy meals for the week.   - Continue to incorporate strategies of mindful eating every day. Practice staying in tune with your body's hunger cues and eat only when truly hungry. Avoid emotional eating/eating when not hungry.  - Keep up the great work with your daily water intakes.  - Aim for weekly physical activity.  - Follow-up as scheduled for the group classes with Dr. Dada Brooks.  - Follow-up with nutrition in one month.      Nutrition Monitoring & Evaluation: adherence to recommendations and patient stated goals    Need for follow-up: As scheduled for Dr. Samuels's Shared Medical Appointment (SMA) and nutrition in one month as scheduled.    Referred by: Dr. Dada Brooks    MNT Billing Type: Medical Nutrition Re-Assessment, each 15 min increment, for 2 increments.    SIGNATURE:   Hafsa Lambert RD, CSOWM, LD, CDCES                                                          DATE:   6/24/2024

## 2024-06-25 ENCOUNTER — APPOINTMENT (OUTPATIENT)
Dept: PRIMARY CARE | Facility: CLINIC | Age: 79
End: 2024-06-25
Payer: MEDICARE

## 2024-06-25 ENCOUNTER — LAB (OUTPATIENT)
Dept: LAB | Facility: LAB | Age: 79
End: 2024-06-25
Payer: MEDICARE

## 2024-06-25 VITALS
OXYGEN SATURATION: 97 % | BODY MASS INDEX: 33.66 KG/M2 | RESPIRATION RATE: 20 BRPM | HEIGHT: 63 IN | HEART RATE: 72 BPM | SYSTOLIC BLOOD PRESSURE: 117 MMHG | DIASTOLIC BLOOD PRESSURE: 73 MMHG | WEIGHT: 190 LBS

## 2024-06-25 DIAGNOSIS — I10 PRIMARY HYPERTENSION: ICD-10-CM

## 2024-06-25 DIAGNOSIS — Z76.89 ENCOUNTER TO ESTABLISH CARE: Primary | ICD-10-CM

## 2024-06-25 DIAGNOSIS — E66.09 CLASS 1 OBESITY DUE TO EXCESS CALORIES WITH SERIOUS COMORBIDITY AND BODY MASS INDEX (BMI) OF 33.0 TO 33.9 IN ADULT: ICD-10-CM

## 2024-06-25 DIAGNOSIS — E03.9 HYPOTHYROIDISM, UNSPECIFIED TYPE: ICD-10-CM

## 2024-06-25 DIAGNOSIS — R73.02 IGT (IMPAIRED GLUCOSE TOLERANCE): ICD-10-CM

## 2024-06-25 DIAGNOSIS — M06.9 RHEUMATOID ARTHRITIS, INVOLVING UNSPECIFIED SITE, UNSPECIFIED WHETHER RHEUMATOID FACTOR PRESENT (MULTI): ICD-10-CM

## 2024-06-25 DIAGNOSIS — Z12.31 SCREENING MAMMOGRAM FOR BREAST CANCER: ICD-10-CM

## 2024-06-25 DIAGNOSIS — N18.31 CHRONIC RENAL IMPAIRMENT, STAGE 3A (MULTI): ICD-10-CM

## 2024-06-25 PROBLEM — N62 MACROMASTIA: Status: RESOLVED | Noted: 2023-04-10 | Resolved: 2024-06-25

## 2024-06-25 PROBLEM — L23.7 POISON IVY DERMATITIS: Status: RESOLVED | Noted: 2023-04-10 | Resolved: 2024-06-25

## 2024-06-25 PROBLEM — R06.09 EXERTIONAL DYSPNEA: Status: RESOLVED | Noted: 2023-04-10 | Resolved: 2024-06-25

## 2024-06-25 PROBLEM — R06.02 SHORTNESS OF BREATH ON EXERTION: Status: RESOLVED | Noted: 2023-04-10 | Resolved: 2024-06-25

## 2024-06-25 PROBLEM — R07.9 CHEST PAIN: Status: RESOLVED | Noted: 2023-06-08 | Resolved: 2024-06-25

## 2024-06-25 PROBLEM — M25.561 KNEE PAIN, RIGHT: Status: RESOLVED | Noted: 2023-04-10 | Resolved: 2024-06-25

## 2024-06-25 PROBLEM — R07.9 CHEST PAIN, EXERTIONAL: Status: RESOLVED | Noted: 2023-04-10 | Resolved: 2024-06-25

## 2024-06-25 PROBLEM — G44.89 OTHER HEADACHE SYNDROME: Status: RESOLVED | Noted: 2024-06-25 | Resolved: 2024-06-25

## 2024-06-25 LAB
ALBUMIN SERPL BCP-MCNC: 3.6 G/DL (ref 3.4–5)
ALP SERPL-CCNC: 48 U/L (ref 33–136)
ALT SERPL W P-5'-P-CCNC: 41 U/L (ref 7–45)
ANION GAP SERPL CALC-SCNC: 12 MMOL/L (ref 10–20)
AST SERPL W P-5'-P-CCNC: 37 U/L (ref 9–39)
BILIRUB SERPL-MCNC: 0.5 MG/DL (ref 0–1.2)
BUN SERPL-MCNC: 20 MG/DL (ref 6–23)
CALCIUM SERPL-MCNC: 9.2 MG/DL (ref 8.6–10.3)
CHLORIDE SERPL-SCNC: 104 MMOL/L (ref 98–107)
CO2 SERPL-SCNC: 26 MMOL/L (ref 21–32)
CREAT SERPL-MCNC: 1.04 MG/DL (ref 0.5–1.05)
EGFRCR SERPLBLD CKD-EPI 2021: 55 ML/MIN/1.73M*2
EST. AVERAGE GLUCOSE BLD GHB EST-MCNC: 108 MG/DL
GLUCOSE SERPL-MCNC: 89 MG/DL (ref 74–99)
HBA1C MFR BLD: 5.4 %
POTASSIUM SERPL-SCNC: 4.7 MMOL/L (ref 3.5–5.3)
PROT SERPL-MCNC: 6.8 G/DL (ref 6.4–8.2)
SODIUM SERPL-SCNC: 137 MMOL/L (ref 136–145)
T4 FREE SERPL-MCNC: 1.08 NG/DL (ref 0.61–1.12)
TSH SERPL-ACNC: 0.27 MIU/L (ref 0.44–3.98)

## 2024-06-25 PROCEDURE — 1123F ACP DISCUSS/DSCN MKR DOCD: CPT

## 2024-06-25 PROCEDURE — 1159F MED LIST DOCD IN RCRD: CPT

## 2024-06-25 PROCEDURE — 1160F RVW MEDS BY RX/DR IN RCRD: CPT

## 2024-06-25 PROCEDURE — 83036 HEMOGLOBIN GLYCOSYLATED A1C: CPT

## 2024-06-25 PROCEDURE — 84443 ASSAY THYROID STIM HORMONE: CPT

## 2024-06-25 PROCEDURE — 1036F TOBACCO NON-USER: CPT

## 2024-06-25 PROCEDURE — 1158F ADVNC CARE PLAN TLK DOCD: CPT

## 2024-06-25 PROCEDURE — 99214 OFFICE O/P EST MOD 30 MIN: CPT

## 2024-06-25 PROCEDURE — 3078F DIAST BP <80 MM HG: CPT

## 2024-06-25 PROCEDURE — 3074F SYST BP LT 130 MM HG: CPT

## 2024-06-25 PROCEDURE — 84439 ASSAY OF FREE THYROXINE: CPT

## 2024-06-25 PROCEDURE — 36415 COLL VENOUS BLD VENIPUNCTURE: CPT

## 2024-06-25 PROCEDURE — 80053 COMPREHEN METABOLIC PANEL: CPT

## 2024-06-25 RX ORDER — LEVOTHYROXINE SODIUM 75 UG/1
75 TABLET ORAL DAILY
Qty: 90 TABLET | Refills: 1 | Status: SHIPPED | OUTPATIENT
Start: 2024-06-25

## 2024-06-25 ASSESSMENT — ENCOUNTER SYMPTOMS
WEAKNESS: 0
EYES NEGATIVE: 1
APNEA: 0
NECK PAIN: 0
ABDOMINAL DISTENTION: 0
ENDOCRINE NEGATIVE: 1
DIZZINESS: 0
NECK STIFFNESS: 0
CHILLS: 0
JOINT SWELLING: 0
HEADACHES: 0
PSYCHIATRIC NEGATIVE: 1
POLYPHAGIA: 0
TREMORS: 0
MUSCULOSKELETAL NEGATIVE: 1
DIARRHEA: 0
DEPRESSION: 0
BACK PAIN: 0
FREQUENCY: 0
ANAL BLEEDING: 0
HEMATURIA: 0
ABDOMINAL PAIN: 0
SORE THROAT: 0
PALPITATIONS: 0
CARDIOVASCULAR NEGATIVE: 1
HYPERACTIVE: 0
VOICE CHANGE: 0
FEVER: 0
PHOTOPHOBIA: 0
FATIGUE: 0
POLYDIPSIA: 0
SLEEP DISTURBANCE: 0
STRIDOR: 0
DYSURIA: 0
MYALGIAS: 0
CONSTITUTIONAL NEGATIVE: 1
SINUS PRESSURE: 0
NAUSEA: 0
EYE DISCHARGE: 0
NEUROLOGICAL NEGATIVE: 1
NERVOUS/ANXIOUS: 0
COUGH: 0
APPETITE CHANGE: 0
SEIZURES: 0
SPEECH DIFFICULTY: 0
UNEXPECTED WEIGHT CHANGE: 0
CONFUSION: 0
OCCASIONAL FEELINGS OF UNSTEADINESS: 0
BLOOD IN STOOL: 0
ACTIVITY CHANGE: 0
BRUISES/BLEEDS EASILY: 0
HEMATOLOGIC/LYMPHATIC NEGATIVE: 1
TROUBLE SWALLOWING: 0
DIFFICULTY URINATING: 0
RECTAL PAIN: 0
GASTROINTESTINAL NEGATIVE: 1
CONSTIPATION: 0
DIAPHORESIS: 0
AGITATION: 0
CHEST TIGHTNESS: 0
COLOR CHANGE: 0
NUMBNESS: 0
RESPIRATORY NEGATIVE: 1
FLANK PAIN: 0
DYSPHORIC MOOD: 0
RHINORRHEA: 0
LIGHT-HEADEDNESS: 0
SHORTNESS OF BREATH: 0
WHEEZING: 0
LOSS OF SENSATION IN FEET: 0

## 2024-06-25 NOTE — PROGRESS NOTES
Primary Care Provider: Kina Lynch, APRN-CNP    Subjective   Lucille Holloway is a 79 y.o. female who presents for New Patient Visit (Est care md leaving /No major concerns per pt/Former dr bob pt as well).    HPI   PMH of of CKD, IGT, HTN, hypothyroidism, goiter, KELLY, obesity, hip replacement, arthritis - osteo and rheumatoid, cataract surgery.    NPV/ est care    RA; Follows with rheumatology- Dr. Ortiz- On Cymbalta & gabapentin  Working with   Obesity; Following with endocrinology for weight loss- on Contrave; was around 225lbs, down to 190lbs  Exercising regularly: water aerobics, strength training, aerobics  Mild KELLY- off of CPAP now due to her weight loss  Kristin Canas, daughter is her Healthcare POA- she has brought in her paperwork for this  CKD- has been stable  Hypothyroidism  IGT- check A1c  HTN- stable  Is going to be seeing plastic surgeon for eval of skin/fat tissue removal    HM:  Last mammogram- 7/20/2023; due 7/20/2024  Last colonoscopy 8/4/2021 w/ 5 yr repeat; due 8/4/2025    Review of Systems   Constitutional: Negative.  Negative for activity change, appetite change, chills, diaphoresis, fatigue, fever and unexpected weight change.   HENT: Negative.  Negative for congestion, dental problem, ear discharge, ear pain, hearing loss, mouth sores, nosebleeds, postnasal drip, rhinorrhea, sinus pressure, sneezing, sore throat, tinnitus, trouble swallowing and voice change.    Eyes: Negative.  Negative for photophobia, discharge and visual disturbance.   Respiratory: Negative.  Negative for apnea, cough, chest tightness, shortness of breath, wheezing and stridor.    Cardiovascular: Negative.  Negative for chest pain, palpitations and leg swelling.   Gastrointestinal: Negative.  Negative for abdominal distention, abdominal pain, anal bleeding, blood in stool, constipation, diarrhea, nausea and rectal pain.   Endocrine: Negative.  Negative for cold intolerance, heat intolerance, polydipsia,  "polyphagia and polyuria.   Genitourinary: Negative.  Negative for decreased urine volume, difficulty urinating, dysuria, flank pain, frequency, hematuria and urgency.   Musculoskeletal: Negative.  Negative for back pain, gait problem, joint swelling, myalgias, neck pain and neck stiffness.   Skin: Negative.  Negative for color change and rash.   Neurological: Negative.  Negative for dizziness, tremors, seizures, syncope, speech difficulty, weakness, light-headedness, numbness and headaches.   Hematological: Negative.  Does not bruise/bleed easily.   Psychiatric/Behavioral: Negative.  Negative for agitation, confusion, dysphoric mood, sleep disturbance and suicidal ideas. The patient is not nervous/anxious and is not hyperactive.    All other systems reviewed and are negative.        Objective   /73   Pulse 72   Resp 20   Ht 1.6 m (5' 3\")   Wt 86.2 kg (190 lb)   SpO2 97%   BMI 33.66 kg/m²     Physical Exam  Vitals reviewed.   Constitutional:       General: She is not in acute distress.     Appearance: Normal appearance. She is normal weight. She is not ill-appearing, toxic-appearing or diaphoretic.   HENT:      Head: Normocephalic and atraumatic.      Right Ear: Tympanic membrane, ear canal and external ear normal. There is no impacted cerumen.      Left Ear: Tympanic membrane, ear canal and external ear normal. There is no impacted cerumen.      Nose: Nose normal.   Eyes:      Conjunctiva/sclera: Conjunctivae normal.   Cardiovascular:      Rate and Rhythm: Normal rate and regular rhythm.      Pulses: Normal pulses.      Heart sounds: Normal heart sounds. No murmur heard.     No friction rub. No gallop.   Pulmonary:      Effort: Pulmonary effort is normal. No respiratory distress.      Breath sounds: Normal breath sounds.   Abdominal:      General: Abdomen is flat. Bowel sounds are normal.      Palpations: Abdomen is soft.   Musculoskeletal:         General: Normal range of motion.      Cervical back: " Normal range of motion and neck supple.   Lymphadenopathy:      Cervical: No cervical adenopathy.   Skin:     General: Skin is warm and dry.      Capillary Refill: Capillary refill takes less than 2 seconds.   Neurological:      General: No focal deficit present.      Mental Status: She is alert and oriented to person, place, and time. Mental status is at baseline.   Psychiatric:         Mood and Affect: Mood normal.         Behavior: Behavior normal.         Thought Content: Thought content normal.         Judgment: Judgment normal.         Assessment/Plan   Problem List Items Addressed This Visit    NPV/ est care   Rheumatoid arthritis, involving unspecified site, unspecified whether rheumatoid factor present (Multi)  Stable  Continue with Cymbalta and gabapentin  Continue following with rheumatology    Chronic renal impairment, stage 3 (moderate) (Multi)    Stable  Recheck labs  Relevant Orders    Comprehensive metabolic panel    Hypertension    stable  Relevant Orders    Comprehensive metabolic panel    IGT (impaired glucose tolerance)    Stable  Continue working on weight loss; healthier eating and regular physical activity  Relevant Orders    Hemoglobin A1C    Class 1 obesity due to excess calories with serious comorbidity and body mass index (BMI) of 33.0 to 33.9 in adult    Stable/ improving  Continue working on weight loss; healthier eating and regular physical activity  Relevant Orders    Comprehensive metabolic panel    Hypothyroidism    Stable  C/w synthroid  Relevant Orders    TSH with reflex to Free T4 if abnormal    Screening mammogram for breast cancer - Primary    Relevant Orders    BI mammo bilateral screening tomosynthesis       Follow up in 6 months or sooner if needed

## 2024-06-27 ENCOUNTER — TELEPHONE (OUTPATIENT)
Dept: PRIMARY CARE | Facility: CLINIC | Age: 79
End: 2024-06-27
Payer: MEDICARE

## 2024-06-27 NOTE — TELEPHONE ENCOUNTER
Per Kina Lynch, APRN-CNP... Informed Patient Stable renal function, normal liver function, normal blood sugars-no diabetes.  Thyroid function was in the hyperthyroid state, I would like to come down on your Synthroid.  Changed Synthroid from 88 mcg to 75 mcg daily, sent prescription to pharmacy. Please let me know if you have any questions.

## 2024-06-28 ENCOUNTER — OFFICE VISIT (OUTPATIENT)
Dept: RHEUMATOLOGY | Facility: CLINIC | Age: 79
End: 2024-06-28
Payer: MEDICARE

## 2024-06-28 VITALS
DIASTOLIC BLOOD PRESSURE: 77 MMHG | BODY MASS INDEX: 33.83 KG/M2 | HEART RATE: 66 BPM | SYSTOLIC BLOOD PRESSURE: 122 MMHG | WEIGHT: 191 LBS

## 2024-06-28 DIAGNOSIS — G89.29 CHRONIC MIDLINE LOW BACK PAIN WITHOUT SCIATICA: ICD-10-CM

## 2024-06-28 DIAGNOSIS — M54.50 CHRONIC MIDLINE LOW BACK PAIN WITHOUT SCIATICA: ICD-10-CM

## 2024-06-28 PROCEDURE — 1159F MED LIST DOCD IN RCRD: CPT | Performed by: INTERNAL MEDICINE

## 2024-06-28 PROCEDURE — 99213 OFFICE O/P EST LOW 20 MIN: CPT | Performed by: INTERNAL MEDICINE

## 2024-06-28 PROCEDURE — 1123F ACP DISCUSS/DSCN MKR DOCD: CPT | Performed by: INTERNAL MEDICINE

## 2024-06-28 PROCEDURE — 3078F DIAST BP <80 MM HG: CPT | Performed by: INTERNAL MEDICINE

## 2024-06-28 PROCEDURE — 1126F AMNT PAIN NOTED NONE PRSNT: CPT | Performed by: INTERNAL MEDICINE

## 2024-06-28 PROCEDURE — 3074F SYST BP LT 130 MM HG: CPT | Performed by: INTERNAL MEDICINE

## 2024-06-28 RX ORDER — DULOXETIN HYDROCHLORIDE 30 MG/1
30 CAPSULE, DELAYED RELEASE ORAL DAILY
Qty: 90 CAPSULE | Refills: 2 | Status: SHIPPED | OUTPATIENT
Start: 2024-06-28 | End: 2025-06-28

## 2024-06-28 RX ORDER — GABAPENTIN 300 MG/1
300 CAPSULE ORAL 3 TIMES DAILY
Qty: 270 CAPSULE | Refills: 3 | Status: SHIPPED | OUTPATIENT
Start: 2024-06-28 | End: 2025-06-28

## 2024-06-28 ASSESSMENT — PAIN SCALES - GENERAL: PAINLEVEL: 0-NO PAIN

## 2024-06-28 NOTE — PROGRESS NOTES
She presents for follow-up evaluation with complaints of having numbness in the right forearm that extends down to the right hand particularly when she is working with her computer.  She is status post bilateral carpal tunnel surgery.  She notes some mild intermittent swelling of the right wrist.  She notes that her musculoskeletal pain otherwise has been significantly improved since starting Cymbalta 30 mg daily.  She has discolored spots on her forearms after exposure to what she thinks is poison ivy in her yard.    Examination shows slight soft tissue thickening of the extensor aspect of the right wrist.  The Tinel's sign is normal at both wrists.  There is slight bony prominence of the extensor aspect of the right elbow.  There is good passive range of motion of the shoulders left hip and knees.  There is no peripheral edema.  There are a few irregularly shaped areas of hypopigmentation on the extensor aspect of the forearms.    Laboratory (6/25/2024) TSH 0.27, free T4 1.08, BUN 20, creatinine 1.04, glucose 89, potassium 4.7, calcium 9.2, albumin 3.6, alk phos 648, AST 37, ALT 41.  X-ray lumbar spine (3/28/2024) mild anterior listhesis of L3 on L4 and advanced degenerative arthritis at L4/L5.  X-ray hips (3/28/2024) advanced osteoarthritis of the right hip.  Left total hip arthroplasty is intact.  There is advanced osteoarthritis of the lumbar spine.    She has history of hypertension, hypothyroidism, obstructive sleep apnea, closure, hepatitis C, IgG kappa monoclonal gammopathy, renal insufficiency with serum creatinine returning to less than 100 normal range, and generalized osteoarthritis.    She is to continue Cymbalta 30 mg daily and gabapentin 300 mg 3 times per day.  She is to return as next available office appointment.

## 2024-07-01 ENCOUNTER — TREATMENT (OUTPATIENT)
Dept: PHYSICAL THERAPY | Facility: CLINIC | Age: 79
End: 2024-07-01
Payer: MEDICARE

## 2024-07-01 DIAGNOSIS — M54.16 LUMBAR RADICULOPATHY, CHRONIC: ICD-10-CM

## 2024-07-01 DIAGNOSIS — M51.36 LUMBAR DEGENERATIVE DISC DISEASE: Primary | ICD-10-CM

## 2024-07-01 PROCEDURE — 97112 NEUROMUSCULAR REEDUCATION: CPT | Mod: GP | Performed by: PHYSICAL THERAPIST

## 2024-07-01 PROCEDURE — 97110 THERAPEUTIC EXERCISES: CPT | Mod: GP | Performed by: PHYSICAL THERAPIST

## 2024-07-01 NOTE — PROGRESS NOTES
Physical Therapy  Physical Therapy Treatment    Patient Name: Lucille Holloway  MRN: [id number]  Today's Date: 7/1/2024  Time Calculation  Start Time: 1400  Stop Time: 1440  Time Calculation (min): 40 min    Insurance:  Visit number: 6 of Med Nec  Authorization info: No Auth Needed  Insurance Type: [facility] Medicare  Cert date start: 4/25/24        Cert date end: 7/24/24                General  Reason for Referral: LBP  Referred By: JOSSUE Peralta PA-C    Current Problem  1. Lumbar degenerative disc disease        2. Lumbar radiculopathy, chronic                Subjective:   Patient reports her back had been feeling so much better, but then she came in contact with poison ivy and had blisters on both arms therefore had to cancel her appointments for a few visits and wasn't able to go to the gym to do her exercises. Her pain is 3/10 today and is mostly in the posterior R thigh, pointing to her hamstring (not radiating from her back). This posterior R thigh pain has been intermittent for about a week, and she has had this pain in the past on and off after her R knee replacement. It is usually worst when standing after prolonged sitting. She describes the pain to be tight and achy, and denies any redness, swelling, discoloration, or warmth.      HEP Performed:  Yes    Objective:   Slightly forward posture.      No signs or symptoms of DVT      Treatments:   - stepper x 7'  - hamstring stretch at step x 1' B   - DBE 2 x 2'   - Airex MIP x 2'   - Seated hip add iso x 20   - seated heel raises x 20     Not performed this visit:   Supine bridge x 20    Supine TA sets x 20, 5 second holds    LTR x 15 B     Charges:  2- TE  1- NME       Assessment:   Patient able to tolerate all exercises noting a significant improvement in R hamstring stiffness and discomfort by the end of the session. Patient did require frequent seated rest breaks throughout the session today as she hasn't exercised in a few weeks due to the recent poison ivy  incident. Overall notes feeling better upon leaving clinic compared to when she arrived.     Plan: Recheck next visit.     Beti Langley, PT

## 2024-07-09 ENCOUNTER — TREATMENT (OUTPATIENT)
Dept: PHYSICAL THERAPY | Facility: CLINIC | Age: 79
End: 2024-07-09
Payer: MEDICARE

## 2024-07-09 DIAGNOSIS — M54.16 LUMBAR RADICULOPATHY, CHRONIC: ICD-10-CM

## 2024-07-09 DIAGNOSIS — M51.36 LUMBAR DEGENERATIVE DISC DISEASE: Primary | ICD-10-CM

## 2024-07-09 PROCEDURE — 97110 THERAPEUTIC EXERCISES: CPT | Mod: GP | Performed by: PHYSICAL THERAPIST

## 2024-07-09 NOTE — PROGRESS NOTES
Physical Therapy  Physical Therapy Treatment/Recheck/Discharge    Patient Name: uLcille Holloway  MRN: [id number]  Today's Date: 7/9/2024  Time Calculation  Start Time: 1320  Stop Time: 1352  Time Calculation (min): 32 min    Insurance:  Visit number: 7 of Med Nec  Authorization info: No Auth Needed  Insurance Type: [facility] Medicare  Cert date start: 4/25/24        Cert date end: 7/24/24                General  Reason for Referral: LBP  Referred By: JOSSUE Peralta PA-C    Precautions: OA, RA  Precautions  STEADI Fall Risk Score (The score of 4 or more indicates an increased risk of falling): 2       Current Problem  1. Lumbar degenerative disc disease        2. Lumbar radiculopathy, chronic              Subjective:   Patient reports continued improvements in her back and leg pain. Reports that her R posterior thigh pain has resolved and she has not experienced it since her last visit. She reports being very close to 100% of her PLOF (compared to 40% at eval). Every once in a while, she does experience R knee pain and swelling with a change in weather which she has consistently experienced since her knee replacement. She rarely experiences radicular symptoms or low back pain, but when she does, she feels she has the tools/exercises to alleviate this pain.      HEP Performed:  Yes    Objective:    ROM     Lumbar AROM (%)     Flexion: WNL  Extension: to neutral   (L) Side Bend: WNL  (R) Side Bend:  WNL  (L) Rotation: WNL  (R) Rotation: WNL        Hip AROM (Degrees)  WNL                                Strength Testing     Core/Abdominals: 4/5     Hip                          (R)                    (L)  Flexion:            5/5                   5/5                                Extension:        4+/5                   4+/5                     Abduction:       4+/5                 4+/5                   Adduction:       5/5                   5/5                     ER:                    4+/5                 4+/5                    IR:                     4+/5                 4+/5                         Posture: mild kyphotic posture         Balance:   R SL balance: 5 seconds  L SL balance: 9 seconds      Palpation: +TTP L lumbar paraspinals, glute med, pifirom     Flexibility: Mild hamstring flexibility limitation on RLE, WNL     Orthotics: Custom orthotics      Gait: mild L trendelenburg      SLR test: negative bilaterally      Radicular Symptoms: radiating pain to L posterior thigh and lateral L knee         Outcome Measures:  Other Measures  Oswestry Disablity Index (RACHID):   Eval: 30%   DC: 6%       No signs or symptoms of DVT      Treatments:   - stepper x 8'  - recheck  - objective measures      Provided updated HEP handout  Access Code: [email or web address]  URL: [email or web address]  Date: 07/09/2024  Prepared by: Beti Langley    Exercises  - Supine Posterior Pelvic Tilt  - 2 x daily - 7 x weekly - 2 sets - 10 reps  - Supine Lower Trunk Rotation  - 2 x daily - 7 x weekly - 2 sets - 10 reps  - Seated Hamstring Stretch  - 3 x daily - 7 x weekly - 1 sets - 2 reps - 30 second hold  - Supine Bridge  - 1 x daily - 7 x weekly - 2 sets - 10 reps  - Supine Hip Adduction Isometric with Ball  - 1 x daily - 7 x weekly - 2 sets - 10 reps  - Standing Tandem Balance with Counter Support  - 1 x daily - 7 x weekly - 1 sets - 2 reps - 30 second hold    Charges:  2- TE    Assessment:   Patient has met/progressed towards all of her goals at this time and demonstrates an excellent understanding of her HEP. Based on evaluation and objective measures, patient demonstrates improved LE strength, flexibility, standing and walking tolerance, pain, and overall function. Provided patient with a handout for her updated HEP and encouraged continuation of HEP for any further progress.     Goals:  Start:  04/25/24    Expected End:  07/24/24         In 4 weeks, patient will improve hamstring flexibility to minimal limitation.- MET ON LLE, PROGRESSED ON  RLE  In 4 weeks, patient will improve pain rating to <3/10 in order to tolerate walking for exercise or grocery shopping.- GOAL MET   In 4 weeks, patient will report centralization of radicular symptoms to at least her hips.- GOAL MET  In 4 weeks, patient will demonstrate improved standing tolerance in order to perform household tasks such as laundry in her basement. - GOAL MET   In 4 weeks, patient will improve Modified RACHID score by 6.- GOAL MET  In 4 weeks, patient will be independent with a final HEP. - GOAL MET        Plan:   Discharge to [facility].      Beti Langley, PT

## 2024-07-11 ENCOUNTER — OFFICE VISIT (OUTPATIENT)
Dept: CARDIOLOGY | Facility: CLINIC | Age: 79
End: 2024-07-11
Payer: MEDICARE

## 2024-07-11 VITALS
SYSTOLIC BLOOD PRESSURE: 133 MMHG | DIASTOLIC BLOOD PRESSURE: 83 MMHG | OXYGEN SATURATION: 95 % | HEART RATE: 67 BPM | WEIGHT: 192.06 LBS | BODY MASS INDEX: 34.03 KG/M2 | HEIGHT: 63 IN

## 2024-07-11 DIAGNOSIS — E78.5 DYSLIPIDEMIA: ICD-10-CM

## 2024-07-11 DIAGNOSIS — R06.09 DYSPNEA ON EXERTION: Primary | ICD-10-CM

## 2024-07-11 DIAGNOSIS — I10 PRIMARY HYPERTENSION: ICD-10-CM

## 2024-07-11 PROCEDURE — 3075F SYST BP GE 130 - 139MM HG: CPT | Performed by: INTERNAL MEDICINE

## 2024-07-11 PROCEDURE — 1036F TOBACCO NON-USER: CPT | Performed by: INTERNAL MEDICINE

## 2024-07-11 PROCEDURE — 1160F RVW MEDS BY RX/DR IN RCRD: CPT | Performed by: INTERNAL MEDICINE

## 2024-07-11 PROCEDURE — 1126F AMNT PAIN NOTED NONE PRSNT: CPT | Performed by: INTERNAL MEDICINE

## 2024-07-11 PROCEDURE — 1159F MED LIST DOCD IN RCRD: CPT | Performed by: INTERNAL MEDICINE

## 2024-07-11 PROCEDURE — 99213 OFFICE O/P EST LOW 20 MIN: CPT | Performed by: INTERNAL MEDICINE

## 2024-07-11 PROCEDURE — 1123F ACP DISCUSS/DSCN MKR DOCD: CPT | Performed by: INTERNAL MEDICINE

## 2024-07-11 PROCEDURE — 3079F DIAST BP 80-89 MM HG: CPT | Performed by: INTERNAL MEDICINE

## 2024-07-11 ASSESSMENT — COLUMBIA-SUICIDE SEVERITY RATING SCALE - C-SSRS
1. IN THE PAST MONTH, HAVE YOU WISHED YOU WERE DEAD OR WISHED YOU COULD GO TO SLEEP AND NOT WAKE UP?: NO
6. HAVE YOU EVER DONE ANYTHING, STARTED TO DO ANYTHING, OR PREPARED TO DO ANYTHING TO END YOUR LIFE?: NO
2. HAVE YOU ACTUALLY HAD ANY THOUGHTS OF KILLING YOURSELF?: NO

## 2024-07-11 ASSESSMENT — ENCOUNTER SYMPTOMS
OCCASIONAL FEELINGS OF UNSTEADINESS: 0
DEPRESSION: 0
LOSS OF SENSATION IN FEET: 0

## 2024-07-11 ASSESSMENT — PAIN SCALES - GENERAL: PAINLEVEL: 0-NO PAIN

## 2024-07-11 NOTE — PATIENT INSTRUCTIONS
You were seen in the [facility] for your shortness of breath and recent echocardiogram findings.     Your July 2022 echocardiogram pictures showed that the right side of your heart is now normal sized. Continue to treat your obstructive sleep apnea to protect your heart. Your 2019 sleep study showed that your night time oxygen levels dropped to 82% during apnea events. During apnea, the soft tissues of the neck relax and block your airway. This causes the muscles of your chest wall to place negative pressure on your heart and lungs. You used the CPAP mask to help the right side of your heart to return to normal size in [id number]. Your sleep doctor is monitoring you for new symptoms that would require you to restart use of CPAP mask. doing a great job helping you treat your sleep apnea.      Aerobic exercise 30 minutes 3-5 times a week can help lower blood pressure and protect your heart. Start with walking a few minutes a day and slowly build up your muscle conditioning after you complete the physical therapy program you are doing for after December right knee replacement surgery.     Your blood pressure is borderline controlled at our visit today taking valsartan 40 mg a day. Goal range 120-130 mm Hg.      Atorvastatin 10 mg a day will help lower your cholesterol levels and protect your heart arteries.      Follow up with Dr. Rock in 6 months.

## 2024-07-11 NOTE — PROGRESS NOTES
Subjective   Lucille Holloway is a 79 y.o. female who presents to the [facility] for follow up of abnormal echocardiogram findings and dyspnea on exertion. Last seen in January 2024.     Since our last vist, Ms. Holloway has no active cardiac symptoms of chest pain, PND, orthopnea, MICHAEL, palpitations, syncope, or claudication.     Her exertional dyspnea has improved compared to 4 months ago. Fully recovered from knee surgery with completion of post-op PT program. Now doing walking and daily exercise. 3 times a week on treadmill at the gym for 30 minutes and walking outside.    Lost 50 lb taking Ozempic 1 mg injections since early 2022 through end of 2023.     BP was low normal at summer 2022 visit with Dr. Cardona and HCTZ/lisinopril combo held since then with SBP < 130 mm Hg. Recent office BP readings 130s/70s mm Hg. Home readings now 120s-130 mm Hg.     Prior dyspnea history:  She had a 6/29/2021 echocardiogram that showed slight enlargement of the right side of her heart with increased estimated PA pressure (RVSP 46 mm Hg) with mild-moderate TR. These findings were new compared to prior testing in 2019. Of note, she was diagnosed with obstructive sleep apnea on 2019 sleep study with AHI of 67 and SpO2 george of 82%. Repeat echocardiogram today shows normal RV/RA size and function and reduction in RVSP to borderline level of 35 mm Hg. Reduction due to improved KELLY treatment.     Past Medical History:  1. Hypertension  2. KELLY     Social History:  Nonsmoker     Family History:  No premature CAD in 1st degree relatives ( 55 years of age for male relatives, 65 years of age for female relatives)     Review of Systems    A 14 point review of systems was asked. All questions were negative except for pertinent positives listed in the HPI.     Current Outpatient Medications on File Prior to Visit   Medication Sig Dispense Refill    acetaminophen 500 mg capsule Take 2 capsules (1,000 mg) by mouth 3  "times a day as needed.      albuterol 90 mcg/actuation inhaler USE 2 INHALATIONS BY MOUTH EVERY 4 HOURS IF NEEDED FOR WHEEZING  OR SHORTNESS OF BREATH 51 g 2    amoxicillin (Amoxil) 500 mg capsule TAKE FOUR CAPSULES BY MOUTH 1 HOUR BEFORE APPOINTMENT      aspirin 81 mg EC tablet Take 1 tablet (81 mg) by mouth once daily.      atorvastatin (Lipitor) 10 mg tablet TAKE 1 TABLET BY MOUTH ONCE  DAILY AT BEDTIME 100 tablet 2    docusate sodium (Colace) 100 mg capsule Take 3 capsules (300 mg) by mouth once daily.      DULoxetine (Cymbalta) 30 mg DR capsule Take 1 capsule (30 mg) by mouth once daily. AS DIRECTED 90 capsule 2    gabapentin (Neurontin) 300 mg capsule Take 1 capsule (300 mg) by mouth 3 times a day. 270 capsule 3    levothyroxine (Synthroid, Levoxyl) 75 mcg tablet Take 1 tablet (75 mcg) by mouth once daily. 90 tablet 1    multivitamin with minerals iron-free (Centrum Silver) Take 1 tablet by mouth once daily.      naltrexone-bupropion (Contrave) 8-90 mg ER tablet Take 1 tablet by mouth once daily. 30 tablet 11    valsartan (Diovan) 40 mg tablet TAKE 1 TABLET BY MOUTH ONCE  DAILY 100 tablet 2    naltrexone-bupropion (Contrave) 8-90 mg ER tablet Take 2 tablets by mouth 2 times a day. 120 tablet 2    triamcinolone (Kenalog) 0.1 % ointment Apply topically 2 times a day as needed for irritation or rash. (Patient not taking: Reported on 6/28/2024) 80 g 0     No current facility-administered medications on file prior to visit.          Objective   Physical Exam  BP Readings from Last 3 Encounters:   07/11/24 133/83   06/28/24 122/77   06/25/24 117/73      Wt Readings from Last 3 Encounters:   07/11/24 87.1 kg (192 lb 1 oz)   06/28/24 86.6 kg (191 lb)   06/25/24 86.2 kg (190 lb)      BMI: Estimated body mass index is 34.02 kg/m² as calculated from the following:    Height as of this encounter: 1.6 m (5' 3\").    Weight as of this encounter: 87.1 kg (192 lb 1 oz).  BSA: Estimated body surface area is 1.97 meters squared " "as calculated from the following:    Height as of this encounter: 1.6 m (5' 3\").    Weight as of this encounter: 87.1 kg (192 lb 1 oz).    General: no acute distress  HEENT: EOMI, no scleral icterus.  Lungs: Clear to auscultation bilaterally without wheezing, rales, or rhonchi.  Cardiovascular: Regular rhythm and rate. Normal S1 and S2. No murmurs, rubs, or gallops are appreciated. JVP normal.  Abdomen: Soft, nontender, nondistended. Bowel sounds present.  Extremities: Warm and well perfused with equal 2+ pulses bilaterally.  No edema present.  Neurologic: Alert and oriented x3.    I have personally reviewed the following images and laboratory findings:  Last echocardiogram:   2021 TTE: LV EF 65-70%, LV conc remodeling (LVMI 72 gm/m2), impaired relaxation diastology (E/e' 10), RV size upper limits of normal, normal RV function (TAPSE 2.1 cm), mild-mod AI, mild MR, mild-mod TR, RVSP 46 mm Hg, normal IVC size (RA pressure 3 mm Hg).    Last cath / stress test / CACS:   10/21/2019 CT chest: protocol negative for PE, no coronary artery calcification, normal PA size, normal heart size, no pulmonary edema or fibrosis     Dyspnea Work up:  . 2019 PFTs: FEV1 1.94 L (108% pred), FVC 2.72 L (117% pred), FEV1/FVC ratio 71%, TLC 4.38 L (104%), DLCO 12.95 (65% pred)    Most recent EC2024 ECG: Sinus rhythm, 67 bpm, normal ECG. Personally reviewed in office.    Lab Results   Component Value Date    CHOL 132 2024    CHOL 113 2023    CHOL 115 10/10/2022     Lab Results   Component Value Date    HDL 74.2 2024    HDL 70.7 2023    HDL 55.5 10/10/2022     Lab Results   Component Value Date    LDLCALC 39 2024     Lab Results   Component Value Date    TRIG 93 2024    TRIG 85 2023    TRIG 88 10/10/2022     No components found for: \"CHOLHDL\"   2023 LDL 25     Assessment/Plan   1. Shortness of breath on exertion:  Multi-factorial: KELLY, diet controlled hypertension, physical " deconditioning.     Exertional dyspnea has improved with treating KELLY with nightly CPAP, good blood pressure control, and 50 lb weight loss from Ozempic. She had R TKR 12/30/2022 and is now fully recovered after doing post-surgery PT program. Repeat July 2022 echocardiogram showed that her RV size and function were normal and RVSP was reduced to 35 mm Hg from 46 mm Hg on prior study.     Will monitor for future sleep apnea symptoms. Off CPAP for last year from mask intolerance.    Continue valsartan 40 mg a day for BP goal range 120-130 mm Hg.      2. CAD risk factors / Dyslipidemia:  No coronary artery calcification on October 2019 CT chest scan. Blood pressure at goal. Cholesterol at goal for given risk level taking atorvastatin 10 mg a day. Low (< 5%) risk of MI in the next 10 years given current risks. June 2023 LDL well controlled at 25.     Follow up with Dr. Rock in 6 months.           SIGNATURE: Chas Rock MD PATIENT NAME: Lucille Holloway   DATE/TIME: July 11, 2024 8:35 AM MRN: [id number]

## 2024-07-15 ENCOUNTER — TELEPHONE (OUTPATIENT)
Dept: ENDOCRINOLOGY | Facility: CLINIC | Age: 79
End: 2024-07-15
Payer: MEDICARE

## 2024-07-15 NOTE — TELEPHONE ENCOUNTER
I haven't heard that as a side effect with contrave.  But I do suggest she stop it for now.  Let's wait a month and see how she feels with the tremor.  I also suggest she talk to her pcp for evaluation of tremor.    Wait to start any other appetite suppressant for now.

## 2024-07-20 ENCOUNTER — APPOINTMENT (OUTPATIENT)
Dept: RADIOLOGY | Facility: CLINIC | Age: 79
End: 2024-07-20
Payer: MEDICARE

## 2024-07-22 ENCOUNTER — APPOINTMENT (OUTPATIENT)
Dept: PLASTIC SURGERY | Facility: CLINIC | Age: 79
End: 2024-07-22
Payer: MEDICARE

## 2024-07-22 VITALS
SYSTOLIC BLOOD PRESSURE: 124 MMHG | BODY MASS INDEX: 34.55 KG/M2 | DIASTOLIC BLOOD PRESSURE: 76 MMHG | HEIGHT: 63 IN | HEART RATE: 77 BPM | WEIGHT: 195 LBS

## 2024-07-22 DIAGNOSIS — E65 ABDOMINAL PANNUS: ICD-10-CM

## 2024-07-22 DIAGNOSIS — E88.1 LIPODYSTROPHY: Primary | ICD-10-CM

## 2024-07-22 PROCEDURE — 1123F ACP DISCUSS/DSCN MKR DOCD: CPT | Performed by: PHYSICIAN ASSISTANT

## 2024-07-22 PROCEDURE — 3074F SYST BP LT 130 MM HG: CPT | Performed by: PHYSICIAN ASSISTANT

## 2024-07-22 PROCEDURE — 3078F DIAST BP <80 MM HG: CPT | Performed by: PHYSICIAN ASSISTANT

## 2024-07-22 PROCEDURE — 99203 OFFICE O/P NEW LOW 30 MIN: CPT | Performed by: PHYSICIAN ASSISTANT

## 2024-07-22 PROCEDURE — 1159F MED LIST DOCD IN RCRD: CPT | Performed by: PHYSICIAN ASSISTANT

## 2024-07-22 NOTE — PROGRESS NOTES
Department of Plastic and Reconstructive Surgery           Initial Office Consultation    Date: 07/22/24  Referring Provider: Vero Man MD  Primary Care Provider: GETACHEW Matos-CNP    Subjective   Lucille Holloway is a 79 y.o. female who was referred by Vero Man MD   for evaluation of excess skin of the lower abdomen.      She presents today to discuss excess skin of the lower abdomen. She has a history of significant weight loss. Her highest weight was 240lbs, lowest was 176bs. She endorses that she was on ozempic in 2022 to aid in weight loss that she was required to have prior to total knee replacement. She lost approx 65lbs while on ozempic in 2022. She underwent right TKA and stopped ozempic in 2023. She endorses that after this weight loss she now has excess skin of the abdomen. She notes that this area has skin on skin contact which causes her rashes and skin irritation. She notes that the skin in this area has become very thin and she occasionally has tearing of the skin that further her irritation. She has gained back weight recently and is currently 195lbs. She endorses her goal weight is 165lbs.she is following with weight management and is now Contrave    PMH: HTN, KELLY, hypothyroidism, arthritis, carotid stenosis, pulmonary HTN, HLD, Hep C, CKD  Surgical Hx: right TKA, left ZUHAIR, bladder reconstruction 2000, hysterectomy  Family Hx: non-contributory  Smoking: non-smoker       Review of Systems   All other systems have been reviewed with the patient and have been found to be negative with exception to the chief complaint as mentioned in the history of present illness.    ROS: As noted in history of present illness    - CONSTITUTIONAL: Denies weight loss, fever and chills.  - HEENT: Denies changes in vision and hearing.  - RESPIRATORY: Denies SOB and cough, difficulty breathing  - CV: Denies palpitations and CP  - GI: Denies abdominal pain, nausea, vomiting and diarrhea.  - :  Denies dysuria and urinary frequency.  - MSK: Denies myalgia and joint pain.  - SKIN: positive for rash and skin tearing of the infra pannicular skin  - NEUROLOGICAL: Denies headache and syncope.    Objective   Vital Signs:   Vitals:    07/22/24 1121   BP: 124/76   Pulse: 77       Gen: interactive and pleasant  Head: NCAT  Eyes: EOMI, PERRLA  Mouth: MMM  Throat: trachea midline  Cor: RRR  Pulm: nonlabored breathing  Abd: s/nt/nd  Neuro: AAOx3  Ext: extremities perfused    Focused exam of the: abdomen    Abdomen shows excess adiposity and skin with overhang obstructing the genitals, pannus grade 2. No rectus diastases is appreciated, no ventral hernias appreciated. There is maceration of the skin where there is skin on skin contact.        Assessment/Plan     Lucille Holloway is a 79 y.o. female who was referred by Vero Man MD   for evaluation of excess skin of the lower abdomen.    She presents to discuss excess skin of the abdomen following significant weight loss. The patient has a hanging apron of skin and fat below the waist.  This extra skin is causing rashes and occasional skin tearing. She has lost 65bs. Abdominal panniculectomy would improve and restore these things. The patient endorses she is not at her goal weight and would like to lose 20-30lbs. I advised that she continue with weight loss at this time. Follow up with her PCP for management for skin irritation. She is to follow up with our office after weight loss and maintaining goal weight for 3 months.     Plan:   Continue weight loss  Follow up with Dr Jaimes after achieving goal weight and maintaining this for minimum 3 months.     I spent 30 minutes with this patient. Greater than 50% of this time was spent in the counselling and/or coordination of care of this patient.  This note was created using voice recognition software and was not corrected for typographical or grammatical errors.    Signature: Lisa Love PA-C

## 2024-07-22 NOTE — LETTER
July 22, 2024     Vero Man MD  [address]  [address]  [facility] [address]    Patient: Lucille Holloway   YOB: 1945   Date of Visit: 7/22/2024       Dear Dr. Vero Man MD:    Thank you for referring Lucille Holloway to me for evaluation. Below are my notes for this consultation.  If you have questions, please do not hesitate to call me. I look forward to following your patient along with you.       Sincerely,     Lisa Love PA-C      CC: No Recipients  ______________________________________________________________________________________       Department of Plastic and Reconstructive Surgery           Initial Office Consultation    Date: 07/22/24  Referring Provider: Vero Man MD  Primary Care Provider: GETACHEW Matos-CNP    Subjective  Lucille Holloway is a 79 y.o. female who was referred by Vero Man MD   for evaluation of excess skin of the lower abdomen.      She presents today to discuss excess skin of the lower abdomen. She has a history of significant weight loss. Her highest weight was 240lbs, lowest was 176bs. She endorses that she was on ozempic in 2022 to aid in weight loss that she was required to have prior to total knee replacement. She lost approx 65lbs while on ozempic in 2022. She underwent right TKA and stopped ozempic in 2023. She endorses that after this weight loss she now has excess skin of the abdomen. She notes that this area has skin on skin contact which causes her rashes and skin irritation. She notes that the skin in this area has become very thin and she occasionally has tearing of the skin that further her irritation. She has gained back weight recently and is currently 195lbs. She endorses her goal weight is 165lbs.she is following with weight management and is now Contrave    PMH: HTN, KELLY, hypothyroidism, arthritis, carotid stenosis, pulmonary HTN, HLD, Hep C, CKD  Surgical Hx: right TKA, left ZUHAIR, bladder reconstruction 2000,  hysterectomy  Family Hx: non-contributory  Smoking: non-smoker       Review of Systems   All other systems have been reviewed with the patient and have been found to be negative with exception to the chief complaint as mentioned in the history of present illness.    ROS: As noted in history of present illness    - CONSTITUTIONAL: Denies weight loss, fever and chills.  - HEENT: Denies changes in vision and hearing.  - RESPIRATORY: Denies SOB and cough, difficulty breathing  - CV: Denies palpitations and CP  - GI: Denies abdominal pain, nausea, vomiting and diarrhea.  - : Denies dysuria and urinary frequency.  - MSK: Denies myalgia and joint pain.  - SKIN: positive for rash and skin tearing of the infra pannicular skin  - NEUROLOGICAL: Denies headache and syncope.    Objective  Vital Signs:   Vitals:    07/22/24 1121   BP: 124/76   Pulse: 77       Gen: interactive and pleasant  Head: NCAT  Eyes: EOMI, PERRLA  Mouth: MMM  Throat: trachea midline  Cor: RRR  Pulm: nonlabored breathing  Abd: s/nt/nd  Neuro: AAOx3  Ext: extremities perfused    Focused exam of the: abdomen    Abdomen shows excess adiposity and skin with overhang obstructing the genitals, pannus grade 2. No rectus diastases is appreciated, no ventral hernias appreciated. There is maceration of the skin where there is skin on skin contact.        Assessment/Plan    Lucille Holloway is a 79 y.o. female who was referred by Vero Man MD   for evaluation of excess skin of the lower abdomen.    She presents to discuss excess skin of the abdomen following significant weight loss. The patient has a hanging apron of skin and fat below the waist.  This extra skin is causing rashes and occasional skin tearing. She has lost 65bs. Abdominal panniculectomy would improve and restore these things. The patient endorses she is not at her goal weight and would like to lose 20-30lbs. I advised that she continue with weight loss at this time. Follow up with her PCP for  management for skin irritation. She is to follow up with our office after weight loss and maintaining goal weight for 3 months.     Plan:   Continue weight loss  Follow up with Dr Jaimes after achieving goal weight and maintaining this for minimum 3 months.     I spent 30 minutes with this patient. Greater than 50% of this time was spent in the counselling and/or coordination of care of this patient.  This note was created using voice recognition software and was not corrected for typographical or grammatical errors.    Signature: Lisa Love PA-C

## 2024-07-26 ENCOUNTER — OFFICE VISIT (OUTPATIENT)
Dept: HEMATOLOGY/ONCOLOGY | Facility: HOSPITAL | Age: 79
End: 2024-07-26
Payer: MEDICARE

## 2024-07-26 ENCOUNTER — LAB (OUTPATIENT)
Dept: LAB | Facility: HOSPITAL | Age: 79
End: 2024-07-26
Payer: MEDICARE

## 2024-07-26 VITALS
HEART RATE: 70 BPM | HEIGHT: 63 IN | SYSTOLIC BLOOD PRESSURE: 128 MMHG | TEMPERATURE: 97.7 F | OXYGEN SATURATION: 98 % | DIASTOLIC BLOOD PRESSURE: 61 MMHG | RESPIRATION RATE: 16 BRPM | WEIGHT: 194.9 LBS | BODY MASS INDEX: 34.54 KG/M2

## 2024-07-26 DIAGNOSIS — R76.8 ELEVATED SERUM IMMUNOGLOBULIN FREE LIGHT CHAIN LEVEL: ICD-10-CM

## 2024-07-26 DIAGNOSIS — R76.8 ELEVATED SERUM IMMUNOGLOBULIN FREE LIGHT CHAIN LEVEL: Primary | ICD-10-CM

## 2024-07-26 LAB
ALBUMIN SERPL BCP-MCNC: 3.6 G/DL (ref 3.4–5)
ALP SERPL-CCNC: 53 U/L (ref 33–136)
ALT SERPL W P-5'-P-CCNC: 43 U/L (ref 7–45)
ANION GAP SERPL CALC-SCNC: 11 MMOL/L (ref 10–20)
AST SERPL W P-5'-P-CCNC: 45 U/L (ref 9–39)
BASOPHILS # BLD AUTO: 0.01 X10*3/UL (ref 0–0.1)
BASOPHILS NFR BLD AUTO: 0.2 %
BILIRUB SERPL-MCNC: 0.4 MG/DL (ref 0–1.2)
BUN SERPL-MCNC: 16 MG/DL (ref 6–23)
CALCIUM SERPL-MCNC: 8.7 MG/DL (ref 8.6–10.6)
CHLORIDE SERPL-SCNC: 106 MMOL/L (ref 98–107)
CO2 SERPL-SCNC: 29 MMOL/L (ref 21–32)
CREAT SERPL-MCNC: 0.96 MG/DL (ref 0.5–1.05)
EGFRCR SERPLBLD CKD-EPI 2021: 60 ML/MIN/1.73M*2
EOSINOPHIL # BLD AUTO: 0.13 X10*3/UL (ref 0–0.4)
EOSINOPHIL NFR BLD AUTO: 2.1 %
ERYTHROCYTE [DISTWIDTH] IN BLOOD BY AUTOMATED COUNT: 13.3 % (ref 11.5–14.5)
GLUCOSE SERPL-MCNC: 89 MG/DL (ref 74–99)
HCT VFR BLD AUTO: 39.9 % (ref 36–46)
HGB BLD-MCNC: 12.9 G/DL (ref 12–16)
IMM GRANULOCYTES # BLD AUTO: 0.01 X10*3/UL (ref 0–0.5)
IMM GRANULOCYTES NFR BLD AUTO: 0.2 % (ref 0–0.9)
LYMPHOCYTES # BLD AUTO: 2.83 X10*3/UL (ref 0.8–3)
LYMPHOCYTES NFR BLD AUTO: 45.9 %
MCH RBC QN AUTO: 30.6 PG (ref 26–34)
MCHC RBC AUTO-ENTMCNC: 32.3 G/DL (ref 32–36)
MCV RBC AUTO: 95 FL (ref 80–100)
MONOCYTES # BLD AUTO: 0.42 X10*3/UL (ref 0.05–0.8)
MONOCYTES NFR BLD AUTO: 6.8 %
NEUTROPHILS # BLD AUTO: 2.77 X10*3/UL (ref 1.6–5.5)
NEUTROPHILS NFR BLD AUTO: 44.8 %
NRBC BLD-RTO: 0 /100 WBCS (ref 0–0)
PLATELET # BLD AUTO: 225 X10*3/UL (ref 150–450)
POTASSIUM SERPL-SCNC: 4.9 MMOL/L (ref 3.5–5.3)
PROT SERPL-MCNC: 7.1 G/DL (ref 6.4–8.2)
RBC # BLD AUTO: 4.22 X10*6/UL (ref 4–5.2)
SODIUM SERPL-SCNC: 141 MMOL/L (ref 136–145)
WBC # BLD AUTO: 6.2 X10*3/UL (ref 4.4–11.3)

## 2024-07-26 PROCEDURE — 99213 OFFICE O/P EST LOW 20 MIN: CPT | Performed by: STUDENT IN AN ORGANIZED HEALTH CARE EDUCATION/TRAINING PROGRAM

## 2024-07-26 PROCEDURE — 1126F AMNT PAIN NOTED NONE PRSNT: CPT | Performed by: STUDENT IN AN ORGANIZED HEALTH CARE EDUCATION/TRAINING PROGRAM

## 2024-07-26 PROCEDURE — 1123F ACP DISCUSS/DSCN MKR DOCD: CPT | Performed by: STUDENT IN AN ORGANIZED HEALTH CARE EDUCATION/TRAINING PROGRAM

## 2024-07-26 PROCEDURE — 3078F DIAST BP <80 MM HG: CPT | Performed by: STUDENT IN AN ORGANIZED HEALTH CARE EDUCATION/TRAINING PROGRAM

## 2024-07-26 PROCEDURE — 1159F MED LIST DOCD IN RCRD: CPT | Performed by: STUDENT IN AN ORGANIZED HEALTH CARE EDUCATION/TRAINING PROGRAM

## 2024-07-26 PROCEDURE — 36415 COLL VENOUS BLD VENIPUNCTURE: CPT

## 2024-07-26 PROCEDURE — 83521 IG LIGHT CHAINS FREE EACH: CPT

## 2024-07-26 PROCEDURE — 3074F SYST BP LT 130 MM HG: CPT | Performed by: STUDENT IN AN ORGANIZED HEALTH CARE EDUCATION/TRAINING PROGRAM

## 2024-07-26 PROCEDURE — 85025 COMPLETE CBC W/AUTO DIFF WBC: CPT

## 2024-07-26 PROCEDURE — 80053 COMPREHEN METABOLIC PANEL: CPT

## 2024-07-26 ASSESSMENT — PAIN SCALES - GENERAL: PAINLEVEL_OUTOF10: 0-NO PAIN

## 2024-07-26 NOTE — PROGRESS NOTES
History   Patient ID: Lucille Holloway is a 79 y.o. female.  Interval Notes: Ms. Holloway comes in her normal state of health to obtain MGUS labs and review symptoms. No new issues since last being seen.   Unchanged from last visit  1. MGUS  IgG Kappa and IgG lambda, roughly 0.2 and 0.3g/dL       Exam   Physical Exam  Vitals reviewed.   Constitutional:       Appearance: Normal appearance. She is normal weight.   HENT:      Head: Normocephalic and atraumatic.      Nose: Nose normal.      Mouth/Throat:      Mouth: Mucous membranes are moist.      Pharynx: Oropharynx is clear.   Eyes:      Conjunctiva/sclera: Conjunctivae normal.      Pupils: Pupils are equal, round, and reactive to light.   Cardiovascular:      Rate and Rhythm: Normal rate and regular rhythm.      Pulses: Normal pulses.      Heart sounds: Normal heart sounds.   Pulmonary:      Effort: Pulmonary effort is normal.      Breath sounds: Normal breath sounds.   Abdominal:      General: Abdomen is flat. Bowel sounds are normal.   Musculoskeletal:         General: Normal range of motion.      Cervical back: Normal range of motion.   Skin:     General: Skin is warm and dry.   Neurological:      General: No focal deficit present.      Mental Status: She is alert and oriented to person, place, and time. Mental status is at baseline.   Psychiatric:         Mood and Affect: Mood normal.         Behavior: Behavior normal.         Thought Content: Thought content normal.         Judgment: Judgment normal.       ECOG Performance Status:   Asymptomatic    Assessment/Plan   Diagnoses and all orders for this visit:  Elevated serum immunoglobulin free light chain level  -     Referral to Hematology and Oncology  -     CBC and Auto Differential; Standing  -     Comprehensive Metabolic Panel; Future  -     Serum Protein Electrophoresis; Future  -     [place]/Lambda Free Light Chain, Serum; Standing  - plan for yearly follow up with [facility] clinic  20 minutes were spent reviewing the  patients chart, discussing symptoms, performing physical exam, reviewing lab results with patient and going over the plan of care

## 2024-07-27 ENCOUNTER — HOSPITAL ENCOUNTER (OUTPATIENT)
Dept: RADIOLOGY | Facility: CLINIC | Age: 79
Discharge: HOME | End: 2024-07-27
Payer: MEDICARE

## 2024-07-27 VITALS — HEIGHT: 63 IN | WEIGHT: 195 LBS | BODY MASS INDEX: 34.55 KG/M2

## 2024-07-27 DIAGNOSIS — Z12.31 SCREENING MAMMOGRAM FOR BREAST CANCER: ICD-10-CM

## 2024-07-27 PROCEDURE — 77067 SCR MAMMO BI INCL CAD: CPT

## 2024-07-27 PROCEDURE — 77067 SCR MAMMO BI INCL CAD: CPT | Performed by: RADIOLOGY

## 2024-07-27 PROCEDURE — 77063 BREAST TOMOSYNTHESIS BI: CPT | Performed by: RADIOLOGY

## 2024-07-28 LAB
KAPPA LC SERPL-MCNC: 3.63 MG/DL (ref 0.33–1.94)
KAPPA LC/LAMBDA SER: 0.95 {RATIO} (ref 0.26–1.65)
LAMBDA LC SERPL-MCNC: 3.83 MG/DL (ref 0.57–2.63)

## 2024-07-29 ENCOUNTER — APPOINTMENT (OUTPATIENT)
Dept: ENDOCRINOLOGY | Facility: CLINIC | Age: 79
End: 2024-07-29
Payer: MEDICARE

## 2024-07-29 VITALS — BODY MASS INDEX: 34.38 KG/M2 | HEIGHT: 63 IN | WEIGHT: 194 LBS

## 2024-07-29 DIAGNOSIS — Z71.3 DIETARY COUNSELING: ICD-10-CM

## 2024-07-29 DIAGNOSIS — N18.9 CHRONIC KIDNEY DISEASE, UNSPECIFIED CKD STAGE: Primary | ICD-10-CM

## 2024-07-29 PROCEDURE — 97803 MED NUTRITION INDIV SUBSEQ: CPT | Performed by: DIETITIAN, REGISTERED

## 2024-07-29 NOTE — PROGRESS NOTES
"Follow-up Nutrition Assessment    Interval History: Patient presents for follow-up nutrition appointment for weight management/desire to lose weight, as well as for consideration 0f CKD. Since last nutrition visit on 6/24/24, pt with a 2 lbs weight gain which she attributes to a recent family vacation where eating was a bit different. Getting back on track now with implementing healthy eating. Exercise has been walking but planning to get back to the gym this week. Further reports weight gain likely due in part to stopping medication for a short time. Explains that she thought medication was causing tremors but has found out it was a pinched nerve in the arm. See all interventions/recommendations below as discussed during visit this day.     Nutrition Interventions/Recommendations from last visit scheduled on 6/24/24:  - Please continue to follow the Mediterranean Meal Plan guidelines.  - Continue to use the Healthy Plate style of eating for incorporating healthy balanced meals in appropriate portions.   - Continue to pre-plan healthy meals for the week.   - Continue to incorporate strategies of mindful eating every day. Practice staying in tune with your body's hunger cues and eat only when truly hungry. Avoid emotional eating/eating when not hungry.  - Keep up the great work with your daily water intakes.  - Aim for weekly physical activity.  - Follow-up as scheduled for the group classes with Dr. Dada Brooks.  - Follow-up with nutrition in one month.      Anthropometrics:  Height:   Ht Readings from Last 1 Encounters:   07/29/24 1.6 m (5' 3\")      Weight:   Wt Readings from Last 10 Encounters:   07/27/24 88.5 kg (195 lb)   07/26/24 88.4 kg (194 lb 14.4 oz)   07/22/24 88.5 kg (195 lb)   07/11/24 87.1 kg (192 lb 1 oz)   06/28/24 86.6 kg (191 lb)   06/25/24 86.2 kg (190 lb)   06/24/24 87.1 kg (192 lb)   06/05/24 90.3 kg (199 lb)   05/30/24 89.8 kg (198 lb)   05/20/24 89.8 kg (198 lb)      Current BMI:   BMI " Readings from Last 1 Encounters:   07/29/24 34.54 kg/m²        Malnutrition Screening:  Significant Unintentional weight loss: No  Eating less than 75% of usual intake for more than 2 weeks: No  Potential Signs of Inflammation: No    Recommended Malnutrition Diagnosis: No malnutrition identified    Diet Recall-  Breakfast- HB egg and English muffin/wheat bread OR cottage cheese and fruit  Lunch- salad with various proteins OR take-out only if out and about OR has a sandwich   Dinner- various proteins, starches and vegetables   Snacks- fruit mostly   Beverages- coffee with cream in am, water throughout the day (plain or flavored)   Alcohol- glass of wine on weekends   Physical Activity- PT and although has not able to get to the gym consistently due to poison ivy planning to go back to the gym tomorrow     Other pertinent patient reported Information:  - Recently went on vacation and got a little off track with healthy eating but back on track since being home  - Recently stopped medication Contrave due to thoughts of it causing tremors; however, it was related to a pinched nerve in her arm and she has restarted such as a result.  - Doing some walking but not yet back to the gym following vacation.     Nutrition Diagnosis: Food and nutrition-related knowledge deficit related to lack of recent exposure to information as evidenced by verbalizes inaccurate or incomplete information.     Readiness to Learn:  Cognitive ability: Alert and oriented  Motivation to learn: Interested  Family Support: Unable to assess- family not present  Instruction provided to: Patient  Patient learns best by: Multiple methods  Factors affecting learning: None   Physical limitations affecting learning: None    Education Materials Provided:   None this visit    Nutrition Interventions/Recommendations for 7/29/2024:  - Continue to follow the Mediterranean Meal Plan guidelines for healthy eating..  - Use the Healthy Plate style of eating for  incorporating healthy balanced meals in appropriate portions.   - Continue to aim for a good source of fiber and protein at meal times to increase satiety at meals.  - Aim to pre-plan healthy meals for the week.   - Practice good sleep hygiene as reviewed during visit.  - Aim for 64 ounces of water daily.  - Aim for weekly physical activity either at the gym or with walking exercise videos at home as discussed.  - Follow-up as scheduled for the group classes with Dr. Dada Brooks.  - Follow-up with nutrition in one month.      Nutrition Monitoring & Evaluation: adherence to recommendations and patient stated goals    Need for follow-up: As scheduled for Dr. Samuels's Shared Medical Appointment (SMA) and nutrition in one month    Referred by: Dr. Dada Brooks    [facility] Billing Type: Medical Nutrition Re-Assessment, each 15 min increment, for 2 increments.    SIGNATURE:   Hafsa Lambert RD, CSFABRICIO, LD, CDCES                                                          DATE:   7/29/2024

## 2024-07-29 NOTE — PATIENT INSTRUCTIONS
- Continue to follow the Mediterranean Meal Plan guidelines for healthy eating..  - Use the Healthy Plate style of eating for incorporating healthy balanced meals in appropriate portions.   - Continue to aim for a good source of fiber and protein at meal times to increase satiety at meals.  - Aim to pre-plan healthy meals for the week.   - Practice good sleep hygiene as reviewed during visit.  - Aim for 64 ounces of water daily.  - Aim for weekly physical activity either at the gym or with walking exercise videos at home as discussed.  - Follow-up as scheduled for the group classes with Dr. Dada Brooks.  - Follow-up with nutrition in one month.

## 2024-08-02 DIAGNOSIS — R92.8 ABNORMAL MAMMOGRAM OF RIGHT BREAST: ICD-10-CM

## 2024-08-02 DIAGNOSIS — N64.89 BREAST ASYMMETRY: Primary | ICD-10-CM

## 2024-08-06 ENCOUNTER — HOSPITAL ENCOUNTER (OUTPATIENT)
Dept: RADIOLOGY | Facility: CLINIC | Age: 79
Discharge: HOME | End: 2024-08-06
Payer: MEDICARE

## 2024-08-06 DIAGNOSIS — N64.89 BREAST ASYMMETRY: ICD-10-CM

## 2024-08-06 DIAGNOSIS — R92.8 ABNORMAL MAMMOGRAM OF RIGHT BREAST: ICD-10-CM

## 2024-08-06 PROCEDURE — G0279 TOMOSYNTHESIS, MAMMO: HCPCS | Mod: RIGHT SIDE | Performed by: STUDENT IN AN ORGANIZED HEALTH CARE EDUCATION/TRAINING PROGRAM

## 2024-08-06 PROCEDURE — 77061 BREAST TOMOSYNTHESIS UNI: CPT | Mod: RT

## 2024-08-06 PROCEDURE — 77065 DX MAMMO INCL CAD UNI: CPT | Mod: RIGHT SIDE | Performed by: STUDENT IN AN ORGANIZED HEALTH CARE EDUCATION/TRAINING PROGRAM

## 2024-08-13 ENCOUNTER — APPOINTMENT (OUTPATIENT)
Dept: CARDIOLOGY | Facility: HOSPITAL | Age: 79
DRG: 494 | End: 2024-08-13
Payer: MEDICARE

## 2024-08-13 ENCOUNTER — APPOINTMENT (OUTPATIENT)
Dept: RADIOLOGY | Facility: HOSPITAL | Age: 79
DRG: 494 | End: 2024-08-13
Payer: MEDICARE

## 2024-08-13 ENCOUNTER — HOSPITAL ENCOUNTER (INPATIENT)
Facility: HOSPITAL | Age: 79
DRG: 494 | End: 2024-08-13
Attending: GENERAL PRACTICE | Admitting: HOSPITALIST
Payer: MEDICARE

## 2024-08-13 DIAGNOSIS — S82.892A CLOSED FRACTURE OF LEFT ANKLE, INITIAL ENCOUNTER: Primary | ICD-10-CM

## 2024-08-13 DIAGNOSIS — R55 SYNCOPE AND COLLAPSE: ICD-10-CM

## 2024-08-13 LAB
ALBUMIN SERPL BCP-MCNC: 3.5 G/DL (ref 3.4–5)
ALP SERPL-CCNC: 57 U/L (ref 33–136)
ALT SERPL W P-5'-P-CCNC: 37 U/L (ref 7–45)
ANION GAP SERPL CALC-SCNC: 14 MMOL/L (ref 10–20)
AST SERPL W P-5'-P-CCNC: 33 U/L (ref 9–39)
BASOPHILS # BLD AUTO: 0.02 X10*3/UL (ref 0–0.1)
BASOPHILS NFR BLD AUTO: 0.3 %
BILIRUB SERPL-MCNC: 0.2 MG/DL (ref 0–1.2)
BUN SERPL-MCNC: 25 MG/DL (ref 6–23)
CALCIUM SERPL-MCNC: 8.3 MG/DL (ref 8.6–10.3)
CARDIAC TROPONIN I PNL SERPL HS: 3 NG/L (ref 0–13)
CHLORIDE SERPL-SCNC: 106 MMOL/L (ref 98–107)
CO2 SERPL-SCNC: 23 MMOL/L (ref 21–32)
CREAT SERPL-MCNC: 1.16 MG/DL (ref 0.5–1.05)
EGFRCR SERPLBLD CKD-EPI 2021: 48 ML/MIN/1.73M*2
EOSINOPHIL # BLD AUTO: 0.15 X10*3/UL (ref 0–0.4)
EOSINOPHIL NFR BLD AUTO: 2.4 %
ERYTHROCYTE [DISTWIDTH] IN BLOOD BY AUTOMATED COUNT: 13.2 % (ref 11.5–14.5)
ETHANOL SERPL-MCNC: 85 MG/DL
GLUCOSE SERPL-MCNC: 93 MG/DL (ref 74–99)
HCT VFR BLD AUTO: 37.8 % (ref 36–46)
HGB BLD-MCNC: 12.3 G/DL (ref 12–16)
IMM GRANULOCYTES # BLD AUTO: 0.02 X10*3/UL (ref 0–0.5)
IMM GRANULOCYTES NFR BLD AUTO: 0.3 % (ref 0–0.9)
LYMPHOCYTES # BLD AUTO: 2.61 X10*3/UL (ref 0.8–3)
LYMPHOCYTES NFR BLD AUTO: 41.6 %
MAGNESIUM SERPL-MCNC: 1.9 MG/DL (ref 1.6–2.4)
MCH RBC QN AUTO: 31.2 PG (ref 26–34)
MCHC RBC AUTO-ENTMCNC: 32.5 G/DL (ref 32–36)
MCV RBC AUTO: 96 FL (ref 80–100)
MONOCYTES # BLD AUTO: 0.47 X10*3/UL (ref 0.05–0.8)
MONOCYTES NFR BLD AUTO: 7.5 %
NEUTROPHILS # BLD AUTO: 3 X10*3/UL (ref 1.6–5.5)
NEUTROPHILS NFR BLD AUTO: 47.9 %
NRBC BLD-RTO: 0 /100 WBCS (ref 0–0)
PLATELET # BLD AUTO: 214 X10*3/UL (ref 150–450)
POTASSIUM SERPL-SCNC: 4 MMOL/L (ref 3.5–5.3)
PROT SERPL-MCNC: 6.5 G/DL (ref 6.4–8.2)
RBC # BLD AUTO: 3.94 X10*6/UL (ref 4–5.2)
SODIUM SERPL-SCNC: 139 MMOL/L (ref 136–145)
WBC # BLD AUTO: 6.3 X10*3/UL (ref 4.4–11.3)

## 2024-08-13 PROCEDURE — 84484 ASSAY OF TROPONIN QUANT: CPT | Performed by: HOSPITALIST

## 2024-08-13 PROCEDURE — 0QSHXZZ REPOSITION LEFT TIBIA, EXTERNAL APPROACH: ICD-10-PCS | Performed by: GENERAL PRACTICE

## 2024-08-13 PROCEDURE — 70450 CT HEAD/BRAIN W/O DYE: CPT | Performed by: STUDENT IN AN ORGANIZED HEALTH CARE EDUCATION/TRAINING PROGRAM

## 2024-08-13 PROCEDURE — 85610 PROTHROMBIN TIME: CPT | Performed by: PHYSICAL THERAPIST

## 2024-08-13 PROCEDURE — 93880 EXTRACRANIAL BILAT STUDY: CPT

## 2024-08-13 PROCEDURE — 99285 EMERGENCY DEPT VISIT HI MDM: CPT | Mod: 25

## 2024-08-13 PROCEDURE — 70450 CT HEAD/BRAIN W/O DYE: CPT

## 2024-08-13 PROCEDURE — 96376 TX/PRO/DX INJ SAME DRUG ADON: CPT

## 2024-08-13 PROCEDURE — 2500000004 HC RX 250 GENERAL PHARMACY W/ HCPCS (ALT 636 FOR OP/ED): Performed by: HOSPITALIST

## 2024-08-13 PROCEDURE — 73610 X-RAY EXAM OF ANKLE: CPT | Mod: LT

## 2024-08-13 PROCEDURE — 80053 COMPREHEN METABOLIC PANEL: CPT | Performed by: GENERAL PRACTICE

## 2024-08-13 PROCEDURE — 71045 X-RAY EXAM CHEST 1 VIEW: CPT

## 2024-08-13 PROCEDURE — 36415 COLL VENOUS BLD VENIPUNCTURE: CPT | Performed by: PHYSICAL THERAPIST

## 2024-08-13 PROCEDURE — 93880 EXTRACRANIAL BILAT STUDY: CPT | Performed by: SURGERY

## 2024-08-13 PROCEDURE — 99222 1ST HOSP IP/OBS MODERATE 55: CPT

## 2024-08-13 PROCEDURE — 71045 X-RAY EXAM CHEST 1 VIEW: CPT | Performed by: STUDENT IN AN ORGANIZED HEALTH CARE EDUCATION/TRAINING PROGRAM

## 2024-08-13 PROCEDURE — 96374 THER/PROPH/DIAG INJ IV PUSH: CPT

## 2024-08-13 PROCEDURE — 83735 ASSAY OF MAGNESIUM: CPT | Performed by: GENERAL PRACTICE

## 2024-08-13 PROCEDURE — 1200000002 HC GENERAL ROOM WITH TELEMETRY DAILY

## 2024-08-13 PROCEDURE — 2500000001 HC RX 250 WO HCPCS SELF ADMINISTERED DRUGS (ALT 637 FOR MEDICARE OP): Performed by: HOSPITALIST

## 2024-08-13 PROCEDURE — 96375 TX/PRO/DX INJ NEW DRUG ADDON: CPT

## 2024-08-13 PROCEDURE — 73700 CT LOWER EXTREMITY W/O DYE: CPT | Mod: LT

## 2024-08-13 PROCEDURE — 73610 X-RAY EXAM OF ANKLE: CPT | Mod: LEFT SIDE | Performed by: RADIOLOGY

## 2024-08-13 PROCEDURE — 2500000004 HC RX 250 GENERAL PHARMACY W/ HCPCS (ALT 636 FOR OP/ED): Mod: JZ

## 2024-08-13 PROCEDURE — 29515 APPLICATION SHORT LEG SPLINT: CPT | Performed by: GENERAL PRACTICE

## 2024-08-13 PROCEDURE — 93005 ELECTROCARDIOGRAM TRACING: CPT

## 2024-08-13 PROCEDURE — 85025 COMPLETE CBC W/AUTO DIFF WBC: CPT | Performed by: GENERAL PRACTICE

## 2024-08-13 PROCEDURE — 2500000004 HC RX 250 GENERAL PHARMACY W/ HCPCS (ALT 636 FOR OP/ED): Performed by: GENERAL PRACTICE

## 2024-08-13 PROCEDURE — 84484 ASSAY OF TROPONIN QUANT: CPT | Performed by: GENERAL PRACTICE

## 2024-08-13 PROCEDURE — 82077 ASSAY SPEC XCP UR&BREATH IA: CPT | Performed by: HOSPITALIST

## 2024-08-13 PROCEDURE — 72125 CT NECK SPINE W/O DYE: CPT | Performed by: STUDENT IN AN ORGANIZED HEALTH CARE EDUCATION/TRAINING PROGRAM

## 2024-08-13 PROCEDURE — 86901 BLOOD TYPING SEROLOGIC RH(D): CPT | Performed by: PHYSICAL THERAPIST

## 2024-08-13 PROCEDURE — 73610 X-RAY EXAM OF ANKLE: CPT | Mod: LEFT SIDE | Performed by: STUDENT IN AN ORGANIZED HEALTH CARE EDUCATION/TRAINING PROGRAM

## 2024-08-13 PROCEDURE — 0QSKXZZ REPOSITION LEFT FIBULA, EXTERNAL APPROACH: ICD-10-PCS | Performed by: GENERAL PRACTICE

## 2024-08-13 PROCEDURE — 36415 COLL VENOUS BLD VENIPUNCTURE: CPT | Performed by: GENERAL PRACTICE

## 2024-08-13 PROCEDURE — 99223 1ST HOSP IP/OBS HIGH 75: CPT | Performed by: HOSPITALIST

## 2024-08-13 PROCEDURE — 72125 CT NECK SPINE W/O DYE: CPT

## 2024-08-13 RX ORDER — FENTANYL CITRATE 50 UG/ML
50 INJECTION, SOLUTION INTRAMUSCULAR; INTRAVENOUS ONCE
Status: COMPLETED | OUTPATIENT
Start: 2024-08-13 | End: 2024-08-13

## 2024-08-13 RX ORDER — ASPIRIN 81 MG/1
81 TABLET ORAL DAILY
Status: DISCONTINUED | OUTPATIENT
Start: 2024-08-14 | End: 2024-08-21 | Stop reason: HOSPADM

## 2024-08-13 RX ORDER — DOCUSATE SODIUM 100 MG/1
300 CAPSULE, LIQUID FILLED ORAL DAILY
Status: DISCONTINUED | OUTPATIENT
Start: 2024-08-14 | End: 2024-08-21 | Stop reason: HOSPADM

## 2024-08-13 RX ORDER — ALBUTEROL SULFATE 0.83 MG/ML
2.5 SOLUTION RESPIRATORY (INHALATION) EVERY 4 HOURS PRN
Status: DISCONTINUED | OUTPATIENT
Start: 2024-08-13 | End: 2024-08-13

## 2024-08-13 RX ORDER — ACETAMINOPHEN 325 MG/1
650 TABLET ORAL EVERY 4 HOURS PRN
Status: DISCONTINUED | OUTPATIENT
Start: 2024-08-13 | End: 2024-08-21 | Stop reason: HOSPADM

## 2024-08-13 RX ORDER — CEFAZOLIN SODIUM 2 G/100ML
2 INJECTION, SOLUTION INTRAVENOUS EVERY 8 HOURS
Status: DISCONTINUED | OUTPATIENT
Start: 2024-08-13 | End: 2024-08-19

## 2024-08-13 RX ORDER — MORPHINE SULFATE 2 MG/ML
2 INJECTION, SOLUTION INTRAMUSCULAR; INTRAVENOUS EVERY 4 HOURS PRN
Status: DISCONTINUED | OUTPATIENT
Start: 2024-08-13 | End: 2024-08-21 | Stop reason: HOSPADM

## 2024-08-13 RX ORDER — MORPHINE SULFATE 2 MG/ML
1 INJECTION, SOLUTION INTRAMUSCULAR; INTRAVENOUS EVERY 4 HOURS PRN
Status: DISCONTINUED | OUTPATIENT
Start: 2024-08-13 | End: 2024-08-21 | Stop reason: HOSPADM

## 2024-08-13 RX ORDER — ENOXAPARIN SODIUM 100 MG/ML
40 INJECTION SUBCUTANEOUS EVERY 24 HOURS
Status: DISCONTINUED | OUTPATIENT
Start: 2024-08-13 | End: 2024-08-21 | Stop reason: HOSPADM

## 2024-08-13 RX ORDER — VALSARTAN 80 MG/1
40 TABLET ORAL DAILY
Status: DISCONTINUED | OUTPATIENT
Start: 2024-08-14 | End: 2024-08-21 | Stop reason: HOSPADM

## 2024-08-13 RX ORDER — POLYETHYLENE GLYCOL 3350 17 G/17G
17 POWDER, FOR SOLUTION ORAL DAILY
Status: DISCONTINUED | OUTPATIENT
Start: 2024-08-14 | End: 2024-08-21 | Stop reason: HOSPADM

## 2024-08-13 RX ORDER — MULTIVIT-MIN/IRON FUM/FOLIC AC 7.5 MG-4
1 TABLET ORAL DAILY
Status: DISCONTINUED | OUTPATIENT
Start: 2024-08-14 | End: 2024-08-21 | Stop reason: HOSPADM

## 2024-08-13 RX ORDER — DULOXETIN HYDROCHLORIDE 30 MG/1
30 CAPSULE, DELAYED RELEASE ORAL DAILY
Status: DISCONTINUED | OUTPATIENT
Start: 2024-08-14 | End: 2024-08-21 | Stop reason: HOSPADM

## 2024-08-13 RX ORDER — ALBUTEROL SULFATE 0.83 MG/ML
2.5 SOLUTION RESPIRATORY (INHALATION) EVERY 2 HOUR PRN
Status: DISCONTINUED | OUTPATIENT
Start: 2024-08-13 | End: 2024-08-21 | Stop reason: HOSPADM

## 2024-08-13 RX ORDER — GABAPENTIN 300 MG/1
300 CAPSULE ORAL 3 TIMES DAILY
Status: DISCONTINUED | OUTPATIENT
Start: 2024-08-13 | End: 2024-08-21 | Stop reason: HOSPADM

## 2024-08-13 RX ORDER — ATORVASTATIN CALCIUM 10 MG/1
10 TABLET, FILM COATED ORAL NIGHTLY
Status: DISCONTINUED | OUTPATIENT
Start: 2024-08-13 | End: 2024-08-21 | Stop reason: HOSPADM

## 2024-08-13 RX ORDER — ALBUTEROL SULFATE 90 UG/1
2 INHALANT RESPIRATORY (INHALATION) EVERY 4 HOURS PRN
Status: DISCONTINUED | OUTPATIENT
Start: 2024-08-13 | End: 2024-08-13 | Stop reason: CLARIF

## 2024-08-13 RX ORDER — SODIUM CHLORIDE 9 MG/ML
75 INJECTION, SOLUTION INTRAVENOUS CONTINUOUS
Status: DISCONTINUED | OUTPATIENT
Start: 2024-08-13 | End: 2024-08-16

## 2024-08-13 RX ORDER — ONDANSETRON 4 MG/1
4 TABLET, ORALLY DISINTEGRATING ORAL EVERY 8 HOURS PRN
Status: DISCONTINUED | OUTPATIENT
Start: 2024-08-13 | End: 2024-08-21 | Stop reason: HOSPADM

## 2024-08-13 RX ORDER — ONDANSETRON HYDROCHLORIDE 2 MG/ML
4 INJECTION, SOLUTION INTRAVENOUS EVERY 8 HOURS PRN
Status: DISCONTINUED | OUTPATIENT
Start: 2024-08-13 | End: 2024-08-21 | Stop reason: HOSPADM

## 2024-08-13 RX ORDER — PROPOFOL 10 MG/ML
INJECTION, EMULSION INTRAVENOUS CODE/TRAUMA/SEDATION MEDICATION
Status: COMPLETED | OUTPATIENT
Start: 2024-08-13 | End: 2024-08-13

## 2024-08-13 RX ORDER — LEVOTHYROXINE SODIUM 75 UG/1
75 TABLET ORAL DAILY
Status: DISCONTINUED | OUTPATIENT
Start: 2024-08-14 | End: 2024-08-21 | Stop reason: HOSPADM

## 2024-08-13 RX ORDER — PROPOFOL 10 MG/ML
200 INJECTION, EMULSION INTRAVENOUS ONCE
Status: DISCONTINUED | OUTPATIENT
Start: 2024-08-13 | End: 2024-08-14

## 2024-08-13 RX ORDER — TALC
3 POWDER (GRAM) TOPICAL NIGHTLY PRN
Status: DISCONTINUED | OUTPATIENT
Start: 2024-08-13 | End: 2024-08-21 | Stop reason: HOSPADM

## 2024-08-13 ASSESSMENT — ENCOUNTER SYMPTOMS
EYES NEGATIVE: 1
MUSCULOSKELETAL NEGATIVE: 1
DIZZINESS: 1
PALPITATIONS: 1
FREQUENCY: 1
PSYCHIATRIC NEGATIVE: 1
CONSTITUTIONAL NEGATIVE: 1
HEMATOLOGIC/LYMPHATIC NEGATIVE: 1
DYSURIA: 0
LIGHT-HEADEDNESS: 1
GASTROINTESTINAL NEGATIVE: 1
RESPIRATORY NEGATIVE: 1
ALLERGIC/IMMUNOLOGIC NEGATIVE: 1

## 2024-08-13 ASSESSMENT — PAIN SCALES - GENERAL
PAINLEVEL_OUTOF10: 5 - MODERATE PAIN
PAINLEVEL_OUTOF10: 5 - MODERATE PAIN
PAINLEVEL_OUTOF10: 0 - NO PAIN
PAINLEVEL_OUTOF10: 8

## 2024-08-13 ASSESSMENT — PAIN - FUNCTIONAL ASSESSMENT: PAIN_FUNCTIONAL_ASSESSMENT: 0-10

## 2024-08-13 NOTE — ED PROVIDER NOTES
HPI   Chief Complaint   Patient presents with    Fall    Syncope       HPI: 79-year-old female with a history of HLD, HTN and hypothyroidism presents for syncopal episode.  She was coming back from lunch with her daughter and was putting her key in the front door when she began to feel nauseous and woke up with her daughter yelling at her.  She does not recall feeling lightheaded, chest pain or shortness of breath or headache.  Her daughter says that she slumped to the ground.  She did injure her left ankle in the process and presents with left ankle swelling.  On presentation the patient is localizing pain to her left ankle but otherwise has no complaints      Limitations to history: None  Independent Historians: Patient  External Records Reviewed: HIE, outpatient notes, inpatient notes  ------------------------------------------------------------------------------------------------------------------------------------------  ROS: a ten point review of systems was performed and was negative except as per HPI.  ------------------------------------------------------------------------------------------------------------------------------------------  PMH / PSH: as per HPI, otherwise reviewed in EMR  MEDS: as per HPI, otherwise reviewed in EMR  ALLERGIES: as per HPI, otherwise reviewed in EMR  SocH:  as per HPI, otherwise reviewed in EMR  FH:  as per HPI, otherwise reviewed in EMR  ------------------------------------------------------------------------------------------------------------------------------------------  Physical Exam:  VS: As documented in the triage note and EMR flowsheet from this visit was reviewed  General: Well appearing. No acute distress.   Eyes:  Extraocular movements grossly intact. No scleral icterus. No discharge  HEENT:  Normocephalic.  Atraumatic  Neck: Moves neck freely. No gross masses  CV: Regular rhythm. No murmurs, rubs or gallops   Resp: Clear to auscultation bilaterally. No  respiratory distress.    GI: Soft, no masses, nontender. No rebound tenderness or guarding  MSK: Symmetric muscle bulk.  Moderate, diffuse swelling of the right ankle with tenderness over the medial malleolus  Skin: Warm, dry, intact.   Neuro: No focal deficits.  A&O x3.   Psych: Appropriate for situation  ------------------------------------------------------------------------------------------------------------------------------------------  Hospital Course / Medical Decision Making:  Independent Interpretations: CT head, c spine, xray left ankle  EKG as interpreted by me: Normal sinus rhythm at 77 bpm with a normal axis, no bundle branch block and no signs of acute ischemia    MDM: 79-year-old female with a history of HLD, HTN and hypothyroidism presents for syncopal episode and left ankle injury.  CT of the brain shows no acute intracranial process.  No cervical spine fracture noted.  X-ray confirms a distal tibia and fibular fracture.  Troponin nonelevated.  The patient was consented for a conscious sedation and ankle reduction due to the fact that there is disruption of the ankle mortise.  I performed sedation using propofol.  The ankle was reduced by the resident and Dr. Villaseñor, another emergency medicine physician.  The patient tolerated the procedure well and the ankle was splinted in position by the medic without difficulty.  The patient was admitted to the medicine service for further management.    Discussion of Management with Other Providers:   I discussed the patient/results with: Emergency medicine team    Final diagnosis and disposition as below.       CT ankle left wo IV contrast   Final Result    Please see above.                MACRO:    None          Signed by: Rob Mendes 8/13/2024 9:40 PM    Dictation workstation:   [id number]     XR ankle left 3+ views   Final Result    Improved alignment status post reduction, with cast material noted.    Mildly widened medial ankle mortise. Displaced  trimalleolar fracture    again noted.                MACRO:    None          Signed by: Brii Ovalles 8/13/2024 8:13 PM    Dictation workstation:   [id number]     XR chest 1 view   Final Result    1.  No evidence of acute cardiopulmonary process.                Signed by: Pancho Larios 8/13/2024 5:36 PM    Dictation workstation:   [id number]     XR ankle left 3+ views   Final Result    Displaced fracture of the medial malleolus and distal fibula with    disruption of ankle mortise consistent with syndesmotic injury.          Signed by: Pancho Larios 8/13/2024 5:36 PM    Dictation workstation:   [id number]     CT head wo IV contrast   Final Result    No acute intracranial abnormality.          No evidence of cervical spine fracture or acute traumatic    malalignment.          Signed by: Pancho Larios 8/13/2024 5:34 PM    Dictation workstation:   [id number]     CT cervical spine wo IV contrast   Final Result    No acute intracranial abnormality.          No evidence of cervical spine fracture or acute traumatic    malalignment.          Signed by: Pancho Larios 8/13/2024 5:34 PM    Dictation workstation:   [id number]                       Patient History   Past Medical History:   Diagnosis Date    Acute upper respiratory infection, unspecified 03/05/2019    Acute URI    Bariatric surgery status 11/20/2019    Bariatric surgery status    Encounter for general adult medical examination without abnormal findings 08/05/2021    Encounter for preventive health examination    Other general symptoms and signs 03/05/2019    Flu-like symptoms    Other headache syndrome 06/25/2024    Comment on above: HAD A FALL SATURDAY NIGHT / BAD HEADACHE, BUMP HEAD    Other neuromuscular dysfunction of bladder     Hyperactivity of bladder    Pain in right foot 09/17/2015    Foot pain, right    Personal history of other diseases of the circulatory system     History of hypertension    Personal history of other diseases of the respiratory  system 03/05/2019    History of acute sinusitis    Personal history of other endocrine, nutritional and metabolic disease     History of thyroid disease    Personal history of other infectious and parasitic diseases 08/28/2019    History of hepatitis    Plantar fascial fibromatosis 09/21/2015    Plantar fasciitis, right    Syncope and collapse 10/16/2015    Vasovagal syncope    Vitamin D deficiency, unspecified 08/28/2019    Mild vitamin D deficiency     Past Surgical History:   Procedure Laterality Date    BLADDER SURGERY  08/28/2019    Bladder Surgery    BREAST RECONSTRUCTION  01/01/2019    bilateral breast reduction    CARPAL TUNNEL RELEASE  08/28/2019    Neuroplasty Decompression Median Nerve At Carpal Tunnel    HYSTERECTOMY  08/28/2019    Hysterectomy    OTHER SURGICAL HISTORY  08/28/2019    Carpal tunnel surgery    REDUCTION MAMMAPLASTY      TOTAL HIP ARTHROPLASTY  07/16/2019    Total Hip Replacement    TOTAL KNEE ARTHROPLASTY Right 12/30/2022     Family History   Problem Relation Name Age of Onset    Diabetes Mother      Hypertension Mother      Thyroid disease Mother       Social History     Tobacco Use    Smoking status: Former     Types: Cigarettes     Passive exposure: Never    Smokeless tobacco: Never   Substance Use Topics    Alcohol use: Not Currently    Drug use: Never       Physical Exam   ED Triage Vitals [08/13/24 1641]   Temperature Heart Rate Respirations BP   36.6 °C (97.9 °F) 84 18 89/52      Pulse Ox Temp Source Heart Rate Source Patient Position   95 % Oral Monitor Lying      BP Location FiO2 (%)     Left arm --       Physical Exam      ED Course & MDM   Diagnoses as of 08/16/24 1113   Closed fracture of left ankle, initial encounter   Syncope and collapse                 No data recorded                                 Medical Decision Making      Procedure  Orthopaedic Injury Treatment - Fracture    Performed by: Cullen Presley DO  Authorized by: Cullen Presley DO    Consent:     Consent  obtained:  Written    Consent given by:  Patient    Risks discussed:  Pain, nerve damage and vascular damage  Universal protocol:     Procedure explained and questions answered to patient or proxy's satisfaction: yes      Test results available: yes      Imaging studies available: yes      Patient identity confirmed:  Verbally with patient  Injury:     Injury location:  Ankle    Ankle injury location:  L ankle    Ankle fracture type comment:  Distal fibula and tibia  Pre-procedure details:     Distal neurologic exam:  Normal    Distal perfusion: distal pulses strong    Sedation:     Sedation type:  Moderate sedation (80mg propofol)  Anesthesia:     Anesthesia method:  None  Procedure details:     Manipulation performed: yes      Reduction successful: yes      X-ray confirmed reduction: yes      Immobilization:  Splint    Splint type:  Ankle stirrup    Supplies used:  Cotton padding and fiberglass  Post-procedure details:     Distal neurologic exam:  Normal    Distal perfusion: distal pulses strong      Procedure completion:  Tolerated well, no immediate complications       Cullen Presley DO  08/16/24 1123

## 2024-08-13 NOTE — PROGRESS NOTES
Respiratory Therapy Note    RT called to AC08A for conscience sedation for ankle reduction. Patient placed on 4 lpm nasal cannula with ECtO2 monitoring. BMV at bedside.

## 2024-08-13 NOTE — ED TRIAGE NOTES
PT TO ED VIA EMS FOR SYNCOPAL EPISODE. EMS STATES THAT PT WAS HYPOTENSIVE AND PASSED OUT. PTS FAMILY STATES THAT HER DAUGHTER CAUGHT HER AND SHE DID NOT HIT HER HEAD. PT DENIES CP, SOB. PT STATES THAT SHE FEELS DIZZY. PT INJURED HER R ANKLE AND DEFORMITY IS NOTICED.

## 2024-08-13 NOTE — CONSULTS
"Reason For Consult  Left ankle fracture     History Of Present Illness  Lucille Holloway is a 79 y.o. female with past medical history of HTN, HLD, hypothyroidism, exertional dyspnea, weight loss with Ozempic, KELLY now presenting s/p fall. Patient was walking up the driveway towards the house, became dizzy and \"passed out\". Daughter was present, was able to help guide mother to the ground however she sustained injury to the left ankle. Orthopedics consulted for ankle xray showing: Displaced fracture of the medial malleolus and distal fibula with disruption of ankle mortise consistent with syndesmotic injury. At the time of exam patient is neurovasc intact, fracture is closed, +cap refill, able to wiggle toes. Hypotensive upon admission, WBC 6.3, h/h 12.3/37.8, creatinine 1.16.     Patient does not use assistive devices at home, able to live independently. Has had multiple orthopedic surgeries in the past. Home with [facility] with right knee replacement, however did go to a facility with left hip replacement.      Past Medical History  She has a past medical history of Acute upper respiratory infection, unspecified (03/05/2019), Bariatric surgery status (11/20/2019), Encounter for general adult medical examination without abnormal findings (08/05/2021), Other general symptoms and signs (03/05/2019), Other headache syndrome (06/25/2024), Other neuromuscular dysfunction of bladder, Pain in right foot (09/17/2015), Personal history of other diseases of the circulatory system, Personal history of other diseases of the respiratory system (03/05/2019), Personal history of other endocrine, nutritional and metabolic disease, Personal history of other infectious and parasitic diseases (08/28/2019), Plantar fascial fibromatosis (09/21/2015), Syncope and collapse (10/16/2015), and Vitamin D deficiency, unspecified (08/28/2019).    Surgical History  She has a past surgical history that includes Other surgical history (08/28/2019); Total " hip arthroplasty (07/16/2019); Hysterectomy (08/28/2019); Carpal tunnel release (08/28/2019); Bladder surgery (08/28/2019); Total knee arthroplasty (Right, 12/30/2022); Reduction mammaplasty; and Breast reconstruction (01/01/2019).     Social History  Denies tobacco or illicit drug use, has social drinks once per week    Family History  Family History   Problem Relation Name Age of Onset    Diabetes Mother      Hypertension Mother      Thyroid disease Mother          Allergies  Soap       Physical Exam  PE:  Constitutional: A&Ox3, calm and cooperative  Eyes: clear sclera   ENMT: Moist mucous membranes  Head/Neck: Neck supple  Cardiovascular: Normal rate and regular rhythm  Respiratory/Thorax: Good symmetric chest expansion.   Gastrointestinal: Abdomen soft, non-tender   Genitourinary: Voiding independently   Musculoskeletal: LLE neurovasc intact, left ankle still requiring reduction at the time of the exam  Extremities: No peripheral edema  Neurological: A&Ox3  Psychological: Appropriate mood and behavior  Skin: Warm and dry     Last Recorded Vitals  Blood pressure 102/64, pulse 81, temperature 36.6 °C (97.9 °F), temperature source Oral, resp. rate 18, weight 88 kg (194 lb), SpO2 100%.    Relevant Results  Results for orders placed or performed during the hospital encounter of 08/13/24 (from the past 24 hour(s))   CBC and Auto Differential   Result Value Ref Range    WBC 6.3 4.4 - 11.3 x10*3/uL    nRBC 0.0 0.0 - 0.0 /100 WBCs    RBC 3.94 (L) 4.00 - 5.20 x10*6/uL    Hemoglobin 12.3 12.0 - 16.0 g/dL    Hematocrit 37.8 36.0 - 46.0 %    MCV 96 80 - 100 fL    MCH 31.2 26.0 - 34.0 pg    MCHC 32.5 32.0 - 36.0 g/dL    RDW 13.2 11.5 - 14.5 %    Platelets 214 150 - 450 x10*3/uL    Neutrophils % 47.9 40.0 - 80.0 %    Immature Granulocytes %, Automated 0.3 0.0 - 0.9 %    Lymphocytes % 41.6 13.0 - 44.0 %    Monocytes % 7.5 2.0 - 10.0 %    Eosinophils % 2.4 0.0 - 6.0 %    Basophils % 0.3 0.0 - 2.0 %    Neutrophils Absolute 3.00  1.60 - 5.50 x10*3/uL    Immature Granulocytes Absolute, Automated 0.02 0.00 - 0.50 x10*3/uL    Lymphocytes Absolute 2.61 0.80 - 3.00 x10*3/uL    Monocytes Absolute 0.47 0.05 - 0.80 x10*3/uL    Eosinophils Absolute 0.15 0.00 - 0.40 x10*3/uL    Basophils Absolute 0.02 0.00 - 0.10 x10*3/uL   Magnesium   Result Value Ref Range    Magnesium 1.90 1.60 - 2.40 mg/dL   Comprehensive metabolic panel   Result Value Ref Range    Glucose 93 74 - 99 mg/dL    Sodium 139 136 - 145 mmol/L    Potassium 4.0 3.5 - 5.3 mmol/L    Chloride 106 98 - 107 mmol/L    Bicarbonate 23 21 - 32 mmol/L    Anion Gap 14 10 - 20 mmol/L    Urea Nitrogen 25 (H) 6 - 23 mg/dL    Creatinine 1.16 (H) 0.50 - 1.05 mg/dL    eGFR 48 (L) >60 mL/min/1.73m*2    Calcium 8.3 (L) 8.6 - 10.3 mg/dL    Albumin 3.5 3.4 - 5.0 g/dL    Alkaline Phosphatase 57 33 - 136 U/L    Total Protein 6.5 6.4 - 8.2 g/dL    AST 33 9 - 39 U/L    Bilirubin, Total 0.2 0.0 - 1.2 mg/dL    ALT 37 7 - 45 U/L   Troponin I, High Sensitivity   Result Value Ref Range    Troponin I, High Sensitivity 3 0 - 13 ng/L     XR chest 1 view   Final Result   1.  No evidence of acute cardiopulmonary process.             Signed by: Pancho Larios 8/13/2024 5:36 PM   Dictation workstation:   [id number]      XR ankle left 3+ views   Final Result   Displaced fracture of the medial malleolus and distal fibula with   disruption of ankle mortise consistent with syndesmotic injury.        Signed by: Pancho Larios 8/13/2024 5:36 PM   Dictation workstation:   [id number]      CT head wo IV contrast   Final Result   No acute intracranial abnormality.        No evidence of cervical spine fracture or acute traumatic   malalignment.        Signed by: Pancho Larios 8/13/2024 5:34 PM   Dictation workstation:   [id number]      CT cervical spine wo IV contrast   Final Result   No acute intracranial abnormality.        No evidence of cervical spine fracture or acute traumatic   malalignment.        Signed by: Pancho Larios  "8/13/2024 5:34 PM   Dictation workstation:   [id number]             Assessment/Plan     Lucille Holloway is a 79 y.o. female with past medical history of HTN, HLD, hypothyroidism, exertional dyspnea, weight loss with Ozempic, KELLY now presenting s/p fall. Patient was walking up the driveway towards the house, became dizzy and \"passed out\". Daughter was present, was able to help guide mother to the ground however she sustained injury to the left ankle. Orthopedics consulted for ankle xray showing: Displaced fracture of the medial malleolus and distal fibula with disruption of ankle mortise consistent with syndesmotic injury. At the time of exam patient is neurovasc intact, fracture is closed, +cap refill, able to wiggle toes. Hypotensive upon admission, WBC 6.3, h/h 12.3/37.8, creatinine 1.16.       Plan:  - ED to reduce left ankle, repeat xray, if satisfactory will plan for left ankle CT scan   - NWB LLE  - admit to medicine  - NPO at midnight  - IVF   - would start IV Ancef for coverage  - PRN pain medications  - medically  optimize for OR  - EKG, CBC, BMP, INR, T&S    Dispo: Discussed with Dr. Larsen, patient being added on to the OR schedule for tomorrow, medically optimize.     I spent 60 minutes in the professional and overall care of this patient.      Sangita Mortensen, APRN-CNP    "

## 2024-08-13 NOTE — H&P (VIEW-ONLY)
"Reason For Consult  Left ankle fracture     History Of Present Illness  Lucille Holloway is a 79 y.o. female with past medical history of HTN, HLD, hypothyroidism, exertional dyspnea, weight loss with Ozempic, KELLY now presenting s/p fall. Patient was walking up the driveway towards the house, became dizzy and \"passed out\". Daughter was present, was able to help guide mother to the ground however she sustained injury to the left ankle. Orthopedics consulted for ankle xray showing: Displaced fracture of the medial malleolus and distal fibula with disruption of ankle mortise consistent with syndesmotic injury. At the time of exam patient is neurovasc intact, fracture is closed, +cap refill, able to wiggle toes. Hypotensive upon admission, WBC 6.3, h/h 12.3/37.8, creatinine 1.16.     Patient does not use assistive devices at home, able to live independently. Has had multiple orthopedic surgeries in the past. Home with [facility] with right knee replacement, however did go to a facility with left hip replacement.      Past Medical History  She has a past medical history of Acute upper respiratory infection, unspecified (03/05/2019), Bariatric surgery status (11/20/2019), Encounter for general adult medical examination without abnormal findings (08/05/2021), Other general symptoms and signs (03/05/2019), Other headache syndrome (06/25/2024), Other neuromuscular dysfunction of bladder, Pain in right foot (09/17/2015), Personal history of other diseases of the circulatory system, Personal history of other diseases of the respiratory system (03/05/2019), Personal history of other endocrine, nutritional and metabolic disease, Personal history of other infectious and parasitic diseases (08/28/2019), Plantar fascial fibromatosis (09/21/2015), Syncope and collapse (10/16/2015), and Vitamin D deficiency, unspecified (08/28/2019).    Surgical History  She has a past surgical history that includes Other surgical history (08/28/2019); Total " hip arthroplasty (07/16/2019); Hysterectomy (08/28/2019); Carpal tunnel release (08/28/2019); Bladder surgery (08/28/2019); Total knee arthroplasty (Right, 12/30/2022); Reduction mammaplasty; and Breast reconstruction (01/01/2019).     Social History  Denies tobacco or illicit drug use, has social drinks once per week    Family History  Family History   Problem Relation Name Age of Onset    Diabetes Mother      Hypertension Mother      Thyroid disease Mother          Allergies  Soap       Physical Exam  PE:  Constitutional: A&Ox3, calm and cooperative  Eyes: clear sclera   ENMT: Moist mucous membranes  Head/Neck: Neck supple  Cardiovascular: Normal rate and regular rhythm  Respiratory/Thorax: Good symmetric chest expansion.   Gastrointestinal: Abdomen soft, non-tender   Genitourinary: Voiding independently   Musculoskeletal: LLE neurovasc intact, left ankle still requiring reduction at the time of the exam  Extremities: No peripheral edema  Neurological: A&Ox3  Psychological: Appropriate mood and behavior  Skin: Warm and dry     Last Recorded Vitals  Blood pressure 102/64, pulse 81, temperature 36.6 °C (97.9 °F), temperature source Oral, resp. rate 18, weight 88 kg (194 lb), SpO2 100%.    Relevant Results  Results for orders placed or performed during the hospital encounter of 08/13/24 (from the past 24 hour(s))   CBC and Auto Differential   Result Value Ref Range    WBC 6.3 4.4 - 11.3 x10*3/uL    nRBC 0.0 0.0 - 0.0 /100 WBCs    RBC 3.94 (L) 4.00 - 5.20 x10*6/uL    Hemoglobin 12.3 12.0 - 16.0 g/dL    Hematocrit 37.8 36.0 - 46.0 %    MCV 96 80 - 100 fL    MCH 31.2 26.0 - 34.0 pg    MCHC 32.5 32.0 - 36.0 g/dL    RDW 13.2 11.5 - 14.5 %    Platelets 214 150 - 450 x10*3/uL    Neutrophils % 47.9 40.0 - 80.0 %    Immature Granulocytes %, Automated 0.3 0.0 - 0.9 %    Lymphocytes % 41.6 13.0 - 44.0 %    Monocytes % 7.5 2.0 - 10.0 %    Eosinophils % 2.4 0.0 - 6.0 %    Basophils % 0.3 0.0 - 2.0 %    Neutrophils Absolute 3.00  1.60 - 5.50 x10*3/uL    Immature Granulocytes Absolute, Automated 0.02 0.00 - 0.50 x10*3/uL    Lymphocytes Absolute 2.61 0.80 - 3.00 x10*3/uL    Monocytes Absolute 0.47 0.05 - 0.80 x10*3/uL    Eosinophils Absolute 0.15 0.00 - 0.40 x10*3/uL    Basophils Absolute 0.02 0.00 - 0.10 x10*3/uL   Magnesium   Result Value Ref Range    Magnesium 1.90 1.60 - 2.40 mg/dL   Comprehensive metabolic panel   Result Value Ref Range    Glucose 93 74 - 99 mg/dL    Sodium 139 136 - 145 mmol/L    Potassium 4.0 3.5 - 5.3 mmol/L    Chloride 106 98 - 107 mmol/L    Bicarbonate 23 21 - 32 mmol/L    Anion Gap 14 10 - 20 mmol/L    Urea Nitrogen 25 (H) 6 - 23 mg/dL    Creatinine 1.16 (H) 0.50 - 1.05 mg/dL    eGFR 48 (L) >60 mL/min/1.73m*2    Calcium 8.3 (L) 8.6 - 10.3 mg/dL    Albumin 3.5 3.4 - 5.0 g/dL    Alkaline Phosphatase 57 33 - 136 U/L    Total Protein 6.5 6.4 - 8.2 g/dL    AST 33 9 - 39 U/L    Bilirubin, Total 0.2 0.0 - 1.2 mg/dL    ALT 37 7 - 45 U/L   Troponin I, High Sensitivity   Result Value Ref Range    Troponin I, High Sensitivity 3 0 - 13 ng/L     XR chest 1 view   Final Result   1.  No evidence of acute cardiopulmonary process.             Signed by: Pancho Larios 8/13/2024 5:36 PM   Dictation workstation:   [id number]      XR ankle left 3+ views   Final Result   Displaced fracture of the medial malleolus and distal fibula with   disruption of ankle mortise consistent with syndesmotic injury.        Signed by: Pancho Larios 8/13/2024 5:36 PM   Dictation workstation:   [id number]      CT head wo IV contrast   Final Result   No acute intracranial abnormality.        No evidence of cervical spine fracture or acute traumatic   malalignment.        Signed by: Pancho Larios 8/13/2024 5:34 PM   Dictation workstation:   [id number]      CT cervical spine wo IV contrast   Final Result   No acute intracranial abnormality.        No evidence of cervical spine fracture or acute traumatic   malalignment.        Signed by: Pancho Larios  "8/13/2024 5:34 PM   Dictation workstation:   [id number]             Assessment/Plan     Lucille Holloway is a 79 y.o. female with past medical history of HTN, HLD, hypothyroidism, exertional dyspnea, weight loss with Ozempic, KELLY now presenting s/p fall. Patient was walking up the driveway towards the house, became dizzy and \"passed out\". Daughter was present, was able to help guide mother to the ground however she sustained injury to the left ankle. Orthopedics consulted for ankle xray showing: Displaced fracture of the medial malleolus and distal fibula with disruption of ankle mortise consistent with syndesmotic injury. At the time of exam patient is neurovasc intact, fracture is closed, +cap refill, able to wiggle toes. Hypotensive upon admission, WBC 6.3, h/h 12.3/37.8, creatinine 1.16.       Plan:  - ED to reduce left ankle, repeat xray, if satisfactory will plan for left ankle CT scan   - NWB LLE  - admit to medicine  - NPO at midnight  - IVF   - would start IV Ancef for coverage  - PRN pain medications  - medically  optimize for OR  - EKG, CBC, BMP, INR, T&S    Dispo: Discussed with Dr. Larsen, patient being added on to the OR schedule for tomorrow, medically optimize.     I spent 60 minutes in the professional and overall care of this patient.      Sangita Mortensen, APRN-CNP    "

## 2024-08-14 ENCOUNTER — ANESTHESIA EVENT (OUTPATIENT)
Dept: OPERATING ROOM | Facility: HOSPITAL | Age: 79
End: 2024-08-14
Payer: MEDICARE

## 2024-08-14 ENCOUNTER — ANESTHESIA (OUTPATIENT)
Dept: OPERATING ROOM | Facility: HOSPITAL | Age: 79
End: 2024-08-14
Payer: MEDICARE

## 2024-08-14 ENCOUNTER — APPOINTMENT (OUTPATIENT)
Dept: RADIOLOGY | Facility: HOSPITAL | Age: 79
DRG: 494 | End: 2024-08-14
Payer: MEDICARE

## 2024-08-14 LAB
ABO GROUP (TYPE) IN BLOOD: NORMAL
ANION GAP SERPL CALC-SCNC: 12 MMOL/L (ref 10–20)
ANTIBODY SCREEN: NORMAL
APPEARANCE UR: CLEAR
APTT PPP: 31 SECONDS (ref 27–38)
ATRIAL RATE: 77 BPM
BILIRUB UR STRIP.AUTO-MCNC: NEGATIVE MG/DL
BUN SERPL-MCNC: 20 MG/DL (ref 6–23)
CALCIUM SERPL-MCNC: 8.2 MG/DL (ref 8.6–10.3)
CARDIAC TROPONIN I PNL SERPL HS: 3 NG/L (ref 0–13)
CARDIAC TROPONIN I PNL SERPL HS: 3 NG/L (ref 0–13)
CHLORIDE SERPL-SCNC: 108 MMOL/L (ref 98–107)
CO2 SERPL-SCNC: 26 MMOL/L (ref 21–32)
COLOR UR: NORMAL
CREAT SERPL-MCNC: 0.9 MG/DL (ref 0.5–1.05)
EGFRCR SERPLBLD CKD-EPI 2021: 65 ML/MIN/1.73M*2
ERYTHROCYTE [DISTWIDTH] IN BLOOD BY AUTOMATED COUNT: 13.4 % (ref 11.5–14.5)
GLUCOSE SERPL-MCNC: 96 MG/DL (ref 74–99)
GLUCOSE UR STRIP.AUTO-MCNC: NORMAL MG/DL
HCT VFR BLD AUTO: 38.3 % (ref 36–46)
HGB BLD-MCNC: 12.3 G/DL (ref 12–16)
INR PPP: 1.1 (ref 0.9–1.1)
KETONES UR STRIP.AUTO-MCNC: NEGATIVE MG/DL
LEUKOCYTE ESTERASE UR QL STRIP.AUTO: NEGATIVE
MCH RBC QN AUTO: 31.4 PG (ref 26–34)
MCHC RBC AUTO-ENTMCNC: 32.1 G/DL (ref 32–36)
MCV RBC AUTO: 98 FL (ref 80–100)
NITRITE UR QL STRIP.AUTO: NEGATIVE
NRBC BLD-RTO: 0 /100 WBCS (ref 0–0)
P AXIS: 63 DEGREES
P OFFSET: 203 MS
P ONSET: 146 MS
PH UR STRIP.AUTO: 5 [PH]
PLATELET # BLD AUTO: 228 X10*3/UL (ref 150–450)
POTASSIUM SERPL-SCNC: 4.8 MMOL/L (ref 3.5–5.3)
PR INTERVAL: 150 MS
PROT UR STRIP.AUTO-MCNC: NEGATIVE MG/DL
PROTHROMBIN TIME: 11.9 SECONDS (ref 9.8–12.8)
Q ONSET: 221 MS
QRS COUNT: 13 BEATS
QRS DURATION: 78 MS
QT INTERVAL: 400 MS
QTC CALCULATION(BAZETT): 452 MS
QTC FREDERICIA: 434 MS
R AXIS: 77 DEGREES
RBC # BLD AUTO: 3.92 X10*6/UL (ref 4–5.2)
RBC # UR STRIP.AUTO: NEGATIVE /UL
RH FACTOR (ANTIGEN D): NORMAL
SODIUM SERPL-SCNC: 141 MMOL/L (ref 136–145)
SP GR UR STRIP.AUTO: 1.01
T AXIS: 68 DEGREES
T OFFSET: 421 MS
TSH SERPL-ACNC: 2.26 MIU/L (ref 0.44–3.98)
UROBILINOGEN UR STRIP.AUTO-MCNC: NORMAL MG/DL
VENTRICULAR RATE: 77 BPM
WBC # BLD AUTO: 8.2 X10*3/UL (ref 4.4–11.3)

## 2024-08-14 PROCEDURE — 85027 COMPLETE CBC AUTOMATED: CPT | Performed by: HOSPITALIST

## 2024-08-14 PROCEDURE — 7100000002 HC RECOVERY ROOM TIME - EACH INCREMENTAL 1 MINUTE: Performed by: ORTHOPAEDIC SURGERY

## 2024-08-14 PROCEDURE — 2720000007 HC OR 272 NO HCPCS: Performed by: ORTHOPAEDIC SURGERY

## 2024-08-14 PROCEDURE — C1713 ANCHOR/SCREW BN/BN,TIS/BN: HCPCS | Performed by: ORTHOPAEDIC SURGERY

## 2024-08-14 PROCEDURE — 99233 SBSQ HOSP IP/OBS HIGH 50: CPT | Performed by: INTERNAL MEDICINE

## 2024-08-14 PROCEDURE — C1769 GUIDE WIRE: HCPCS | Performed by: ORTHOPAEDIC SURGERY

## 2024-08-14 PROCEDURE — 36415 COLL VENOUS BLD VENIPUNCTURE: CPT | Performed by: HOSPITALIST

## 2024-08-14 PROCEDURE — 99221 1ST HOSP IP/OBS SF/LOW 40: CPT | Performed by: ORTHOPAEDIC SURGERY

## 2024-08-14 PROCEDURE — 84443 ASSAY THYROID STIM HORMONE: CPT

## 2024-08-14 PROCEDURE — 2500000004 HC RX 250 GENERAL PHARMACY W/ HCPCS (ALT 636 FOR OP/ED): Performed by: ANESTHESIOLOGIST ASSISTANT

## 2024-08-14 PROCEDURE — 99223 1ST HOSP IP/OBS HIGH 75: CPT | Performed by: INTERNAL MEDICINE

## 2024-08-14 PROCEDURE — 7100000001 HC RECOVERY ROOM TIME - INITIAL BASE CHARGE: Performed by: ORTHOPAEDIC SURGERY

## 2024-08-14 PROCEDURE — 80048 BASIC METABOLIC PNL TOTAL CA: CPT | Performed by: HOSPITALIST

## 2024-08-14 PROCEDURE — 76000 FLUOROSCOPY <1 HR PHYS/QHP: CPT | Mod: LT

## 2024-08-14 PROCEDURE — 2500000004 HC RX 250 GENERAL PHARMACY W/ HCPCS (ALT 636 FOR OP/ED)

## 2024-08-14 PROCEDURE — 1200000002 HC GENERAL ROOM WITH TELEMETRY DAILY

## 2024-08-14 PROCEDURE — 2500000004 HC RX 250 GENERAL PHARMACY W/ HCPCS (ALT 636 FOR OP/ED): Performed by: HOSPITALIST

## 2024-08-14 PROCEDURE — 2500000004 HC RX 250 GENERAL PHARMACY W/ HCPCS (ALT 636 FOR OP/ED): Mod: JZ

## 2024-08-14 PROCEDURE — 84484 ASSAY OF TROPONIN QUANT: CPT | Performed by: HOSPITALIST

## 2024-08-14 PROCEDURE — 2500000001 HC RX 250 WO HCPCS SELF ADMINISTERED DRUGS (ALT 637 FOR MEDICARE OP): Performed by: HOSPITALIST

## 2024-08-14 PROCEDURE — 3600000004 HC OR TIME - INITIAL BASE CHARGE - PROCEDURE LEVEL FOUR: Performed by: ORTHOPAEDIC SURGERY

## 2024-08-14 PROCEDURE — 2500000005 HC RX 250 GENERAL PHARMACY W/O HCPCS: Performed by: ORTHOPAEDIC SURGERY

## 2024-08-14 PROCEDURE — 2500000005 HC RX 250 GENERAL PHARMACY W/O HCPCS: Performed by: ANESTHESIOLOGIST ASSISTANT

## 2024-08-14 PROCEDURE — 2780000003 HC OR 278 NO HCPCS: Performed by: ORTHOPAEDIC SURGERY

## 2024-08-14 PROCEDURE — 0QSK04Z REPOSITION LEFT FIBULA WITH INTERNAL FIXATION DEVICE, OPEN APPROACH: ICD-10-PCS | Performed by: ORTHOPAEDIC SURGERY

## 2024-08-14 PROCEDURE — 81003 URINALYSIS AUTO W/O SCOPE: CPT | Performed by: HOSPITALIST

## 2024-08-14 PROCEDURE — 3600000009 HC OR TIME - EACH INCREMENTAL 1 MINUTE - PROCEDURE LEVEL FOUR: Performed by: ORTHOPAEDIC SURGERY

## 2024-08-14 PROCEDURE — 3700000001 HC GENERAL ANESTHESIA TIME - INITIAL BASE CHARGE: Performed by: ORTHOPAEDIC SURGERY

## 2024-08-14 PROCEDURE — 3700000002 HC GENERAL ANESTHESIA TIME - EACH INCREMENTAL 1 MINUTE: Performed by: ORTHOPAEDIC SURGERY

## 2024-08-14 PROCEDURE — 2500000001 HC RX 250 WO HCPCS SELF ADMINISTERED DRUGS (ALT 637 FOR MEDICARE OP)

## 2024-08-14 PROCEDURE — 0QSH04Z REPOSITION LEFT TIBIA WITH INTERNAL FIXATION DEVICE, OPEN APPROACH: ICD-10-PCS | Performed by: ORTHOPAEDIC SURGERY

## 2024-08-14 DEVICE — SCREW, LOW PROFILE, CORTICAL, 3.5 X 12MM. SS: Type: IMPLANTABLE DEVICE | Site: ANKLE | Status: FUNCTIONAL

## 2024-08-14 DEVICE — SCREW, LOW PROFILE, CORTICAL, 3.5 X 20MM, SS: Type: IMPLANTABLE DEVICE | Site: ANKLE | Status: FUNCTIONAL

## 2024-08-14 DEVICE — IMPLANTABLE DEVICE: Type: IMPLANTABLE DEVICE | Site: ANKLE | Status: FUNCTIONAL

## 2024-08-14 DEVICE — K-LESS T-ROPE W/DRV, SYN REPR, SS
Type: IMPLANTABLE DEVICE | Site: ANKLE | Status: FUNCTIONAL
Brand: ARTHREX®

## 2024-08-14 RX ORDER — CEFAZOLIN SODIUM 2 G/100ML
2 INJECTION, SOLUTION INTRAVENOUS EVERY 8 HOURS
Status: DISCONTINUED | OUTPATIENT
Start: 2024-08-14 | End: 2024-08-14 | Stop reason: SDUPTHER

## 2024-08-14 RX ORDER — OXYCODONE HYDROCHLORIDE 5 MG/1
5 TABLET ORAL EVERY 4 HOURS PRN
Status: DISCONTINUED | OUTPATIENT
Start: 2024-08-14 | End: 2024-08-14 | Stop reason: HOSPADM

## 2024-08-14 RX ORDER — SODIUM CHLORIDE, SODIUM LACTATE, POTASSIUM CHLORIDE, CALCIUM CHLORIDE 600; 310; 30; 20 MG/100ML; MG/100ML; MG/100ML; MG/100ML
100 INJECTION, SOLUTION INTRAVENOUS CONTINUOUS
Status: DISCONTINUED | OUTPATIENT
Start: 2024-08-14 | End: 2024-08-14 | Stop reason: HOSPADM

## 2024-08-14 RX ORDER — FENTANYL CITRATE 50 UG/ML
INJECTION, SOLUTION INTRAMUSCULAR; INTRAVENOUS AS NEEDED
Status: DISCONTINUED | OUTPATIENT
Start: 2024-08-14 | End: 2024-08-14

## 2024-08-14 RX ORDER — SODIUM CHLORIDE 0.9 G/100ML
IRRIGANT IRRIGATION AS NEEDED
Status: DISCONTINUED | OUTPATIENT
Start: 2024-08-14 | End: 2024-08-14 | Stop reason: HOSPADM

## 2024-08-14 RX ORDER — ONDANSETRON HYDROCHLORIDE 2 MG/ML
4 INJECTION, SOLUTION INTRAVENOUS ONCE AS NEEDED
Status: DISCONTINUED | OUTPATIENT
Start: 2024-08-14 | End: 2024-08-14 | Stop reason: HOSPADM

## 2024-08-14 RX ORDER — ALBUTEROL SULFATE 0.83 MG/ML
2.5 SOLUTION RESPIRATORY (INHALATION) ONCE AS NEEDED
Status: DISCONTINUED | OUTPATIENT
Start: 2024-08-14 | End: 2024-08-14 | Stop reason: HOSPADM

## 2024-08-14 RX ORDER — PHENYLEPHRINE HCL IN 0.9% NACL 1 MG/10 ML
SYRINGE (ML) INTRAVENOUS AS NEEDED
Status: DISCONTINUED | OUTPATIENT
Start: 2024-08-14 | End: 2024-08-14

## 2024-08-14 RX ORDER — ACETAMINOPHEN 325 MG/1
650 TABLET ORAL EVERY 4 HOURS PRN
Status: DISCONTINUED | OUTPATIENT
Start: 2024-08-14 | End: 2024-08-14 | Stop reason: HOSPADM

## 2024-08-14 RX ORDER — IPRATROPIUM BROMIDE 0.5 MG/2.5ML
500 SOLUTION RESPIRATORY (INHALATION) ONCE
Status: DISCONTINUED | OUTPATIENT
Start: 2024-08-14 | End: 2024-08-14 | Stop reason: HOSPADM

## 2024-08-14 RX ORDER — LIDOCAINE HYDROCHLORIDE 10 MG/ML
0.1 INJECTION, SOLUTION EPIDURAL; INFILTRATION; INTRACAUDAL; PERINEURAL ONCE
Status: DISCONTINUED | OUTPATIENT
Start: 2024-08-14 | End: 2024-08-14 | Stop reason: HOSPADM

## 2024-08-14 RX ORDER — DROPERIDOL 2.5 MG/ML
0.62 INJECTION, SOLUTION INTRAMUSCULAR; INTRAVENOUS ONCE AS NEEDED
Status: DISCONTINUED | OUTPATIENT
Start: 2024-08-14 | End: 2024-08-14 | Stop reason: HOSPADM

## 2024-08-14 RX ORDER — LIDOCAINE HYDROCHLORIDE 20 MG/ML
INJECTION, SOLUTION INFILTRATION; PERINEURAL AS NEEDED
Status: DISCONTINUED | OUTPATIENT
Start: 2024-08-14 | End: 2024-08-14

## 2024-08-14 RX ORDER — SODIUM CHLORIDE, SODIUM LACTATE, POTASSIUM CHLORIDE, CALCIUM CHLORIDE 600; 310; 30; 20 MG/100ML; MG/100ML; MG/100ML; MG/100ML
INJECTION, SOLUTION INTRAVENOUS CONTINUOUS PRN
Status: DISCONTINUED | OUTPATIENT
Start: 2024-08-14 | End: 2024-08-14

## 2024-08-14 RX ORDER — MIDAZOLAM HYDROCHLORIDE 1 MG/ML
INJECTION, SOLUTION INTRAMUSCULAR; INTRAVENOUS AS NEEDED
Status: DISCONTINUED | OUTPATIENT
Start: 2024-08-14 | End: 2024-08-14

## 2024-08-14 RX ORDER — CEFAZOLIN 1 G/1
INJECTION, POWDER, FOR SOLUTION INTRAVENOUS AS NEEDED
Status: DISCONTINUED | OUTPATIENT
Start: 2024-08-14 | End: 2024-08-14

## 2024-08-14 RX ORDER — PROPOFOL 10 MG/ML
INJECTION, EMULSION INTRAVENOUS AS NEEDED
Status: DISCONTINUED | OUTPATIENT
Start: 2024-08-14 | End: 2024-08-14

## 2024-08-14 RX ORDER — OXYCODONE HYDROCHLORIDE 5 MG/1
5 TABLET ORAL EVERY 6 HOURS PRN
Status: DISCONTINUED | OUTPATIENT
Start: 2024-08-14 | End: 2024-08-21 | Stop reason: HOSPADM

## 2024-08-14 RX ORDER — SODIUM CHLORIDE, SODIUM LACTATE, POTASSIUM CHLORIDE, CALCIUM CHLORIDE 600; 310; 30; 20 MG/100ML; MG/100ML; MG/100ML; MG/100ML
100 INJECTION, SOLUTION INTRAVENOUS CONTINUOUS
Status: CANCELLED | OUTPATIENT
Start: 2024-08-14

## 2024-08-14 SDOH — SOCIAL STABILITY: SOCIAL INSECURITY: ARE YOU OR HAVE YOU BEEN THREATENED OR ABUSED PHYSICALLY, EMOTIONALLY, OR SEXUALLY BY ANYONE?: NO

## 2024-08-14 SDOH — ECONOMIC STABILITY: HOUSING INSECURITY: AT ANY TIME IN THE PAST 12 MONTHS, WERE YOU HOMELESS OR LIVING IN A SHELTER (INCLUDING NOW)?: NO

## 2024-08-14 SDOH — ECONOMIC STABILITY: INCOME INSECURITY: HOW HARD IS IT FOR YOU TO PAY FOR THE VERY BASICS LIKE FOOD, HOUSING, MEDICAL CARE, AND HEATING?: NOT HARD AT ALL

## 2024-08-14 SDOH — SOCIAL STABILITY: SOCIAL INSECURITY: DO YOU FEEL ANYONE HAS EXPLOITED OR TAKEN ADVANTAGE OF YOU FINANCIALLY OR OF YOUR PERSONAL PROPERTY?: NO

## 2024-08-14 SDOH — ECONOMIC STABILITY: HOUSING INSECURITY: IN THE PAST 12 MONTHS, HOW MANY TIMES HAVE YOU MOVED WHERE YOU WERE LIVING?: 0

## 2024-08-14 SDOH — SOCIAL STABILITY: SOCIAL INSECURITY: WERE YOU ABLE TO COMPLETE ALL THE BEHAVIORAL HEALTH SCREENINGS?: YES

## 2024-08-14 SDOH — ECONOMIC STABILITY: INCOME INSECURITY: IN THE LAST 12 MONTHS, WAS THERE A TIME WHEN YOU WERE NOT ABLE TO PAY THE MORTGAGE OR RENT ON TIME?: NO

## 2024-08-14 SDOH — SOCIAL STABILITY: SOCIAL INSECURITY: ARE THERE ANY APPARENT SIGNS OF INJURIES/BEHAVIORS THAT COULD BE RELATED TO ABUSE/NEGLECT?: NO

## 2024-08-14 SDOH — SOCIAL STABILITY: SOCIAL INSECURITY: DO YOU FEEL UNSAFE GOING BACK TO THE PLACE WHERE YOU ARE LIVING?: NO

## 2024-08-14 SDOH — SOCIAL STABILITY: SOCIAL INSECURITY: ABUSE: ADULT

## 2024-08-14 SDOH — SOCIAL STABILITY: SOCIAL INSECURITY: HAVE YOU HAD ANY THOUGHTS OF HARMING ANYONE ELSE?: NO

## 2024-08-14 SDOH — SOCIAL STABILITY: SOCIAL INSECURITY: HAVE YOU HAD THOUGHTS OF HARMING ANYONE ELSE?: NO

## 2024-08-14 SDOH — ECONOMIC STABILITY: TRANSPORTATION INSECURITY
IN THE PAST 12 MONTHS, HAS LACK OF TRANSPORTATION KEPT YOU FROM MEETINGS, WORK, OR FROM GETTING THINGS NEEDED FOR DAILY LIVING?: NO

## 2024-08-14 SDOH — ECONOMIC STABILITY: TRANSPORTATION INSECURITY
IN THE PAST 12 MONTHS, HAS THE LACK OF TRANSPORTATION KEPT YOU FROM MEDICAL APPOINTMENTS OR FROM GETTING MEDICATIONS?: NO

## 2024-08-14 SDOH — SOCIAL STABILITY: SOCIAL INSECURITY: DOES ANYONE TRY TO KEEP YOU FROM HAVING/CONTACTING OTHER FRIENDS OR DOING THINGS OUTSIDE YOUR HOME?: NO

## 2024-08-14 SDOH — SOCIAL STABILITY: SOCIAL INSECURITY: HAS ANYONE EVER THREATENED TO HURT YOUR FAMILY OR YOUR PETS?: NO

## 2024-08-14 ASSESSMENT — LIFESTYLE VARIABLES
AUDIT-C TOTAL SCORE: 1
HOW OFTEN DO YOU HAVE A DRINK CONTAINING ALCOHOL: MONTHLY OR LESS
HOW OFTEN DO YOU HAVE 6 OR MORE DRINKS ON ONE OCCASION: NEVER
AUDIT-C TOTAL SCORE: 1
HOW MANY STANDARD DRINKS CONTAINING ALCOHOL DO YOU HAVE ON A TYPICAL DAY: 1 OR 2
SKIP TO QUESTIONS 9-10: 1

## 2024-08-14 ASSESSMENT — COGNITIVE AND FUNCTIONAL STATUS - GENERAL
DRESSING REGULAR LOWER BODY CLOTHING: A LITTLE
MOVING FROM LYING ON BACK TO SITTING ON SIDE OF FLAT BED WITH BEDRAILS: A LITTLE
MOBILITY SCORE: 19
DRESSING REGULAR LOWER BODY CLOTHING: A LOT
MOVING TO AND FROM BED TO CHAIR: A LOT
PERSONAL GROOMING: A LITTLE
MOVING TO AND FROM BED TO CHAIR: A LITTLE
STANDING UP FROM CHAIR USING ARMS: A LOT
DRESSING REGULAR UPPER BODY CLOTHING: A LITTLE
CLIMB 3 TO 5 STEPS WITH RAILING: TOTAL
HELP NEEDED FOR BATHING: A LITTLE
MOBILITY SCORE: 12
WALKING IN HOSPITAL ROOM: A LITTLE
TOILETING: A LOT
TURNING FROM BACK TO SIDE WHILE IN FLAT BAD: A LOT
DAILY ACTIVITIY SCORE: 17
DAILY ACTIVITIY SCORE: 20
WALKING IN HOSPITAL ROOM: A LOT
CLIMB 3 TO 5 STEPS WITH RAILING: A LOT
PATIENT BASELINE BEDBOUND: NO
TOILETING: A LITTLE
HELP NEEDED FOR BATHING: A LOT
STANDING UP FROM CHAIR USING ARMS: A LITTLE

## 2024-08-14 ASSESSMENT — PAIN SCALES - GENERAL
PAINLEVEL_OUTOF10: 0 - NO PAIN
PAINLEVEL_OUTOF10: 0 - NO PAIN
PAINLEVEL_OUTOF10: 6
PAINLEVEL_OUTOF10: 9
PAINLEVEL_OUTOF10: 0 - NO PAIN
PAINLEVEL_OUTOF10: 0 - NO PAIN
PAINLEVEL_OUTOF10: 7
PAINLEVEL_OUTOF10: 0 - NO PAIN
PAINLEVEL_OUTOF10: 0 - NO PAIN

## 2024-08-14 ASSESSMENT — PAIN - FUNCTIONAL ASSESSMENT
PAIN_FUNCTIONAL_ASSESSMENT: 0-10

## 2024-08-14 ASSESSMENT — PAIN DESCRIPTION - DESCRIPTORS
DESCRIPTORS: SHARP;ACHING
DESCRIPTORS: ACHING;SHARP

## 2024-08-14 ASSESSMENT — ACTIVITIES OF DAILY LIVING (ADL)
ADEQUATE_TO_COMPLETE_ADL: NO
PATIENT'S MEMORY ADEQUATE TO SAFELY COMPLETE DAILY ACTIVITIES?: YES
FEEDING YOURSELF: INDEPENDENT
WALKS IN HOME: NEEDS ASSISTANCE
BATHING: NEEDS ASSISTANCE
DRESSING YOURSELF: NEEDS ASSISTANCE
HEARING - RIGHT EAR: FUNCTIONAL
ASSISTIVE_DEVICE: WALKER
HEARING - LEFT EAR: FUNCTIONAL
JUDGMENT_ADEQUATE_SAFELY_COMPLETE_DAILY_ACTIVITIES: YES
TOILETING: NEEDS ASSISTANCE
GROOMING: INDEPENDENT

## 2024-08-14 ASSESSMENT — COLUMBIA-SUICIDE SEVERITY RATING SCALE - C-SSRS
6. HAVE YOU EVER DONE ANYTHING, STARTED TO DO ANYTHING, OR PREPARED TO DO ANYTHING TO END YOUR LIFE?: NO
2. HAVE YOU ACTUALLY HAD ANY THOUGHTS OF KILLING YOURSELF?: NO
1. IN THE PAST MONTH, HAVE YOU WISHED YOU WERE DEAD OR WISHED YOU COULD GO TO SLEEP AND NOT WAKE UP?: NO

## 2024-08-14 NOTE — BRIEF OP NOTE
Date: 2024  OR Location: [facility] OR    Name: Lucille Holloway YOB: 1945, Age: 79 y.o., MRN: [id number], Sex: female    Diagnosis  Pre-op Diagnosis      * Closed fracture of left ankle, initial encounter [S82.052A] Post-op Diagnosis     * Closed fracture of left ankle, initial encounter [S82.892A]     Procedures  Left Open Reduction Internal Fixation Ankle  05924 - FL OPEN TREATMENT BIMALLEOLAR ANKLE FRACTURE      Surgeons      * John Larsen - Primary    Resident/Fellow/Other Assistant:  Surgeons and Role:     * Ronni Arrington DO - Assisting  Natalie Beltran DO     Procedure Summary  Anesthesia: Regional, General  ASA: III  Anesthesia Staff: Anesthesiologist: Eduardo Cheema MD  C-AA: SULLY Florez  Estimated Blood Loss: 10mL  Intra-op Medications:   Administrations occurring from 0900 to 1110 on 24:   Medication Name Total Dose   sodium chloride 0.9 % irrigation solution 1,000 mL   aspirin EC tablet 81 mg Cannot be calculated              Anesthesia Record               Intraprocedure I/O Totals          Intake    LR infusion 1000.00 mL    Total Intake 1000 mL       Output    Est. Blood Loss 5 mL    Total Output 5 mL       Net    Net Volume 995 mL          Specimen: No specimens collected     Staff:   Circulator: Judith  Circulator: Laurel  Scrub Person: Jocelyn Felder Circulator: Kali      Findings: consistent with preop diagnosis    Complications:  None; patient tolerated the procedure well.     Disposition: PACU - hemodynamically stable.  Condition: stable  Specimens Collected: No specimens collected    Natalie Beltran DO     Attending Attestation: I was present and scrubbed for the entire procedure.    John Larsen  Phone Number: [phone]

## 2024-08-14 NOTE — CONSULTS
Inpatient consult to Cardiology  Consult performed by: GETACHEW Novoa-CNP  Consult ordered by: Cailin Aragon MD  Reason for consult: syncope, palpitations  Assessment/Recommendations: Transient loss of consciousness resulting in fracture of her left ankle. Alcohol may have played a role, however will rule out structural heart disease and arrhythmia.     We will obtain a transthoracic echocardiogram for structural evaluation including ejection fraction, assessment of regional wall motion abnormalities or valvular disease, and further evaluation of hemodynamics.    The patient will benefit from discharge with a Zio or Preventice Patch event monitor for 2 weeks.  Zio or Preventice Patch can only be placed by [facility] nursing during regular business hours (9AM-4PM Monday-Friday) after the discharge order has been placed.  The results will be discussed at the patient's followup visit.    The patient will benefit from follow-up in Cardiology clinic within 3-5 weeks of discharge.  The patient or their family should call the [facility] at [phone] for [facility].  If the care team is assisting in scheduling a follow up appointment prior to their discharge, please ensure that the patient has committed to attending the scheduled date and time.    Thank you for allowing me to participate in their care.  Please feel free to call me with any further questions or concerns.    Gustavo Arvizu DO   Clinical Cardology   [facility]  [facility]              History Of Present Illness:    Lucille Holloway is a 79 y.o. female with a past medical history of RA, chronic back pain, CKD, MGUS, HTN, HLD, Hypothyroidism, hearing loss, KELLY. Patient reportedly presents to the ED s/p excessive alcohol use (ETOH level 85) resulting in a fall; found to have a left ankle fx and underwent repair today 8/14/24. Cardiology consulted  for further evaluation of palpitations and syncope.     [place] [facility] her [place] was present and she gives permission for them to precipitate in history obtaining.  Patient reports that prior to her arrival the day she presented to the hospital she had been in her normal state of health.  The day of admission she had been out having a few drinks with family, afterwards she went home and when attempting to open the door she collapsed.  She denies any symptoms prior to collapsing of dizziness, lightheadedness, chest pain, palpitations, nausea or vomiting.  Per her family that was present they stated that she was unresponsive for about 30 seconds and they were able to revive her by shaking and calling her name and within minutes she was back to baseline with mentation.  She denies striking her head, but it was noted that she had deformity of her left foot and she was unable to walk after fall. D/t injury, patient was taken to the ED for further evaluation.  In addition, patient reports that she had an episode of palpitations that lasted 1 minute a few weeks ago subsided without any intervention.  For the last 2 months she reports feeling dizzy w/o syncope occasionally> has been related to her blood pressure medications in the past.  Currently, post left ankle surgery, patient reports that she feels comfortable at rest and denies any complaints of chest pain or dyspnea.  She has no supplemental oxygen requirement.    In addition, patient denies any past medical history of cardiac surgeries or coronary percutaneous interventions.      ED course:  Initial EKG: Sinus rhythm  Arrival vital signs: Afebrile 97.9, HR 84, BP 89/52, 95% on room air  Current vital signs: /72, HR 74, 96% on room air  Pertinent imaging/Labs: HS trop 3/3/3, WBC 8.2, Hgb 12.3, , , K4.8, CR 0.90, alcohol 85    Home CV meds:  Aspirin 81 mg p.o. daily, Lipitor 10 mg p.o. daily, valsartan 40 mg p.o.  daily      All other systems reviewed and negative unless as mentioned in HPI.       Past Medical/ Surgical History:  Rheumatoid arthritis  Hypertension  Hyperlipidemia  Hypothyroidism  Chronic kidney disease  MGUS  Obstructive sleep apnea  Chronic back pain        Social History:  Denies smoking  Occasional alcohol use  Denies illicit drug use     Family History:  Reviewed, not pertinent to presenting problem        Past Cardiology Tests (Last 3 Years):  Echocardiogram 22:  CONCLUSIONS:   1. The left ventricular systolic function is normal with a 65-70% estimated ejection fraction.   2. Spectral Doppler shows an impaired relaxation pattern of left ventricular diastolic filling.   3. There is no evidence of left ventricular hypertrophy.    Nuclear Stress Test 19:  Indication CP  IMPRESSION:  1. Normal stress myocardial perfusion imaging with no sign of  stress-induced ischemia or prior infarct.  2. Well-maintained left ventricular function.  3. Normal LV size and myocardial contractility with an estimated the  ejection fraction of greater than 65%      Allergies:  Soap    ROS:  10 point review of systems including (Constitutional, Eyes, ENMT, Respiratory, Cardiac, Gastrointestinal, Neurological, Psychiatric, and Hematologic) was performed and is otherwise negative.    Objective Data:  Last Recorded Vitals:  Vitals:    24 1145 24 1200 24 1215 24 1320   BP: 139/73 125/73 146/78 140/72   BP Location: Right arm Right arm Right arm Right arm   Patient Position: Lying Lying Lying Lying   Pulse: 73 80 73 74   Resp: 15 13 13 16   Temp:   36.2 °C (97.2 °F) 36.3 °C (97.3 °F)   TempSrc:   Temporal    SpO2: 97% 95% 95% 96%   Weight:       Height:         Medical Gas Therapy: None (Room air)  Medical Gas Delivery Method: Simple mask  Weight  Av kg (194 lb)  Min: 88 kg (194 lb)  Max: 88 kg (194 lb 0.1 oz)      LABS:  CMP:  Results from last 7 days   Lab Units 24  0400 24  7746    SODIUM mmol/L 141 139   POTASSIUM mmol/L 4.8 4.0   CHLORIDE mmol/L 108* 106   CO2 mmol/L 26 23   ANION GAP mmol/L 12 14   BUN mg/dL 20 25*   CREATININE mg/dL 0.90 1.16*   EGFR mL/min/1.73m*2 65 48*   MAGNESIUM mg/dL  --  1.90   ALBUMIN g/dL  --  3.5   ALT U/L  --  37   AST U/L  --  33   BILIRUBIN TOTAL mg/dL  --  0.2     CBC:  Results from last 7 days   Lab Units 08/14/24  0400 08/13/24  1749   WBC AUTO x10*3/uL 8.2 6.3   HEMOGLOBIN g/dL 12.3 12.3   HEMATOCRIT % 38.3 37.8   PLATELETS AUTO x10*3/uL 228 214   MCV fL 98 96     COAG:   Results from last 7 days   Lab Units 08/13/24  2309   INR  1.1     ABO:   ABO TYPE   Date Value Ref Range Status   08/13/2024 O  Final     HEME/ENDO:     CARDIAC:   Results from last 7 days   Lab Units 08/14/24  0400 08/13/24  2309 08/13/24  1749   TROPHS ng/L 3 3 3             Last I/O:    Intake/Output Summary (Last 24 hours) at 8/14/2024 1401  Last data filed at 8/14/2024 1129  Gross per 24 hour   Intake 1500 ml   Output 555 ml   Net 945 ml     Net IO Since Admission: 945 mL [08/14/24 1401]          Inpatient Medications:  Scheduled medications   Medication Dose Route Frequency    aspirin  81 mg oral Daily    atorvastatin  10 mg oral Nightly    ceFAZolin  2 g intravenous q8h    docusate sodium  300 mg oral Daily    DULoxetine  30 mg oral Daily    enoxaparin  40 mg subcutaneous q24h    gabapentin  300 mg oral TID    levothyroxine  75 mcg oral Daily    multivitamin with minerals  1 tablet oral Daily    polyethylene glycol  17 g oral Daily    valsartan  40 mg oral Daily     PRN medications   Medication    acetaminophen    acetaminophen    albuterol    melatonin    morphine    morphine    ondansetron ODT    Or    ondansetron    oxyCODONE     Continuous Medications   Medication Dose Last Rate    sodium chloride 0.9%  75 mL/hr 75 mL/hr (08/13/24 2320)       Inpatient Medications:  Scheduled medications   Medication Dose Route Frequency    aspirin  81 mg oral Daily    atorvastatin  10 mg  oral Nightly    ceFAZolin  2 g intravenous q8h    docusate sodium  300 mg oral Daily    DULoxetine  30 mg oral Daily    enoxaparin  40 mg subcutaneous q24h    gabapentin  300 mg oral TID    levothyroxine  75 mcg oral Daily    multivitamin with minerals  1 tablet oral Daily    polyethylene glycol  17 g oral Daily    valsartan  40 mg oral Daily     PRN medications   Medication    acetaminophen    acetaminophen    albuterol    melatonin    morphine    morphine    ondansetron ODT    Or    ondansetron    oxyCODONE     Continuous Medications   Medication Dose Last Rate    sodium chloride 0.9%  75 mL/hr 75 mL/hr (08/13/24 2320)     Outpatient Medications:  Current Outpatient Medications   Medication Instructions    acetaminophen 500 mg capsule 2 capsules, oral, 3 times daily PRN    albuterol 90 mcg/actuation inhaler 2 puffs, Every 4 hours PRN    amoxicillin (Amoxil) 500 mg capsule TAKE FOUR CAPSULES BY MOUTH 1 HOUR BEFORE APPOINTMENT    aspirin 81 mg EC tablet 1 tablet, oral, Daily    atorvastatin (LIPITOR) 10 mg, oral, Nightly    docusate sodium (Colace) 100 mg capsule 3 capsules, oral, Daily    DULoxetine (CYMBALTA) 30 mg, oral, Daily, AS DIRECTED    gabapentin (NEURONTIN) 300 mg, oral, 3 times daily    levothyroxine (SYNTHROID, LEVOXYL) 75 mcg, oral, Daily    multivitamin with minerals iron-free (Centrum Silver) 1 tablet, oral, Daily    naltrexone-bupropion (Contrave) 8-90 mg ER tablet 1 tablet, oral, Daily    naltrexone-bupropion (Contrave) 8-90 mg ER tablet 2 tablets, oral, 2 times daily    valsartan (DIOVAN) 40 mg, oral, Daily       Physical Exam:  General: Pleasant obese female, alert and oriented x 3, NAD  HEENT:  Pupils equal and reactive.  Normocephalic.  Moist mucosa.    Neck:  No thyromegaly.  Normal Jugular Venous Pressure.  Cardiovascular:  Regular rate and rhythm.  Normal S1 and S2.  Pulmonary:  Clear to auscultation bilaterally.  Abdomen:  Soft. Non-tender.   Non-distended.  Positive bowel sounds.  Lower  Extremities: Left ankle repair cast dressing in place, no lower extremity edema noted  Neurologic:  Cranial nerves intact.  No focal deficit.   Skin: Skin warm and dry, normal skin turgor.   Psychiatric: Appropriate mood and behavior     Assessment/Plan   Lucille Holloway is a 79 y.o. female with a past medical history of RA, chronic back pain, CKD, MGUS, HTN, HLD, Hypothyroidism, hearing loss, KELLY. Patient reportedly presents to the ED s/p excessive alcohol use (ETOH level 85) resulting in a fall; found to have a left ankle fx and underwent repair today 8/14/24. Cardiology consulted for further evaluation of palpitations and syncope.       Syncope in setting of ETOH use.  Patient denies any cardiovascular symptoms prior to collapsing or after    Palpitations(reports palpitations were present a few weeks ago at rest, lasted 1 minute and subsided without intervention.    - TSH 2.26    3.   HTN. Acceptable given current circumstances    4.   HLD. Continue statin    5.   Left ankle Fx s/p Repair today 8/14/24> defer management to ortho/surgery       Recommendations:  Syncope likely related to alcohol use  Will plan for Holter monitor prior to discharge  Echo pending  Continue telemetry while inpatient    Code Status:  Full Code    Sarah Ferrera, APRN-CNP      ==============================================  Attending Note   ==============================================  Both the DEISI and I have had a face to face encounter with the patient today. I have examined the patient and edited the documented physical examination as necessary.  I personally reviewed the patient's recent labs, medications, orders, EKGs, and pertinent cardiac imaging.  I have reviewed the DEISI's encounter note, approve the DEISI's documentation and have edited the note to reflect the diagnostic and therapeutic plan.    Lucille Holloway is a 79 y.o. female with a past medical history of RA, chronic back pain, CKD, MGUS, HTN, HLD, Hypothyroidism, hearing  loss, KELLY. Patient reportedly presents to the ED s/p excessive alcohol use (ETOH level 85) resulting in a fall; found to have a left ankle fx and underwent repair today 8/14/24. Cardiology consulted for further evaluation of palpitations and syncope.     [place] [facility] her family was present and she gives permission for them to precipitate in history obtaining.  Patient reports that prior to her arrival the day she presented to the hospital she had been in her normal state of health.  The day of admission she had been out having a few drinks with family, afterwards she went home and when attempting to open the door she collapsed.  She denies any symptoms prior to collapsing of dizziness, lightheadedness, chest pain, palpitations, nausea or vomiting.  Per her family that was present they stated that she was unresponsive for about 30 seconds and they were able to revive her by shaking and calling her name and within minutes she was back to baseline with mentation.  She denies striking her head, but it was noted that she had deformity of her left foot and she was unable to walk after fall. D/t injury, patient was taken to the ED for further evaluation.  In addition, patient reports that she had an episode of palpitations that lasted 1 minute a few weeks ago subsided without any intervention.  For the last 2 months she reports feeling dizzy w/o syncope occasionally> has been related to her blood pressure medications in the past.  Currently, post left ankle surgery, patient reports that she feels comfortable at rest and denies any complaints of chest pain or dyspnea.  She has no supplemental oxygen requirement.    In addition, patient denies any past medical history of cardiac surgeries or coronary percutaneous interventions.    No exacerbating or relieving factors.  Patient denies chest pain and angina.  Pt denies shortness of breath, dyspnea on exertion, orthopnea, and paroxysmal nocturnal  dyspnea.  Pt denies worsening lower extremity edema.  Pt denies palpitations or syncope.  No recent falls.  No fever or chills.  No cough.  No change in bowel or bladder habits.  No sick contacts.  No recent travel.     12 point review of systems including (Constitutional, Eyes, ENMT, Respiratory, Cardiac, Gastrointestinal, Neurological, Psychiatric, and Hematologic) was performed and is otherwise negative.    Past medical history:  As above.    Medications were reviewed.    Allergies were reviewed.    Social history:  Patient denies smoking, alcohol abuse, or illicit drug use.    Family history:  No sudden cardiac death or premature coronary artery disease.     Patient seen and examined independently in  714.  She is seen post surgery. She is resting but awakens.   General:  Patient is awake, alert, and oriented.  Patient is in no acute distress.  HEENT:  Pupils equal and reactive.  Normocephalic.  Moist mucosa.    Neck:  No thyromegaly.  Normal Jugular Venous Pressure.  Cardiovascular:  Regular rate and rhythm.  Normal S1 and S2.  Pulmonary:  Clear to auscultation bilaterally.  Abdomen:  Soft. Non-tender.   Non-distended.  Positive bowel sounds.  Lower Extremities:  2+ pedal pulses. No LE edema.. Left leg with bandages in place  Neurologic:  Cranial nerves intact.  No focal deficit.   Skin: Skin warm and dry, normal skin turgor.   Psychiatric: Normal affect.    Vital signs, telemetry, medications, labs, and imaging were reviewed as well.    Lucille Holloway is a 79 y.o. female with a past medical history of RA, chronic back pain, CKD, MGUS, HTN, HLD, Hypothyroidism, hearing loss, KELLY. Patient reportedly presents to the ED s/p excessive alcohol use (ETOH level 85) resulting in a fall; found to have a left ankle fx and underwent repair today 8/14/24. Cardiology consulted for further evaluation of palpitations and syncope.       Transient loss of consciousness resulting in fracture of her left ankle. Alcohol may have  played a role, however will rule out structural heart disease and arrhythmia.     We will obtain a transthoracic echocardiogram for structural evaluation including ejection fraction, assessment of regional wall motion abnormalities or valvular disease, and further evaluation of hemodynamics.    The patient will benefit from discharge with a Zio or Preventice Patch event monitor for 2 weeks.  Zio or Preventice Patch can only be placed by [facility] nursing during regular business hours (9AM-4PM Monday-Friday) after the discharge order has been placed.  The results will be discussed at the patient's followup visit.    The patient will benefit from follow-up in Cardiology clinic within 3-5 weeks of discharge.  The patient or their family should call the [facility] at [phone] for [facility].  If the care team is assisting in scheduling a follow up appointment prior to their discharge, please ensure that the patient has committed to attending the scheduled date and time.    Thank you for allowing me to participate in their care.  Please feel free to call me with any further questions or concerns.    Gustavo Arvizu DO   Clinical Cardology   [facility]  [facility]

## 2024-08-14 NOTE — ANESTHESIA PROCEDURE NOTES
Peripheral Block    Patient location during procedure: pre-op  Start time: 8/14/2024 8:12 AM  End time: 8/14/2024 8:28 AM  Reason for block: procedure for pain, at surgeon's request and post-op pain management  Staffing  Performed: attending   Authorized by: Eduardo Cheema MD    Performed by: Eduardo Cheema MD  Preanesthetic Checklist  Completed: patient identified, IV checked, site marked, risks and benefits discussed, surgical consent, monitors and equipment checked, pre-op evaluation and timeout performed   Timeout performed at: 8/14/2024 8:12 AM  Peripheral Block  Patient position: laying flat  Prep: alcohol swabs  Patient monitoring: continuous pulse ox  Block type: adductor canal and popliteal  Laterality: left  Injection technique: single-shot  Guidance: ultrasound guided  Local infiltration: lidocaine  Needle  Needle type: short-bevel   Needle gauge: 22 G  Needle length: 8 cm  Needle localization: ultrasound guidance     image stored in chart  Test dose: negative  Assessment  Injection assessment: negative aspiration for heme, no paresthesia on injection, incremental injection and local visualized surrounding nerve on ultrasound  Heart rate change: no  Slow fractionated injection: yes  Additional Notes  40 ml 0.375% bupivacaine with 1:200K epi and 2.67 mg dexamethasone injected for popliteal  20 ml 0.375% bupivacaine with 1:200K epi and 1.33 mg dexamethasone injected for adductor

## 2024-08-14 NOTE — ANESTHESIA POSTPROCEDURE EVALUATION
Patient: Lucille Holloway    Procedure Summary       Date: 08/14/24 Room / Location: U A OR 18 / Virtual [facility] OR    Anesthesia Start: 0848 Anesthesia Stop: 1133    Procedure: Left Open Reduction Internal Fixation Ankle (Left: Ankle) Diagnosis:       Closed fracture of left ankle, initial encounter      (Closed fracture of left ankle, initial encounter [S82.892A])    Surgeons: John Larsen MD Responsible Provider: Eduardo Cheema MD    Anesthesia Type: general, regional ASA Status: 3            Anesthesia Type: general, regional    Vitals Value Taken Time   /69 08/14/24 1131   Temp 36 08/14/24 1138   Pulse 82 08/14/24 1137   Resp 18 08/14/24 1137   SpO2 95 % 08/14/24 1137   Vitals shown include unfiled device data.    Anesthesia Post Evaluation    Patient location during evaluation: bedside  Patient participation: complete - patient participated  Level of consciousness: awake  Pain management: adequate  Airway patency: patent  Cardiovascular status: acceptable  Respiratory status: acceptable  Hydration status: acceptable  Postoperative Nausea and Vomiting: none        No notable events documented.

## 2024-08-14 NOTE — CONSULTS
Reason For Consult  Left ankle fracture    History Of Present Illness  Lucille Holloway is a 79 y.o. female presenting with fractured left ankle following a fall yesterday..     Past Medical History  She has a past medical history of Acute upper respiratory infection, unspecified (03/05/2019), Bariatric surgery status (11/20/2019), Encounter for general adult medical examination without abnormal findings (08/05/2021), Other general symptoms and signs (03/05/2019), Other headache syndrome (06/25/2024), Other neuromuscular dysfunction of bladder, Pain in right foot (09/17/2015), Personal history of other diseases of the circulatory system, Personal history of other diseases of the respiratory system (03/05/2019), Personal history of other endocrine, nutritional and metabolic disease, Personal history of other infectious and parasitic diseases (08/28/2019), Plantar fascial fibromatosis (09/21/2015), Syncope and collapse (10/16/2015), and Vitamin D deficiency, unspecified (08/28/2019).    Surgical History  She has a past surgical history that includes Other surgical history (08/28/2019); Total hip arthroplasty (07/16/2019); Hysterectomy (08/28/2019); Carpal tunnel release (08/28/2019); Bladder surgery (08/28/2019); Total knee arthroplasty (Right, 12/30/2022); Reduction mammaplasty; and Breast reconstruction (01/01/2019).     Social History  She reports that she has quit smoking. Her smoking use included cigarettes. She has never been exposed to tobacco smoke. She has never used smokeless tobacco. She reports that she does not currently use alcohol. She reports that she does not use drugs.    Family History  Family History   Problem Relation Name Age of Onset    Diabetes Mother      Hypertension Mother      Thyroid disease Mother          Allergies  Soap    Review of Systems  See chart     Physical Exam  Awake alert oriented appropriate  Left lower extremity appropriately splinted  Digits with satisfactory refill and  "sensation.     Last Recorded Vitals  Blood pressure 125/73, pulse 80, temperature 36.3 °C (97.3 °F), temperature source Temporal, resp. rate 13, height 1.6 m (5' 3\"), weight 88 kg (194 lb 0.1 oz), SpO2 95%.    Relevant Results  X-rays show trimalleolar fracture left ankle interval reduction  Scheduled medications  [Held by provider] aspirin, 81 mg, oral, Daily  atorvastatin, 10 mg, oral, Nightly  [Transfer Hold] ceFAZolin, 2 g, intravenous, q8h  docusate sodium, 300 mg, oral, Daily  DULoxetine, 30 mg, oral, Daily  enoxaparin, 40 mg, subcutaneous, q24h  gabapentin, 300 mg, oral, TID  ipratropium, 500 mcg, nebulization, Once  levothyroxine, 75 mcg, oral, Daily  lidocaine, 0.1 mL, subcutaneous, Once  multivitamin with minerals, 1 tablet, oral, Daily  oxygen, , inhalation, Continuous - 02/gases  polyethylene glycol, 17 g, oral, Daily  valsartan, 40 mg, oral, Daily      Continuous medications  lactated Ringer's, 100 mL/hr  sodium chloride 0.9%, 75 mL/hr, Last Rate: 75 mL/hr (08/13/24 2320)      PRN medications  PRN medications: acetaminophen, acetaminophen, acetaminophen, [Transfer Hold] albuterol, albuterol, droperidol, HYDROmorphone, HYDROmorphone, melatonin, morphine, morphine, ondansetron ODT **OR** ondansetron, ondansetron, [Transfer Hold] oxyCODONE, oxyCODONE  Results for orders placed or performed during the hospital encounter of 08/13/24 (from the past 24 hour(s))   ECG 12 lead   Result Value Ref Range    Ventricular Rate 77 BPM    Atrial Rate 77 BPM    [place] Interval 150 ms    QRS Duration 78 ms    QT Interval 400 ms    QTC Calculation(Bazett) 452 ms    P Axis 63 degrees    R Axis 77 degrees    T Axis 68 degrees    QRS Count 13 beats    Q Onset 221 ms    P Onset 146 ms    P Offset 203 ms    T Offset 421 ms    QTC Fredericia 434 ms   CBC and Auto Differential   Result Value Ref Range    WBC 6.3 4.4 - 11.3 x10*3/uL    nRBC 0.0 0.0 - 0.0 /100 WBCs    RBC 3.94 (L) 4.00 - 5.20 x10*6/uL    Hemoglobin 12.3 12.0 - 16.0 " g/dL    Hematocrit 37.8 36.0 - 46.0 %    MCV 96 80 - 100 fL    MCH 31.2 26.0 - 34.0 pg    MCHC 32.5 32.0 - 36.0 g/dL    RDW 13.2 11.5 - 14.5 %    Platelets 214 150 - 450 x10*3/uL    Neutrophils % 47.9 40.0 - 80.0 %    Immature Granulocytes %, Automated 0.3 0.0 - 0.9 %    Lymphocytes % 41.6 13.0 - 44.0 %    Monocytes % 7.5 2.0 - 10.0 %    Eosinophils % 2.4 0.0 - 6.0 %    Basophils % 0.3 0.0 - 2.0 %    Neutrophils Absolute 3.00 1.60 - 5.50 x10*3/uL    Immature Granulocytes Absolute, Automated 0.02 0.00 - 0.50 x10*3/uL    Lymphocytes Absolute 2.61 0.80 - 3.00 x10*3/uL    Monocytes Absolute 0.47 0.05 - 0.80 x10*3/uL    Eosinophils Absolute 0.15 0.00 - 0.40 x10*3/uL    Basophils Absolute 0.02 0.00 - 0.10 x10*3/uL   Magnesium   Result Value Ref Range    Magnesium 1.90 1.60 - 2.40 mg/dL   Comprehensive metabolic panel   Result Value Ref Range    Glucose 93 74 - 99 mg/dL    Sodium 139 136 - 145 mmol/L    Potassium 4.0 3.5 - 5.3 mmol/L    Chloride 106 98 - 107 mmol/L    Bicarbonate 23 21 - 32 mmol/L    Anion Gap 14 10 - 20 mmol/L    Urea Nitrogen 25 (H) 6 - 23 mg/dL    Creatinine 1.16 (H) 0.50 - 1.05 mg/dL    eGFR 48 (L) >60 mL/min/1.73m*2    Calcium 8.3 (L) 8.6 - 10.3 mg/dL    Albumin 3.5 3.4 - 5.0 g/dL    Alkaline Phosphatase 57 33 - 136 U/L    Total Protein 6.5 6.4 - 8.2 g/dL    AST 33 9 - 39 U/L    Bilirubin, Total 0.2 0.0 - 1.2 mg/dL    ALT 37 7 - 45 U/L   Troponin I, High Sensitivity   Result Value Ref Range    Troponin I, High Sensitivity 3 0 - 13 ng/L   Ethanol   Result Value Ref Range    Alcohol 85 (H) <=10 mg/dL   Type and screen   Result Value Ref Range    ABO TYPE O     Rh TYPE POS     ANTIBODY SCREEN NEG    Coagulation Screen   Result Value Ref Range    Protime 11.9 9.8 - 12.8 seconds    INR 1.1 0.9 - 1.1    aPTT 31 27 - 38 seconds   Troponin I, High Sensitivity   Result Value Ref Range    Troponin I, High Sensitivity 3 0 - 13 ng/L   Urinalysis with Reflex Microscopic   Result Value Ref Range    Color, Urine  Light-Yellow Light-Yellow, Yellow, Dark-Yellow    Appearance, Urine Clear Clear    Specific Gravity, Urine 1.014 1.005 - 1.035    pH, Urine 5.0 5.0, 5.5, 6.0, 6.5, 7.0, 7.5, 8.0    Protein, Urine NEGATIVE NEGATIVE, 10 (TRACE), 20 (TRACE) mg/dL    Glucose, Urine Normal Normal mg/dL    Blood, Urine NEGATIVE NEGATIVE    Ketones, Urine NEGATIVE NEGATIVE mg/dL    Bilirubin, Urine NEGATIVE NEGATIVE    Urobilinogen, Urine Normal Normal mg/dL    Nitrite, Urine NEGATIVE NEGATIVE    Leukocyte Esterase, Urine NEGATIVE NEGATIVE   Troponin I, High Sensitivity   Result Value Ref Range    Troponin I, High Sensitivity 3 0 - 13 ng/L   CBC   Result Value Ref Range    WBC 8.2 4.4 - 11.3 x10*3/uL    nRBC 0.0 0.0 - 0.0 /100 WBCs    RBC 3.92 (L) 4.00 - 5.20 x10*6/uL    Hemoglobin 12.3 12.0 - 16.0 g/dL    Hematocrit 38.3 36.0 - 46.0 %    MCV 98 80 - 100 fL    MCH 31.4 26.0 - 34.0 pg    MCHC 32.1 32.0 - 36.0 g/dL    RDW 13.4 11.5 - 14.5 %    Platelets 228 150 - 450 x10*3/uL   Basic metabolic panel   Result Value Ref Range    Glucose 96 74 - 99 mg/dL    Sodium 141 136 - 145 mmol/L    Potassium 4.8 3.5 - 5.3 mmol/L    Chloride 108 (H) 98 - 107 mmol/L    Bicarbonate 26 21 - 32 mmol/L    Anion Gap 12 10 - 20 mmol/L    Urea Nitrogen 20 6 - 23 mg/dL    Creatinine 0.90 0.50 - 1.05 mg/dL    eGFR 65 >60 mL/min/1.73m*2    Calcium 8.2 (L) 8.6 - 10.3 mg/dL        Assessment/Plan     Try malleoli are fracture ankle recommend open reduction internal fixation  Risks, benefits, goals of surgery as well as alternative treatments were discussed.   Realistic post operative expectations and expected course of recovery were reviewed.  All questions answered.    John Larsen MD

## 2024-08-14 NOTE — PROGRESS NOTES
Lucille Holloway is a 79 y.o. female on day 1 of admission presenting with Closed fracture of left ankle.      Subjective   Pt seen and examined.        Objective     Last Recorded Vitals  /72 (BP Location: Right arm, Patient Position: Lying)   Pulse 74   Temp 36.3 °C (97.3 °F)   Resp 16   Wt 88 kg (194 lb 0.1 oz)   SpO2 96%   Intake/Output last 3 Shifts:    Intake/Output Summary (Last 24 hours) at 8/14/2024 1322  Last data filed at 8/14/2024 1129  Gross per 24 hour   Intake 1500 ml   Output 555 ml   Net 945 ml       Admission Weight  Weight: 88 kg (194 lb) (08/13/24 1641)    Daily Weight  08/14/24 : 88 kg (194 lb 0.1 oz)      Physical Exam  Constitutional:       Appearance: Normal appearance.      Comments: Pleasant elderly female, alert, oriented, children at bedside.   She is in no apparent distress.    HENT:      Head: Normocephalic and atraumatic.      Mouth/Throat:      Mouth: Mucous membranes are moist.   Eyes:      Extraocular Movements: Extraocular movements intact.      Conjunctiva/sclera: Conjunctivae normal.      Pupils: Pupils are equal, round, and reactive to light.   Neck:      Comments: No carotid bruit appreciated.   Cardiovascular:      Rate and Rhythm: Normal rate and regular rhythm.      Pulses: Normal pulses.      Heart sounds: Normal heart sounds.      Comments: Regular rate and rhythm, no murmur  Pulmonary:      Effort: Pulmonary effort is normal.      Breath sounds: Normal breath sounds.   Abdominal:      General: Abdomen is flat. Bowel sounds are normal.      Palpations: Abdomen is soft.   Musculoskeletal:         General: Normal range of motion.   Skin:     General: Skin is warm and dry.   Neurological:      General: No focal deficit present.      Mental Status: She is alert and oriented to person, place, and time.   Psychiatric:         Mood and Affect: Mood normal.         Behavior: Behavior normal.         Thought Content: Thought content normal.         Judgment: Judgment  normal.   Relevant Results  Results for orders placed or performed during the hospital encounter of 08/13/24 (from the past 24 hour(s))   ECG 12 lead   Result Value Ref Range    Ventricular Rate 77 BPM    Atrial Rate 77 BPM    SD Interval 150 ms    QRS Duration 78 ms    QT Interval 400 ms    QTC Calculation(Bazett) 452 ms    P Axis 63 degrees    R Axis 77 degrees    T Axis 68 degrees    QRS Count 13 beats    Q Onset 221 ms    P Onset 146 ms    P Offset 203 ms    T Offset 421 ms    QTC Fredericia 434 ms   CBC and Auto Differential   Result Value Ref Range    WBC 6.3 4.4 - 11.3 x10*3/uL    nRBC 0.0 0.0 - 0.0 /100 WBCs    RBC 3.94 (L) 4.00 - 5.20 x10*6/uL    Hemoglobin 12.3 12.0 - 16.0 g/dL    Hematocrit 37.8 36.0 - 46.0 %    MCV 96 80 - 100 fL    MCH 31.2 26.0 - 34.0 pg    MCHC 32.5 32.0 - 36.0 g/dL    RDW 13.2 11.5 - 14.5 %    Platelets 214 150 - 450 x10*3/uL    Neutrophils % 47.9 40.0 - 80.0 %    Immature Granulocytes %, Automated 0.3 0.0 - 0.9 %    Lymphocytes % 41.6 13.0 - 44.0 %    Monocytes % 7.5 2.0 - 10.0 %    Eosinophils % 2.4 0.0 - 6.0 %    Basophils % 0.3 0.0 - 2.0 %    Neutrophils Absolute 3.00 1.60 - 5.50 x10*3/uL    Immature Granulocytes Absolute, Automated 0.02 0.00 - 0.50 x10*3/uL    Lymphocytes Absolute 2.61 0.80 - 3.00 x10*3/uL    Monocytes Absolute 0.47 0.05 - 0.80 x10*3/uL    Eosinophils Absolute 0.15 0.00 - 0.40 x10*3/uL    Basophils Absolute 0.02 0.00 - 0.10 x10*3/uL   Magnesium   Result Value Ref Range    Magnesium 1.90 1.60 - 2.40 mg/dL   Comprehensive metabolic panel   Result Value Ref Range    Glucose 93 74 - 99 mg/dL    Sodium 139 136 - 145 mmol/L    Potassium 4.0 3.5 - 5.3 mmol/L    Chloride 106 98 - 107 mmol/L    Bicarbonate 23 21 - 32 mmol/L    Anion Gap 14 10 - 20 mmol/L    Urea Nitrogen 25 (H) 6 - 23 mg/dL    Creatinine 1.16 (H) 0.50 - 1.05 mg/dL    eGFR 48 (L) >60 mL/min/1.73m*2    Calcium 8.3 (L) 8.6 - 10.3 mg/dL    Albumin 3.5 3.4 - 5.0 g/dL    Alkaline Phosphatase 57 33 - 136 U/L     Total Protein 6.5 6.4 - 8.2 g/dL    AST 33 9 - 39 U/L    Bilirubin, Total 0.2 0.0 - 1.2 mg/dL    ALT 37 7 - 45 U/L   Troponin I, High Sensitivity   Result Value Ref Range    Troponin I, High Sensitivity 3 0 - 13 ng/L   Ethanol   Result Value Ref Range    Alcohol 85 (H) <=10 mg/dL   Type and screen   Result Value Ref Range    ABO TYPE O     Rh TYPE POS     ANTIBODY SCREEN NEG    Coagulation Screen   Result Value Ref Range    Protime 11.9 9.8 - 12.8 seconds    INR 1.1 0.9 - 1.1    aPTT 31 27 - 38 seconds   Troponin I, High Sensitivity   Result Value Ref Range    Troponin I, High Sensitivity 3 0 - 13 ng/L   Urinalysis with Reflex Microscopic   Result Value Ref Range    Color, Urine Light-Yellow Light-Yellow, Yellow, Dark-Yellow    Appearance, Urine Clear Clear    Specific Gravity, Urine 1.014 1.005 - 1.035    pH, Urine 5.0 5.0, 5.5, 6.0, 6.5, 7.0, 7.5, 8.0    Protein, Urine NEGATIVE NEGATIVE, 10 (TRACE), 20 (TRACE) mg/dL    Glucose, Urine Normal Normal mg/dL    Blood, Urine NEGATIVE NEGATIVE    Ketones, Urine NEGATIVE NEGATIVE mg/dL    Bilirubin, Urine NEGATIVE NEGATIVE    Urobilinogen, Urine Normal Normal mg/dL    Nitrite, Urine NEGATIVE NEGATIVE    Leukocyte Esterase, Urine NEGATIVE NEGATIVE   Troponin I, High Sensitivity   Result Value Ref Range    Troponin I, High Sensitivity 3 0 - 13 ng/L   CBC   Result Value Ref Range    WBC 8.2 4.4 - 11.3 x10*3/uL    nRBC 0.0 0.0 - 0.0 /100 WBCs    RBC 3.92 (L) 4.00 - 5.20 x10*6/uL    Hemoglobin 12.3 12.0 - 16.0 g/dL    Hematocrit 38.3 36.0 - 46.0 %    MCV 98 80 - 100 fL    MCH 31.4 26.0 - 34.0 pg    MCHC 32.1 32.0 - 36.0 g/dL    RDW 13.4 11.5 - 14.5 %    Platelets 228 150 - 450 x10*3/uL   Basic metabolic panel   Result Value Ref Range    Glucose 96 74 - 99 mg/dL    Sodium 141 136 - 145 mmol/L    Potassium 4.8 3.5 - 5.3 mmol/L    Chloride 108 (H) 98 - 107 mmol/L    Bicarbonate 26 21 - 32 mmol/L    Anion Gap 12 10 - 20 mmol/L    Urea Nitrogen 20 6 - 23 mg/dL    Creatinine 0.90  0.50 - 1.05 mg/dL    eGFR 65 >60 mL/min/1.73m*2    Calcium 8.2 (L) 8.6 - 10.3 mg/dL        FL less than 1 hour   Final Result      Carotid duplex bilateral   Final Result   Normal flow pattern without evidence of hemodynamically relevant   stenosis or atheromatous plaques in the visualized portions of the   carotid circulation as described above.        Mild eccentric atheromatous plaque of the carotid   bifurcations/proximal ICAs without significant associated luminal   narrowing.        The velocity criteria are extrapolated from diameter data as defined   by the Society of Radiologists in Ultrasound Consensus Conference   Radiology 2003; 229;340-346.        MACRO:   None        Signed by: Rob Mendes 8/13/2024 10:30 PM   Dictation workstation:   [id number]      CT ankle left wo IV contrast   Final Result   Please see above.             MACRO:   None        Signed by: Rob Mendes 8/13/2024 9:40 PM   Dictation workstation:   [id number]      XR ankle left 3+ views   Final Result   Improved alignment status post reduction, with cast material noted.   Mildly widened medial ankle mortise. Displaced trimalleolar fracture   again noted.             MACRO:   None        Signed by: Brii Ovalles 8/13/2024 8:13 PM   Dictation workstation:   [id number]      XR chest 1 view   Final Result   1.  No evidence of acute cardiopulmonary process.             Signed by: Pancho Larios 8/13/2024 5:36 PM   Dictation workstation:   [id number]      XR ankle left 3+ views   Final Result   Displaced fracture of the medial malleolus and distal fibula with   disruption of ankle mortise consistent with syndesmotic injury.        Signed by: Pancho Larios 8/13/2024 5:36 PM   Dictation workstation:   [id number]      CT head wo IV contrast   Final Result   No acute intracranial abnormality.        No evidence of cervical spine fracture or acute traumatic   malalignment.        Signed by: Pancho Larios 8/13/2024 5:34 PM   Dictation workstation:    "[id number]      CT cervical spine wo IV contrast   Final Result   No acute intracranial abnormality.        No evidence of cervical spine fracture or acute traumatic   malalignment.        Signed by: Pancho Larios 8/13/2024 5:34 PM   Dictation workstation:   [id number]      Transthoracic Echo (TTE) Complete    (Results Pending)       Scheduled medications  [Held by provider] aspirin, 81 mg, oral, Daily  atorvastatin, 10 mg, oral, Nightly  ceFAZolin, 2 g, intravenous, q8h  ceFAZolin, 2 g, intravenous, q8h  docusate sodium, 300 mg, oral, Daily  DULoxetine, 30 mg, oral, Daily  enoxaparin, 40 mg, subcutaneous, q24h  gabapentin, 300 mg, oral, TID  levothyroxine, 75 mcg, oral, Daily  multivitamin with minerals, 1 tablet, oral, Daily  polyethylene glycol, 17 g, oral, Daily  valsartan, 40 mg, oral, Daily      Continuous medications  sodium chloride 0.9%, 75 mL/hr, Last Rate: 75 mL/hr (08/13/24 2320)      PRN medications  PRN medications: acetaminophen, acetaminophen, albuterol, melatonin, morphine, morphine, ondansetron ODT **OR** ondansetron, oxyCODONE         Assessment/Plan                  Principal Problem:    Closed fracture of left ankle  Active Problems:    Rheumatoid arthritis, involving unspecified site, unspecified whether rheumatoid factor present (Multi)    Pulmonary hypertension (Multi)    Closed fracture of left ankle, initial encounter    Closed fracture of left ankle, initial encounter  - reduced in the ED  - ortho consult  - morphine for pain control  - s/p surgery     Syncope  - pt describes feeling light headed  - denies any associated CP or palpitations with the syncope, however has had 2 episodes of \"heart fluttering\"   - echocardiogram  - telemetry  -  possible event monitor/Holter monitor to detect any arrhythmias that may have caused the syncope  - US carotid arteries wnl  - trend troponin, initial trop neg.      Increased urinary frequency  - denies dysuria  - UA negative     HTN  - Hold diovan " in am if still hypotensive     KELLY  - no longer using CPAP.     Code status  - FULL    DVT ppx              Fred Persaud MD

## 2024-08-14 NOTE — CARE PLAN
The patient's goals for the shift include  regaining feeling in L foot and toes     The clinical goals for the shift include painc control    Over the shift, the patient is making progress toward the following goals.

## 2024-08-14 NOTE — ANESTHESIA PREPROCEDURE EVALUATION
Patient: Lucille Holloway    Procedure Information       Date/Time: 08/14/24 0900    Procedure: Left Open Reduction Internal Fixation Ankle (Left: Ankle)    Location: [facility] A OR 18 / Virtual [facility] A OR    Surgeons: John Larsen MD            Relevant Problems   Cardiac   (+) Abnormal ECG   (+) Hypertension   (+) Mild aortic insufficiency      Pulmonary   (+) Dyspnea on exertion   (+) Obstructive sleep apnea of adult   (+) Pulmonary hypertension (Multi)      Neuro   (+) Carotid atherosclerosis   (+) Carpal tunnel syndrome of left wrist   (+) Carpal tunnel syndrome on both sides   (+) Lumbar radiculopathy, chronic   (+) Lumbosacral radiculitis   (+) Peripheral neuropathy   (+) Sciatica of right side      /Renal   (+) Chronic renal impairment, stage 3 (moderate) (Multi)   (+) Chronic renal insufficiency      Liver   (+) Hepatitis C virus      Endocrine   (+) Class 1 obesity due to excess calories with serious comorbidity and body mass index (BMI) of 33.0 to 33.9 in adult   (+) Hypothyroidism   (+) Obesity, morbid (Multi)   (+) Other specified hypothyroidism   (+) Simple goiter      Hematology   (+) Anemia      Musculoskeletal   (+) Acute exacerbation of chronic low back pain   (+) Carpal tunnel syndrome of left wrist   (+) Carpal tunnel syndrome on both sides   (+) Lumbar degenerative disc disease   (+) Multilevel degenerative disc disease   (+) Osteoarthritis   (+) Osteoarthritis of hip   (+) Primary localized osteoarthritis of right knee   (+) Primary osteoarthritis of both hands   (+) Rheumatoid arthritis, involving unspecified site, unspecified whether rheumatoid factor present (Multi)      HEENT   (+) Hearing loss      ID   (+) Hepatitis C virus      Skin   (+) Eczema      Genitourinary   (+) CKD (chronic kidney disease)       Clinical information reviewed:    Allergies  Meds               NPO Detail:  No data recorded     Physical Exam    Airway  Mallampati: II     Cardiovascular   Rhythm: regular  Rate:  normal     Dental    Pulmonary    Abdominal            Anesthesia Plan    History of general anesthesia?: yes  History of complications of general anesthesia?: no    ASA 3     general and regional     intravenous induction   Anesthetic plan and risks discussed with patient.    Plan discussed with CAA.

## 2024-08-14 NOTE — H&P
"History Of Present Illness  Lucille Holloway is a 79 y.o. female with a PMH of RA, chronic back pain, CKD, MGUS, HTN, hearing  loss, KELLY presenting with syncope.    Pt went out to lunch with her daughter, shared 1/2 bottle of wine during lunch. She was driven home, walked to her front door by her daughter. She was putting the key in the lock when she felt \"woozy\"/light headed. She denies any CP, palpitations. Thinks she may have been slightly SOB. She began to crumple, her daughter broke her fall by holding her unde her arms. Pt went straight down, did not hit her head, however her left foot twisted outward in the fall.   No seizure activity. Pt's daughter thinks she was unconscious for approx 2 min. When she awoke she was oriented.   Pt attempted to stand, had incredible pain in her left ankle, which appeared deformed.     Ms Holloway states she has felt \"woozy\" intermittently for the past few weeks, episodes are random and brief.   She does not think she has had palpitations during these episodes.   She has experienced 2 episodes of heart racing/fluttering while lying down watching TV. They last only seconds.     She used to see Dr Chas Rock, she does not remember the exact details. However upon review of the EMR, he was seeing pt re SOB, echo findings and KELLY.     In the ED, WBC ct normal. Hb 12.3  ECG - NSR, rate 77  Trop neg.   CT head, CT C spine show no acute fx or abnormality.   Xray left ankle shows displaced fracture of the medial malleolus and distal fibula.   Fx reduced in the ED.      Past Medical History  Past Medical History:   Diagnosis Date    Acute upper respiratory infection, unspecified 03/05/2019    Acute URI    Bariatric surgery status 11/20/2019    Bariatric surgery status    Encounter for general adult medical examination without abnormal findings 08/05/2021    Encounter for preventive health examination    Other general symptoms and signs 03/05/2019    Flu-like symptoms    Other headache " syndrome 06/25/2024    Comment on above: HAD A FALL SATURDAY NIGHT / BAD HEADACHE, BUMP HEAD    Other neuromuscular dysfunction of bladder     Hyperactivity of bladder    Pain in right foot 09/17/2015    Foot pain, right    Personal history of other diseases of the circulatory system     History of hypertension    Personal history of other diseases of the respiratory system 03/05/2019    History of acute sinusitis    Personal history of other endocrine, nutritional and metabolic disease     History of thyroid disease    Personal history of other infectious and parasitic diseases 08/28/2019    History of hepatitis    Plantar fascial fibromatosis 09/21/2015    Plantar fasciitis, right    Syncope and collapse 10/16/2015    Vasovagal syncope    Vitamin D deficiency, unspecified 08/28/2019    Mild vitamin D deficiency       Surgical History  Past Surgical History:   Procedure Laterality Date    BLADDER SURGERY  08/28/2019    Bladder Surgery    BREAST RECONSTRUCTION  01/01/2019    bilateral breast reduction    CARPAL TUNNEL RELEASE  08/28/2019    Neuroplasty Decompression Median Nerve At Carpal Tunnel    HYSTERECTOMY  08/28/2019    Hysterectomy    OTHER SURGICAL HISTORY  08/28/2019    Carpal tunnel surgery    REDUCTION MAMMAPLASTY      TOTAL HIP ARTHROPLASTY  07/16/2019    Total Hip Replacement    TOTAL KNEE ARTHROPLASTY Right 12/30/2022        Social History  She reports that she has quit smoking. Her smoking use included cigarettes. She has never been exposed to tobacco smoke. She has never used smokeless tobacco. She reports that she does not currently use alcohol. She reports that she does not use drugs.    Family History  Family History   Problem Relation Name Age of Onset    Diabetes Mother      Hypertension Mother      Thyroid disease Mother          Allergies  Soap    Review of Systems   Constitutional: Negative.    HENT: Negative.     Eyes: Negative.    Respiratory: Negative.     Cardiovascular:  Positive for  "palpitations. Negative for chest pain and leg swelling.   Gastrointestinal: Negative.    Genitourinary:  Positive for frequency. Negative for dysuria.   Musculoskeletal: Negative.    Skin: Negative.    Allergic/Immunologic: Negative.    Neurological:  Positive for dizziness and light-headedness.   Hematological: Negative.    Psychiatric/Behavioral: Negative.          Physical Exam  Vitals reviewed.   Constitutional:       Appearance: Normal appearance.      Comments: Pleasant elderly female, alert, oriented, children at bedside.   She is in no apparent distress.    HENT:      Head: Normocephalic and atraumatic.      Mouth/Throat:      Mouth: Mucous membranes are moist.   Eyes:      Extraocular Movements: Extraocular movements intact.      Conjunctiva/sclera: Conjunctivae normal.      Pupils: Pupils are equal, round, and reactive to light.   Neck:      Comments: No carotid bruit appreciated.   Cardiovascular:      Rate and Rhythm: Normal rate and regular rhythm.      Pulses: Normal pulses.      Heart sounds: Normal heart sounds.      Comments: Regular rate and rhythm, no murmur  Pulmonary:      Effort: Pulmonary effort is normal.      Breath sounds: Normal breath sounds.   Abdominal:      General: Abdomen is flat. Bowel sounds are normal.      Palpations: Abdomen is soft.   Musculoskeletal:         General: Normal range of motion.   Skin:     General: Skin is warm and dry.   Neurological:      General: No focal deficit present.      Mental Status: She is alert and oriented to person, place, and time.   Psychiatric:         Mood and Affect: Mood normal.         Behavior: Behavior normal.         Thought Content: Thought content normal.         Judgment: Judgment normal.          Last Recorded Vitals  Blood pressure 98/68, pulse 87, temperature 36.6 °C (97.9 °F), temperature source Oral, resp. rate 16, height 1.6 m (5' 3\"), weight 88 kg (194 lb), SpO2 98%.    Relevant Results        Results for orders placed or " performed during the hospital encounter of 08/13/24 (from the past 24 hour(s))   CBC and Auto Differential   Result Value Ref Range    WBC 6.3 4.4 - 11.3 x10*3/uL    nRBC 0.0 0.0 - 0.0 /100 WBCs    RBC 3.94 (L) 4.00 - 5.20 x10*6/uL    Hemoglobin 12.3 12.0 - 16.0 g/dL    Hematocrit 37.8 36.0 - 46.0 %    MCV 96 80 - 100 fL    MCH 31.2 26.0 - 34.0 pg    MCHC 32.5 32.0 - 36.0 g/dL    RDW 13.2 11.5 - 14.5 %    Platelets 214 150 - 450 x10*3/uL    Neutrophils % 47.9 40.0 - 80.0 %    Immature Granulocytes %, Automated 0.3 0.0 - 0.9 %    Lymphocytes % 41.6 13.0 - 44.0 %    Monocytes % 7.5 2.0 - 10.0 %    Eosinophils % 2.4 0.0 - 6.0 %    Basophils % 0.3 0.0 - 2.0 %    Neutrophils Absolute 3.00 1.60 - 5.50 x10*3/uL    Immature Granulocytes Absolute, Automated 0.02 0.00 - 0.50 x10*3/uL    Lymphocytes Absolute 2.61 0.80 - 3.00 x10*3/uL    Monocytes Absolute 0.47 0.05 - 0.80 x10*3/uL    Eosinophils Absolute 0.15 0.00 - 0.40 x10*3/uL    Basophils Absolute 0.02 0.00 - 0.10 x10*3/uL   Magnesium   Result Value Ref Range    Magnesium 1.90 1.60 - 2.40 mg/dL   Comprehensive metabolic panel   Result Value Ref Range    Glucose 93 74 - 99 mg/dL    Sodium 139 136 - 145 mmol/L    Potassium 4.0 3.5 - 5.3 mmol/L    Chloride 106 98 - 107 mmol/L    Bicarbonate 23 21 - 32 mmol/L    Anion Gap 14 10 - 20 mmol/L    Urea Nitrogen 25 (H) 6 - 23 mg/dL    Creatinine 1.16 (H) 0.50 - 1.05 mg/dL    eGFR 48 (L) >60 mL/min/1.73m*2    Calcium 8.3 (L) 8.6 - 10.3 mg/dL    Albumin 3.5 3.4 - 5.0 g/dL    Alkaline Phosphatase 57 33 - 136 U/L    Total Protein 6.5 6.4 - 8.2 g/dL    AST 33 9 - 39 U/L    Bilirubin, Total 0.2 0.0 - 1.2 mg/dL    ALT 37 7 - 45 U/L   Troponin I, High Sensitivity   Result Value Ref Range    Troponin I, High Sensitivity 3 0 - 13 ng/L     XR ankle left 3+ views    Result Date: 8/13/2024  Interpreted By:  Brii Ovalles, STUDY: XR ANKLE LEFT 3+ VIEWS; ;  8/13/2024 7:51 pm   INDICATION: Signs/Symptoms:post reduction xray.   COMPARISON: Same day  radiographs   ACCESSION NUMBER(S): [id number]   ORDERING CLINICIAN: ALYSSA SHELBY   FINDINGS: Cast material obscures bone detail. Interval reduction of ankle dislocation with some persistent widening of the medial ankle mortise although overall improved alignment. Oblique mildly displaced distal fibular fracture with no widening of the tibiofibular syndesmosis, displaced medial malleolar fracture fragment and minimally displaced posterior malleolar fracture seen on lateral view.       Improved alignment status post reduction, with cast material noted. Mildly widened medial ankle mortise. Displaced trimalleolar fracture again noted.     MACRO: None   Signed by: Brii Ovalles 8/13/2024 8:13 PM Dictation workstation:   [id number]    XR chest 1 view    Result Date: 8/13/2024  Interpreted By:  Pancho Larios, STUDY: XR CHEST 1 VIEW;  8/13/2024 5:26 pm   INDICATION: Signs/Symptoms:syncope.   COMPARISON: None.   ACCESSION NUMBER(S): [id number]   ORDERING CLINICIAN: ALYSSA SHELBY   FINDINGS:   CARDIOMEDIASTINAL SILHOUETTE: Cardiomediastinal silhouette is normal in size and configuration.   LUNGS/PLEURA: There are no consolidations.There are no pleural effusions. There is no demonstrated pneumothorax.     BONES: No evidence of acute osseous abnormality.       1.  No evidence of acute cardiopulmonary process.     Signed by: Pancho Larios 8/13/2024 5:36 PM Dictation workstation:   [id number]    XR ankle left 3+ views    Result Date: 8/13/2024  Interpreted By:  Pancho Larios, STUDY: XR ANKLE LEFT 3+ VIEWS; ;  8/13/2024 5:26 pm   INDICATION: Signs/Symptoms:syncope, left ankle swelling.   COMPARISON: None.   ACCESSION NUMBER(S): [id number]   ORDERING CLINICIAN: ALYSSA SHELBY   FINDINGS: There is displaced fracture of the medial malleolus and there is disruption of ankle mortise with medial displacement of talus relative to tibia. There is also laterally displaced oblique and mildly comminuted fracture of distal fibula above the  tibiotalar articulation.       Displaced fracture of the medial malleolus and distal fibula with disruption of ankle mortise consistent with syndesmotic injury.   Signed by: Pancho Larios 8/13/2024 5:36 PM Dictation workstation:   [id number]    CT head wo IV contrast    Result Date: 8/13/2024  Interpreted By:  Pancho Larios, STUDY: CT HEAD WO IV CONTRAST; CT CERVICAL SPINE WO IV CONTRAST;  8/13/2024 5:02 pm   INDICATION: Signs/Symptoms:Syncope and collapse. Altered mental status   COMPARISON: Head CT dated 06/27/2022.   ACCESSION NUMBER(S): [id number]; [id number]   ORDERING CLINICIAN: ALYSSA SHELBY   TECHNIQUE: Axial noncontrast CT images of the head and cervical spine.   FINDINGS: BRAIN PARENCHYMA: Gray-white matter interfaces are preserved. No mass, mass effect or midline shift. Mild cerebral involutional changes.   HEMORRHAGE: No acute intracranial hemorrhage. VENTRICLES and EXTRA-AXIAL SPACES: No hydrocephalus. No extra-axial collections. EXTRACRANIAL SOFT TISSUES: CALVARIUM: No depressed calvarial fracture.     SOFT TISSUES: Within normal limits. PARANASAL SINUSES: No hemorrhage in the paranasal sinuses. MASTOIDS: Within normal limits. ORBITS: Grossly normal. Bilateral cataract surgeries.   ALIGNMENT: Straightening of cervical lordosis. Mild degenerative anterolisthesis of C3 over C4. VERTEBRAE: No acute fracture. Vertebral body heights are maintained. There are no suspicious osseous lesions. Degenerative disc space narrowing and subchondral reactive change at C5 through T1. Posterolateral osseous spurring contributes to mild bilateral foraminal stenosis at C5-C6 and C6-C7. There is moderate right foraminal stenosis at C7-T1. SPINAL CANAL: No critical spinal canal stenosis. PREVERTEBRAL SOFT TISSUES: No prevertebral soft tissue swelling. LUNG APICES: Imaged portion of the lung apices are within normal limits.   OTHER FINDINGS: None.         No acute intracranial abnormality.   No evidence of cervical spine  fracture or acute traumatic malalignment.   Signed by: Pancho Larios 8/13/2024 5:34 PM Dictation workstation:   [id number]    CT cervical spine wo IV contrast    Result Date: 8/13/2024  Interpreted By:  Pancho Larios, STUDY: CT HEAD WO IV CONTRAST; CT CERVICAL SPINE WO IV CONTRAST;  8/13/2024 5:02 pm   INDICATION: Signs/Symptoms:Syncope and collapse. Altered mental status   COMPARISON: Head CT dated 06/27/2022.   ACCESSION NUMBER(S): [id number]; [id number]   ORDERING CLINICIAN: ALYSSA SHELBY   TECHNIQUE: Axial noncontrast CT images of the head and cervical spine.   FINDINGS: BRAIN PARENCHYMA: Gray-white matter interfaces are preserved. No mass, mass effect or midline shift. Mild cerebral involutional changes.   HEMORRHAGE: No acute intracranial hemorrhage. VENTRICLES and EXTRA-AXIAL SPACES: No hydrocephalus. No extra-axial collections. EXTRACRANIAL SOFT TISSUES: CALVARIUM: No depressed calvarial fracture.     SOFT TISSUES: Within normal limits. PARANASAL SINUSES: No hemorrhage in the paranasal sinuses. MASTOIDS: Within normal limits. ORBITS: Grossly normal. Bilateral cataract surgeries.   ALIGNMENT: Straightening of cervical lordosis. Mild degenerative anterolisthesis of C3 over C4. VERTEBRAE: No acute fracture. Vertebral body heights are maintained. There are no suspicious osseous lesions. Degenerative disc space narrowing and subchondral reactive change at C5 through T1. Posterolateral osseous spurring contributes to mild bilateral foraminal stenosis at C5-C6 and C6-C7. There is moderate right foraminal stenosis at C7-T1. SPINAL CANAL: No critical spinal canal stenosis. PREVERTEBRAL SOFT TISSUES: No prevertebral soft tissue swelling. LUNG APICES: Imaged portion of the lung apices are within normal limits.   OTHER FINDINGS: None.         No acute intracranial abnormality.   No evidence of cervical spine fracture or acute traumatic malalignment.   Signed by: Pancho Larios 8/13/2024 5:34 PM Dictation  "workstation:   [id number]            Assessment/Plan   Principal Problem:    Closed fracture of left ankle  Active Problems:    Closed fracture of left ankle, initial encounter  - reduced in the ED  - ortho consult  - morphine for pain control  - NPO after midnight for possible procedure    Syncope  - pt describes feeling light headed  - denies any associated CP or palpitations with the syncope, however has had 2 episodes of \"heart fluttering\"   - echocardiogram  - telemetry  -  possible event monitor/Holter monitor to detect any arrhythmias that may have caused the syncope  - US carotid arteries  - trend troponin, initial trop neg.   - pt did have half a bottle of wine with lunch, will add serum ETOH    Increased urinary frequency  - denies dysuria  - check for UA as a cause of syncope/\"wooziness\"    HTN  - BP on lower side  - IVF  - Hold diovan in am if still hypotensive    KELLY  - no longer using CPAP.    Code status  - FULL         I spent 60 minutes in the professional and overall care of this patient.      Cailin Aragon MD    "

## 2024-08-14 NOTE — ANESTHESIA PROCEDURE NOTES
Airway  Date/Time: 8/14/2024 8:57 AM  Urgency: elective    Airway not difficult    Staffing  Performed: OANH   Authorized by: Eduardo Cheema MD    Performed by: SULLY Florez  Patient location during procedure: OR    Indications and Patient Condition  Indications for airway management: anesthesia  Spontaneous ventilation: present  Sedation level: deep  Preoxygenated: yes  Mask difficulty assessment: 1 - vent by mask    Final Airway Details  Final airway type: supraglottic airway      Successful airway: Supraglottic airway: i Gel.  Size 4     Number of attempts at approach: 1

## 2024-08-14 NOTE — PROGRESS NOTES
Physical Therapy                 Therapy Communication Note    Patient Name: Lucille Holloway  MRN: [id number]  Today's Date: 8/14/2024     Discipline: Physical Therapy    Missed Visit Reason: Missed Visit Reason: Patient in a medical procedure (Per chart review, pt going to OR this AM for L ankle ORIF. Will defer PT eval until pt is post op.)    Missed Time: Attempt

## 2024-08-14 NOTE — PROGRESS NOTES
Occupational Therapy                 Therapy Communication Note    Patient Name: Lucille Holloway  MRN: [id number]  Today's Date: 8/14/2024     Discipline: Occupational Therapy    Missed Visit Reason: Missed Visit Reason: Patient in a medical procedure (Per chart review, pt going to OR this AM for L ankle ORIF. Will defer OT eval until pt is post op.)    Missed Time: Attempt

## 2024-08-14 NOTE — SIGNIFICANT EVENT
08/13/24 2214   Patient Evaluation Program   Pulmonary Status 0   Surgical Status 0   Chest X-Ray 0   Respiratory Pattern 0   Mental Status 0   Breath Sounds 0   Cough 0   Level of Activity 1   Oxygen Required for Sp02 Greater Than or Equal to 92% 0   Respiratory Acuity Score 1   Triage Level Triage - 5, Score of 0-3   Frequency Q2 PRN (Occasional SOB or LILLY,  TRIAGE - 5)

## 2024-08-14 NOTE — PROGRESS NOTES
Subjective:  Resting comfortably PACU.      Objective:  NAD, RRR, peripherally well perf, Breathing comfortably, O2 sat appropriate    MSK operative extremity:  - Dressing / splint DCI  - Palpable pulses, toes WWP  - Motor / sensory limited by block and somnolence  - Negative MSK secondary    A&P:  79F s/p ORIF left ankle fracture w Dr. Larsen 8/14/2024    Ancef x3 doses  At least aspirin 81bid 4 weeks for dvt ppx  Rec home going calcium / vitamin D  NWB LLE SLS. Keep CDI until follow up.   Follow up Dr. Larsen in 2 weeks for suture removal / wound check  PT/OT  Multimodal pain control    D/w attending.     Ronni Arrington DO

## 2024-08-15 LAB
ANION GAP SERPL CALC-SCNC: 12 MMOL/L (ref 10–20)
BUN SERPL-MCNC: 17 MG/DL (ref 6–23)
CALCIUM SERPL-MCNC: 8 MG/DL (ref 8.6–10.3)
CHLORIDE SERPL-SCNC: 108 MMOL/L (ref 98–107)
CO2 SERPL-SCNC: 24 MMOL/L (ref 21–32)
CREAT SERPL-MCNC: 0.88 MG/DL (ref 0.5–1.05)
EGFRCR SERPLBLD CKD-EPI 2021: 67 ML/MIN/1.73M*2
ERYTHROCYTE [DISTWIDTH] IN BLOOD BY AUTOMATED COUNT: 13.4 % (ref 11.5–14.5)
GLUCOSE SERPL-MCNC: 95 MG/DL (ref 74–99)
HCT VFR BLD AUTO: 36.8 % (ref 36–46)
HGB BLD-MCNC: 11.8 G/DL (ref 12–16)
MCH RBC QN AUTO: 31.1 PG (ref 26–34)
MCHC RBC AUTO-ENTMCNC: 32.1 G/DL (ref 32–36)
MCV RBC AUTO: 97 FL (ref 80–100)
NRBC BLD-RTO: 0 /100 WBCS (ref 0–0)
PLATELET # BLD AUTO: 234 X10*3/UL (ref 150–450)
POTASSIUM SERPL-SCNC: 4.2 MMOL/L (ref 3.5–5.3)
RBC # BLD AUTO: 3.79 X10*6/UL (ref 4–5.2)
SODIUM SERPL-SCNC: 140 MMOL/L (ref 136–145)
WBC # BLD AUTO: 8.7 X10*3/UL (ref 4.4–11.3)

## 2024-08-15 PROCEDURE — 2500000002 HC RX 250 W HCPCS SELF ADMINISTERED DRUGS (ALT 637 FOR MEDICARE OP, ALT 636 FOR OP/ED)

## 2024-08-15 PROCEDURE — 97165 OT EVAL LOW COMPLEX 30 MIN: CPT | Mod: GO | Performed by: OCCUPATIONAL THERAPIST

## 2024-08-15 PROCEDURE — 2500000001 HC RX 250 WO HCPCS SELF ADMINISTERED DRUGS (ALT 637 FOR MEDICARE OP)

## 2024-08-15 PROCEDURE — 2500000001 HC RX 250 WO HCPCS SELF ADMINISTERED DRUGS (ALT 637 FOR MEDICARE OP): Performed by: PHARMACIST

## 2024-08-15 PROCEDURE — 97530 THERAPEUTIC ACTIVITIES: CPT | Mod: GP

## 2024-08-15 PROCEDURE — 2500000004 HC RX 250 GENERAL PHARMACY W/ HCPCS (ALT 636 FOR OP/ED): Mod: JZ

## 2024-08-15 PROCEDURE — 97535 SELF CARE MNGMENT TRAINING: CPT | Mod: GO | Performed by: OCCUPATIONAL THERAPIST

## 2024-08-15 PROCEDURE — 36415 COLL VENOUS BLD VENIPUNCTURE: CPT | Performed by: INTERNAL MEDICINE

## 2024-08-15 PROCEDURE — 82374 ASSAY BLOOD CARBON DIOXIDE: CPT | Performed by: INTERNAL MEDICINE

## 2024-08-15 PROCEDURE — 99232 SBSQ HOSP IP/OBS MODERATE 35: CPT

## 2024-08-15 PROCEDURE — 2500000004 HC RX 250 GENERAL PHARMACY W/ HCPCS (ALT 636 FOR OP/ED)

## 2024-08-15 PROCEDURE — 99233 SBSQ HOSP IP/OBS HIGH 50: CPT | Performed by: INTERNAL MEDICINE

## 2024-08-15 PROCEDURE — 2500000001 HC RX 250 WO HCPCS SELF ADMINISTERED DRUGS (ALT 637 FOR MEDICARE OP): Performed by: INTERNAL MEDICINE

## 2024-08-15 PROCEDURE — 85027 COMPLETE CBC AUTOMATED: CPT | Performed by: INTERNAL MEDICINE

## 2024-08-15 PROCEDURE — 99232 SBSQ HOSP IP/OBS MODERATE 35: CPT | Performed by: INTERNAL MEDICINE

## 2024-08-15 PROCEDURE — 1200000002 HC GENERAL ROOM WITH TELEMETRY DAILY

## 2024-08-15 PROCEDURE — 97161 PT EVAL LOW COMPLEX 20 MIN: CPT | Mod: GP

## 2024-08-15 RX ORDER — MAGNESIUM HYDROXIDE 2400 MG/10ML
10 SUSPENSION ORAL DAILY PRN
Status: DISCONTINUED | OUTPATIENT
Start: 2024-08-15 | End: 2024-08-21 | Stop reason: HOSPADM

## 2024-08-15 SDOH — HEALTH STABILITY: MENTAL HEALTH: HOW OFTEN DO YOU HAVE A DRINK CONTAINING ALCOHOL?: 2-4 TIMES A MONTH

## 2024-08-15 SDOH — ECONOMIC STABILITY: INCOME INSECURITY: HOW HARD IS IT FOR YOU TO PAY FOR THE VERY BASICS LIKE FOOD, HOUSING, MEDICAL CARE, AND HEATING?: NOT VERY HARD

## 2024-08-15 SDOH — ECONOMIC STABILITY: FOOD INSECURITY: WITHIN THE PAST 12 MONTHS, THE FOOD YOU BOUGHT JUST DIDN'T LAST AND YOU DIDN'T HAVE MONEY TO GET MORE.: NEVER TRUE

## 2024-08-15 SDOH — ECONOMIC STABILITY: INCOME INSECURITY: IN THE PAST 12 MONTHS, HAS THE ELECTRIC, GAS, OIL, OR WATER COMPANY THREATENED TO SHUT OFF SERVICE IN YOUR HOME?: NO

## 2024-08-15 SDOH — ECONOMIC STABILITY: INCOME INSECURITY: IN THE LAST 12 MONTHS, WAS THERE A TIME WHEN YOU WERE NOT ABLE TO PAY THE MORTGAGE OR RENT ON TIME?: NO

## 2024-08-15 SDOH — HEALTH STABILITY: MENTAL HEALTH
HOW OFTEN DO YOU NEED TO HAVE SOMEONE HELP YOU WHEN YOU READ INSTRUCTIONS, PAMPHLETS, OR OTHER WRITTEN MATERIAL FROM YOUR DOCTOR OR PHARMACY?: RARELY

## 2024-08-15 SDOH — ECONOMIC STABILITY: HOUSING INSECURITY: AT ANY TIME IN THE PAST 12 MONTHS, WERE YOU HOMELESS OR LIVING IN A SHELTER (INCLUDING NOW)?: NO

## 2024-08-15 SDOH — ECONOMIC STABILITY: FOOD INSECURITY: WITHIN THE PAST 12 MONTHS, YOU WORRIED THAT YOUR FOOD WOULD RUN OUT BEFORE YOU GOT MONEY TO BUY MORE.: NEVER TRUE

## 2024-08-15 SDOH — HEALTH STABILITY: MENTAL HEALTH: HOW OFTEN DO YOU HAVE 6 OR MORE DRINKS ON ONE OCCASION?: NEVER

## 2024-08-15 SDOH — HEALTH STABILITY: MENTAL HEALTH: HOW MANY STANDARD DRINKS CONTAINING ALCOHOL DO YOU HAVE ON A TYPICAL DAY?: 1 OR 2

## 2024-08-15 ASSESSMENT — COGNITIVE AND FUNCTIONAL STATUS - GENERAL
TOILETING: A LITTLE
DRESSING REGULAR UPPER BODY CLOTHING: A LITTLE
STANDING UP FROM CHAIR USING ARMS: A LITTLE
PERSONAL GROOMING: A LITTLE
WALKING IN HOSPITAL ROOM: A LITTLE
MOBILITY SCORE: 17
PERSONAL GROOMING: A LITTLE
CLIMB 3 TO 5 STEPS WITH RAILING: TOTAL
DRESSING REGULAR LOWER BODY CLOTHING: A LOT
TOILETING: A LITTLE
WALKING IN HOSPITAL ROOM: A LOT
MOBILITY SCORE: 17
DRESSING REGULAR LOWER BODY CLOTHING: A LITTLE
MOVING FROM LYING ON BACK TO SITTING ON SIDE OF FLAT BED WITH BEDRAILS: A LITTLE
DAILY ACTIVITIY SCORE: 19
DAILY ACTIVITIY SCORE: 21
TOILETING: A LITTLE
HELP NEEDED FOR BATHING: A LITTLE
HELP NEEDED FOR BATHING: A LITTLE
STANDING UP FROM CHAIR USING ARMS: A LITTLE
HELP NEEDED FOR BATHING: A LITTLE
DAILY ACTIVITIY SCORE: 20
MOVING TO AND FROM BED TO CHAIR: A LOT
MOVING TO AND FROM BED TO CHAIR: A LITTLE
TOILETING: A LOT
MOVING TO AND FROM BED TO CHAIR: A LITTLE
CLIMB 3 TO 5 STEPS WITH RAILING: A LOT
DAILY ACTIVITIY SCORE: 16
STANDING UP FROM CHAIR USING ARMS: A LOT
DRESSING REGULAR LOWER BODY CLOTHING: A LITTLE
DRESSING REGULAR UPPER BODY CLOTHING: A LITTLE
PERSONAL GROOMING: A LITTLE
MOBILITY SCORE: 18
DRESSING REGULAR LOWER BODY CLOTHING: A LITTLE
TURNING FROM BACK TO SIDE WHILE IN FLAT BAD: A LITTLE
WALKING IN HOSPITAL ROOM: TOTAL
CLIMB 3 TO 5 STEPS WITH RAILING: A LOT
STANDING UP FROM CHAIR USING ARMS: A LITTLE
TURNING FROM BACK TO SIDE WHILE IN FLAT BAD: A LITTLE
MOBILITY SCORE: 12
MOVING FROM LYING ON BACK TO SITTING ON SIDE OF FLAT BED WITH BEDRAILS: A LITTLE
WALKING IN HOSPITAL ROOM: A LOT
MOVING TO AND FROM BED TO CHAIR: A LITTLE
HELP NEEDED FOR BATHING: A LOT
CLIMB 3 TO 5 STEPS WITH RAILING: TOTAL

## 2024-08-15 ASSESSMENT — PAIN DESCRIPTION - LOCATION
LOCATION: ANKLE
LOCATION: FOOT
LOCATION: ANKLE
LOCATION: FOOT

## 2024-08-15 ASSESSMENT — PAIN - FUNCTIONAL ASSESSMENT
PAIN_FUNCTIONAL_ASSESSMENT: 0-10

## 2024-08-15 ASSESSMENT — PAIN DESCRIPTION - ORIENTATION
ORIENTATION: LEFT

## 2024-08-15 ASSESSMENT — PAIN SCALES - GENERAL
PAINLEVEL_OUTOF10: 4
PAINLEVEL_OUTOF10: 4
PAINLEVEL_OUTOF10: 10 - WORST POSSIBLE PAIN
PAINLEVEL_OUTOF10: 0 - NO PAIN
PAINLEVEL_OUTOF10: 8
PAINLEVEL_OUTOF10: 8
PAINLEVEL_OUTOF10: 3
PAINLEVEL_OUTOF10: 5 - MODERATE PAIN
PAINLEVEL_OUTOF10: 3

## 2024-08-15 ASSESSMENT — ACTIVITIES OF DAILY LIVING (ADL)
HOME_MANAGEMENT_TIME_ENTRY: 13
LACK_OF_TRANSPORTATION: NO
ADL_ASSISTANCE: INDEPENDENT
ADL_ASSISTANCE: INDEPENDENT

## 2024-08-15 ASSESSMENT — LIFESTYLE VARIABLES
AUDIT-C TOTAL SCORE: 2
SKIP TO QUESTIONS 9-10: 1

## 2024-08-15 NOTE — CARE PLAN
The patient's goals for the shift include      The clinical goals for the shift include control pain      Problem: Pain - Adult  Goal: Verbalizes/displays adequate comfort level or baseline comfort level  Outcome: Progressing     Problem: Safety - Adult  Goal: Free from fall injury  Outcome: Progressing     Problem: Discharge Planning  Goal: Discharge to home or other facility with appropriate resources  Outcome: Progressing     Problem: Chronic Conditions and Co-morbidities  Goal: Patient's chronic conditions and co-morbidity symptoms are monitored and maintained or improved  Outcome: Progressing

## 2024-08-15 NOTE — PROGRESS NOTES
Lucille Holloway is a 79 y.o. female on day 2 of admission presenting with Closed fracture of left ankle.      Subjective   Pt seen and examined.        Objective     Last Recorded Vitals  /88   Pulse 81   Temp 36.6 °C (97.8 °F) (Temporal)   Resp 18   Wt 88 kg (194 lb 0.1 oz)   SpO2 94%   Intake/Output last 3 Shifts:    Intake/Output Summary (Last 24 hours) at 8/15/2024 1220  Last data filed at 8/15/2024 1017  Gross per 24 hour   Intake 2780 ml   Output 3700 ml   Net -920 ml       Admission Weight  Weight: 88 kg (194 lb) (08/13/24 1641)    Daily Weight  08/14/24 : 88 kg (194 lb 0.1 oz)      Physical Exam  Constitutional:       Appearance: Normal appearance.      Comments: Pleasant elderly female, alert, oriented, children at bedside.   She is in no apparent distress.    HENT:      Head: Normocephalic and atraumatic.      Mouth/Throat:      Mouth: Mucous membranes are moist.   Eyes:      Extraocular Movements: Extraocular movements intact.      Conjunctiva/sclera: Conjunctivae normal.      Pupils: Pupils are equal, round, and reactive to light.   Neck:      Comments: No carotid bruit appreciated.   Cardiovascular:      Rate and Rhythm: Normal rate and regular rhythm.      Pulses: Normal pulses.      Heart sounds: Normal heart sounds.      Comments: Regular rate and rhythm, no murmur  Pulmonary:      Effort: Pulmonary effort is normal.      Breath sounds: Normal breath sounds.   Abdominal:      General: Abdomen is flat. Bowel sounds are normal.      Palpations: Abdomen is soft.   Musculoskeletal:         General: Normal range of motion.   Skin:     General: Skin is warm and dry.   Neurological:      General: No focal deficit present.      Mental Status: She is alert and oriented to person, place, and time.   Psychiatric:         Mood and Affect: Mood normal.         Behavior: Behavior normal.         Thought Content: Thought content normal.         Judgment: Judgment normal.   Relevant Results  Results for  orders placed or performed during the hospital encounter of 08/13/24 (from the past 24 hour(s))   CBC   Result Value Ref Range    WBC 8.7 4.4 - 11.3 x10*3/uL    nRBC 0.0 0.0 - 0.0 /100 WBCs    RBC 3.79 (L) 4.00 - 5.20 x10*6/uL    Hemoglobin 11.8 (L) 12.0 - 16.0 g/dL    Hematocrit 36.8 36.0 - 46.0 %    MCV 97 80 - 100 fL    MCH 31.1 26.0 - 34.0 pg    MCHC 32.1 32.0 - 36.0 g/dL    RDW 13.4 11.5 - 14.5 %    Platelets 234 150 - 450 x10*3/uL   Basic Metabolic Panel   Result Value Ref Range    Glucose 95 74 - 99 mg/dL    Sodium 140 136 - 145 mmol/L    Potassium 4.2 3.5 - 5.3 mmol/L    Chloride 108 (H) 98 - 107 mmol/L    Bicarbonate 24 21 - 32 mmol/L    Anion Gap 12 10 - 20 mmol/L    Urea Nitrogen 17 6 - 23 mg/dL    Creatinine 0.88 0.50 - 1.05 mg/dL    eGFR 67 >60 mL/min/1.73m*2    Calcium 8.0 (L) 8.6 - 10.3 mg/dL        FL less than 1 hour   Final Result      Carotid duplex bilateral   Final Result   Normal flow pattern without evidence of hemodynamically relevant   stenosis or atheromatous plaques in the visualized portions of the   carotid circulation as described above.        Mild eccentric atheromatous plaque of the carotid   bifurcations/proximal ICAs without significant associated luminal   narrowing.        The velocity criteria are extrapolated from diameter data as defined   by the Society of Radiologists in Ultrasound Consensus Conference   Radiology 2003; 229;340-346.        MACRO:   None        Signed by: Rob Mendes 8/13/2024 10:30 PM   Dictation workstation:   [id number]      CT ankle left wo IV contrast   Final Result   Please see above.             MACRO:   None        Signed by: Rob Mendes 8/13/2024 9:40 PM   Dictation workstation:   [id number]      XR ankle left 3+ views   Final Result   Improved alignment status post reduction, with cast material noted.   Mildly widened medial ankle mortise. Displaced trimalleolar fracture   again noted.             MACRO:   None        Signed by: Brii Ovalles 8/13/2024  8:13 PM   Dictation workstation:   [id number]      XR chest 1 view   Final Result   1.  No evidence of acute cardiopulmonary process.             Signed by: Pancho Larios 8/13/2024 5:36 PM   Dictation workstation:   [id number]      XR ankle left 3+ views   Final Result   Displaced fracture of the medial malleolus and distal fibula with   disruption of ankle mortise consistent with syndesmotic injury.        Signed by: Pancho Larios 8/13/2024 5:36 PM   Dictation workstation:   [id number]      CT head wo IV contrast   Final Result   No acute intracranial abnormality.        No evidence of cervical spine fracture or acute traumatic   malalignment.        Signed by: Pancho Larios 8/13/2024 5:34 PM   Dictation workstation:   [id number]      CT cervical spine wo IV contrast   Final Result   No acute intracranial abnormality.        No evidence of cervical spine fracture or acute traumatic   malalignment.        Signed by: Pancho Larios 8/13/2024 5:34 PM   Dictation workstation:   [id number]      Transthoracic Echo (TTE) Complete    (Results Pending)   Holter Or Event Cardiac Monitor    (Results Pending)       Scheduled medications  aspirin, 81 mg, oral, Daily  atorvastatin, 10 mg, oral, Nightly  ceFAZolin, 2 g, intravenous, q8h  docusate sodium, 300 mg, oral, Daily  DULoxetine, 30 mg, oral, Daily  enoxaparin, 40 mg, subcutaneous, q24h  gabapentin, 300 mg, oral, TID  levothyroxine, 75 mcg, oral, Daily  multivitamin with minerals, 1 tablet, oral, Daily  polyethylene glycol, 17 g, oral, Daily  valsartan, 40 mg, oral, Daily      Continuous medications  sodium chloride 0.9%, 75 mL/hr, Last Rate: 75 mL/hr (08/14/24 1813)      PRN medications  PRN medications: acetaminophen, acetaminophen, albuterol, magnesium hydroxide, melatonin, morphine, morphine, ondansetron ODT **OR** ondansetron, oxyCODONE         Assessment/Plan                  Principal Problem:    Closed fracture of left ankle  Active Problems:    Rheumatoid  "arthritis, involving unspecified site, unspecified whether rheumatoid factor present (Multi)    Pulmonary hypertension (Multi)    Closed fracture of left ankle, initial encounter    Closed fracture of left ankle, initial encounter  - reduced in the ED  - ortho consult  - morphine for pain control  - s/p surgery  -PT/OT     Syncope  - pt describes feeling light headed  - denies any associated CP or palpitations with the syncope, however has had 2 episodes of \"heart fluttering\"   - ECHO pending  - telemetry  -  possible event monitor/Holter monitor to detect any arrhythmias that may have caused the syncope  - US carotid arteries wnl  - trend troponin, initial trop neg.      Increased urinary frequency  - denies dysuria  - UA negative     HTN  - Hold diovan in am if still hypotensive     KELLY  - no longer using CPAP.     Code status  - FULL    DVT ppx              Fred Persaud MD      "

## 2024-08-15 NOTE — PROGRESS NOTES
Subjective Data:  Patient seen and examined, chart reviewed   -- Cardiac Telemetry shows Normal sinsu rhythm without pauses or arrhythmia  -- Offers no complaints.          Objective Data:  Last Recorded Vitals:  Vitals:    08/15/24 0740 08/15/24 1014 08/15/24 1017 08/15/24 1107   BP: 139/81 150/84 150/84 157/88   BP Location:  Right arm Right arm    Patient Position:  Sitting Sitting    Pulse: 74  80 81   Resp: 18   18   Temp: 36.9 °C (98.5 °F)   36.6 °C (97.8 °F)   TempSrc: Temporal   Temporal   SpO2: 96%  96% 94%   Weight:       Height:           Last Labs:  CBC - 8/15/2024:  6:02 AM  8.7 11.8 234    36.8      CMP - 8/15/2024:  6:02 AM  8.0 6.5 33 --- 0.2   _ 3.5 37 57      PTT - 8/13/2024: 11:09 PM  1.1   11.9 31     TROPHS   Date/Time Value Ref Range Status   08/14/2024 04:00 AM 3 0 - 13 ng/L Final   08/13/2024 11:09 PM 3 0 - 13 ng/L Final   08/13/2024 05:49 PM 3 0 - 13 ng/L Final     HGBA1C   Date/Time Value Ref Range Status   06/25/2024 10:30 AM 5.4 see below % Final   12/16/2022 08:43 AM 4.9 % Final     Comment:          Diagnosis of Diabetes-Adults   Non-Diabetic: < or = 5.6%   Increased risk for developing diabetes: 5.7-6.4%   Diagnostic of diabetes: > or = 6.5%  .       Monitoring of Diabetes                Age (y)     Therapeutic Goal (%)   Adults:          >18           <7.0   Pediatrics:    13-18           <7.5                   7-12           <8.0                   0- 6            7.5-8.5   American Diabetes Association. Diabetes Care 33(S1), Jan 2010.     10/10/2022 08:31 AM 4.7 % Final     Comment:          Diagnosis of Diabetes-Adults   Non-Diabetic: < or = 5.6%   Increased risk for developing diabetes: 5.7-6.4%   Diagnostic of diabetes: > or = 6.5%  .       Monitoring of Diabetes                Age (y)     Therapeutic Goal (%)   Adults:          >18           <7.0   Pediatrics:    13-18           <7.5                   7-12           <8.0                   0- 6            7.5-8.5   American  "Diabetes Association. Diabetes Care 33(S1), Jan 2010.       LDLCALC   Date/Time Value Ref Range Status   01/04/2024 08:16 AM 39 <=99 mg/dL Final     Comment:                                 Near   Borderline      AGE      Desirable  Optimal    High     High     Very High     0-19 Y     0 - 109     ---    110-129   >/= 130     ----    20-24 Y     0 - 119     ---    120-159   >/= 160     ----      >24 Y     0 -  99   100-129  130-159   160-189     >/=190       VLDL   Date/Time Value Ref Range Status   01/04/2024 08:16 AM 19 0 - 40 mg/dL Final   06/09/2023 07:48 AM 17 0 - 40 mg/dL Final   10/10/2022 08:31 AM 18 0 - 40 mg/dL Final   07/11/2022 07:35 AM 26 0 - 40 mg/dL Final      Last I/O:  I/O last 3 completed shifts:  In: 4280 (48.6 mL/kg) [P.O.:580; I.V.:3300 (37.5 mL/kg); IV Piggyback:400]  Out: 3355 (38.1 mL/kg) [Urine:3350 (1.1 mL/kg/hr); Blood:5]  Weight: 88 kg     Past Cardiology Tests (Last 3 Years):  EKG:  ECG 12 lead 08/13/2024      ECG 12 lead (Clinic Performed) 01/04/2024    Echo:  No results found for this or any previous visit from the past 1095 days.    Ejection Fractions:  No results found for: \"EF\"  Cath:  No results found for this or any previous visit from the past 1095 days.    Stress Test:  No results found for this or any previous visit from the past 1095 days.    Cardiac Imaging:  No results found for this or any previous visit from the past 1095 days.      Inpatient Medications:  Scheduled medications   Medication Dose Route Frequency    aspirin  81 mg oral Daily    atorvastatin  10 mg oral Nightly    ceFAZolin  2 g intravenous q8h    docusate sodium  300 mg oral Daily    DULoxetine  30 mg oral Daily    enoxaparin  40 mg subcutaneous q24h    gabapentin  300 mg oral TID    levothyroxine  75 mcg oral Daily    multivitamin with minerals  1 tablet oral Daily    polyethylene glycol  17 g oral Daily    valsartan  40 mg oral Daily     PRN medications   Medication    acetaminophen    acetaminophen    " "albuterol    magnesium hydroxide    melatonin    morphine    morphine    ondansetron ODT    Or    ondansetron    oxyCODONE     Continuous Medications   Medication Dose Last Rate    sodium chloride 0.9%  75 mL/hr 75 mL/hr (08/14/24 1813)       Physical Exam:  /88   Pulse 81   Temp 36.6 °C (97.8 °F) (Temporal)   Resp 18   Ht 1.6 m (5' 3\")   Wt 88 kg (194 lb 0.1 oz)   SpO2 94%   BMI 34.37 kg/m²   General:  Patient is awake, alert, and oriented.  Patient is in no acute distress.  HEENT:  Pupils equal and reactive.  Normocephalic.  Moist mucosa.    Neck:  No thyromegaly.  Normal Jugular Venous Pressure.  Cardiovascular:  Regular rate and rhythm.  Normal S1 and S2.  1/6 RIDDHI.  Pulmonary:  Clear to auscultation bilaterally.  Abdomen:  Soft. Non-tender.   Non-distended.  Positive bowel sounds.  Lower Extremities:  2+ pedal pulses. No LE edema. LLE  Neurologic:  Cranial nerves intact.  No focal deficit.   Skin: Skin warm and dry, normal skin turgor.   Psychiatric: Normal affect.         Assessment/Plan   Assessment/Recommendations: Transient loss of consciousness resulting in fracture of her left ankle. Alcohol may have played a role, however will rule out structural heart disease and arrhythmia.  She reports that she has had several episodes of \"lower blood pressures with lightheadedness\".  Discussed that alcohol consumption can exaggerate these periods as well as cause issues with medications which could also contribute and prolong these episodes.        We will obtain a transthoracic echocardiogram for structural evaluation including ejection fraction, assessment of regional wall motion abnormalities or valvular disease, and further evaluation of hemodynamics. If there is delay, then this can be completed in the O/P setting.  A Complete Echocardiogram order would need to be placed into the discharge orders for this to be arranged.      The patient will benefit from discharge with a Zio or Preventice Patch " event monitor for 2 weeks.  Zio or Preventice Patch can only be placed by [facility] nursing during regular business hours (9AM-4PM Monday-Friday) after the discharge order has been placed.  The results will be discussed at the patient's followup visit.     The patient will benefit from follow-up in Cardiology clinic within 3-5 weeks of discharge.  The patient or their family should call the [facility] at [phone] for [facility].  If the care team is assisting in scheduling a follow up appointment prior to their discharge, please ensure that the patient has committed to attending the scheduled date and time.     Thank you for allowing me to participate in their care.  Please feel free to call me with any further questions or concerns.     Gustavo Arvizu DO   Clinical Cardology   [facility]     Peripheral IV 08/14/24 18 G Left Antecubital (Active)   Site Assessment Clean;Dry;Intact 08/15/24 0736   Dressing Status Clean;Dry 08/15/24 0736   Number of days: 1       Code Status:  Full Code

## 2024-08-15 NOTE — PROGRESS NOTES
Care Coordinator Note:  TCC spoke with patient regarding dc planning. Demo is correct. Patient lives at home independently, has CPAP but does not currently use. PCP is [facility] seen 1 m onth ago. Pharmacy is [facility] on [address]. [facility] for mail order.     Plan: patient POD#1 R ankle ORIF due to syncope and fall. Cardio following. Echo ordered. Plan for dc with holter monitor. PT OT rec HIGH.  FOC is  [facility]. Auth requested 8/15.    Status: inpatient  Payor: [employer]  Disposition: New  AR BW. Auth pending 8/15  Barrier: AUTH  ADOD: Friday/sat    [facility]      08/15/24 0444   Discharge Planning   Living Arrangements Alone   Support Systems Children   Assistance Needed IND with ADL   Type of Residence Private residence   Number of Stairs to Enter Residence 4   Number of Stairs Within Residence 0   Do you have animals or pets at home? No   Who is requesting discharge planning? Provider   Home or Post Acute Services Post acute facilities (Rehab/SNF/etc)   Type of Post Acute Facility Services Rehab   Expected Discharge Disposition IRF   Does the patient need discharge transport arranged? Yes   RoundTrip coordination needed? Yes   Has discharge transport been arranged? No   Financial Resource Strain   How hard is it for you to pay for the very basics like food, housing, medical care, and heating? Not very   Housing Stability   In the last 12 months, was there a time when you were not able to pay the mortgage or rent on time? N   At any time in the past 12 months, were you homeless or living in a shelter (including now)? N   Transportation Needs   In the past 12 months, has lack of transportation kept you from medical appointments or from getting medications? no   In the past 12 months, has lack of transportation kept you from meetings, work, or from getting things needed for daily living? No   Patient Choice   Provider Choice list and CMS website  (https://medicare.gov/care-compare#search) for post-acute Quality and Resource Measure Data were provided and reviewed with: Patient   Patient / Family choosing to utilize agency / facility established prior to hospitalization Yes

## 2024-08-15 NOTE — PROGRESS NOTES
08/15/24 5997   Financial Resource Strain   How hard is it for you to pay for the very basics like food, housing, medical care, and heating? Not very   Housing Stability   In the last 12 months, was there a time when you were not able to pay the mortgage or rent on time? N   At any time in the past 12 months, were you homeless or living in a shelter (including now)? N   Transportation Needs   In the past 12 months, has lack of transportation kept you from medical appointments or from getting medications? no   In the past 12 months, has lack of transportation kept you from meetings, work, or from getting things needed for daily living? No   Food Insecurity   Within the past 12 months, you worried that your food would run out before you got the money to buy more. Never true   Within the past 12 months, the food you bought just didn't last and you didn't have money to get more. Never true   Alcohol Use   Q1: How often do you have a drink containing alcohol? 2-4 pr month   Q2: How many drinks containing alcohol do you have on a typical day when you are drinking? 1 or 2   Q3: How often do you have six or more drinks on one occasion? Never   Utilities   In the past 12 months has the electric, gas, oil, or water company threatened to shut off services in your home? No   Health Literacy   How often do you need to have someone help you when you read instructions, pamphlets, or other written material from your doctor or pharmacy? Rarely

## 2024-08-15 NOTE — PROGRESS NOTES
"Lucille Holloway is a 79 y.o. female on day 2 of admission presenting with Closed fracture of left ankle.    Subjective   Patient is doing well this morning. No significant pain. She is still numb to LLE, and is unable to move the left foot. Denies fevers/chills.       Objective     Physical Exam  Vitals reviewed.   Constitutional:       Appearance: Normal appearance.   Cardiovascular:      Rate and Rhythm: Normal rate and regular rhythm.      Pulses: Normal pulses.      Heart sounds: Normal heart sounds.   Pulmonary:      Effort: Pulmonary effort is normal.      Breath sounds: Normal breath sounds.   Abdominal:      General: Bowel sounds are normal.      Palpations: Abdomen is soft.   Musculoskeletal:      Cervical back: Normal range of motion.      Comments: LLE with post-op dressing intact, kelsey dp/pt pulses. Popliteal +2. Unable to feel sensation on left toes, unable to move left toes at this time.   Skin:     General: Skin is warm and dry.   Neurological:      Mental Status: She is alert and oriented to person, place, and time.   Psychiatric:         Behavior: Behavior normal.         Last Recorded Vitals  Blood pressure 139/81, pulse 74, temperature 36.9 °C (98.5 °F), temperature source Temporal, resp. rate 18, height 1.6 m (5' 3\"), weight 88 kg (194 lb 0.1 oz), SpO2 96%.  Intake/Output last 3 Shifts:  I/O last 3 completed shifts:  In: 4280 (48.6 mL/kg) [P.O.:580; I.V.:3300 (37.5 mL/kg); IV Piggyback:400]  Out: 3355 (38.1 mL/kg) [Urine:3350 (1.1 mL/kg/hr); Blood:5]  Weight: 88 kg     Medications  No current facility-administered medications on file prior to encounter.     Current Outpatient Medications on File Prior to Encounter   Medication Sig Dispense Refill    acetaminophen 500 mg capsule Take 2 capsules (1,000 mg) by mouth 3 times a day as needed.      aspirin 81 mg EC tablet Take 1 tablet (81 mg) by mouth once daily.      atorvastatin (Lipitor) 10 mg tablet TAKE 1 TABLET BY MOUTH ONCE  DAILY AT BEDTIME 100 " tablet 2    docusate sodium (Colace) 100 mg capsule Take 3 capsules (300 mg) by mouth once daily.      DULoxetine (Cymbalta) 30 mg DR capsule Take 1 capsule (30 mg) by mouth once daily. AS DIRECTED 90 capsule 2    gabapentin (Neurontin) 300 mg capsule Take 1 capsule (300 mg) by mouth 3 times a day. 270 capsule 3    levothyroxine (Synthroid, Levoxyl) 75 mcg tablet Take 1 tablet (75 mcg) by mouth once daily. 90 tablet 1    multivitamin with minerals iron-free (Centrum Silver) Take 1 tablet by mouth once daily.      naltrexone-bupropion (Contrave) 8-90 mg ER tablet Take 1 tablet by mouth once daily. 30 tablet 11    naltrexone-bupropion (Contrave) 8-90 mg ER tablet Take 2 tablets by mouth 2 times a day. 120 tablet 2    valsartan (Diovan) 40 mg tablet TAKE 1 TABLET BY MOUTH ONCE  DAILY 100 tablet 2    albuterol 90 mcg/actuation inhaler USE 2 INHALATIONS BY MOUTH EVERY 4 HOURS IF NEEDED FOR WHEEZING  OR SHORTNESS OF BREATH (Patient not taking: Reported on 8/14/2024) 51 g 2    amoxicillin (Amoxil) 500 mg capsule TAKE FOUR CAPSULES BY MOUTH 1 HOUR BEFORE APPOINTMENT         Relevant Results  Results for orders placed or performed during the hospital encounter of 08/13/24 (from the past 24 hour(s))   CBC   Result Value Ref Range    WBC 8.7 4.4 - 11.3 x10*3/uL    nRBC 0.0 0.0 - 0.0 /100 WBCs    RBC 3.79 (L) 4.00 - 5.20 x10*6/uL    Hemoglobin 11.8 (L) 12.0 - 16.0 g/dL    Hematocrit 36.8 36.0 - 46.0 %    MCV 97 80 - 100 fL    MCH 31.1 26.0 - 34.0 pg    MCHC 32.1 32.0 - 36.0 g/dL    RDW 13.4 11.5 - 14.5 %    Platelets 234 150 - 450 x10*3/uL   Basic Metabolic Panel   Result Value Ref Range    Glucose 95 74 - 99 mg/dL    Sodium 140 136 - 145 mmol/L    Potassium 4.2 3.5 - 5.3 mmol/L    Chloride 108 (H) 98 - 107 mmol/L    Bicarbonate 24 21 - 32 mmol/L    Anion Gap 12 10 - 20 mmol/L    Urea Nitrogen 17 6 - 23 mg/dL    Creatinine 0.88 0.50 - 1.05 mg/dL    eGFR 67 >60 mL/min/1.73m*2    Calcium 8.0 (L) 8.6 - 10.3 mg/dL     FL less than  1 hour   Final Result      Carotid duplex bilateral   Final Result   Normal flow pattern without evidence of hemodynamically relevant   stenosis or atheromatous plaques in the visualized portions of the   carotid circulation as described above.        Mild eccentric atheromatous plaque of the carotid   bifurcations/proximal ICAs without significant associated luminal   narrowing.        The velocity criteria are extrapolated from diameter data as defined   by the Society of Radiologists in Ultrasound Consensus Conference   Radiology 2003; 229;340-346.        MACRO:   None        Signed by: Rob Mendes 8/13/2024 10:30 PM   Dictation workstation:   [id number]      CT ankle left wo IV contrast   Final Result   Please see above.             MACRO:   None        Signed by: Rob Mendes 8/13/2024 9:40 PM   Dictation workstation:   [id number]      XR ankle left 3+ views   Final Result   Improved alignment status post reduction, with cast material noted.   Mildly widened medial ankle mortise. Displaced trimalleolar fracture   again noted.             MACRO:   None        Signed by: Brii Ovalles 8/13/2024 8:13 PM   Dictation workstation:   [id number]      XR chest 1 view   Final Result   1.  No evidence of acute cardiopulmonary process.             Signed by: Pancho Larios 8/13/2024 5:36 PM   Dictation workstation:   [id number]      XR ankle left 3+ views   Final Result   Displaced fracture of the medial malleolus and distal fibula with   disruption of ankle mortise consistent with syndesmotic injury.        Signed by: Pancho Larios 8/13/2024 5:36 PM   Dictation workstation:   [id number]      CT head wo IV contrast   Final Result   No acute intracranial abnormality.        No evidence of cervical spine fracture or acute traumatic   malalignment.        Signed by: Pancho Larios 8/13/2024 5:34 PM   Dictation workstation:   [id number]      CT cervical spine wo IV contrast   Final Result   No acute intracranial abnormality.      "   No evidence of cervical spine fracture or acute traumatic   malalignment.        Signed by: Pancho Larios 8/13/2024 5:34 PM   Dictation workstation:   [id number]      Transthoracic Echo (TTE) Complete    (Results Pending)   Holter Or Event Cardiac Monitor    (Results Pending)           Assessment/Plan   Assessment & Plan  Closed fracture of left ankle    Rheumatoid arthritis, involving unspecified site, unspecified whether rheumatoid factor present (Multi)    Pulmonary hypertension (Multi)    Closed fracture of left ankle, initial encounter    Lucille Holloway is on Day 3 of admission. Patient presented to the ED on 8/13 with a fall causing Displaced fracture of the medial malleolus and distal fibula with disruption of ankle mortise consistent with syndesmotic injury . She is now POD 1 s/p left open reduction internal fixation ankle with  which showed findings of \"consistent with preop diagnosis\". Today She is doing well. Her left foot is still numb from the block, and she is unable to move it. Post-op dressing still in place.     Plan: s/p left open reduction internal fixation ankle  - Diet: ADAT  - IV antibiotics: ancef  - PRN pain management  - PRN antiemetics  - DVT prophylaxis: SCDs, lovenox  - OOB and ambulate as much as tolerated  - Monitor surgical site  - PT/OT to eval/treat  - SNF planning for discharge  - [facility]  - Follow up with Dr. Larsen in 2 weeks    Dispo: Dispo pending PT/OT. Ortho team to sign off. Plan of care discussed  with Dr. Larsen.      I spent 35 minutes in the professional and overall care of this patient.      Shannon Tesfaye, APRN-CNP    Addendum: Reevaluated at 1400. Still not baseline sensation, however, she is able to move toes and has more sensation than prior exam.  "

## 2024-08-15 NOTE — PROGRESS NOTES
08/15/24 1408   [facility] Disability Status   Are you deaf or do you have serious difficulty hearing? N   Are you blind or do you have serious difficulty seeing, even when wearing glasses? N   Because of a physical, mental, or emotional condition, do you have serious difficulty concentrating, remembering, or making decisions? (5 years old or older) N   Do you have serious difficulty walking or climbing stairs? N   Do you have serious difficulty dressing or bathing? N   Because of a physical, mental, or emotional condition, do you have serious difficulty doing errands alone such as visiting the doctor? N

## 2024-08-15 NOTE — PROGRESS NOTES
08/15/24 1102   Current Planned Discharge Disposition   Current Planned Discharge Disposition Rehab     Rec'd for  high  at  dc by the therapies  Lives home alone, wants  rehab  Will    have holter monitor  Case sent to  Care Transitions Manager   to  review  SUREKHA Ivory LSW        8-15-24    1143  Per  Care  Transitions  Manager   rehab would need to  see if  ankle  fracture is appropriate  dx  Referral  sent    -  she  agreed    [facility]  can  accept -auth can start  once  therapy notes are in    SUREKHA Ivory LSW              8-15-24     2243  [facility]ert Status[facility]  [facility] [id number]  Date: 8/16/2024   SUREKHA Ivory LSW

## 2024-08-15 NOTE — PROGRESS NOTES
Physical Therapy    Physical Therapy Evaluation & Treatment    Patient Name: Lucille Holloway  MRN: [id number]  Today's Date: 8/15/2024   Time Calculation  Start Time: 1014  Stop Time: 1049  Time Calculation (min): 35 min    Assessment/Plan   PT Assessment  PT Assessment Results: Decreased strength, Decreased endurance, Impaired balance, Decreased mobility, Orthopedic restrictions, Pain  Rehab Prognosis: Good  Barriers to Discharge: Inaccessible home environment. Pt unable to ambulate in today's session  Evaluation/Treatment Tolerance: Patient limited by pain  Medical Staff Made Aware: Yes  Strengths: Ability to acquire knowledge, Access to adaptive/assistive products, Premorbid level of function  Barriers to Participation:  (None identified)  End of Session Communication: Bedside nurse  Assessment Comment: Pt presents today with decreased LLE strength, balance, and activity tolerance. Currently, pt is below the reported PLOF requiring mod assistance with transfers and with inability to ambulate today. Pt would benefit from continued PT 5x/wk to address the above factors and bring the pt closer to the PLFO while reducing fall risk.  End of Session Patient Position: Up in chair, Alarm on   IP OR SWING BED PT PLAN  Inpatient or Swing Bed: Inpatient  PT Plan  Treatment/Interventions: Bed mobility, Transfer training, Gait training, Balance training, Strengthening, Endurance training, Therapeutic exercise, Therapeutic activity, Home exercise program, Postural re-education  PT Plan: Ongoing PT  PT Frequency: 5 times per week  PT Discharge Recommendations: High intensity level of continued care  Equipment Recommended upon Discharge:  (Pt owns a FWW)  PT Recommended Transfer Status: Assist x2, Assistive device (Mod assist)  PT - OK to Discharge: Yes (Per PT POC)    Subjective     General Visit Information:  General  Reason for Referral: 80 y/o F s/p L ORIF of the ankle s/p fall and fx  Referred By: LITA Aragon  Past Medical History  Relevant to Rehab:   Past Medical History:   Diagnosis Date    Acute upper respiratory infection, unspecified 03/05/2019    Acute URI    Bariatric surgery status 11/20/2019    Bariatric surgery status    Encounter for general adult medical examination without abnormal findings 08/05/2021    Encounter for preventive health examination    Other general symptoms and signs 03/05/2019    Flu-like symptoms    Other headache syndrome 06/25/2024    Comment on above: HAD A FALL SATURDAY NIGHT / BAD HEADACHE, BUMP HEAD    Other neuromuscular dysfunction of bladder     Hyperactivity of bladder    Pain in right foot 09/17/2015    Foot pain, right    Personal history of other diseases of the circulatory system     History of hypertension    Personal history of other diseases of the respiratory system 03/05/2019    History of acute sinusitis    Personal history of other endocrine, nutritional and metabolic disease     History of thyroid disease    Personal history of other infectious and parasitic diseases 08/28/2019    History of hepatitis    Plantar fascial fibromatosis 09/21/2015    Plantar fasciitis, right    Syncope and collapse 10/16/2015    Vasovagal syncope    Vitamin D deficiency, unspecified 08/28/2019    Mild vitamin D deficiency     Family/Caregiver Present: No  Co-Treatment: OT  Co-Treatment Reason: Co-evaluation with OT to maximize pt safety, transfer ability, and participation  Prior to Session Communication: Bedside nurse  Patient Position Received: Bed, 3 rail up, Alarm on  Preferred Learning Style: auditory, kinesthetic, visual  General Comment: Pt supine in bed upon PT arrival. Cleared to participate with RN, and agreeable to PT evaluation.  Home Living:  Home Living  Type of Home: House  Lives With: Alone  Home Adaptive Equipment: Walker rolling or standard (FWW)  Home Layout: Two level, Able to live on main level with bedroom/bathroom  Home Access: Stairs to enter with rails  Entrance Stairs-Rails: Both (Pt  reports wide staircase with 1 HR accessible at a time)  Entrance Stairs-Number of Steps: 4  Bathroom Shower/Tub: Walk-in shower  Bathroom Toilet: Standard  Bathroom Equipment: Grab bars in shower  Home Living Comments: Pt reports clothing located on second level of home but able to move to first level temporarily  Prior Level of Function:  Prior Function Per Pt/Caregiver Report  Level of [place]: Independent with ADLs and functional transfers, Independent with homemaking with ambulation  Receives Help From:  (Pt reports decreased home support as daughter works)  ADL Assistance: Independent  Homemaking Assistance: Independent  Ambulatory Assistance: Independent (No AD)  Leisure: Pt reports being active with her [employer]  Hand Dominance: Right  Prior Function Comments: +Driving. Pt denies further falls excluding fall leading to ankle fx  Precautions:  Precautions  LE Weight Bearing Status: Left Non-Weight Bearing  Medical Precautions: Fall precautions  Vital Signs:  Vital Signs  BP: 150/84  BP Location: Right arm  BP Method: Automatic  Patient Position: Sitting    Objective   Pain:  Pain Assessment  Pain Assessment: 0-10  0-10 (Numeric) Pain Score: 3  Pain Type: Surgical pain  Pain Location: Ankle  Pain Orientation: Left  Pain Interventions: Repositioned  Response to Interventions: 5/10 pain reported  Cognition:  Cognition  Overall Cognitive Status: Within Functional Limits  Orientation Level: Oriented X4  Insight: Within function limits  Impulsive: Within functional limits    General Assessments:  Activity Tolerance  Activity Tolerance Comments: Increased L ankle pain with upright positioning and when LLE in dependent position    Sensation  Light Touch: No apparent deficits    Coordination  Movements are Fluid and Coordinated: Yes    Postural Control  Trunk Control: Trunk flexion in standing with FWW support    Static Sitting Balance  Static Sitting-Balance Support: Bilateral upper extremity supported (LLE  supported/elevated d/t elevated pain)  Dynamic Sitting Balance  Dynamic Sitting-Balance Support: Bilateral upper extremity supported, Feet unsupported  Dynamic Sitting-Level of Assistance: Close supervision  Dynamic Sitting-Comments: During EOB RLE MMT    Static Standing Balance  Static Standing-Balance Support: Bilateral upper extremity supported  Static Standing-Level of Assistance: Minimum assistance  Static Standing-Comment/Number of Minutes: With FWW support  Dynamic Standing Balance  Dynamic Standing-Balance Support: Bilateral upper extremity supported  Dynamic Standing-Level of Assistance: Moderate assistance (2-person)  Dynamic Standing-Comments: With FWW support  Functional Assessments:  Ambulation/Gait Training  Ambulation/Gait Training Performed: No  Ambulation/Gait Training 1  Comments/Distance (ft) 1: Pt able to take 1 hop on the RLE while maintaining NWB on the LLE. Pt unable to perform additional hops on the RLE while maintaining NWB on the LLE. Pt demonstrates difficulty with walker progression during transition between bed and chair requiring assist. Pt able to pivot on RLE while maintaining NWB on the LLE in transition from bed to bedside chair.    Stairs  Stairs: No  Extremity/Trunk Assessments:  RLE   RLE : Within Functional Limits  LLE   LLE : Exceptions to WFL  Strength LLE  L Hip Flexion:  (At least 2-/5)  L Hip Extension:  (At least 1/5)  Treatments:  Bed Mobility  Bed Mobility: Yes  Bed Mobility 1  Bed Mobility 1: Supine to sitting  Level of Assistance 1: Minimum assistance  Bed Mobility Comments 1: HOB elevated. Assist provided with LLE progression off the EOB. Pt reports increased L ankle pain with leg in dependent position. LLE elevated in EOB sitting for comfort. Pt denies dizziness in sitting    Transfers  Transfer: Yes  Transfer 1  Transfer From 1: Bed to  Transfer to 1: Stand  Technique 1: Sit to stand  Transfer Device 1: Walker, Gait belt  Transfer Level of Assistance 1: Moderate  assistance, +2, Moderate verbal cues  Trials/Comments 1: VC for LLE positioning to maintain NWB status. VC for BUE push from the bed to stand instead of pulling from the FWW. hospital bed slightly elevated. Pt flexed at hips and trunk with gaze directed at the floor in standing. Pt's L foot resting on the ground in standing. Pt able to lift L foot off the ground with moderate VC.  Transfers 2  Transfer From 2: Stand to  Transfer to 2: Chair with arms  Technique 2: Stand to sit  Transfer Device 2: Walker, Gait belt  Transfer Level of Assistance 2: Moderate assistance, +2, Minimal verbal cues  Trials/Comments 2: VC for LE positoining in front of chair before attempting to sit. VC for BUE reach for the armrests of the chair when sitting with return demonstration. VC for LLE extension in front of body to maintain NWB status. Assist provided with eccentric control on descent to the chair.  Outcome Measures:  [facility] Basic Mobility  Turning from your back to your side while in a flat bed without using bedrails: A little  Moving from lying on your back to sitting on the side of a flat bed without using bedrails: A little  Moving to and from bed to chair (including a wheelchair): A lot  Standing up from a chair using your arms (e.g. wheelchair or bedside chair): A lot  To walk in hospital room: Total  Climbing 3-5 steps with railing: Total  Basic Mobility - Total Score: 12    Encounter Problems       Encounter Problems (Active)       Balance       Goal 1 (Progressing)       Start:  08/15/24    Expected End:  08/29/24       Pt performs all sitting balance with supervision and standing balance with CGA using FWW            Mobility       STG - Patient will ambulate (Not Progressing)       Start:  08/15/24    Expected End:  08/29/24       10 ft with min assist x 1 using FWW while maintaining relevant weight bearing precautions            PT Transfers       STG - Patient to transfer to and from sit to supine (Progressing)        Start:  08/15/24    Expected End:  08/29/24       With supervision         STG - Patient will transfer sit to and from stand (Progressing)       Start:  08/15/24    Expected End:  08/29/24       With min assist x 1 using FWW              Education Documentation  Precautions, taught by Codey Nation PT at 8/15/2024  1:48 PM.  Learner: Patient  Readiness: Acceptance  Method: Explanation, Demonstration  Response: Verbalizes Understanding    Body Mechanics, taught by Codey Nation PT at 8/15/2024  1:48 PM.  Learner: Patient  Readiness: Acceptance  Method: Explanation, Demonstration  Response: Verbalizes Understanding    Mobility Training, taught by Codey Nation PT at 8/15/2024  1:48 PM.  Learner: Patient  Readiness: Acceptance  Method: Explanation, Demonstration  Response: Verbalizes Understanding    Education Comments  No comments found.

## 2024-08-15 NOTE — NURSING NOTE
Interdisciplinary team present: NP, PT, NM, CC, SW, Orthopedic Coordinator, and bedside RN.  Pain - controlled  Nausea - none  Discharge barrier - PT/OT eval, holter monitor  Discharge plan - home with [facility]?  Discharge date/time -  pending

## 2024-08-15 NOTE — PROGRESS NOTES
Occupational Therapy    Evaluation/Treatment    Patient Name: Lucille Holloway  MRN: [id number]  : 1945  Today's Date: 08/15/24  Time Calculation  Start Time: 1017  Stop Time: 1050  Time Calculation (min): 33 min       Assessment:  OT Assessment: Pt demos s/p L ankle ORIF and demos decreased balance, strength, endurance and safety awareness with new L LE NWB restriction resulting in decreased safety and independence with ADLs/IADLs. Pt requires skilled OT services to address above deficits to safely return to PLOF.  Prognosis: Good  Barriers to Discharge: Decreased caregiver support  Evaluation/Treatment Tolerance: Patient limited by fatigue, Patient limited by pain  Medical Staff Made Aware: Yes  End of Session Communication: Bedside nurse  End of Session Patient Position: Up in chair, Alarm on  OT Assessment Results: Decreased ADL status, Decreased safe judgment during ADL, Decreased endurance, Decreased functional mobility, Decreased IADLs, Decreased trunk control for functional activities  Prognosis: Good  Barriers to Discharge: Decreased caregiver support  Evaluation/Treatment Tolerance: Patient limited by fatigue, Patient limited by pain  Medical Staff Made Aware: Yes  Strengths: Premorbid level of function, Attitude of self, Ability to acquire knowledge  Barriers to Participation:  (pain)  Plan:  Treatment Interventions: ADL retraining, Functional transfer training, UE strengthening/ROM, Endurance training, Patient/family training, Neuromuscular reeducation, Equipment evaluation/education, Compensatory technique education  OT Frequency: 4 times per week  OT Discharge Recommendations: High intensity level of continued care  Equipment Recommended upon Discharge: Bedside commode (hip kit, shower chair)  OT Recommended Transfer Status: Assist of 2  OT - OK to Discharge: Yes (OT POC established this date)  Treatment Interventions: ADL retraining, Functional transfer training, UE strengthening/ROM, Endurance  training, Patient/family training, Neuromuscular reeducation, Equipment evaluation/education, Compensatory technique education    Subjective     General:   OT Received On: 08/15/24  General  Reason for Referral: Pt is a 78 y/o female POD#1 L ankle ORIF d/t L ankle fracture following syncopal episode.  Referred By: Devin LITTLE)  Past Medical History Relevant to Rehab:   Past Medical History:   Diagnosis Date    Acute upper respiratory infection, unspecified 03/05/2019    Acute URI    Bariatric surgery status 11/20/2019    Bariatric surgery status    Encounter for general adult medical examination without abnormal findings 08/05/2021    Encounter for preventive health examination    Other general symptoms and signs 03/05/2019    Flu-like symptoms    Other headache syndrome 06/25/2024    Comment on above: HAD A FALL SATURDAY NIGHT / BAD HEADACHE, BUMP HEAD    Other neuromuscular dysfunction of bladder     Hyperactivity of bladder    Pain in right foot 09/17/2015    Foot pain, right    Personal history of other diseases of the circulatory system     History of hypertension    Personal history of other diseases of the respiratory system 03/05/2019    History of acute sinusitis    Personal history of other endocrine, nutritional and metabolic disease     History of thyroid disease    Personal history of other infectious and parasitic diseases 08/28/2019    History of hepatitis    Plantar fascial fibromatosis 09/21/2015    Plantar fasciitis, right    Syncope and collapse 10/16/2015    Vasovagal syncope    Vitamin D deficiency, unspecified 08/28/2019    Mild vitamin D deficiency      Family/Caregiver Present: No  Co-Treatment: PT  Co-Treatment Reason: to maximize safety and participation with skilled intervention  Prior to Session Communication: Bedside nurse  Patient Position Received: Bed, 3 rail up, Alarm on  General Comment: Pt supine in bed upon arrival and agreeable to OT Eval/tx. Pt fully participatory in  session.  Precautions:  LE Weight Bearing Status: Left Non-Weight Bearing  Medical Precautions: Fall precautions  Splinting: L LE in SLS  Vital Signs:  Heart Rate: 80  SpO2: 96 %  BP: 150/84  MAP (mmHg): 106  BP Location: Right arm  BP Method: Automatic  Patient Position: Sitting  Pain:  Pain Assessment  Pain Assessment: 0-10  0-10 (Numeric) Pain Score: 3 (start of session; 5/10 end of session)  Pain Type: Surgical pain  Pain Location: Ankle  Pain Orientation: Left  Pain Interventions: Medication (See MAR), Elevated    Objective   Cognition:  Overall Cognitive Status: Within Functional Limits  Orientation Level: Oriented X4  Insight: Within function limits  Impulsive: Within functional limits           Home Living:  Type of Home: House  Lives With: Alone  Home Adaptive Equipment: Walker rolling or standard  Home Layout: Two level, Able to live on main level with bedroom/bathroom  Home Access: Stairs to enter with rails  Entrance Stairs-Rails: Both  Entrance Stairs-Number of Steps: 4  Bathroom Shower/Tub: Walk-in shower  Bathroom Toilet: Standard  Bathroom Equipment: Grab bars in shower  Home Living Comments: pt reports clothes closet is upstairs however able to remain on first floor temporarily  Prior Function:  Level of [place]: Independent with ADLs and functional transfers, Independent with homemaking with ambulation  ADL Assistance: Independent  Homemaking Assistance: Independent  Ambulatory Assistance: Independent (no AD)  Hand Dominance: Right  Prior Function Comments: + driving. x1 fall in past 6 months       ADL:  Grooming Deficit: Setup, Wash/dry face (chair)  UE Dressing Assistance: Minimal  UE Dressing Deficit:  (to don/doff gown)  LE Dressing Assistance: Maximal  LE Dressing Deficit:  (to don adult brief; educated pt on AE and provided visual demo on compensatory techniques and proper use of AE)  Toileting Assistance with Device: Maximal  Toileting Deficit:  (assist for brief management and jason  hygiene, pt able to assist anteriorly with brief using R UE, assist on L side d/t balance deficits)  Activities of Daily Living: Grooming  Grooming Level of Assistance: Setup  Grooming Where Assessed: Chair  Grooming Comments: pt washed face    UE Dressing  UE Dressing Level of Assistance: Minimum assistance, Minimal verbal cues  UE Dressing Where Assessed: Edge of bed  UE Dressing Comments: to don/doff gown    LE Dressing  LE Dressing: Yes  Adult Briefs Level of Assistance: Maximum assistance, Maximum verbal cues  LE Dressing Where Assessed: Edge of bed  LE Dressing Comments: pt educated on AE & compensatory techniques for LB dressing to increase ease and independence    Toileting  Toileting Level of Assistance: Maximum assistance, Maximum verbal cues  Where Assessed:  (standing at EOB)  Toileting Comments: pt able to assist with brief management anteriorly up over hips, assist posteriorly and on L side and for jason hygiene as well as to remove purewick in standing  Activity Tolerance:  Endurance: Tolerates 10 - 20 min exercise with multiple rests       Bed Mobility/Transfers: Bed Mobility  Bed Mobility: Yes  Bed Mobility 1  Bed Mobility 1: Supine to sitting  Level of Assistance 1: Minimum assistance, Minimal verbal cues  Bed Mobility Comments 1: cues for sequencing, assist at L LE    Transfers  Transfer: Yes  Transfer 1  Technique 1: Sit to stand, Stand to sit  Transfer Device 1: Gait belt, Walker  Transfer Level of Assistance 1: Moderate assistance, +2, Moderate verbal cues  Trials/Comments 1: cues for sequencing, safe hand placement, L LE position and walker safety  Transfers 2  Transfer From 2: Bed to  Transfer to 2: Chair with arms  Technique 2: Stand pivot  Transfer Device 2: Gait belt, Walker  Transfer Level of Assistance 2: Moderate assistance, +2, Maximum verbal cues, Minimal tactile cues  Trials/Comments 2: cues for sequencing, walker safety, L LE position to adhere to NWB and alignment to chair, pt able  to hop to R side x1 trial but reports difficulty hopping, prefers to pivot on R LE, assist to adhere to LLE NWB         Sitting Balance:  Static Sitting Balance  Static Sitting-Balance Support: Bilateral upper extremity supported, Feet supported  Static Sitting-Level of Assistance:  (SUP)  Static Sitting-Comment/Number of Minutes: at EOB  Dynamic Sitting Balance  Dynamic Sitting-Comments: SBA at EOB  Standing Balance:  Static Standing Balance  Static Standing-Balance Support: Bilateral upper extremity supported  Static Standing-Level of Assistance: Minimum assistance (x1-2)  Static Standing-Comment/Number of Minutes: using FWW  Dynamic Standing Balance  Dynamic Standing-Comments: Mod A x2      Sensation:  Light Touch: No apparent deficits  Strength:  Strength Comments: B UEs grossly WFL       Coordination:  Movements are Fluid and Coordinated: Yes        Extremities: RUE   RUE : Within Functional Limits and LUE   LUE: Within Functional Limits      Outcome Measures: [facility] Daily Activity  Putting on and taking off regular lower body clothing: A lot  Bathing (including washing, rinsing, drying): A lot  Putting on and taking off regular upper body clothing: A little  Toileting, which includes using toilet, bedpan or urinal: A lot  Taking care of personal grooming such as brushing teeth: A little  Eating Meals: None  Daily Activity - Total Score: 16        Education Documentation  Body Mechanics, taught by Elaine Jackson OT at 8/15/2024 12:11 PM.  Learner: Patient  Readiness: Acceptance  Method: Explanation  Response: Verbalizes Understanding, Needs Reinforcement    Precautions, taught by Elaine Jackson OT at 8/15/2024 12:11 PM.  Learner: Patient  Readiness: Acceptance  Method: Explanation  Response: Verbalizes Understanding, Needs Reinforcement    ADL Training, taught by Elaien Jackson OT at 8/15/2024 12:11 PM.  Learner: Patient  Readiness: Acceptance  Method: Explanation  Response: Verbalizes Understanding, Needs  Reinforcement    Education Comments  No comments found.           Goals:  Encounter Problems       Encounter Problems (Active)       ADLs       Patient will perform UB sponge bathing with set up A and LB bathing with contact guard assist level of assistance and long-handled sponge.       Start:  08/15/24    Expected End:  08/29/24            Patient with complete upper body dressing with modified independent level of assistance donning and doffing all UE clothes with PRN adaptive equipment.       Start:  08/15/24    Expected End:  08/29/24            Patient with complete lower body dressing with minimal assist  level of assistance donning and doffing all LE clothes  with PRN adaptive equipment.       Start:  08/15/24    Expected End:  08/29/24            Patient will complete daily grooming tasks with modified independent level of assistance and PRN adaptive equipment.       Start:  08/15/24    Expected End:  08/29/24            Patient will complete toileting including hygiene clothing management/hygiene with minimal assist  level of assistance and raised toilet seat and grab bars vs. BSC.       Start:  08/15/24    Expected End:  08/29/24               MOBILITY       Patient will perform Functional mobility x Household distances/Community Distances with contact guard assist level of assistance and least restrictive device while adhering to L LW NWB restriction in order to improve safety and functional mobility.       Start:  08/15/24    Expected End:  08/29/24               TRANSFERS       Patient will perform bed mobility modified independent level of assistance in order to improve safety and independence with mobility       Start:  08/15/24    Expected End:  08/29/24            Patient will complete functional transfers with least restrictive device with contact guard assist level of assistance while adhering to L LE NWB restriction.       Start:  08/15/24    Expected End:  08/29/24

## 2024-08-16 PROBLEM — Z78.9 IMPAIRED MOBILITY AND ADLS: Status: ACTIVE | Noted: 2024-08-16

## 2024-08-16 PROBLEM — Z74.09 IMPAIRED MOBILITY AND ADLS: Status: ACTIVE | Noted: 2024-08-16

## 2024-08-16 PROCEDURE — 97535 SELF CARE MNGMENT TRAINING: CPT | Mod: GO

## 2024-08-16 PROCEDURE — 99232 SBSQ HOSP IP/OBS MODERATE 35: CPT | Performed by: INTERNAL MEDICINE

## 2024-08-16 PROCEDURE — 97530 THERAPEUTIC ACTIVITIES: CPT | Mod: GO

## 2024-08-16 PROCEDURE — 2500000001 HC RX 250 WO HCPCS SELF ADMINISTERED DRUGS (ALT 637 FOR MEDICARE OP)

## 2024-08-16 PROCEDURE — 1200000002 HC GENERAL ROOM WITH TELEMETRY DAILY

## 2024-08-16 PROCEDURE — 2500000001 HC RX 250 WO HCPCS SELF ADMINISTERED DRUGS (ALT 637 FOR MEDICARE OP): Performed by: PHARMACIST

## 2024-08-16 PROCEDURE — 2500000002 HC RX 250 W HCPCS SELF ADMINISTERED DRUGS (ALT 637 FOR MEDICARE OP, ALT 636 FOR OP/ED)

## 2024-08-16 PROCEDURE — 2500000004 HC RX 250 GENERAL PHARMACY W/ HCPCS (ALT 636 FOR OP/ED)

## 2024-08-16 PROCEDURE — 2500000004 HC RX 250 GENERAL PHARMACY W/ HCPCS (ALT 636 FOR OP/ED): Mod: JZ

## 2024-08-16 PROCEDURE — 2500000001 HC RX 250 WO HCPCS SELF ADMINISTERED DRUGS (ALT 637 FOR MEDICARE OP): Performed by: INTERNAL MEDICINE

## 2024-08-16 PROCEDURE — 97530 THERAPEUTIC ACTIVITIES: CPT | Mod: GP,CQ | Performed by: PHYSICAL THERAPY ASSISTANT

## 2024-08-16 ASSESSMENT — COGNITIVE AND FUNCTIONAL STATUS - GENERAL
DAILY ACTIVITIY SCORE: 18
DRESSING REGULAR UPPER BODY CLOTHING: A LITTLE
PERSONAL GROOMING: A LITTLE
WALKING IN HOSPITAL ROOM: A LOT
HELP NEEDED FOR BATHING: A LITTLE
CLIMB 3 TO 5 STEPS WITH RAILING: TOTAL
DRESSING REGULAR LOWER BODY CLOTHING: A LITTLE
CLIMB 3 TO 5 STEPS WITH RAILING: A LITTLE
EATING MEALS: A LITTLE
STANDING UP FROM CHAIR USING ARMS: A LITTLE
DAILY ACTIVITIY SCORE: 19
CLIMB 3 TO 5 STEPS WITH RAILING: TOTAL
TURNING FROM BACK TO SIDE WHILE IN FLAT BAD: A LITTLE
PERSONAL GROOMING: A LITTLE
DAILY ACTIVITIY SCORE: 19
PERSONAL GROOMING: A LITTLE
MOVING TO AND FROM BED TO CHAIR: A LOT
MOVING TO AND FROM BED TO CHAIR: A LITTLE
MOVING FROM LYING ON BACK TO SITTING ON SIDE OF FLAT BED WITH BEDRAILS: A LITTLE
MOVING FROM LYING ON BACK TO SITTING ON SIDE OF FLAT BED WITH BEDRAILS: A LITTLE
TURNING FROM BACK TO SIDE WHILE IN FLAT BAD: A LITTLE
DRESSING REGULAR UPPER BODY CLOTHING: A LITTLE
STANDING UP FROM CHAIR USING ARMS: A LITTLE
MOVING TO AND FROM BED TO CHAIR: A LITTLE
WALKING IN HOSPITAL ROOM: A LITTLE
HELP NEEDED FOR BATHING: A LITTLE
DRESSING REGULAR UPPER BODY CLOTHING: A LITTLE
MOVING FROM LYING ON BACK TO SITTING ON SIDE OF FLAT BED WITH BEDRAILS: A LITTLE
TOILETING: A LITTLE
TURNING FROM BACK TO SIDE WHILE IN FLAT BAD: A LITTLE
STANDING UP FROM CHAIR USING ARMS: A LITTLE
WALKING IN HOSPITAL ROOM: A LOT
DRESSING REGULAR LOWER BODY CLOTHING: A LITTLE
MOBILITY SCORE: 15
MOBILITY SCORE: 14
TOILETING: A LITTLE
TOILETING: A LITTLE
HELP NEEDED FOR BATHING: A LITTLE
DRESSING REGULAR LOWER BODY CLOTHING: A LITTLE
MOBILITY SCORE: 18

## 2024-08-16 ASSESSMENT — PAIN - FUNCTIONAL ASSESSMENT
PAIN_FUNCTIONAL_ASSESSMENT: 0-10

## 2024-08-16 ASSESSMENT — ACTIVITIES OF DAILY LIVING (ADL)
BATHING_WHERE_ASSESSED: EDGE OF BED
BATHING_LEVEL_OF_ASSISTANCE: MINIMUM ASSISTANCE;SETUP
LACK_OF_TRANSPORTATION: NO

## 2024-08-16 ASSESSMENT — PAIN DESCRIPTION - LOCATION
LOCATION: HEAD
LOCATION: HEAD

## 2024-08-16 ASSESSMENT — PAIN SCALES - GENERAL
PAINLEVEL_OUTOF10: 2
PAINLEVEL_OUTOF10: 6
PAINLEVEL_OUTOF10: 0 - NO PAIN
PAINLEVEL_OUTOF10: 0 - NO PAIN
PAINLEVEL_OUTOF10: 4
PAINLEVEL_OUTOF10: 4
PAINLEVEL_OUTOF10: 7
PAINLEVEL_OUTOF10: 1

## 2024-08-16 NOTE — PROGRESS NOTES
08/16/24 1610   Discharge Planning   Living Arrangements Alone   Support Systems Children   Assistance Needed IND with ADLs   Type of Residence Private residence   Number of Stairs to Enter Residence 4   Number of Stairs Within Residence 0   Do you have animals or pets at home? No   Who is requesting discharge planning? Provider   Home or Post Acute Services Post acute facilities (Rehab/SNF/etc)   Type of Post Acute Facility Services Rehab   Expected Discharge Disposition IRF   Does the patient need discharge transport arranged? Yes   RoundTrip coordination needed? Yes   Has discharge transport been arranged? No   Financial Resource Strain   How hard is it for you to pay for the very basics like food, housing, medical care, and heating? Not very   Housing Stability   In the last 12 months, was there a time when you were not able to pay the mortgage or rent on time? N   In the past 12 months, how many times have you moved where you were living? 0   At any time in the past 12 months, were you homeless or living in a shelter (including now)? N   Transportation Needs   In the past 12 months, has lack of transportation kept you from medical appointments or from getting medications? no   In the past 12 months, has lack of transportation kept you from meetings, work, or from getting things needed for daily living? No     Care Transitions:  Patient lives at home independently, has CPAP but does not currently use. PCP is  Dr Lynch. Pharmacy is [facility] for mail order.      Plan: patient POD#1 R ankle ORIF due to syncope and fall. Cardio following. Echo ordered. Plan for dc with holter monitor. PT OT rec HIGH.  FOC is  [facility]. They have accepted.  Updates sent today.Auth requested 8/15. Shannon Sharp this afternoon     Status: inpatient  Payor: [employer] mcare  Disposition: New  AR BW. Auth pending 8/15  Barrier: AUTH  ADOD: Friday/Sat  [facility]: 19/17  CT to follow.  Amanda  Andrea BSN/RN-TCC

## 2024-08-16 NOTE — PROGRESS NOTES
Lucille Holloway is a 79 y.o. female on day 3 of admission presenting with Closed fracture of left ankle.      Subjective   Pt seen and examined.        Objective     Last Recorded Vitals  /70   Pulse 62   Temp 37.2 °C (99 °F) (Temporal)   Resp 18   Wt 88 kg (194 lb 0.1 oz)   SpO2 95%   Intake/Output last 3 Shifts:    Intake/Output Summary (Last 24 hours) at 8/16/2024 1227  Last data filed at 8/16/2024 0815  Gross per 24 hour   Intake 1744 ml   Output 900 ml   Net 844 ml       Admission Weight  Weight: 88 kg (194 lb) (08/13/24 1641)    Daily Weight  08/14/24 : 88 kg (194 lb 0.1 oz)      Physical Exam  Constitutional:       Appearance: Normal appearance.      Comments: Pleasant elderly female, alert, oriented, children at bedside.   She is in no apparent distress.    HENT:      Head: Normocephalic and atraumatic.      Mouth/Throat:      Mouth: Mucous membranes are moist.   Eyes:      Extraocular Movements: Extraocular movements intact.      Conjunctiva/sclera: Conjunctivae normal.      Pupils: Pupils are equal, round, and reactive to light.   Neck:      Comments: No carotid bruit appreciated.   Cardiovascular:      Rate and Rhythm: Normal rate and regular rhythm.      Pulses: Normal pulses.      Heart sounds: Normal heart sounds.      Comments: Regular rate and rhythm, no murmur  Pulmonary:      Effort: Pulmonary effort is normal.      Breath sounds: Normal breath sounds.   Abdominal:      General: Abdomen is flat. Bowel sounds are normal.      Palpations: Abdomen is soft.   Musculoskeletal:         General: Normal range of motion.   Skin:     General: Skin is warm and dry.   Neurological:      General: No focal deficit present.      Mental Status: She is alert and oriented to person, place, and time.   Psychiatric:         Mood and Affect: Mood normal.         Behavior: Behavior normal.         Thought Content: Thought content normal.         Judgment: Judgment normal.   Relevant Results  No results found  for this or any previous visit (from the past 24 hour(s)).       FL less than 1 hour   Final Result      Carotid duplex bilateral   Final Result   Normal flow pattern without evidence of hemodynamically relevant   stenosis or atheromatous plaques in the visualized portions of the   carotid circulation as described above.        Mild eccentric atheromatous plaque of the carotid   bifurcations/proximal ICAs without significant associated luminal   narrowing.        The velocity criteria are extrapolated from diameter data as defined   by the Society of Radiologists in Ultrasound Consensus Conference   Radiology 2003; 229;340-346.        MACRO:   None        Signed by: Rob Mendes 8/13/2024 10:30 PM   Dictation workstation:   [id number]      CT ankle left wo IV contrast   Final Result   Please see above.             MACRO:   None        Signed by: Rob Mendes 8/13/2024 9:40 PM   Dictation workstation:   [id number]      XR ankle left 3+ views   Final Result   Improved alignment status post reduction, with cast material noted.   Mildly widened medial ankle mortise. Displaced trimalleolar fracture   again noted.             MACRO:   None        Signed by: Brii Ovalles 8/13/2024 8:13 PM   Dictation workstation:   [id number]      XR chest 1 view   Final Result   1.  No evidence of acute cardiopulmonary process.             Signed by: Pancho Larios 8/13/2024 5:36 PM   Dictation workstation:   [id number]      XR ankle left 3+ views   Final Result   Displaced fracture of the medial malleolus and distal fibula with   disruption of ankle mortise consistent with syndesmotic injury.        Signed by: Pancho Larios 8/13/2024 5:36 PM   Dictation workstation:   [id number]      CT head wo IV contrast   Final Result   No acute intracranial abnormality.        No evidence of cervical spine fracture or acute traumatic   malalignment.        Signed by: Pancho Larios 8/13/2024 5:34 PM   Dictation workstation:   [id number]      CT  "cervical spine wo IV contrast   Final Result   No acute intracranial abnormality.        No evidence of cervical spine fracture or acute traumatic   malalignment.        Signed by: Pancho Larios 8/13/2024 5:34 PM   Dictation workstation:   [id number]      Transthoracic Echo (TTE) Complete    (Results Pending)   Holter Or Event Cardiac Monitor    (Results Pending)       Scheduled medications  aspirin, 81 mg, oral, Daily  atorvastatin, 10 mg, oral, Nightly  ceFAZolin, 2 g, intravenous, q8h  docusate sodium, 300 mg, oral, Daily  DULoxetine, 30 mg, oral, Daily  enoxaparin, 40 mg, subcutaneous, q24h  gabapentin, 300 mg, oral, TID  levothyroxine, 75 mcg, oral, Daily  multivitamin with minerals, 1 tablet, oral, Daily  polyethylene glycol, 17 g, oral, Daily  valsartan, 40 mg, oral, Daily      Continuous medications  sodium chloride 0.9%, 75 mL/hr, Last Rate: 75 mL/hr (08/16/24 0621)      PRN medications  PRN medications: acetaminophen, acetaminophen, albuterol, magnesium hydroxide, melatonin, morphine, morphine, ondansetron ODT **OR** ondansetron, oxyCODONE         Assessment/Plan                  Principal Problem:    Closed fracture of left ankle  Active Problems:    Rheumatoid arthritis, involving unspecified site, unspecified whether rheumatoid factor present (Multi)    Pulmonary hypertension (Multi)    Closed fracture of left ankle, initial encounter    Impaired mobility and ADLs    Closed fracture of left ankle, initial encounter  - reduced in the ED  - ortho consult  - morphine for pain control  - s/p surgery  -PT/OT     Syncope  - pt describes feeling light headed  - denies any associated CP or palpitations with the syncope, however has had 2 episodes of \"heart fluttering\"   - ECHO pending  - telemetry  -  possible event monitor/Holter monitor to detect any arrhythmias that may have caused the syncope  - US carotid arteries wnl  - trend troponin, initial trop neg.      Increased urinary frequency  - denies " dysuria  - UA negative     HTN  - Hold diovan in am if still hypotensive     KELLY  - no longer using CPAP.     Code status  - FULL    DVT ppx    Dispo: acute rehab placement. ECHO pending              Fred Persaud MD

## 2024-08-16 NOTE — CARE PLAN
Problem: Pain - Adult  Goal: Verbalizes/displays adequate comfort level or baseline comfort level  Outcome: Progressing   The patient's goals for the shift include      The clinical goals for the shift include control pain

## 2024-08-16 NOTE — PROGRESS NOTES
Physical Therapy    Physical Therapy Treatment    Patient Name: Lucille Holloway  MRN: [id number]  Today's Date: 8/16/2024  Time Calculation  Start Time: 1138  Stop Time: 1153  Time Calculation (min): 15 min    Assessment/Plan   PT Assessment  End of Session Communication: Bedside nurse  Assessment Comment:  (pt demo fair - angelika to tx,reporting fatigued after OT, able to stand but not able to take steps.)  End of Session Patient Position: Up in chair, Alarm on  PT Plan  Inpatient/Swing Bed or Outpatient: Inpatient  PT Plan  Treatment/Interventions: Transfer training, Gait training, Therapeutic activity  PT Plan: Ongoing PT  PT Frequency: 5 times per week  PT Discharge Recommendations: High intensity level of continued care  Equipment Recommended upon Discharge:  (Pt owns a FWW)  PT Recommended Transfer Status: Assist x2, Assistive device (Mod assist)  PT - OK to Discharge: Yes (per PT POC)      General Visit Information:   PT  Visit  PT Received On: 08/16/24  General  Reason for Referral: Pt is a 80 y/o female L ankle ORIF d/t L ankle fracture following syncopal episode.  Prior to Session Communication: Bedside nurse  Patient Position Received: Up in chair, Alarm on  Preferred Learning Style: auditory, kinesthetic, visual  General Comment:  (pt agreeable to tx.)    Subjective   Precautions:  Precautions  LE Weight Bearing Status: Left Non-Weight Bearing  Medical Precautions: Fall precautions  Vital Signs:       Objective   Pain:  Pain Assessment  Pain Assessment: 0-10  0-10 (Numeric) Pain Score: 4  Pain Type: Acute pain  Pain Location: Ankle  Pain Orientation: Left  Cognition:     Coordination:     Postural Control:     Extremity/Trunk Assessments:    Activity Tolerance:     Treatments:                    Transfers  Transfer: Yes  Transfer 1  Transfer From 1: Sit to, Stand to  Technique 1: Sit to stand, Stand to sit  Transfer Device 1: Gait belt, Walker  Transfer Level of Assistance 1: Minimum assistance, Minimal verbal  cues  Trials/Comments 1: 3 trials (pt able to stand approx 1-2 min each trial req increased cues for proper posture and balance,able to keep LLE NWB,fair endurance noted,attempted hop step but having too much difficulty.)         Outcome Measures:  [facility] Basic Mobility  Turning from your back to your side while in a flat bed without using bedrails: A little  Moving from lying on your back to sitting on the side of a flat bed without using bedrails: A little  Moving to and from bed to chair (including a wheelchair): A lot  Standing up from a chair using your arms (e.g. wheelchair or bedside chair): A little  To walk in hospital room: A lot  Climbing 3-5 steps with railing: Total  Basic Mobility - Total Score: 14    Education Documentation  Precautions, taught by Anselmo Bedoya PTA at 8/16/2024  1:32 PM.  Learner: Patient  Readiness: Acceptance  Method: Explanation  Response: Needs Reinforcement    Body Mechanics, taught by Anselmo Bedoya PTA at 8/16/2024  1:32 PM.  Learner: Patient  Readiness: Acceptance  Method: Explanation  Response: Needs Reinforcement    Mobility Training, taught by Anselmo Bedoya PTA at 8/16/2024  1:32 PM.  Learner: Patient  Readiness: Acceptance  Method: Explanation  Response: Needs Reinforcement    Education Comments  No comments found.        OP EDUCATION:       Encounter Problems       Encounter Problems (Active)       Balance       Goal 1 (Progressing)       Start:  08/15/24    Expected End:  08/29/24       Pt performs all sitting balance with supervision and standing balance with CGA using FWW            Mobility       STG - Patient will ambulate (Not Progressing)       Start:  08/15/24    Expected End:  08/29/24       10 ft with min assist x 1 using FWW while maintaining relevant weight bearing precautions            PT Transfers       STG - Patient to transfer to and from sit to supine (Progressing)       Start:  08/15/24    Expected End:  08/29/24       With  supervision         STG - Patient will transfer sit to and from stand (Progressing)       Start:  08/15/24    Expected End:  08/29/24       With min assist x 1 using FWW            Pain - Adult

## 2024-08-16 NOTE — PROGRESS NOTES
Occupational Therapy    Occupational Therapy Treatment    Name: Lucille Holloway  MRN: [id number]  : 1945  Date: 24  Time Calculation  Start Time: 1034  Stop Time: 1115  Time Calculation (min): 41 min    Assessment:  OT Assessment: Patient requires min assist for ADL and functional transfers and would benefit from continued skilled OT to maximize functional performance and independence.  Prognosis: Excellent  Barriers to Discharge: None  Evaluation/Treatment Tolerance: Patient tolerated treatment well  Medical Staff Made Aware: Yes  End of Session Communication: Bedside nurse, PCT/NA/CTA  End of Session Patient Position: Up in chair, Alarm on (LLE elevated.  All needs within reach.)    Plan:  Treatment Interventions: ADL retraining, Functional transfer training, UE strengthening/ROM, Endurance training, Patient/family training, Neuromuscular reeducation, Equipment evaluation/education, Compensatory technique education  OT Frequency: 4 times per week  OT Discharge Recommendations: High intensity level of continued care  Equipment Recommended upon Discharge:  (Shower seat; Patient owns walker and AE for ADL)  OT Recommended Transfer Status: Minimal assist, Assist of 1  OT - OK to Discharge: Yes (Continue per OT POC)    Subjective   Previous Visit Info:  OT Last Visit  OT Received On: 24    General:  General  Family/Caregiver Present: No  Prior to Session Communication: Bedside nurse  Patient Position Received: Bed, 2 rail up, Alarm on  General Comment: Pleasant and agreeable to OT tx session.    Precautions:  LE Weight Bearing Status: Left Non-Weight Bearing  Medical Precautions: Fall precautions  Precautions Comment: IV, tele, external catheter removed for oob activity       Pain Assessment:  Pain Assessment  Pain Assessment: 0-10  0-10 (Numeric) Pain Score: 0 - No pain  Multiple Pain Sites:  (No c/o pain LLE; c/o intermittent headache (6/10); notified RN)     Objective   Cognition:  Overall Cognitive  Status: Within Functional Limits  Orientation Level: Oriented X4    Activities of Daily Living:    Grooming  Grooming Level of Assistance: Setup  Grooming Where Assessed: Edge of bed  Grooming Comments: Patient able to retrieve items from tray, open packages, and perform oral hygiene and facewashing while seated eob.    UE Bathing  UE Bathing Level of Assistance: Setup, Close supervision  UE Bathing Where Assessed: Edge of bed  UE Bathing Comments: Patient able to manage gown and bathe UB while seated eob.  No c/o fatigue or pain.    LE Bathing  LE Bathing Level of Assistance: Minimum assistance, Setup  LE Bathing Where Assessed: Edge of bed  LE Bathing Comments: Patient able to cleanse jason area, thighs, and buttocks while seated eob.  Able to reach RLE, but did not complete bathing that area.  Splint and ace wrap in place LLE.  Incr'd effort req'd for LB bathing.    LE Dressing  LE Dressing: Yes  LE Dressing Adaptive Equipment: Reacher  Adult Briefs Level of Assistance: Minimum assistance, Minimal verbal cues, Setup  LE Dressing Where Assessed: Bedsied commode  LE Dressing Comments: Instructed on use of AE to don/doff briefs.  After setup to position briefs with reacher, patient able to return demo to don briefs while seated on BSC.  Min assist to don over hips while standing.  Incr'd effort req'd.  Min verbal cues req'd to avoid holding breath during exertion.    Toileting  Toileting Level of Assistance: Minimum assistance, Setup, Minimal verbal cues  Where Assessed: Bedside commode  Toileting Comments: All items placed within reach.  Patient able to complete hygiene without assistance.  Min assist for clothing mgmt.    Functional Standing Tolerance:  Functional Standing Tolerance  Activity: ADL  Functional Standing Tolerance Comments: Patient able to tolerate standing at bedside to complete clothing mgmt after toileting and to transfer to chair with walker.  Able to maintain NWB LLE.    Bed Mobility/Transfers:  Bed Mobility  Bed Mobility: Yes  Bed Mobility 1  Bed Mobility 1: Supine to sitting  Level of Assistance 1: Minimum assistance, Minimal verbal cues, Set up  Bed Mobility Comments 1: HOB slightly elevated.  Assist req'd to move pillows from LLE and to guard LLE as patient mobilized from bed.    Transfers  Transfer: Yes  Transfer 1  Transfer From 1: Bed to  Transfer to 1: Commode-standard  Technique 1: Sit pivot, Lateral, To left  Transfer Device 1:  (no device)  Transfer Level of Assistance 1: Minimum assistance, Minimal verbal cues (1-person assist)  Trials/Comments 1: Instructed on safe technique and provided demo prior to transfer.  Patient able to return demo and maintained NWB LLE.  Transfers 2  Transfer From 2: Commode-standard to  Transfer to 2: Stand  Technique 2: Sit to stand  Transfer Device 2: Walker  Transfer Level of Assistance 2: Contact guard, Minimal verbal cues  Transfers 3  Transfer From 3: Commode-standard to  Transfer to 3: Chair with arms  Technique 3: Sit to stand, Stand to sit (Stand step transfer)  Transfer Device 3: Walker  Transfer Level of Assistance 3: Minimum assistance, Minimal verbal cues (1-person assist)    Toilet Transfers  Toilet Transfer Type: To and from  Toilet Transfer to: Standard bedside commode  Toilet Transfer Technique:  (Sit pivot without walker and stand step with walker)  Toilet Transfers: Minimal assistance, Verbal cues    Sitting Balance:  Static Sitting Balance  Static Sitting-Level of Assistance: Distant supervision    Standing Balance:  Static Standing Balance  Static Standing-Level of Assistance: Contact guard  Static Standing-Comment/Number of Minutes: WFL for ADL  Dynamic Standing Balance  Dynamic Standing-Balance: Turning  Dynamic Standing-Comments: CGA-min assist with walker; 1-person assist       Therapy/Activity: Therapeutic Activity  Therapeutic Activity Performed: Yes  Therapeutic Activity 1: Instructed on safe technique, body positioning, and hand placement  to perform transfers.  Therapeutic Activity 2: Facilitated safe participation in ADL and functional mobility.       Strength:  Strength  Strength Comments: BUE WFL       RUE   RUE : Within Functional Limits and LUE   LUE: Within Functional Limits    Outcome Measures:  [facility] Daily Activity  Putting on and taking off regular lower body clothing: A little  Bathing (including washing, rinsing, drying): A little  Putting on and taking off regular upper body clothing: A little  Toileting, which includes using toilet, bedpan or urinal: A little  Taking care of personal grooming such as brushing teeth: A little  Eating Meals: None  Daily Activity - Total Score: 19    Education Documentation  Precautions, taught by Antonio Urena OT at 8/16/2024 11:56 AM.  Learner: Patient  Readiness: Acceptance  Method: Explanation, Demonstration  Response: Verbalizes Understanding, Demonstrated Understanding    ADL Training, taught by Antonio Urena OT at 8/16/2024 11:56 AM.  Learner: Patient  Readiness: Acceptance  Method: Explanation, Demonstration  Response: Verbalizes Understanding, Demonstrated Understanding    Education Comments  No comments found.      Goals:  Encounter Problems       Encounter Problems (Active)       ADLs       Patient will perform UB sponge bathing with set up A and LB bathing with contact guard assist level of assistance and long-handled sponge. (Progressing)       Start:  08/15/24    Expected End:  08/29/24            Patient with complete upper body dressing with modified independent level of assistance donning and doffing all UE clothes with PRN adaptive equipment. (Progressing)       Start:  08/15/24    Expected End:  08/29/24            Patient with complete lower body dressing with minimal assist  level of assistance donning and doffing all LE clothes  with PRN adaptive equipment. (Progressing)       Start:  08/15/24    Expected End:  08/29/24            Patient will complete daily grooming tasks  with modified independent level of assistance and PRN adaptive equipment. (Progressing)       Start:  08/15/24    Expected End:  08/29/24            Patient will complete toileting including hygiene clothing management/hygiene with minimal assist  level of assistance and raised toilet seat and grab bars vs. BSC. (Progressing)       Start:  08/15/24    Expected End:  08/29/24               MOBILITY       Patient will perform Functional mobility x Household distances/Community Distances with contact guard assist level of assistance and least restrictive device while adhering to L LW NWB restriction in order to improve safety and functional mobility. (Progressing)       Start:  08/15/24    Expected End:  08/29/24               TRANSFERS       Patient will perform bed mobility modified independent level of assistance in order to improve safety and independence with mobility (Progressing)       Start:  08/15/24    Expected End:  08/29/24            Patient will complete functional transfers with least restrictive device with contact guard assist level of assistance while adhering to L LE NWB restriction. (Progressing)       Start:  08/15/24    Expected End:  08/29/24

## 2024-08-16 NOTE — NURSING NOTE
Interdisciplinary team present: NP, PT, NM, CC, SW, Orthopedic Coordinator, and bedside RN.  Pain - controlled  Nausea - none  Discharge barrier - has cardiology consult due to syncopal episode  Discharge plan - [facility]  Discharge date/time -pending

## 2024-08-17 ENCOUNTER — APPOINTMENT (OUTPATIENT)
Dept: CARDIOLOGY | Facility: HOSPITAL | Age: 79
DRG: 494 | End: 2024-08-17
Payer: MEDICARE

## 2024-08-17 LAB
ANION GAP SERPL CALC-SCNC: 11 MMOL/L (ref 10–20)
AORTIC VALVE MEAN GRADIENT: 2.7 MMHG
AORTIC VALVE PEAK VELOCITY: 1.13 M/S
AV PEAK GRADIENT: 5.1 MMHG
AVA (PEAK VEL): 3.17 CM2
AVA (VTI): 3.33 CM2
BUN SERPL-MCNC: 13 MG/DL (ref 6–23)
CALCIUM SERPL-MCNC: 8.4 MG/DL (ref 8.6–10.3)
CHLORIDE SERPL-SCNC: 104 MMOL/L (ref 98–107)
CO2 SERPL-SCNC: 29 MMOL/L (ref 21–32)
CREAT SERPL-MCNC: 0.76 MG/DL (ref 0.5–1.05)
EGFRCR SERPLBLD CKD-EPI 2021: 80 ML/MIN/1.73M*2
EJECTION FRACTION APICAL 4 CHAMBER: 50.8
EJECTION FRACTION: 58 %
ERYTHROCYTE [DISTWIDTH] IN BLOOD BY AUTOMATED COUNT: 13.2 % (ref 11.5–14.5)
GLUCOSE SERPL-MCNC: 92 MG/DL (ref 74–99)
HCT VFR BLD AUTO: 37.7 % (ref 36–46)
HGB BLD-MCNC: 12.1 G/DL (ref 12–16)
LEFT ATRIUM VOLUME AREA LENGTH INDEX BSA: 29.6 ML/M2
LEFT VENTRICLE INTERNAL DIMENSION DIASTOLE: 3.03 CM (ref 3.5–6)
LEFT VENTRICULAR OUTFLOW TRACT DIAMETER: 2.02 CM
MCH RBC QN AUTO: 31.2 PG (ref 26–34)
MCHC RBC AUTO-ENTMCNC: 32.1 G/DL (ref 32–36)
MCV RBC AUTO: 97 FL (ref 80–100)
MITRAL VALVE E/A RATIO: 0.59
NRBC BLD-RTO: 0 /100 WBCS (ref 0–0)
PLATELET # BLD AUTO: 235 X10*3/UL (ref 150–450)
POTASSIUM SERPL-SCNC: 3.9 MMOL/L (ref 3.5–5.3)
RBC # BLD AUTO: 3.88 X10*6/UL (ref 4–5.2)
RIGHT VENTRICLE FREE WALL PEAK S': 20 CM/S
RIGHT VENTRICLE PEAK SYSTOLIC PRESSURE: 47.2 MMHG
SODIUM SERPL-SCNC: 140 MMOL/L (ref 136–145)
TRICUSPID ANNULAR PLANE SYSTOLIC EXCURSION: 1.5 CM
WBC # BLD AUTO: 8.2 X10*3/UL (ref 4.4–11.3)

## 2024-08-17 PROCEDURE — 2500000004 HC RX 250 GENERAL PHARMACY W/ HCPCS (ALT 636 FOR OP/ED): Mod: JZ

## 2024-08-17 PROCEDURE — 80048 BASIC METABOLIC PNL TOTAL CA: CPT | Performed by: INTERNAL MEDICINE

## 2024-08-17 PROCEDURE — 2500000004 HC RX 250 GENERAL PHARMACY W/ HCPCS (ALT 636 FOR OP/ED)

## 2024-08-17 PROCEDURE — 1200000002 HC GENERAL ROOM WITH TELEMETRY DAILY

## 2024-08-17 PROCEDURE — 99232 SBSQ HOSP IP/OBS MODERATE 35: CPT | Performed by: INTERNAL MEDICINE

## 2024-08-17 PROCEDURE — 2500000001 HC RX 250 WO HCPCS SELF ADMINISTERED DRUGS (ALT 637 FOR MEDICARE OP)

## 2024-08-17 PROCEDURE — 2500000002 HC RX 250 W HCPCS SELF ADMINISTERED DRUGS (ALT 637 FOR MEDICARE OP, ALT 636 FOR OP/ED)

## 2024-08-17 PROCEDURE — 85027 COMPLETE CBC AUTOMATED: CPT | Performed by: INTERNAL MEDICINE

## 2024-08-17 PROCEDURE — 2500000001 HC RX 250 WO HCPCS SELF ADMINISTERED DRUGS (ALT 637 FOR MEDICARE OP): Performed by: INTERNAL MEDICINE

## 2024-08-17 PROCEDURE — 2500000001 HC RX 250 WO HCPCS SELF ADMINISTERED DRUGS (ALT 637 FOR MEDICARE OP): Performed by: PHARMACIST

## 2024-08-17 PROCEDURE — 36415 COLL VENOUS BLD VENIPUNCTURE: CPT | Performed by: INTERNAL MEDICINE

## 2024-08-17 PROCEDURE — 93306 TTE W/DOPPLER COMPLETE: CPT

## 2024-08-17 PROCEDURE — 93306 TTE W/DOPPLER COMPLETE: CPT | Performed by: STUDENT IN AN ORGANIZED HEALTH CARE EDUCATION/TRAINING PROGRAM

## 2024-08-17 RX ORDER — BUTALBITAL, ACETAMINOPHEN AND CAFFEINE 50; 325; 40 MG/1; MG/1; MG/1
1 TABLET ORAL ONCE
Status: COMPLETED | OUTPATIENT
Start: 2024-08-17 | End: 2024-08-17

## 2024-08-17 ASSESSMENT — COGNITIVE AND FUNCTIONAL STATUS - GENERAL
HELP NEEDED FOR BATHING: A LITTLE
CLIMB 3 TO 5 STEPS WITH RAILING: A LITTLE
MOVING FROM LYING ON BACK TO SITTING ON SIDE OF FLAT BED WITH BEDRAILS: A LITTLE
MOBILITY SCORE: 18
WALKING IN HOSPITAL ROOM: A LITTLE
STANDING UP FROM CHAIR USING ARMS: A LITTLE
MOVING TO AND FROM BED TO CHAIR: A LITTLE
TOILETING: A LITTLE
DAILY ACTIVITIY SCORE: 18
TURNING FROM BACK TO SIDE WHILE IN FLAT BAD: A LITTLE
DRESSING REGULAR LOWER BODY CLOTHING: A LITTLE
PERSONAL GROOMING: A LITTLE
EATING MEALS: A LITTLE
DRESSING REGULAR UPPER BODY CLOTHING: A LITTLE

## 2024-08-17 ASSESSMENT — PAIN - FUNCTIONAL ASSESSMENT
PAIN_FUNCTIONAL_ASSESSMENT: 0-10

## 2024-08-17 ASSESSMENT — PAIN DESCRIPTION - ORIENTATION: ORIENTATION: LEFT

## 2024-08-17 ASSESSMENT — PAIN SCALES - GENERAL
PAINLEVEL_OUTOF10: 0 - NO PAIN
PAINLEVEL_OUTOF10: 0 - NO PAIN
PAINLEVEL_OUTOF10: 3
PAINLEVEL_OUTOF10: 8
PAINLEVEL_OUTOF10: 0 - NO PAIN
PAINLEVEL_OUTOF10: 7
PAINLEVEL_OUTOF10: 0 - NO PAIN

## 2024-08-17 ASSESSMENT — PAIN DESCRIPTION - LOCATION: LOCATION: ANKLE

## 2024-08-17 ASSESSMENT — PAIN DESCRIPTION - DESCRIPTORS: DESCRIPTORS: ACHING

## 2024-08-17 NOTE — PROGRESS NOTES
Lucille Holloway is a 79 y.o. female on day 4 of admission presenting with Closed fracture of left ankle.      Subjective   Pt seen and examined.        Objective     Last Recorded Vitals  /89 (BP Location: Left arm, Patient Position: Lying)   Pulse 76   Temp 35.7 °C (96.2 °F) (Temporal)   Resp 18   Wt 88 kg (194 lb 0.1 oz)   SpO2 94%   Intake/Output last 3 Shifts:    Intake/Output Summary (Last 24 hours) at 8/17/2024 1107  Last data filed at 8/17/2024 0409  Gross per 24 hour   Intake 360 ml   Output 250 ml   Net 110 ml       Admission Weight  Weight: 88 kg (194 lb) (08/13/24 1641)    Daily Weight  08/14/24 : 88 kg (194 lb 0.1 oz)      Physical Exam  Constitutional:       Appearance: Normal appearance.      Comments: Pleasant elderly female, alert, oriented, children at bedside.   She is in no apparent distress.    HENT:      Head: Normocephalic and atraumatic.      Mouth/Throat:      Mouth: Mucous membranes are moist.   Eyes:      Extraocular Movements: Extraocular movements intact.      Conjunctiva/sclera: Conjunctivae normal.      Pupils: Pupils are equal, round, and reactive to light.   Neck:      Comments: No carotid bruit appreciated.   Cardiovascular:      Rate and Rhythm: Normal rate and regular rhythm.      Pulses: Normal pulses.      Heart sounds: Normal heart sounds.      Comments: Regular rate and rhythm, no murmur  Pulmonary:      Effort: Pulmonary effort is normal.      Breath sounds: Normal breath sounds.   Abdominal:      General: Abdomen is flat. Bowel sounds are normal.      Palpations: Abdomen is soft.   Musculoskeletal:         General: Normal range of motion.   Skin:     General: Skin is warm and dry.   Neurological:      General: No focal deficit present.      Mental Status: She is alert and oriented to person, place, and time.   Psychiatric:         Mood and Affect: Mood normal.         Behavior: Behavior normal.         Thought Content: Thought content normal.         Judgment:  Judgment normal.   Relevant Results  Results for orders placed or performed during the hospital encounter of 08/13/24 (from the past 24 hour(s))   CBC   Result Value Ref Range    WBC 8.2 4.4 - 11.3 x10*3/uL    nRBC 0.0 0.0 - 0.0 /100 WBCs    RBC 3.88 (L) 4.00 - 5.20 x10*6/uL    Hemoglobin 12.1 12.0 - 16.0 g/dL    Hematocrit 37.7 36.0 - 46.0 %    MCV 97 80 - 100 fL    MCH 31.2 26.0 - 34.0 pg    MCHC 32.1 32.0 - 36.0 g/dL    RDW 13.2 11.5 - 14.5 %    Platelets 235 150 - 450 x10*3/uL   Basic Metabolic Panel   Result Value Ref Range    Glucose 92 74 - 99 mg/dL    Sodium 140 136 - 145 mmol/L    Potassium 3.9 3.5 - 5.3 mmol/L    Chloride 104 98 - 107 mmol/L    Bicarbonate 29 21 - 32 mmol/L    Anion Gap 11 10 - 20 mmol/L    Urea Nitrogen 13 6 - 23 mg/dL    Creatinine 0.76 0.50 - 1.05 mg/dL    eGFR 80 >60 mL/min/1.73m*2    Calcium 8.4 (L) 8.6 - 10.3 mg/dL          FL less than 1 hour   Final Result      Carotid duplex bilateral   Final Result   Normal flow pattern without evidence of hemodynamically relevant   stenosis or atheromatous plaques in the visualized portions of the   carotid circulation as described above.        Mild eccentric atheromatous plaque of the carotid   bifurcations/proximal ICAs without significant associated luminal   narrowing.        The velocity criteria are extrapolated from diameter data as defined   by the Society of Radiologists in Ultrasound Consensus Conference   Radiology 2003; 229;340-346.        MACRO:   None        Signed by: Rob Mendes 8/13/2024 10:30 PM   Dictation workstation:   [id number]      CT ankle left wo IV contrast   Final Result   Please see above.             MACRO:   None        Signed by: Rob Mendes 8/13/2024 9:40 PM   Dictation workstation:   [id number]      XR ankle left 3+ views   Final Result   Improved alignment status post reduction, with cast material noted.   Mildly widened medial ankle mortise. Displaced trimalleolar fracture   again noted.             MACRO:    None        Signed by: Brii Ovalles 8/13/2024 8:13 PM   Dictation workstation:   [id number]      XR chest 1 view   Final Result   1.  No evidence of acute cardiopulmonary process.             Signed by: Pancho Larios 8/13/2024 5:36 PM   Dictation workstation:   [id number]      XR ankle left 3+ views   Final Result   Displaced fracture of the medial malleolus and distal fibula with   disruption of ankle mortise consistent with syndesmotic injury.        Signed by: Pancho Larios 8/13/2024 5:36 PM   Dictation workstation:   [id number]      CT head wo IV contrast   Final Result   No acute intracranial abnormality.        No evidence of cervical spine fracture or acute traumatic   malalignment.        Signed by: Pancho Larios 8/13/2024 5:34 PM   Dictation workstation:   [id number]      CT cervical spine wo IV contrast   Final Result   No acute intracranial abnormality.        No evidence of cervical spine fracture or acute traumatic   malalignment.        Signed by: Pancho Larios 8/13/2024 5:34 PM   Dictation workstation:   [id number]      Transthoracic Echo (TTE) Complete    (Results Pending)   Holter Or Event Cardiac Monitor    (Results Pending)       Scheduled medications  aspirin, 81 mg, oral, Daily  atorvastatin, 10 mg, oral, Nightly  ceFAZolin, 2 g, intravenous, q8h  docusate sodium, 300 mg, oral, Daily  DULoxetine, 30 mg, oral, Daily  enoxaparin, 40 mg, subcutaneous, q24h  gabapentin, 300 mg, oral, TID  levothyroxine, 75 mcg, oral, Daily  multivitamin with minerals, 1 tablet, oral, Daily  polyethylene glycol, 17 g, oral, Daily  valsartan, 40 mg, oral, Daily      Continuous medications       PRN medications  PRN medications: acetaminophen, acetaminophen, albuterol, magnesium hydroxide, melatonin, morphine, morphine, ondansetron ODT **OR** ondansetron, oxyCODONE         Assessment/Plan                  Principal Problem:    Closed fracture of left ankle  Active Problems:    Rheumatoid arthritis, involving  "unspecified site, unspecified whether rheumatoid factor present (Multi)    Pulmonary hypertension (Multi)    Closed fracture of left ankle, initial encounter    Impaired mobility and ADLs    Closed fracture of left ankle, initial encounter  - reduced in the ED  - ortho consult  - morphine for pain control  - s/p surgery  -PT/OT     Syncope  - pt describes feeling light headed  - denies any associated CP or palpitations with the syncope, however has had 2 episodes of \"heart fluttering\"   - ECHO pending  - telemetry  -  possible event monitor/Holter monitor to detect any arrhythmias that may have caused the syncope  - US carotid arteries wnl  - trend troponin, initial trop neg.      Increased urinary frequency  - denies dysuria  - UA negative     HTN  - Hold diovan in am if still hypotensive     KELLY  - no longer using CPAP.     Code status  - FULL    DVT ppx    Dispo: acute rehab placement. ECHO pending              Fred Persaud MD      "

## 2024-08-17 NOTE — CARE PLAN
The patient's goals for the shift include      The clinical goals for the shift include pain managment    Over the shift, the patient did not make progress toward the following goals. Barriers to progression include . Recommendations to address these barriers include   Problem: Pain - Adult  Goal: Verbalizes/displays adequate comfort level or baseline comfort level  Outcome: Progressing     Problem: Safety - Adult  Goal: Free from fall injury  Outcome: Progressing     Problem: Discharge Planning  Goal: Discharge to home or other facility with appropriate resources  Outcome: Progressing     Problem: Chronic Conditions and Co-morbidities  Goal: Patient's chronic conditions and co-morbidity symptoms are monitored and maintained or improved  Outcome: Progressing   .

## 2024-08-18 VITALS
SYSTOLIC BLOOD PRESSURE: 133 MMHG | TEMPERATURE: 98.8 F | WEIGHT: 194 LBS | BODY MASS INDEX: 34.38 KG/M2 | HEIGHT: 63 IN | HEART RATE: 82 BPM | DIASTOLIC BLOOD PRESSURE: 77 MMHG | OXYGEN SATURATION: 97 % | RESPIRATION RATE: 18 BRPM

## 2024-08-18 LAB
ANION GAP SERPL CALC-SCNC: 11 MMOL/L (ref 10–20)
BUN SERPL-MCNC: 14 MG/DL (ref 6–23)
CALCIUM SERPL-MCNC: 8.5 MG/DL (ref 8.6–10.3)
CHLORIDE SERPL-SCNC: 104 MMOL/L (ref 98–107)
CO2 SERPL-SCNC: 29 MMOL/L (ref 21–32)
CREAT SERPL-MCNC: 0.85 MG/DL (ref 0.5–1.05)
EGFRCR SERPLBLD CKD-EPI 2021: 70 ML/MIN/1.73M*2
ERYTHROCYTE [DISTWIDTH] IN BLOOD BY AUTOMATED COUNT: 13.2 % (ref 11.5–14.5)
GLUCOSE SERPL-MCNC: 100 MG/DL (ref 74–99)
HCT VFR BLD AUTO: 35.9 % (ref 36–46)
HGB BLD-MCNC: 11.6 G/DL (ref 12–16)
MCH RBC QN AUTO: 30.5 PG (ref 26–34)
MCHC RBC AUTO-ENTMCNC: 32.3 G/DL (ref 32–36)
MCV RBC AUTO: 95 FL (ref 80–100)
NRBC BLD-RTO: 0 /100 WBCS (ref 0–0)
PLATELET # BLD AUTO: 242 X10*3/UL (ref 150–450)
POTASSIUM SERPL-SCNC: 4 MMOL/L (ref 3.5–5.3)
RBC # BLD AUTO: 3.8 X10*6/UL (ref 4–5.2)
SODIUM SERPL-SCNC: 140 MMOL/L (ref 136–145)
WBC # BLD AUTO: 7.5 X10*3/UL (ref 4.4–11.3)

## 2024-08-18 PROCEDURE — 80048 BASIC METABOLIC PNL TOTAL CA: CPT | Performed by: INTERNAL MEDICINE

## 2024-08-18 PROCEDURE — 36415 COLL VENOUS BLD VENIPUNCTURE: CPT | Performed by: INTERNAL MEDICINE

## 2024-08-18 PROCEDURE — 85027 COMPLETE CBC AUTOMATED: CPT | Performed by: INTERNAL MEDICINE

## 2024-08-18 PROCEDURE — 2500000001 HC RX 250 WO HCPCS SELF ADMINISTERED DRUGS (ALT 637 FOR MEDICARE OP): Performed by: PHARMACIST

## 2024-08-18 PROCEDURE — 99232 SBSQ HOSP IP/OBS MODERATE 35: CPT | Performed by: INTERNAL MEDICINE

## 2024-08-18 PROCEDURE — 1200000002 HC GENERAL ROOM WITH TELEMETRY DAILY

## 2024-08-18 PROCEDURE — 2500000001 HC RX 250 WO HCPCS SELF ADMINISTERED DRUGS (ALT 637 FOR MEDICARE OP)

## 2024-08-18 PROCEDURE — 2500000002 HC RX 250 W HCPCS SELF ADMINISTERED DRUGS (ALT 637 FOR MEDICARE OP, ALT 636 FOR OP/ED)

## 2024-08-18 PROCEDURE — 2500000004 HC RX 250 GENERAL PHARMACY W/ HCPCS (ALT 636 FOR OP/ED): Mod: JZ

## 2024-08-18 PROCEDURE — 2500000004 HC RX 250 GENERAL PHARMACY W/ HCPCS (ALT 636 FOR OP/ED)

## 2024-08-18 PROCEDURE — 2500000001 HC RX 250 WO HCPCS SELF ADMINISTERED DRUGS (ALT 637 FOR MEDICARE OP): Performed by: HOSPITALIST

## 2024-08-18 PROCEDURE — 2500000001 HC RX 250 WO HCPCS SELF ADMINISTERED DRUGS (ALT 637 FOR MEDICARE OP): Performed by: INTERNAL MEDICINE

## 2024-08-18 RX ORDER — BUTALBITAL, ACETAMINOPHEN AND CAFFEINE 50; 325; 40 MG/1; MG/1; MG/1
1 TABLET ORAL ONCE
Status: COMPLETED | OUTPATIENT
Start: 2024-08-18 | End: 2024-08-18

## 2024-08-18 ASSESSMENT — PAIN DESCRIPTION - DESCRIPTORS: DESCRIPTORS: SORE

## 2024-08-18 ASSESSMENT — COGNITIVE AND FUNCTIONAL STATUS - GENERAL
DRESSING REGULAR LOWER BODY CLOTHING: A LITTLE
PERSONAL GROOMING: A LITTLE
DAILY ACTIVITIY SCORE: 19
WALKING IN HOSPITAL ROOM: A LITTLE
STANDING UP FROM CHAIR USING ARMS: A LITTLE
MOVING TO AND FROM BED TO CHAIR: A LITTLE
MOVING TO AND FROM BED TO CHAIR: A LITTLE
WALKING IN HOSPITAL ROOM: A LITTLE
MOBILITY SCORE: 19
HELP NEEDED FOR BATHING: A LITTLE
DAILY ACTIVITIY SCORE: 22
TOILETING: A LITTLE
MOBILITY SCORE: 18
CLIMB 3 TO 5 STEPS WITH RAILING: A LOT
EATING MEALS: A LITTLE
HELP NEEDED FOR BATHING: A LITTLE
STANDING UP FROM CHAIR USING ARMS: A LITTLE
TURNING FROM BACK TO SIDE WHILE IN FLAT BAD: A LITTLE
CLIMB 3 TO 5 STEPS WITH RAILING: A LOT
TOILETING: A LITTLE

## 2024-08-18 ASSESSMENT — PAIN SCALES - GENERAL
PAINLEVEL_OUTOF10: 8
PAINLEVEL_OUTOF10: 0 - NO PAIN
PAINLEVEL_OUTOF10: 1
PAINLEVEL_OUTOF10: 0 - NO PAIN
PAINLEVEL_OUTOF10: 0 - NO PAIN
PAINLEVEL_OUTOF10: 2

## 2024-08-18 ASSESSMENT — PAIN - FUNCTIONAL ASSESSMENT
PAIN_FUNCTIONAL_ASSESSMENT: 0-10

## 2024-08-18 NOTE — CARE PLAN
The patient's goals for the shift include      The clinical goals for the shift include Maintain patient safety and manage patient's pain throughout the shift    Over the shift, the patient did make progress toward the goal.

## 2024-08-18 NOTE — PROGRESS NOTES
Care Coordinator Note:    Plan: echo completed yesterday. S/P R ankle ORIF. Cleared for dc. Waiting on insurance auth. Started 8/15 for [facility]. TCC sent to escalation team to review. PT OT to see patient first thing tomororw.     Status: inpatient  Payor: Medicare advantage  Disposition: [facility]- auth pending  Barrier: auth  ADOD: oscar Marteistine Casey TCC      08/18/24 1053   Discharge Planning   Expected Discharge Disposition IRF

## 2024-08-18 NOTE — PROGRESS NOTES
Lucille Holloway is a 79 y.o. female on day 5 of admission presenting with Closed fracture of left ankle.      Subjective   Pt seen and examined.        Objective     Last Recorded Vitals  /76 (BP Location: Left arm, Patient Position: Lying)   Pulse 71   Temp 36.2 °C (97.1 °F) (Temporal)   Resp 17   Wt 88 kg (194 lb 0.1 oz)   SpO2 94%   Intake/Output last 3 Shifts:    Intake/Output Summary (Last 24 hours) at 8/18/2024 1151  Last data filed at 8/18/2024 0700  Gross per 24 hour   Intake 1080 ml   Output 600 ml   Net 480 ml       Admission Weight  Weight: 88 kg (194 lb) (08/13/24 1641)    Daily Weight  08/14/24 : 88 kg (194 lb 0.1 oz)      Physical Exam  Constitutional:       Appearance: Normal appearance.      Comments: Pleasant elderly female, alert, oriented, children at bedside.   She is in no apparent distress.    HENT:      Head: Normocephalic and atraumatic.      Mouth/Throat:      Mouth: Mucous membranes are moist.   Eyes:      Extraocular Movements: Extraocular movements intact.      Conjunctiva/sclera: Conjunctivae normal.      Pupils: Pupils are equal, round, and reactive to light.   Neck:      Comments: No carotid bruit appreciated.   Cardiovascular:      Rate and Rhythm: Normal rate and regular rhythm.      Pulses: Normal pulses.      Heart sounds: Normal heart sounds.      Comments: Regular rate and rhythm, no murmur  Pulmonary:      Effort: Pulmonary effort is normal.      Breath sounds: Normal breath sounds.   Abdominal:      General: Abdomen is flat. Bowel sounds are normal.      Palpations: Abdomen is soft.   Musculoskeletal:         General: Normal range of motion.   Skin:     General: Skin is warm and dry.   Neurological:      General: No focal deficit present.      Mental Status: She is alert and oriented to person, place, and time.   Psychiatric:         Mood and Affect: Mood normal.         Behavior: Behavior normal.         Thought Content: Thought content normal.         Judgment:  Judgment normal.   Relevant Results  Results for orders placed or performed during the hospital encounter of 08/13/24 (from the past 24 hour(s))   Transthoracic Echo (TTE) Complete   Result Value Ref Range    AV pk roselia 1.13 m/s    AV mn grad 2.7 mmHg    LVOT diam 2.02 cm    MV E/A ratio 0.59     LA vol index A/L 29.6 ml/m2    Tricuspid annular plane systolic excursion 1.5 cm    LV EF 58 %    RV free wall pk S' 20.00 cm/s    LVIDd 3.03 cm    RVSP 47.2 mmHg    Aortic Valve Area by Continuity of VTI 3.33 cm2    Aortic Valve Area by Continuity of Peak Velocity 3.17 cm2    AV pk grad 5.1 mmHg    LV A4C EF 50.8    CBC   Result Value Ref Range    WBC 7.5 4.4 - 11.3 x10*3/uL    nRBC 0.0 0.0 - 0.0 /100 WBCs    RBC 3.80 (L) 4.00 - 5.20 x10*6/uL    Hemoglobin 11.6 (L) 12.0 - 16.0 g/dL    Hematocrit 35.9 (L) 36.0 - 46.0 %    MCV 95 80 - 100 fL    MCH 30.5 26.0 - 34.0 pg    MCHC 32.3 32.0 - 36.0 g/dL    RDW 13.2 11.5 - 14.5 %    Platelets 242 150 - 450 x10*3/uL   Basic Metabolic Panel   Result Value Ref Range    Glucose 100 (H) 74 - 99 mg/dL    Sodium 140 136 - 145 mmol/L    Potassium 4.0 3.5 - 5.3 mmol/L    Chloride 104 98 - 107 mmol/L    Bicarbonate 29 21 - 32 mmol/L    Anion Gap 11 10 - 20 mmol/L    Urea Nitrogen 14 6 - 23 mg/dL    Creatinine 0.85 0.50 - 1.05 mg/dL    eGFR 70 >60 mL/min/1.73m*2    Calcium 8.5 (L) 8.6 - 10.3 mg/dL          Transthoracic Echo (TTE) Complete   Final Result      FL less than 1 hour   Final Result      Carotid duplex bilateral   Final Result   Normal flow pattern without evidence of hemodynamically relevant   stenosis or atheromatous plaques in the visualized portions of the   carotid circulation as described above.        Mild eccentric atheromatous plaque of the carotid   bifurcations/proximal ICAs without significant associated luminal   narrowing.        The velocity criteria are extrapolated from diameter data as defined   by the Society of Radiologists in Ultrasound Consensus Conference    Radiology 2003; 229;340-346.        MACRO:   None        Signed by: Rob Mendes 8/13/2024 10:30 PM   Dictation workstation:   [id number]      CT ankle left wo IV contrast   Final Result   Please see above.             MACRO:   None        Signed by: Rob Mendes 8/13/2024 9:40 PM   Dictation workstation:   [id number]      XR ankle left 3+ views   Final Result   Improved alignment status post reduction, with cast material noted.   Mildly widened medial ankle mortise. Displaced trimalleolar fracture   again noted.             MACRO:   None        Signed by: Brii Ovalles 8/13/2024 8:13 PM   Dictation workstation:   [id number]      XR chest 1 view   Final Result   1.  No evidence of acute cardiopulmonary process.             Signed by: Pancho Larios 8/13/2024 5:36 PM   Dictation workstation:   [id number]      XR ankle left 3+ views   Final Result   Displaced fracture of the medial malleolus and distal fibula with   disruption of ankle mortise consistent with syndesmotic injury.        Signed by: Pancho Larios 8/13/2024 5:36 PM   Dictation workstation:   [id number]      CT head wo IV contrast   Final Result   No acute intracranial abnormality.        No evidence of cervical spine fracture or acute traumatic   malalignment.        Signed by: Pancho Larios 8/13/2024 5:34 PM   Dictation workstation:   [id number]      CT cervical spine wo IV contrast   Final Result   No acute intracranial abnormality.        No evidence of cervical spine fracture or acute traumatic   malalignment.        Signed by: Pancho Larios 8/13/2024 5:34 PM   Dictation workstation:   [id number]      Holter Or Event Cardiac Monitor    (Results Pending)       Scheduled medications  aspirin, 81 mg, oral, Daily  atorvastatin, 10 mg, oral, Nightly  ceFAZolin, 2 g, intravenous, q8h  docusate sodium, 300 mg, oral, Daily  DULoxetine, 30 mg, oral, Daily  enoxaparin, 40 mg, subcutaneous, q24h  gabapentin, 300 mg, oral, TID  levothyroxine, 75 mcg, oral,  "Daily  multivitamin with minerals, 1 tablet, oral, Daily  polyethylene glycol, 17 g, oral, Daily  valsartan, 40 mg, oral, Daily      Continuous medications       PRN medications  PRN medications: acetaminophen, acetaminophen, albuterol, magnesium hydroxide, melatonin, morphine, morphine, ondansetron ODT **OR** ondansetron, oxyCODONE         Assessment/Plan                  Principal Problem:    Closed fracture of left ankle  Active Problems:    Rheumatoid arthritis, involving unspecified site, unspecified whether rheumatoid factor present (Multi)    Pulmonary hypertension (Multi)    Closed fracture of left ankle, initial encounter    Impaired mobility and ADLs    Closed fracture of left ankle, initial encounter  - reduced in the ED  - ortho consult  - morphine for pain control  - s/p surgery  -PT/OT     Syncope  - pt describes feeling light headed  - denies any associated CP or palpitations with the syncope, however has had 2 episodes of \"heart fluttering\"   - ECHO noted  - telemetry  -  possible event monitor/Holter monitor to detect any arrhythmias that may have caused the syncope  - US carotid arteries wnl  - trend troponin, initial trop neg.      Increased urinary frequency  - denies dysuria  - UA negative     HTN  - Hold diovan in am if still hypotensive     KELLY  - no longer using CPAP.     Code status  - FULL    DVT ppx    Dispo: acute rehab placement              Fred Persaud MD      "

## 2024-08-18 NOTE — CARE PLAN
The patient's goals for the shift include      The clinical goals for the shift include Maintain patient safety and manage patient's pain throughout the shift    Over the shift, the patient did not make progress toward the following goals. Barriers to progression include . Recommendations to address these barriers include   Problem: Pain - Adult  Goal: Verbalizes/displays adequate comfort level or baseline comfort level  Outcome: Progressing     Problem: Safety - Adult  Goal: Free from fall injury  Outcome: Progressing     Problem: Discharge Planning  Goal: Discharge to home or other facility with appropriate resources  Outcome: Progressing     Problem: Chronic Conditions and Co-morbidities  Goal: Patient's chronic conditions and co-morbidity symptoms are monitored and maintained or improved  Outcome: Progressing   .

## 2024-08-18 NOTE — TREATMENT PLAN
Cardiology Consult Service Follow up   -- Patient unable to be seen due to heavy consultation load.   -- Echocardiogram reviewed --. LVEF preserved   -- RVSP 47 --> Mild to Moderately Elevated   -- Carotid negative for High Grade stenosis.   -- No further recommendations at this time   ++ Continue CPAP  ++ Continue Blood Pressure control   += Event Monitor already ordered     Cardiology to sign off at this time      Gustavo Arvizu DO   Division of Cardiovascular Medicine  [facility]            [phone]

## 2024-08-19 ENCOUNTER — APPOINTMENT (OUTPATIENT)
Dept: CARDIOLOGY | Facility: HOSPITAL | Age: 79
DRG: 494 | End: 2024-08-19
Payer: MEDICARE

## 2024-08-19 PROCEDURE — 2500000004 HC RX 250 GENERAL PHARMACY W/ HCPCS (ALT 636 FOR OP/ED): Mod: JZ

## 2024-08-19 PROCEDURE — 2500000004 HC RX 250 GENERAL PHARMACY W/ HCPCS (ALT 636 FOR OP/ED)

## 2024-08-19 PROCEDURE — 97535 SELF CARE MNGMENT TRAINING: CPT | Mod: GO

## 2024-08-19 PROCEDURE — 2500000001 HC RX 250 WO HCPCS SELF ADMINISTERED DRUGS (ALT 637 FOR MEDICARE OP): Performed by: PHARMACIST

## 2024-08-19 PROCEDURE — 2500000001 HC RX 250 WO HCPCS SELF ADMINISTERED DRUGS (ALT 637 FOR MEDICARE OP): Performed by: INTERNAL MEDICINE

## 2024-08-19 PROCEDURE — 97530 THERAPEUTIC ACTIVITIES: CPT | Mod: GP,CQ

## 2024-08-19 PROCEDURE — 2500000001 HC RX 250 WO HCPCS SELF ADMINISTERED DRUGS (ALT 637 FOR MEDICARE OP)

## 2024-08-19 PROCEDURE — 99232 SBSQ HOSP IP/OBS MODERATE 35: CPT | Performed by: INTERNAL MEDICINE

## 2024-08-19 PROCEDURE — 93005 ELECTROCARDIOGRAM TRACING: CPT

## 2024-08-19 PROCEDURE — 2500000002 HC RX 250 W HCPCS SELF ADMINISTERED DRUGS (ALT 637 FOR MEDICARE OP, ALT 636 FOR OP/ED)

## 2024-08-19 PROCEDURE — 1200000002 HC GENERAL ROOM WITH TELEMETRY DAILY

## 2024-08-19 PROCEDURE — 97116 GAIT TRAINING THERAPY: CPT | Mod: GP,CQ

## 2024-08-19 RX ORDER — ENOXAPARIN SODIUM 100 MG/ML
40 INJECTION SUBCUTANEOUS EVERY 24 HOURS
Start: 2024-08-19 | End: 2024-08-19 | Stop reason: HOSPADM

## 2024-08-19 RX ORDER — ASPIRIN 81 MG/1
81 TABLET ORAL 2 TIMES DAILY
Qty: 60 TABLET | Refills: 0 | Status: SHIPPED | OUTPATIENT
Start: 2024-08-19 | End: 2024-09-18

## 2024-08-19 ASSESSMENT — COGNITIVE AND FUNCTIONAL STATUS - GENERAL
MOBILITY SCORE: 21
TURNING FROM BACK TO SIDE WHILE IN FLAT BAD: A LITTLE
MOVING TO AND FROM BED TO CHAIR: A LITTLE
WALKING IN HOSPITAL ROOM: A LITTLE
HELP NEEDED FOR BATHING: A LITTLE
MOVING TO AND FROM BED TO CHAIR: A LITTLE
HELP NEEDED FOR BATHING: A LITTLE
HELP NEEDED FOR BATHING: A LITTLE
TOILETING: A LITTLE
DRESSING REGULAR LOWER BODY CLOTHING: A LITTLE
TOILETING: A LITTLE
STANDING UP FROM CHAIR USING ARMS: A LITTLE
WALKING IN HOSPITAL ROOM: A LITTLE
STANDING UP FROM CHAIR USING ARMS: A LITTLE
CLIMB 3 TO 5 STEPS WITH RAILING: A LITTLE
CLIMB 3 TO 5 STEPS WITH RAILING: TOTAL
MOBILITY SCORE: 14
DRESSING REGULAR LOWER BODY CLOTHING: A LITTLE
PERSONAL GROOMING: A LITTLE
CLIMB 3 TO 5 STEPS WITH RAILING: A LOT
MOBILITY SCORE: 18
MOVING TO AND FROM BED TO CHAIR: A LITTLE
DAILY ACTIVITIY SCORE: 21
DAILY ACTIVITIY SCORE: 19
EATING MEALS: A LITTLE
DRESSING REGULAR LOWER BODY CLOTHING: A LITTLE
MOVING FROM LYING ON BACK TO SITTING ON SIDE OF FLAT BED WITH BEDRAILS: A LITTLE
WALKING IN HOSPITAL ROOM: TOTAL
DAILY ACTIVITIY SCORE: 21
TOILETING: A LITTLE
TURNING FROM BACK TO SIDE WHILE IN FLAT BAD: A LITTLE

## 2024-08-19 ASSESSMENT — PAIN SCALES - GENERAL
PAINLEVEL_OUTOF10: 0 - NO PAIN
PAINLEVEL_OUTOF10: 2
PAINLEVEL_OUTOF10: 0 - NO PAIN
PAINLEVEL_OUTOF10: 2

## 2024-08-19 ASSESSMENT — ACTIVITIES OF DAILY LIVING (ADL): HOME_MANAGEMENT_TIME_ENTRY: 18

## 2024-08-19 ASSESSMENT — PAIN - FUNCTIONAL ASSESSMENT
PAIN_FUNCTIONAL_ASSESSMENT: 0-10

## 2024-08-19 ASSESSMENT — PAIN DESCRIPTION - ORIENTATION: ORIENTATION: LEFT

## 2024-08-19 ASSESSMENT — PAIN DESCRIPTION - LOCATION: LOCATION: ANKLE

## 2024-08-19 NOTE — PROGRESS NOTES
08/19/24 1716   Current Planned Discharge Disposition   Current Planned Discharge Disposition Rehab     Still waiting on  insurance  decision   on  AR    Pt  given updated  SNF list  - pt  wants  facilities in close vicinity  to home. Sw will get choices in AM    SUREKHA Ivory, ELVERW

## 2024-08-19 NOTE — PROGRESS NOTES
Lucille Holloway is a 79 y.o. female on day 6 of admission presenting with Closed fracture of left ankle.      Subjective   Pt seen and examined.        Objective     Last Recorded Vitals  /71 (BP Location: Right arm, Patient Position: Sitting)   Pulse 86   Temp 36.6 °C (97.9 °F)   Resp 16   Wt 88 kg (194 lb 0.1 oz)   SpO2 98%   Intake/Output last 3 Shifts:    Intake/Output Summary (Last 24 hours) at 8/19/2024 1141  Last data filed at 8/19/2024 0955  Gross per 24 hour   Intake 680 ml   Output 1000 ml   Net -320 ml       Admission Weight  Weight: 88 kg (194 lb) (08/13/24 1641)    Daily Weight  08/14/24 : 88 kg (194 lb 0.1 oz)      Physical Exam   constitutional: No acute distress, awake, alert  Head/Neck: Neck supple  Respiratory/Thorax: Lungs are Clear to auscultation  Cardiovascular: Regular, rate and rhythm,  2+ equal pulses of the extremities, normal S 1and S 2  Gastrointestinal: Nondistended, soft, non-tender, no rebound tenderness or guarding  Extremities: No edema. No calf tenderness.  Neurological: Awake and alert. No focal neurological deficits  Psychological: Appropriate mood and behavior    Relevant Results  No results found for this or any previous visit (from the past 24 hour(s)).         Transthoracic Echo (TTE) Complete   Final Result      FL less than 1 hour   Final Result      Carotid duplex bilateral   Final Result   Normal flow pattern without evidence of hemodynamically relevant   stenosis or atheromatous plaques in the visualized portions of the   carotid circulation as described above.        Mild eccentric atheromatous plaque of the carotid   bifurcations/proximal ICAs without significant associated luminal   narrowing.        The velocity criteria are extrapolated from diameter data as defined   by the Society of Radiologists in Ultrasound Consensus Conference   Radiology 2003; 229;340-346.        MACRO:   None        Signed by: Rob Mendes 8/13/2024 10:30 PM   Dictation  workstation:   [id number]      CT ankle left wo IV contrast   Final Result   Please see above.             MACRO:   None        Signed by: Rob Mendes 8/13/2024 9:40 PM   Dictation workstation:   [id number]      XR ankle left 3+ views   Final Result   Improved alignment status post reduction, with cast material noted.   Mildly widened medial ankle mortise. Displaced trimalleolar fracture   again noted.             MACRO:   None        Signed by: Brii Ovalles 8/13/2024 8:13 PM   Dictation workstation:   [id number]      XR chest 1 view   Final Result   1.  No evidence of acute cardiopulmonary process.             Signed by: Pancho Larios 8/13/2024 5:36 PM   Dictation workstation:   [id number]      XR ankle left 3+ views   Final Result   Displaced fracture of the medial malleolus and distal fibula with   disruption of ankle mortise consistent with syndesmotic injury.        Signed by: Pancho Larios 8/13/2024 5:36 PM   Dictation workstation:   [id number]      CT head wo IV contrast   Final Result   No acute intracranial abnormality.        No evidence of cervical spine fracture or acute traumatic   malalignment.        Signed by: Pancho Larios 8/13/2024 5:34 PM   Dictation workstation:   [id number]      CT cervical spine wo IV contrast   Final Result   No acute intracranial abnormality.        No evidence of cervical spine fracture or acute traumatic   malalignment.        Signed by: Pancho Larios 8/13/2024 5:34 PM   Dictation workstation:   [id number]      Holter Or Event Cardiac Monitor    (Results Pending)       Scheduled medications  aspirin, 81 mg, oral, Daily  atorvastatin, 10 mg, oral, Nightly  docusate sodium, 300 mg, oral, Daily  DULoxetine, 30 mg, oral, Daily  enoxaparin, 40 mg, subcutaneous, q24h  gabapentin, 300 mg, oral, TID  levothyroxine, 75 mcg, oral, Daily  multivitamin with minerals, 1 tablet, oral, Daily  polyethylene glycol, 17 g, oral, Daily  valsartan, 40 mg, oral, Daily      Continuous  "medications       PRN medications  PRN medications: acetaminophen, acetaminophen, albuterol, magnesium hydroxide, melatonin, morphine, morphine, ondansetron ODT **OR** ondansetron, oxyCODONE         Assessment/Plan                  Principal Problem:    Closed fracture of left ankle  Active Problems:    Rheumatoid arthritis, involving unspecified site, unspecified whether rheumatoid factor present (Multi)    Pulmonary hypertension (Multi)    Closed fracture of left ankle, initial encounter    Impaired mobility and ADLs    Closed fracture of left ankle, initial encounter  - reduced in the ED  - ortho consult  - morphine for pain control  - s/p surgery  -PT/OT     Syncope  - pt describes feeling light headed  - denies any associated CP or palpitations with the syncope, however has had 2 episodes of \"heart fluttering\"   - ECHO noted  - telemetry  -  possible event monitor/Holter monitor to detect any arrhythmias that may have caused the syncope  - US carotid arteries wnl  - trend troponin, initial trop neg.      Increased urinary frequency  - denies dysuria  - UA negative     HTN  - Hold diovan in am if still hypotensive     KELLY  - no longer using CPAP.     Code status  - FULL    DVT ppx    Dispo: acute rehab placement              Fred Persaud MD      "

## 2024-08-19 NOTE — PROGRESS NOTES
Physical Therapy    Physical Therapy Treatment    Patient Name: Lucille Holloway  MRN: [id number]  Today's Date: 8/19/2024  Time Calculation  Start Time: 0955  Stop Time: 1020  Time Calculation (min): 25 min    Assessment/Plan   PT Assessment  End of Session Communication: Bedside nurse  Assessment Comment: pt able to hop from bed to chair  End of Session Patient Position: Alarm on, Up in chair  PT Plan  Inpatient/Swing Bed or Outpatient: Inpatient  PT Plan  Treatment/Interventions: Transfer training, Gait training  PT Plan: Ongoing PT  PT Frequency: 5 times per week  PT Discharge Recommendations: High intensity level of continued care  Equipment Recommended upon Discharge:  (Pt owns a FWW)  PT Recommended Transfer Status: Assist x1  PT - OK to Discharge: Yes (per POC)      General Visit Information:   PT  Visit  PT Received On: 08/19/24  General  Reason for Referral: Pt is a 78 y/o female L ankle ORIF d/t L ankle fracture following syncopal episode.  Referred By: Devin ()  Prior to Session Communication: Bedside nurse  Patient Position Received: Up in chair, Alarm on  Preferred Learning Style: auditory, kinesthetic, visual  General Comment: pt agreeable to tx    Subjective   Precautions:  Precautions  LE Weight Bearing Status: Left Non-Weight Bearing  Medical Precautions: Fall precautions  Vital Signs:       Objective   Pain:  Pain Assessment  0-10 (Numeric) Pain Score: 0 - No pain (increased with activity but not rated numerically)  Pain Type: Surgical pain  Pain Location: Ankle  Pain Orientation: Left  Pain Interventions: Medication (See MAR)  Cognition:  Cognition  Overall Cognitive Status: Within Functional Limits  Coordination:     Postural Control:  Static Sitting Balance  Static Sitting-Comment/Number of Minutes: pt performed at EOB with S, pt performed for prolonged period of time  Extremity/Trunk Assessments:    Activity Tolerance:     Treatments:                 Ambulation/Gait Training  1  Comments/Distance (ft) 1: pt able to perform small hops this date with CGA.  pt req rest break.  bed <>chair  Transfer 1  Technique 1: Sit to stand, Stand to sit  Transfer Device 1: Walker  Transfer Level of Assistance 1: Contact guard  Trials/Comments 1: vc/tc for hand placment on solid sitting surface and not RW. pt performed x2    Outcome Measures:  [facility] Basic Mobility  Turning from your back to your side while in a flat bed without using bedrails: A little  Moving from lying on your back to sitting on the side of a flat bed without using bedrails: A little  Moving to and from bed to chair (including a wheelchair): A little  Standing up from a chair using your arms (e.g. wheelchair or bedside chair): A little  To walk in hospital room: Total  Climbing 3-5 steps with railing: Total  Basic Mobility - Total Score: 14    Education Documentation  Mobility Training, taught by Rob Hart PTA at 8/19/2024 10:30 AM.  Learner: Patient  Readiness: Acceptance  Method: Explanation  Response: Verbalizes Understanding    Education Comments  No comments found.        OP EDUCATION:  Outpatient Education  Education Comment: educated pt on importance of OOB activity    Encounter Problems       Encounter Problems (Active)       Balance       Goal 1 (Progressing)       Start:  08/15/24    Expected End:  08/29/24       Pt performs all sitting balance with supervision and standing balance with CGA using FWW            Mobility       STG - Patient will ambulate (Progressing)       Start:  08/15/24    Expected End:  08/29/24       10 ft with min assist x 1 using FWW while maintaining relevant weight bearing precautions            PT Transfers       STG - Patient to transfer to and from sit to supine (Progressing)       Start:  08/15/24    Expected End:  08/29/24       With supervision         STG - Patient will transfer sit to and from stand (Progressing)       Start:  08/15/24    Expected End:  08/29/24       With min assist  x 1 using FWW            Pain - Adult

## 2024-08-19 NOTE — PROGRESS NOTES
Occupational Therapy    OT Treatment    Patient Name: Lucille Holloway  MRN: [id number]  Today's Date: 8/19/2024  Time Calculation  Start Time: 0852  Stop Time: 0910  Time Calculation (min): 18 min           Plan:  Treatment Interventions: ADL retraining, Functional transfer training  OT Frequency: 4 times per week  OT Discharge Recommendations: High intensity level of continued care  Equipment Recommended upon Discharge:  (Shower seat; Patient owns walker and AE for ADL)  OT Recommended Transfer Status: Minimal assist  OT - OK to Discharge: Yes  Treatment Interventions: ADL retraining, Functional transfer training    Subjective   Previous Visit Info:  OT Last Visit  OT Received On: 08/19/24  General:  General  Reason for Referral: Pt is a 78 y/o female L ankle ORIF d/t L ankle fracture following syncopal episode.  Referred By: Devin LITTLE)  Past Medical History Relevant to Rehab:   Past Medical History:   Diagnosis Date    Acute upper respiratory infection, unspecified 03/05/2019    Acute URI    Bariatric surgery status 11/20/2019    Bariatric surgery status    Encounter for general adult medical examination without abnormal findings 08/05/2021    Encounter for preventive health examination    Other general symptoms and signs 03/05/2019    Flu-like symptoms    Other headache syndrome 06/25/2024    Comment on above: HAD A FALL SATURDAY NIGHT / BAD HEADACHE, BUMP HEAD    Other neuromuscular dysfunction of bladder     Hyperactivity of bladder    Pain in right foot 09/17/2015    Foot pain, right    Personal history of other diseases of the circulatory system     History of hypertension    Personal history of other diseases of the respiratory system 03/05/2019    History of acute sinusitis    Personal history of other endocrine, nutritional and metabolic disease     History of thyroid disease    Personal history of other infectious and parasitic diseases 08/28/2019    History of hepatitis    Plantar fascial fibromatosis  09/21/2015    Plantar fasciitis, right    Syncope and collapse 10/16/2015    Vasovagal syncope    Vitamin D deficiency, unspecified 08/28/2019    Mild vitamin D deficiency     Past Surgical History:   Procedure Laterality Date    BLADDER SURGERY  08/28/2019    Bladder Surgery    BREAST RECONSTRUCTION  01/01/2019    bilateral breast reduction    CARPAL TUNNEL RELEASE  08/28/2019    Neuroplasty Decompression Median Nerve At Carpal Tunnel    HYSTERECTOMY  08/28/2019    Hysterectomy    OTHER SURGICAL HISTORY  08/28/2019    Carpal tunnel surgery    REDUCTION MAMMAPLASTY      TOTAL HIP ARTHROPLASTY  07/16/2019    Total Hip Replacement    TOTAL KNEE ARTHROPLASTY Right 12/30/2022       Family/Caregiver Present: No  Prior to Session Communication: Bedside nurse  Patient Position Received: Up in chair, Alarm on  Preferred Learning Style: auditory, kinesthetic, visual  General Comment: Pt. cleared for OT session, pt. up in chair, alarm on. Pt. L LE cast. Pt. pleasant and agreeable to OT session.  Precautions:  LE Weight Bearing Status: Left Non-Weight Bearing  Medical Precautions: Fall precautions  Vital Signs:     Pain:  Pain Assessment  0-10 (Numeric) Pain Score: 0 - No pain  Pain Type: Surgical pain  Pain Location: Ankle  Pain Orientation: Left  Pain Interventions: Medication (See MAR)    Objective    Cognition:  Cognition  Overall Cognitive Status: Within Functional Limits  Orientation Level: Oriented X4  Insight: Within function limits  Impulsive: Within functional limits    Activities of Daily Living: LE Dressing  LE Dressing: Yes  Pants Level of Assistance: Minimum assistance  LE Dressing Where Assessed: Recliner  LE Dressing Comments: Pt. required Min A to thread L LE through hospital pants while seated in recliner chair.  Functional Standing Tolerance:     Bed Mobility/Transfers:      Transfer 1  Technique 1: Sit to stand, Stand to sit  Transfer Device 1: Walker  Transfer Level of Assistance 1: Contact  guard  Trials/Comments 1: verbal cues for appropriate hand placement and NWBing status      Outcome Measures:[facility] Daily Activity  Putting on and taking off regular lower body clothing: A little  Bathing (including washing, rinsing, drying): A little  Putting on and taking off regular upper body clothing: None  Toileting, which includes using toilet, bedpan or urinal: A little  Taking care of personal grooming such as brushing teeth: None  Eating Meals: None  Daily Activity - Total Score: 21        Education Documentation  Body Mechanics, taught by Flor Almanzar OT at 8/19/2024 11:18 AM.  Learner: Patient  Readiness: Acceptance  Method: Explanation, Demonstration  Response: Verbalizes Understanding, Demonstrated Understanding    Precautions, taught by Flor Almanzar OT at 8/19/2024 11:18 AM.  Learner: Patient  Readiness: Acceptance  Method: Explanation, Demonstration  Response: Verbalizes Understanding, Demonstrated Understanding    ADL Training, taught by Flor Almanzar OT at 8/19/2024 11:18 AM.  Learner: Patient  Readiness: Acceptance  Method: Explanation, Demonstration  Response: Verbalizes Understanding, Demonstrated Understanding    Education Comments  No comments found.        OP EDUCATION:  Education  Individual(s) Educated: Patient  Education Provided: Diagnosis & Precautions, Fall precautons, Risk and benefits of OT discussed with patient or other, POC discussed and agreed upon  Risk and Benefits Discussed with Patient/Caregiver/Other: yes  Patient/Caregiver Demonstrated Understanding: yes  Plan of Care Discussed and Agreed Upon: yes  Patient Response to Education: Patient/Caregiver Verbalized Understanding of Information, Patient/Caregiver Performed Return Demonstration of Exercises/Activities, Patient/Caregiver Asked Appropriate Questions    Goals:  Encounter Problems       Encounter Problems (Active)       ADLs       Patient will perform UB sponge bathing with set up A and LB bathing with contact  guard assist level of assistance and long-handled sponge. (Progressing)       Start:  08/15/24    Expected End:  08/29/24            Patient with complete upper body dressing with modified independent level of assistance donning and doffing all UE clothes with PRN adaptive equipment. (Progressing)       Start:  08/15/24    Expected End:  08/29/24            Patient with complete lower body dressing with minimal assist  level of assistance donning and doffing all LE clothes  with PRN adaptive equipment. (Progressing)       Start:  08/15/24    Expected End:  08/29/24            Patient will complete daily grooming tasks with modified independent level of assistance and PRN adaptive equipment. (Progressing)       Start:  08/15/24    Expected End:  08/29/24            Patient will complete toileting including hygiene clothing management/hygiene with minimal assist  level of assistance and raised toilet seat and grab bars vs. BSC. (Progressing)       Start:  08/15/24    Expected End:  08/29/24               MOBILITY       Patient will perform Functional mobility x Household distances/Community Distances with contact guard assist level of assistance and least restrictive device while adhering to L LW NWB restriction in order to improve safety and functional mobility. (Progressing)       Start:  08/15/24    Expected End:  08/29/24               TRANSFERS       Patient will perform bed mobility modified independent level of assistance in order to improve safety and independence with mobility (Progressing)       Start:  08/15/24    Expected End:  08/29/24            Patient will complete functional transfers with least restrictive device with contact guard assist level of assistance while adhering to L LE NWB restriction. (Progressing)       Start:  08/15/24    Expected End:  08/29/24

## 2024-08-20 LAB
ANION GAP SERPL CALC-SCNC: 8 MMOL/L (ref 10–20)
BUN SERPL-MCNC: 18 MG/DL (ref 6–23)
CALCIUM SERPL-MCNC: 8.3 MG/DL (ref 8.6–10.3)
CHLORIDE SERPL-SCNC: 104 MMOL/L (ref 98–107)
CO2 SERPL-SCNC: 30 MMOL/L (ref 21–32)
CREAT SERPL-MCNC: 0.82 MG/DL (ref 0.5–1.05)
EGFRCR SERPLBLD CKD-EPI 2021: 73 ML/MIN/1.73M*2
ERYTHROCYTE [DISTWIDTH] IN BLOOD BY AUTOMATED COUNT: 13.2 % (ref 11.5–14.5)
GLUCOSE SERPL-MCNC: 96 MG/DL (ref 74–99)
HCT VFR BLD AUTO: 33.4 % (ref 36–46)
HGB BLD-MCNC: 11 G/DL (ref 12–16)
MCH RBC QN AUTO: 30.6 PG (ref 26–34)
MCHC RBC AUTO-ENTMCNC: 32.9 G/DL (ref 32–36)
MCV RBC AUTO: 93 FL (ref 80–100)
NRBC BLD-RTO: 0 /100 WBCS (ref 0–0)
PLATELET # BLD AUTO: 261 X10*3/UL (ref 150–450)
POTASSIUM SERPL-SCNC: 4 MMOL/L (ref 3.5–5.3)
RBC # BLD AUTO: 3.6 X10*6/UL (ref 4–5.2)
SODIUM SERPL-SCNC: 138 MMOL/L (ref 136–145)
WBC # BLD AUTO: 7.5 X10*3/UL (ref 4.4–11.3)

## 2024-08-20 PROCEDURE — 85027 COMPLETE CBC AUTOMATED: CPT | Performed by: INTERNAL MEDICINE

## 2024-08-20 PROCEDURE — 2500000001 HC RX 250 WO HCPCS SELF ADMINISTERED DRUGS (ALT 637 FOR MEDICARE OP): Performed by: PHARMACIST

## 2024-08-20 PROCEDURE — 2500000001 HC RX 250 WO HCPCS SELF ADMINISTERED DRUGS (ALT 637 FOR MEDICARE OP)

## 2024-08-20 PROCEDURE — 2500000004 HC RX 250 GENERAL PHARMACY W/ HCPCS (ALT 636 FOR OP/ED)

## 2024-08-20 PROCEDURE — 82374 ASSAY BLOOD CARBON DIOXIDE: CPT | Performed by: INTERNAL MEDICINE

## 2024-08-20 PROCEDURE — 1100000001 HC PRIVATE ROOM DAILY

## 2024-08-20 PROCEDURE — 36415 COLL VENOUS BLD VENIPUNCTURE: CPT | Performed by: INTERNAL MEDICINE

## 2024-08-20 PROCEDURE — 99232 SBSQ HOSP IP/OBS MODERATE 35: CPT | Performed by: INTERNAL MEDICINE

## 2024-08-20 PROCEDURE — 2500000001 HC RX 250 WO HCPCS SELF ADMINISTERED DRUGS (ALT 637 FOR MEDICARE OP): Performed by: INTERNAL MEDICINE

## 2024-08-20 PROCEDURE — 2500000002 HC RX 250 W HCPCS SELF ADMINISTERED DRUGS (ALT 637 FOR MEDICARE OP, ALT 636 FOR OP/ED)

## 2024-08-20 PROCEDURE — 97116 GAIT TRAINING THERAPY: CPT | Mod: GP,CQ

## 2024-08-20 ASSESSMENT — PAIN - FUNCTIONAL ASSESSMENT
PAIN_FUNCTIONAL_ASSESSMENT: 0-10

## 2024-08-20 ASSESSMENT — COGNITIVE AND FUNCTIONAL STATUS - GENERAL
MOVING TO AND FROM BED TO CHAIR: A LITTLE
MOBILITY SCORE: 15
STANDING UP FROM CHAIR USING ARMS: A LITTLE
MOVING FROM LYING ON BACK TO SITTING ON SIDE OF FLAT BED WITH BEDRAILS: A LITTLE
MOBILITY SCORE: 16
HELP NEEDED FOR BATHING: A LITTLE
WALKING IN HOSPITAL ROOM: A LITTLE
TURNING FROM BACK TO SIDE WHILE IN FLAT BAD: A LITTLE
TOILETING: A LITTLE
CLIMB 3 TO 5 STEPS WITH RAILING: TOTAL
MOBILITY SCORE: 17
DRESSING REGULAR LOWER BODY CLOTHING: A LITTLE
CLIMB 3 TO 5 STEPS WITH RAILING: A LOT
MOVING TO AND FROM BED TO CHAIR: A LITTLE
DRESSING REGULAR LOWER BODY CLOTHING: A LITTLE
DRESSING REGULAR UPPER BODY CLOTHING: A LITTLE
PERSONAL GROOMING: A LITTLE
DAILY ACTIVITIY SCORE: 19
DAILY ACTIVITIY SCORE: 19
MOVING FROM LYING ON BACK TO SITTING ON SIDE OF FLAT BED WITH BEDRAILS: A LITTLE
WALKING IN HOSPITAL ROOM: A LOT
STANDING UP FROM CHAIR USING ARMS: A LITTLE
STANDING UP FROM CHAIR USING ARMS: A LITTLE
PERSONAL GROOMING: A LITTLE
TOILETING: A LITTLE
DRESSING REGULAR UPPER BODY CLOTHING: A LITTLE
HELP NEEDED FOR BATHING: A LITTLE
TURNING FROM BACK TO SIDE WHILE IN FLAT BAD: A LITTLE
CLIMB 3 TO 5 STEPS WITH RAILING: TOTAL
MOVING FROM LYING ON BACK TO SITTING ON SIDE OF FLAT BED WITH BEDRAILS: A LITTLE
TURNING FROM BACK TO SIDE WHILE IN FLAT BAD: A LITTLE
MOVING TO AND FROM BED TO CHAIR: A LITTLE
WALKING IN HOSPITAL ROOM: A LITTLE

## 2024-08-20 ASSESSMENT — PAIN SCALES - GENERAL
PAINLEVEL_OUTOF10: 0 - NO PAIN

## 2024-08-20 NOTE — PROGRESS NOTES
08/20/24 0908   Discharge Planning   Who is requesting discharge planning? Provider   Home or Post Acute Services Post acute facilities (Rehab/SNF/etc)   Type of Post Acute Facility Services Skilled nursing   Expected Discharge Disposition SNF   Does the patient need discharge transport arranged? Yes   RoundTrip coordination needed? Yes   Has discharge transport been arranged? No     8/20/24 0908  Patient is medically ready for discharge.  Insurance denied [facility] but did offer a P2P.  Information sent to Dr Rueda.  Must be completed by 1pm today.  Message sent to LEXIS BELLA to obtain choices for SNF and send referrals.  Patient was given SNF list yesterday.  Raisa Wray RN T[facility]

## 2024-08-20 NOTE — PROGRESS NOTES
08/20/24 1437   Current Planned Discharge Disposition   Current Planned Discharge Disposition SNF     Pt  chose [facility], [facility] this AM. Referrals sent. [facility] is full. [facility]  never responded. Pt  offered [facility] and [facility] -she then asked for [facility]. Referral  sent.   SUREKHA Ivory, LSW      8-20-24 1815   (late note  on  8/21 0902)  SW  called  floor  to speak to pt  2x as  [facility]  can  take - message left in general  mailbox  asking for  a  call back  that was not  returned.     SUREKHA Ivory, LSW

## 2024-08-20 NOTE — NURSING NOTE
Interdisciplinary team present: NP, PT, NM, CC, SW, Orthopedic Coordinator, and bedside RN.  Pain - controlled  Nausea - none  Discharge barrier - disposition  Discharge plan - AR vs SNF  Discharge date/time -  pending

## 2024-08-20 NOTE — PROGRESS NOTES
Physical Therapy    Physical Therapy Treatment    Patient Name: Lucille Holloway  MRN: [id number]  Today's Date: 8/20/2024  Time Calculation  Start Time: 1112  Stop Time: 1140  Time Calculation (min): 28 min    Assessment/Plan   PT Assessment  End of Session Communication: Bedside nurse  Assessment Comment: pt able to increase gait distance  End of Session Patient Position: Alarm on, Up in chair  PT Plan  Inpatient/Swing Bed or Outpatient: Inpatient  PT Plan  Treatment/Interventions: Transfer training, Gait training  PT Plan: Ongoing PT  PT Frequency: 5 times per week  PT Discharge Recommendations: High intensity level of continued care  Equipment Recommended upon Discharge:  (Pt owns a FWW)  PT Recommended Transfer Status: Assist x1  PT - OK to Discharge: Yes (per POC)      General Visit Information:   PT  Visit  PT Received On: 08/20/24  General  Reason for Referral: Pt is a 78 y/o female L ankle ORIF d/t L ankle fracture following syncopal episode.  Referred By: Devin ()  Prior to Session Communication: Bedside nurse  Patient Position Received: Up in chair, Alarm on  Preferred Learning Style: auditory, kinesthetic, visual  General Comment: agreeable to tx, rest breaks req throughout 2/2 fatigue    Subjective   Precautions:  Precautions  LE Weight Bearing Status: Left Non-Weight Bearing  Medical Precautions: Fall precautions  Vital Signs:       Objective   Pain:  Pain Assessment  0-10 (Numeric) Pain Score: 0 - No pain  Cognition:  Cognition  Overall Cognitive Status: Within Functional Limits  Coordination:     Postural Control:     Extremity/Trunk Assessments:    Activity Tolerance:     Treatments:                 Ambulation/Gait Training  Ambulation/Gait Training Performed: Yes  Ambulation/Gait Training 1  Surface 1: Level tile  Device 1: Rolling walker  Gait Support Devices: Gait belt  Comments/Distance (ft) 1: 8x3 (pt able to perform hop to gait pattern.  good maintaining WB status.  pt req x3 seated rest break  2/2 fatigue.  increased time req to complete.)  Transfer 1  Technique 1: Sit to stand, Stand to sit  Transfer Device 1: Walker  Transfer Level of Assistance 1: Contact guard  Trials/Comments 1: pt performed x4    Outcome Measures:  [facility] Basic Mobility  Turning from your back to your side while in a flat bed without using bedrails: A little  Moving from lying on your back to sitting on the side of a flat bed without using bedrails: A little  Moving to and from bed to chair (including a wheelchair): A little  Standing up from a chair using your arms (e.g. wheelchair or bedside chair): A little  To walk in hospital room: A little  Climbing 3-5 steps with railing: Total  Basic Mobility - Total Score: 16    Education Documentation  Mobility Training, taught by Rob Hart PTA at 8/20/2024  4:24 PM.  Learner: Patient  Readiness: Acceptance  Method: Explanation  Response: Verbalizes Understanding    Education Comments  No comments found.        OP EDUCATION:       Encounter Problems       Encounter Problems (Active)       Balance       Goal 1 (Progressing)       Start:  08/15/24    Expected End:  08/29/24       Pt performs all sitting balance with supervision and standing balance with CGA using FWW            Mobility       STG - Patient will ambulate (Progressing)       Start:  08/15/24    Expected End:  08/29/24       10 ft with min assist x 1 using FWW while maintaining relevant weight bearing precautions            PT Transfers       STG - Patient to transfer to and from sit to supine (Progressing)       Start:  08/15/24    Expected End:  08/29/24       With supervision         STG - Patient will transfer sit to and from stand (Progressing)       Start:  08/15/24    Expected End:  08/29/24       With min assist x 1 using FWW            Pain - Adult

## 2024-08-20 NOTE — CARE PLAN
The patient's goals for the shift include      The clinical goals for the shift include Patient to remain free of fall for shift and during ambulation      Problem: Pain - Adult  Goal: Verbalizes/displays adequate comfort level or baseline comfort level  Outcome: Progressing     Problem: Safety - Adult  Goal: Free from fall injury  Outcome: Progressing     Problem: Discharge Planning  Goal: Discharge to home or other facility with appropriate resources  Outcome: Progressing     Problem: Chronic Conditions and Co-morbidities  Goal: Patient's chronic conditions and co-morbidity symptoms are monitored and maintained or improved  Outcome: Progressing

## 2024-08-20 NOTE — DISCHARGE INSTRUCTIONS
LEFT ANKLE OPEN REDUCTION AND INTERNAL FIXATION POST-OPERATIVE INSTRUCTIONS  Diet: resume your regular diet    Activity: non- weight bearing on left leg, walker/crutches for balance.    No driving    Anticoagulation meds:  You will be discharged with a prescription for Aspirin 81 mg twice a day for 2 weeks for blood clot prevention.     Berry Hose: Should be worn during the day for the next 4 weeks on non-operative leg. Can remove at night.     Pain Meds: You will be sent home with a prescription for pain meds as well as a stool softener to help with constipation.  If you need a refill on your pain meds please contact Dr. Larsen's office. A 48 hour notice will need to be given for all pain medication refills.     Splint/Cast care instructions:   1) Keep your splint on until your follow up visit with your surgeon.    2) Do not get your splint/cast wet for any reason. This includes protecting it from shower water, bath water, and the rain. If the cast/splint becomes wet for any reason, you need to be seen immediately, either in the emergency department or in the first available clinic appointment, in order to have the splint/cast changed. Allowing a wet splint/cast to sit on your skin may cause skin breakdown and infection.    3) Do not stick any sharp objects (knives, forks, clothes hangers, etc) inside your splint/cast to itch. These objects scratch the skin, which may become infected.     4) You should keep your operative or injured extremity elevated at or above the level of your heart for the first 48-72 hours. This will help minimize the postoperative swelling.     5) You may ice your injured/operative extremity, which is especially useful to minimize swelling, in the first 48-72 hours. Make sure that the ice is not in direct contact with your skin, and that the ice does not leak out of its bag onto your splint.    6) check capillary refill: pinch toe. Should turn white to pink in 3 seconds or less.  If you begin  to experience progressive and rapidly increasing pain that seems out of proportion to what you normally have been experiencing from your baseline pain after surgery/injury, or if your hand or foot become numb or turn blue and cold - you NEED TO CALL US IMMEDIATELY. Alternatively, you may come into the emergency department IMMEDIATELY for an emergent evaluation.         Follow-up: Dr. Larsen's office in 2 weeks.      CALL PHYSICIAN:   Excessive nausea, vomiting, diarrhea greater than 24 hours.   Inability to urinate every 8-12 hours and bladder becomes too full and is painful.   Incision site: increased redness and or swelling; increased pain and or tenderness; progressive drainage or an unusual odor.  Call office if drainage continues beyond 5 days of surgery.   Excessive Bleeding: Slow general oozing that completely soaks dressing or fresh bright red bleeding or bleeding that will not stop.    Operative leg: has a change in color, numbness, tingling, and coldness to the touch or excessive pain.

## 2024-08-21 ENCOUNTER — NURSING HOME VISIT (OUTPATIENT)
Dept: POST ACUTE CARE | Facility: EXTERNAL LOCATION | Age: 79
End: 2024-08-21
Payer: MEDICARE

## 2024-08-21 ENCOUNTER — APPOINTMENT (OUTPATIENT)
Dept: CARDIOLOGY | Facility: HOSPITAL | Age: 79
DRG: 494 | End: 2024-08-21
Payer: MEDICARE

## 2024-08-21 VITALS
HEIGHT: 63 IN | HEART RATE: 74 BPM | RESPIRATION RATE: 18 BRPM | WEIGHT: 194 LBS | DIASTOLIC BLOOD PRESSURE: 72 MMHG | BODY MASS INDEX: 34.38 KG/M2 | SYSTOLIC BLOOD PRESSURE: 115 MMHG | OXYGEN SATURATION: 95 % | TEMPERATURE: 97.3 F

## 2024-08-21 DIAGNOSIS — G89.4 CHRONIC PAIN SYNDROME: Primary | ICD-10-CM

## 2024-08-21 DIAGNOSIS — S82.892A CLOSED FRACTURE OF LEFT ANKLE, INITIAL ENCOUNTER: ICD-10-CM

## 2024-08-21 DIAGNOSIS — I10 PRIMARY HYPERTENSION: Primary | ICD-10-CM

## 2024-08-21 DIAGNOSIS — E11.69 TYPE 2 DIABETES MELLITUS WITH OTHER SPECIFIED COMPLICATION, UNSPECIFIED WHETHER LONG TERM INSULIN USE (MULTI): ICD-10-CM

## 2024-08-21 LAB
ANION GAP SERPL CALC-SCNC: 12 MMOL/L (ref 10–20)
BODY SURFACE AREA: 1.98 M2
BUN SERPL-MCNC: 19 MG/DL (ref 6–23)
CALCIUM SERPL-MCNC: 8.7 MG/DL (ref 8.6–10.3)
CHLORIDE SERPL-SCNC: 103 MMOL/L (ref 98–107)
CO2 SERPL-SCNC: 27 MMOL/L (ref 21–32)
CREAT SERPL-MCNC: 0.88 MG/DL (ref 0.5–1.05)
EGFRCR SERPLBLD CKD-EPI 2021: 67 ML/MIN/1.73M*2
ERYTHROCYTE [DISTWIDTH] IN BLOOD BY AUTOMATED COUNT: 13.2 % (ref 11.5–14.5)
GLUCOSE SERPL-MCNC: 92 MG/DL (ref 74–99)
HCT VFR BLD AUTO: 32.4 % (ref 36–46)
HGB BLD-MCNC: 10.8 G/DL (ref 12–16)
MCH RBC QN AUTO: 30.6 PG (ref 26–34)
MCHC RBC AUTO-ENTMCNC: 33.3 G/DL (ref 32–36)
MCV RBC AUTO: 92 FL (ref 80–100)
NRBC BLD-RTO: 0 /100 WBCS (ref 0–0)
PLATELET # BLD AUTO: 289 X10*3/UL (ref 150–450)
POTASSIUM SERPL-SCNC: 3.9 MMOL/L (ref 3.5–5.3)
RBC # BLD AUTO: 3.53 X10*6/UL (ref 4–5.2)
SODIUM SERPL-SCNC: 138 MMOL/L (ref 136–145)
WBC # BLD AUTO: 7.6 X10*3/UL (ref 4.4–11.3)

## 2024-08-21 PROCEDURE — 99239 HOSP IP/OBS DSCHRG MGMT >30: CPT | Performed by: INTERNAL MEDICINE

## 2024-08-21 PROCEDURE — 93246 EXT ECG>7D<15D RECORDING: CPT

## 2024-08-21 PROCEDURE — 2500000004 HC RX 250 GENERAL PHARMACY W/ HCPCS (ALT 636 FOR OP/ED)

## 2024-08-21 PROCEDURE — 36415 COLL VENOUS BLD VENIPUNCTURE: CPT | Performed by: INTERNAL MEDICINE

## 2024-08-21 PROCEDURE — 2500000001 HC RX 250 WO HCPCS SELF ADMINISTERED DRUGS (ALT 637 FOR MEDICARE OP)

## 2024-08-21 PROCEDURE — 97530 THERAPEUTIC ACTIVITIES: CPT | Mod: GO | Performed by: OCCUPATIONAL THERAPIST

## 2024-08-21 PROCEDURE — 85027 COMPLETE CBC AUTOMATED: CPT | Performed by: INTERNAL MEDICINE

## 2024-08-21 PROCEDURE — 2500000001 HC RX 250 WO HCPCS SELF ADMINISTERED DRUGS (ALT 637 FOR MEDICARE OP): Performed by: INTERNAL MEDICINE

## 2024-08-21 PROCEDURE — 2500000001 HC RX 250 WO HCPCS SELF ADMINISTERED DRUGS (ALT 637 FOR MEDICARE OP): Performed by: PHARMACIST

## 2024-08-21 PROCEDURE — 97535 SELF CARE MNGMENT TRAINING: CPT | Mod: GO | Performed by: OCCUPATIONAL THERAPIST

## 2024-08-21 PROCEDURE — 99306 1ST NF CARE HIGH MDM 50: CPT | Performed by: INTERNAL MEDICINE

## 2024-08-21 PROCEDURE — 97116 GAIT TRAINING THERAPY: CPT | Mod: GP,CQ

## 2024-08-21 PROCEDURE — 80048 BASIC METABOLIC PNL TOTAL CA: CPT | Performed by: INTERNAL MEDICINE

## 2024-08-21 PROCEDURE — 2500000002 HC RX 250 W HCPCS SELF ADMINISTERED DRUGS (ALT 637 FOR MEDICARE OP, ALT 636 FOR OP/ED)

## 2024-08-21 RX ORDER — OXYCODONE HYDROCHLORIDE 5 MG/1
5 TABLET ORAL EVERY 6 HOURS PRN
Qty: 56 TABLET | Refills: 0 | Status: SHIPPED | OUTPATIENT
Start: 2024-08-21 | End: 2024-09-04

## 2024-08-21 RX ORDER — OXYCODONE HYDROCHLORIDE 5 MG/1
5 TABLET ORAL EVERY 6 HOURS PRN
Start: 2024-08-21

## 2024-08-21 ASSESSMENT — COGNITIVE AND FUNCTIONAL STATUS - GENERAL
MOVING TO AND FROM BED TO CHAIR: A LITTLE
MOBILITY SCORE: 15
TOILETING: A LITTLE
MOVING FROM LYING ON BACK TO SITTING ON SIDE OF FLAT BED WITH BEDRAILS: A LITTLE
PERSONAL GROOMING: A LITTLE
CLIMB 3 TO 5 STEPS WITH RAILING: TOTAL
HELP NEEDED FOR BATHING: A LITTLE
TOILETING: A LITTLE
MOVING FROM LYING ON BACK TO SITTING ON SIDE OF FLAT BED WITH BEDRAILS: A LITTLE
CLIMB 3 TO 5 STEPS WITH RAILING: TOTAL
DRESSING REGULAR LOWER BODY CLOTHING: A LITTLE
TURNING FROM BACK TO SIDE WHILE IN FLAT BAD: A LITTLE
DRESSING REGULAR LOWER BODY CLOTHING: A LITTLE
WALKING IN HOSPITAL ROOM: A LOT
DAILY ACTIVITIY SCORE: 19
WALKING IN HOSPITAL ROOM: A LITTLE
STANDING UP FROM CHAIR USING ARMS: A LITTLE
TURNING FROM BACK TO SIDE WHILE IN FLAT BAD: A LITTLE
DRESSING REGULAR UPPER BODY CLOTHING: A LITTLE
MOVING TO AND FROM BED TO CHAIR: A LITTLE
DAILY ACTIVITIY SCORE: 21
MOBILITY SCORE: 16
HELP NEEDED FOR BATHING: A LITTLE
STANDING UP FROM CHAIR USING ARMS: A LITTLE

## 2024-08-21 ASSESSMENT — ACTIVITIES OF DAILY LIVING (ADL): HOME_MANAGEMENT_TIME_ENTRY: 14

## 2024-08-21 ASSESSMENT — PAIN SCALES - GENERAL
PAINLEVEL_OUTOF10: 0 - NO PAIN
PAINLEVEL_OUTOF10: 2
PAINLEVEL_OUTOF10: 5 - MODERATE PAIN
PAINLEVEL_OUTOF10: 2

## 2024-08-21 ASSESSMENT — PAIN - FUNCTIONAL ASSESSMENT
PAIN_FUNCTIONAL_ASSESSMENT: 0-10
PAIN_FUNCTIONAL_ASSESSMENT: 0-10

## 2024-08-21 NOTE — DISCHARGE SUMMARY
Discharge Diagnosis  Closed fracture of left ankle    Issues Requiring Follow-Up  Follow up with PCP    Discharge Meds     Your medication list        START taking these medications        Instructions Last Dose Given Next Dose Due   oxyCODONE 5 mg immediate release tablet  Commonly known as: Roxicodone      Take 1 tablet (5 mg) by mouth every 6 hours if needed for moderate pain (4 - 6).              CHANGE how you take these medications        Instructions Last Dose Given Next Dose Due   aspirin 81 mg EC tablet  What changed:   when to take this  additional instructions      Take 1 tablet (81 mg) by mouth 2 times a day. Take 1 tablet twice a day for 1 month, then switch back to once a day.       naltrexone-bupropion 8-90 mg ER tablet  Commonly known as: Contrave  What changed: Another medication with the same name was removed. Continue taking this medication, and follow the directions you see here.      Take 2 tablets by mouth 2 times a day.              CONTINUE taking these medications        Instructions Last Dose Given Next Dose Due   acetaminophen 500 mg capsule  Commonly known as: Tylenol           atorvastatin 10 mg tablet  Commonly known as: Lipitor      TAKE 1 TABLET BY MOUTH ONCE  DAILY AT BEDTIME       docusate sodium 100 mg capsule  Commonly known as: Colace           DULoxetine 30 mg DR capsule  Commonly known as: Cymbalta      Take 1 capsule (30 mg) by mouth once daily. AS DIRECTED       gabapentin 300 mg capsule  Commonly known as: Neurontin      Take 1 capsule (300 mg) by mouth 3 times a day.       levothyroxine 75 mcg tablet  Commonly known as: Synthroid, Levoxyl      Take 1 tablet (75 mcg) by mouth once daily.       multivitamin with minerals iron-free  Commonly known as: Centrum Silver           valsartan 40 mg tablet  Commonly known as: Diovan      TAKE 1 TABLET BY MOUTH ONCE  DAILY              STOP taking these medications      amoxicillin 500 mg capsule  Commonly known as: Amoxil            "    ASK your doctor about these medications        Instructions Last Dose Given Next Dose Due   albuterol 90 mcg/actuation inhaler      USE 2 INHALATIONS BY MOUTH EVERY 4 HOURS IF NEEDED FOR WHEEZING  OR SHORTNESS OF BREATH                 Where to Get Your Medications        These medications were sent to [id number] - [place], [place] - [address] [facility]  [address] [address]      Phone: [phone]   aspirin 81 mg EC tablet       Information about where to get these medications is not yet available    Ask your nurse or doctor about these medications  oxyCODONE 5 mg immediate release tablet         Test Results Pending At Discharge  Pending Labs       No current pending labs.            Hospital Course   Lucille Holloway is a 79 y.o. female with a PMH of RA, chronic back pain, CKD, MGUS, HTN, hearing  loss, KELLY presenting with syncope.     Pt went out to lunch with her daughter, shared 1/2 bottle of wine during lunch. She was driven home, walked to her front door by her daughter. She was putting the key in the lock when she felt \"woozy\"/light headed. She denies any CP, palpitations. Thinks she may have been slightly SOB. She began to crumple, her daughter broke her fall by holding her unde her arms. Pt went straight down, did not hit her head, however her left foot twisted outward in the fall.   No seizure activity. Pt's daughter thinks she was unconscious for approx 2 min. When she awoke she was oriented.   Pt attempted to stand, had incredible pain in her left ankle, which appeared deformed.      Ms Holloway states she has felt \"woozy\" intermittently for the past few weeks, episodes are random and brief.   She does not think she has had palpitations during these episodes.   She has experienced 2 episodes of heart racing/fluttering while lying down watching TV. They last only seconds.      She used to see Dr Chas Rock, she does not remember the exact details. However upon review of the EMR, " he was seeing pt re SOB, echo findings and KELLY.     Patient was admitted for further management with consultation of orthopedics and cardiology.  She presented with displaced fracture of the medial malleolus and distal fibula with disruption of ankle mortise consistent with syndesmotic injury and underwent open reduction internal fixation by Dr. Larsen uneventfully.  Patient was seen and evaluated by cardiology for her syncopal episode, echocardiogram showed ventricular ejection fraction is normal, by visual estimate at 55-60%, normal right ventricular global systolic function, moderately severely elevated right ventricular systolic pressure. Pt thought to benefit from discharge with a Zio or Preventice Patch event monitor for 2 weeks.  Zio or Preventice Patch can only be placed by [facility] nursing during regular business hours (9AM-4PM Monday-Friday) after the discharge order has been placed. The results will be discussed at the patient's followup visit.  Will benefit from follow-up in Cardiology clinic within 3-5 weeks of discharge.  The patient or their family should call the [facility] at [phone] for [facility].  If the care team is assisting in scheduling a follow up appointment prior to their discharge, please ensure that the patient has committed to attending the scheduled date and time.  PT OT was consulted, upon evaluation recommended high intensity level of continue care, unfortunately she was not accepted at  acute rehab, now being discharged to SNF.   Patient is stable for discharge to SNF, with follow-up with PCP and cardiology.    Greater than 30 minutes were dedicated to this discharge including educating patient and coordinating care.    Pertinent Physical Exam At Time of Discharge  Physical Exam  Constitutional: Pleasant, conversational, alert active, cooperative not in acute distress  Eyes: PERRLA, clear sclera  ENMT: Moist mucosal  membranes, no exudate  Head / Neck: Atraumatic, normocephalic, supple neck, JVP not visualized  Lungs: Patent airways, CTABL  Heart: RRR, S1S2, no murmurs appreciated, palpable pulses in all extremities  GI: Soft, NT, ND, bowel sounds present in all quadrants  MSK: Moves all extremities freely, no restriction  of ROM, no joint edema  Extremities: Left foot ankle and prosthetic wrapping, no peripheral edema  : No Samuel catheter inserted  Breast: Deferred  Neurological: AAO x 3 to person, place and date, facial muscles symmetrical, sensation intact, strength 4/4, no acute focal neurological deficits appreciated  Psychological: Appropriate mood and behavior    Outpatient Follow-Up  Future Appointments   Date Time Provider Department Center   8/27/2024  9:15 AM Hafsa Lambert RD, LD [id number] [facility]   10/8/2024  8:30 AM Glenn Brooks MD [id number] [facility]   12/2/2024  8:40 AM Sai Jimenez MD [id number] [facility]   12/10/2024  9:00 AM Sai Jimenez MD [id number] [facility]   12/27/2024  7:00 AM Kina Lynch APRN-CNP [id number] [facility]   1/13/2025  4:30 PM Tyson Edouard MD [id number] [facility]   1/16/2025  8:20 AM Chas Rock MD [id number] [facility]   7/21/2025  9:00 AM Symone Fofana APRN-CNP [id number] [facility]         Jamie Rueda DO

## 2024-08-21 NOTE — CARE PLAN
Problem: Pain - Adult  Goal: Verbalizes/displays adequate comfort level or baseline comfort level  8/20/2024 2125 by Sarah Lyn RN  Outcome: Progressing  8/20/2024 2125 by Sarah Lyn RN  Outcome: Progressing     Problem: Safety - Adult  Goal: Free from fall injury  8/20/2024 2125 by Sarah Lyn RN  Outcome: Progressing  8/20/2024 2125 by Sarah Lyn RN  Outcome: Progressing     Problem: Discharge Planning  Goal: Discharge to home or other facility with appropriate resources  8/20/2024 2125 by Sarah Lyn RN  Outcome: Progressing  8/20/2024 2125 by Sarah Lyn RN  Outcome: Progressing     Problem: Chronic Conditions and Co-morbidities  Goal: Patient's chronic conditions and co-morbidity symptoms are monitored and maintained or improved  8/20/2024 2125 by Sarah Lyn RN  Outcome: Progressing  8/20/2024 2125 by Sarah Lyn RN  Outcome: Progressing

## 2024-08-21 NOTE — PROGRESS NOTES
Physical Therapy    Physical Therapy Treatment    Patient Name: Lucille Holloway  MRN: [id number]  Today's Date: 8/21/2024  Time Calculation  Start Time: 1055  Stop Time: 1122  Time Calculation (min): 27 min    Assessment/Plan   PT Assessment  End of Session Communication: Bedside nurse  Assessment Comment: pt able to increase gait distance  End of Session Patient Position: Alarm on, Up in chair  PT Plan  Inpatient/Swing Bed or Outpatient: Inpatient  PT Plan  Treatment/Interventions: Transfer training, Gait training  PT Plan: Ongoing PT  PT Frequency: 5 times per week  PT Discharge Recommendations: High intensity level of continued care  Equipment Recommended upon Discharge:  (Pt owns a FWW)  PT Recommended Transfer Status: Assist x1  PT - OK to Discharge: Yes (per POC)      General Visit Information:   PT  Visit  PT Received On: 08/21/24  General  Reason for Referral: Pt is a 78 y/o female L ankle ORIF d/t L ankle fracture following syncopal episode.  Referred By: Devin ()  Prior to Session Communication: Bedside nurse  Patient Position Received: Up in chair, Alarm on  Preferred Learning Style: auditory, kinesthetic, visual  General Comment: agreeable to tx, req rest breaks 2/2 fatigue    Subjective   Precautions:  Precautions  LE Weight Bearing Status: Left Non-Weight Bearing  Medical Precautions: Fall precautions  Vital Signs:       Objective   Pain:  Pain Assessment  0-10 (Numeric) Pain Score: 0 - No pain  Cognition:  Cognition  Overall Cognitive Status: Within Functional Limits  Coordination:     Postural Control:     Extremity/Trunk Assessments:    Activity Tolerance:     Treatments:                 Ambulation/Gait Training  Ambulation/Gait Training Performed: Yes  Ambulation/Gait Training 1  Surface 1: Level tile  Device 1: Rolling walker  Gait Support Devices: Gait belt  Assistance 1: Contact guard  Comments/Distance (ft) 1: 10x5 (hop to gait pattern.  pt req rest breaks 2/2 fatigue.  no overt LOB.  increased  time req)  Transfer 1  Technique 1: Sit to stand, Stand to sit  Transfer Device 1: Walker  Transfer Level of Assistance 1: Close supervision  Trials/Comments 1: vc/tc for hand placment on solid sitting surface and not RW. pt performed x5    Outcome Measures:  [facility] Basic Mobility  Turning from your back to your side while in a flat bed without using bedrails: A little  Moving from lying on your back to sitting on the side of a flat bed without using bedrails: A little  Moving to and from bed to chair (including a wheelchair): A little  Standing up from a chair using your arms (e.g. wheelchair or bedside chair): A little  To walk in hospital room: A little  Climbing 3-5 steps with railing: Total  Basic Mobility - Total Score: 16    Education Documentation  Body Mechanics, taught by Rob Hart PTA at 8/21/2024  2:09 PM.  Learner: Patient  Readiness: Acceptance  Method: Explanation  Response: Verbalizes Understanding    Mobility Training, taught by Rob Hart PTA at 8/21/2024  2:09 PM.  Learner: Patient  Readiness: Acceptance  Method: Explanation  Response: Verbalizes Understanding    Education Comments  No comments found.        OP EDUCATION:       Encounter Problems       Encounter Problems (Resolved)       Balance       Goal 1 (Adequate for Discharge)       Start:  08/15/24    Expected End:  08/29/24    Resolved:  08/21/24    Pt performs all sitting balance with supervision and standing balance with CGA using FWW            Mobility       STG - Patient will ambulate (Adequate for Discharge)       Start:  08/15/24    Expected End:  08/29/24    Resolved:  08/21/24    10 ft with min assist x 1 using FWW while maintaining relevant weight bearing precautions            PT Transfers       STG - Patient to transfer to and from sit to supine (Adequate for Discharge)       Start:  08/15/24    Expected End:  08/29/24    Resolved:  08/21/24    With supervision         STG - Patient will transfer sit to and from  stand (Adequate for Discharge)       Start:  08/15/24    Expected End:  08/29/24    Resolved:  08/21/24    With min assist x 1 using FWW            Pain - Adult

## 2024-08-21 NOTE — PROGRESS NOTES
Occupational Therapy    OT Treatment    Patient Name: Lucille Holloway  MRN: [id number]  Today's Date: 8/21/2024  Time Calculation  Start Time: 1150  Stop Time: 1216  Time Calculation (min): 26 min        Assessment:  End of Session Communication: Bedside nurse  End of Session Patient Position: Up in chair, Alarm on     Plan:  Treatment Interventions: ADL retraining, Functional transfer training  OT Frequency: 4 times per week  OT Discharge Recommendations: High intensity level of continued care  Equipment Recommended upon Discharge:  (Shower seat; Patient owns walker and AE for ADL)  OT Recommended Transfer Status: Assist of 1  OT - OK to Discharge: Yes (continue per OT POC)  Treatment Interventions: ADL retraining, Functional transfer training    Subjective   Previous Visit Info:  OT Last Visit  OT Received On: 08/21/24  General:  General  Reason for Referral: Pt is a 80 y/o female L ankle ORIF d/t L ankle fracture following syncopal episode.  Referred By: Devin LITTLE)  Past Medical History Relevant to Rehab:   Past Medical History:   Diagnosis Date    Acute upper respiratory infection, unspecified 03/05/2019    Acute URI    Bariatric surgery status 11/20/2019    Bariatric surgery status    Encounter for general adult medical examination without abnormal findings 08/05/2021    Encounter for preventive health examination    Other general symptoms and signs 03/05/2019    Flu-like symptoms    Other headache syndrome 06/25/2024    Comment on above: HAD A FALL SATURDAY NIGHT / BAD HEADACHE, BUMP HEAD    Other neuromuscular dysfunction of bladder     Hyperactivity of bladder    Pain in right foot 09/17/2015    Foot pain, right    Personal history of other diseases of the circulatory system     History of hypertension    Personal history of other diseases of the respiratory system 03/05/2019    History of acute sinusitis    Personal history of other endocrine, nutritional and metabolic disease     History of thyroid disease     Personal history of other infectious and parasitic diseases 08/28/2019    History of hepatitis    Plantar fascial fibromatosis 09/21/2015    Plantar fasciitis, right    Syncope and collapse 10/16/2015    Vasovagal syncope    Vitamin D deficiency, unspecified 08/28/2019    Mild vitamin D deficiency      Family/Caregiver Present: No  Prior to Session Communication: Bedside nurse  Patient Position Received: Up in chair, Alarm on  General Comment: Pt seated in chair upon arrival and agreeable to treatment. Pt fully participatory.  Precautions:  LE Weight Bearing Status: Left Non-Weight Bearing  Medical Precautions: Fall precautions  Splinting: L LE in SLS       Pain:  Pain Assessment  Pain Assessment: 0-10  0-10 (Numeric) Pain Score: 2  Pain Type: Surgical pain  Pain Location: Ankle  Pain Orientation: Left    Objective    Cognition:  Cognition  Overall Cognitive Status: Within Functional Limits  Orientation Level: Oriented X4    Activities of Daily Living: LE Dressing  LE Dressing: Yes  Pants Level of Assistance: Minimum assistance, Minimal verbal cues  Sock Level of Assistance: Close supervision, Minimal verbal cues (to don/doff R sock, cues for sequencing and compensatory techniques)  Shoe Level of Assistance: Close supervision, Minimal verbal cues (cues for sequencing and compensatory technique)  LE Dressing Where Assessed: Chair, Edge of bed  LE Dressing Comments: cues for sequencing, pt able to thread B LEs, assist posteriorly to briefly manage up over hips       Bed Mobility/Transfers: Bed Mobility  Bed Mobility: Yes  Bed Mobility 1  Bed Mobility 1: Supine to sitting, Sitting to supine  Level of Assistance 1: Close supervision, Minimal verbal cues  Bed Mobility Comments 1: HOB flat, cues for sequencing and compensatory technique with B UE support under L thigh to lift L LE on/off bed, extra time to perform    Transfers  Transfer: Yes  Transfer 1  Technique 1: Sit to stand  Transfer Device 1: Walker  Transfer  Level of Assistance 1: Close supervision, Minimal verbal cues  Trials/Comments 1: from EOB and chair, cues for sequencing, L LE position, safe hand placement and walker safety  Transfers 2  Technique 2: Stand to sit  Transfer Device 2: Walker  Transfer Level of Assistance 2: Contact guard, Minimal verbal cues  Trials/Comments 2: cues for sequencing, safe hand placement, L LE Position and walker safety as well as eccentric control      Functional Mobility:     Sitting Balance:  Static Sitting Balance  Static Sitting-Balance Support: Bilateral upper extremity supported (R foot supported)  Static Sitting-Level of Assistance: Independent  Static Sitting-Comment/Number of Minutes: EOB  Dynamic Sitting Balance  Dynamic Sitting-Comments: SUP at EOB  Standing Balance:  Static Standing Balance  Static Standing-Balance Support: Bilateral upper extremity supported  Static Standing-Level of Assistance: Close supervision  Static Standing-Comment/Number of Minutes: FWW  Dynamic Standing Balance  Dynamic Standing-Comments: CGA using FWW       Therapy/Activity: Therapeutic Activity  Therapeutic Activity Performed: Yes  Therapeutic Activity 1: Pt functionally navigated x min household distance using FWW via hop-to-pattern on L LE with CGA x1. Cues for sequencing, walker safety and to adhere to L LE NWB. Pt able to adhere to L LE NWB restriction. No LOB noted.           Outcome Measures:[facility] Daily Activity  Putting on and taking off regular lower body clothing: A little  Bathing (including washing, rinsing, drying): A little  Putting on and taking off regular upper body clothing: None  Toileting, which includes using toilet, bedpan or urinal: A little  Taking care of personal grooming such as brushing teeth: None  Eating Meals: None  Daily Activity - Total Score: 21        Education Documentation  Body Mechanics, taught by Elaine Jackson OT at 8/21/2024  1:59 PM.  Learner: Patient  Readiness: Acceptance  Method:  Explanation  Response: Verbalizes Understanding    Precautions, taught by Elaine Jackson OT at 8/21/2024  1:59 PM.  Learner: Patient  Readiness: Acceptance  Method: Explanation  Response: Verbalizes Understanding    ADL Training, taught by Elaine Jackson OT at 8/21/2024  1:59 PM.  Learner: Patient  Readiness: Acceptance  Method: Explanation  Response: Verbalizes Understanding    Education Comments  No comments found.             Goals:  Encounter Problems       Encounter Problems (Resolved)       ADLs       Patient will perform UB sponge bathing with set up A and LB bathing with contact guard assist level of assistance and long-handled sponge. (Adequate for Discharge)       Start:  08/15/24    Expected End:  08/29/24    Resolved:  08/21/24         Patient with complete upper body dressing with modified independent level of assistance donning and doffing all UE clothes with PRN adaptive equipment. (Adequate for Discharge)       Start:  08/15/24    Expected End:  08/29/24    Resolved:  08/21/24         Patient with complete lower body dressing with minimal assist  level of assistance donning and doffing all LE clothes  with PRN adaptive equipment. (Adequate for Discharge)       Start:  08/15/24    Expected End:  08/29/24    Resolved:  08/21/24         Patient will complete daily grooming tasks with modified independent level of assistance and PRN adaptive equipment. (Adequate for Discharge)       Start:  08/15/24    Expected End:  08/29/24    Resolved:  08/21/24         Patient will complete toileting including hygiene clothing management/hygiene with minimal assist  level of assistance and raised toilet seat and grab bars vs. BSC. (Adequate for Discharge)       Start:  08/15/24    Expected End:  08/29/24    Resolved:  08/21/24            MOBILITY       Patient will perform Functional mobility x Household distances/Community Distances with contact guard assist level of assistance and least restrictive device while  adhering to L LW NWB restriction in order to improve safety and functional mobility. (Adequate for Discharge)       Start:  08/15/24    Expected End:  08/29/24    Resolved:  08/21/24            TRANSFERS       Patient will perform bed mobility modified independent level of assistance in order to improve safety and independence with mobility (Adequate for Discharge)       Start:  08/15/24    Expected End:  08/29/24    Resolved:  08/21/24         Patient will complete functional transfers with least restrictive device with contact guard assist level of assistance while adhering to L LE NWB restriction. (Adequate for Discharge)       Start:  08/15/24    Expected End:  08/29/24    Resolved:  08/21/24

## 2024-08-21 NOTE — NURSING NOTE
Interdisciplinary team present: NP, PT, NM, CC, SW, Orthopedic Coordinator, and bedside RN.  Pain - controlled  Nausea - none  Discharge barrier - needs placement  Discharge plan - SNF  Discharge date/time - pending

## 2024-08-21 NOTE — PROGRESS NOTES
Jenni Holloway is a 79 y.o. female on day 7 of admission presenting with Closed fracture of left ankle.      Subjective   Patient was seen and examined at bedside this morning, states that she is tolerating her pain, and doing physical therapy sessions.  Patient looking forward to discharge to acute rehab to expedite recovery, and allow a quicker return to ADL achievement.       Objective     Last Recorded Vitals  /58   Pulse 87   Temp 36.5 °C (97.7 °F)   Resp 16   Wt 88 kg (194 lb 0.1 oz)   SpO2 98%   Intake/Output last 3 Shifts:    Intake/Output Summary (Last 24 hours) at 8/20/2024 2237  Last data filed at 8/20/2024 0851  Gross per 24 hour   Intake 180 ml   Output --   Net 180 ml       Admission Weight  Weight: 88 kg (194 lb) (08/13/24 1641)    Daily Weight  08/14/24 : 88 kg (194 lb 0.1 oz)    Image Results  Transthoracic Echo (TTE) Complete     [facility], [address] [place] [address]               Tel [phone] and Fax [phone]    TRANSTHORACIC ECHOCARDIOGRAM REPORT       Patient Name:      JENNI HOLLOWAY       Reading Physician:    [id number] Powell MD  Study Date:        8/17/2024            Ordering Provider:    [id number] HOLLY TIRADO  MRN/PID:           [id number]             Fellow:  Accession#:        [id number]         Nurse:  Date of Birth/Age: 1945 / 79 years Sonographer:          [facility]  Gender:            F                    Additional Staff:  Height:            160.00 cm            Admit Date:  Weight:            88.00 kg             Admission Status:     Inpatient -                                                                Routine  BSA / BMI:         1.91 m2 / 34.37      Encounter#:           [id number]                     kg/m2  Blood Pressure:    149/62 mmHg          Department Location:  [facility]                                                                 Invasive    Study Type:    TRANSTHORACIC ECHO (TTE) COMPLETE  Diagnosis/ICD: Syncope-R55  Indication:    Syncope  CPT Code:      Echo Complete w Full [id number]    Patient History:  Valve Disorders:   Aortic Insufficiency.  Pertinent History: HTN.    Study Detail: The following Echo studies were performed: 2D, M-Mode, Doppler and                color flow.       PHYSICIAN INTERPRETATION:  Left Ventricle: Left ventricular ejection fraction is normal, by visual estimate at 55-60%. There are no regional left ventricular wall motion abnormalities. The left ventricular cavity size is normal. Spectral Doppler shows an impaired relaxation pattern of left ventricular diastolic filling.  Left Atrium: The left atrium is upper limits of normal in size.  Right Ventricle: The right ventricle is normal in size. There is normal right ventricular global systolic function.  Right Atrium: The right atrium is normal in size.  Aortic Valve: The aortic valve is trileaflet. The aortic valve dimensionless index is 1.04. There is no evidence of aortic valve regurgitation. The peak instantaneous gradient of the aortic valve is 5.1 mmHg. The mean gradient of the aortic valve is 2.7 mmHg.  Mitral Valve: The mitral valve is normal in structure. There is trace mitral valve regurgitation.  Tricuspid Valve: The tricuspid valve is structurally normal. There is trace tricuspid regurgitation. The Doppler estimated RVSP is moderate to severely elevated at 47.2 mmHg.  Pulmonic Valve: The pulmonic valve is structurally normal. There is trace pulmonic valve regurgitation.  Pericardium: There is no pericardial effusion noted.  Aorta: The aortic root is normal.  In comparison to the previous echocardiogram(s): Compared with study dated 7/28/2022,.       CONCLUSIONS:   1. Left ventricular ejection fraction is normal, by visual estimate at 55-60%.   2. Spectral Doppler shows an impaired  relaxation pattern of left ventricular diastolic filling.   3. There is normal right ventricular global systolic function.   4. Moderate to severely elevated right ventricular systolic pressure.    QUANTITATIVE DATA SUMMARY:  2D MEASUREMENTS:                          Normal Ranges:  LAs:           3.10 cm  (2.7-4.0cm)  IVSd:          1.13 cm  (0.6-1.1cm)  LVPWd:         1.21 cm  (0.6-1.1cm)  LVIDd:         3.03 cm  (3.9-5.9cm)  LVIDs:         2.13 cm  LV Mass Index: 56 g/m2  LVEDV Index:   26 ml/m2  LV % FS        29.9 %    LA VOLUME:                                Normal Ranges:  LA Vol A4C:        51.0 ml    (22+/-6mL/m2)  LA Vol A2C:        61.6 ml  LA Vol BP:         56.6 ml  LA Vol Index A4C:  26.7 ml/m2  LA Vol Index A2C:  32.3 ml/m2  LA Vol Index BP:   29.6 ml/m2  LA Area A4C:       18.0 cm2  LA Area A2C:       19.6 cm2  LA Major Axis A4C: 5.4 cm  LA Major Axis A2C: 5.3 cm  LA Volume Index:   29.6 ml/m2  LA Vol A4C:        47.2 ml  LA Vol A2C:        58.3 ml  LA Vol Index BSA:  27.6 ml/m2    M-MODE MEASUREMENTS:               Normal Ranges:  LAs: 4.12 cm (2.7-4.0cm)    LV SYSTOLIC FUNCTION BY 2D PLANIMETRY (MOD):                       Normal Ranges:  EF-A4C View:    51 % (>=55%)  EF-A2C View:    40 %  EF-Biplane:     47 %  EF-Visual:      58 %  LV EF Reported: 58 %    LV DIASTOLIC FUNCTION:                                Normal Ranges:  MV Peak E:        0.64 m/s    (0.7-1.2 m/s)  MV Peak A:        1.08 m/s    (0.42-0.7 m/s)  E/A Ratio:        0.59        (1.0-2.2)  PulmV Sys Bryson:    57.50 cm/s  PulmV Sol Bryson:   40.14 cm/s  PulmV S/D Bryson:    1.43  PulmV A Revs Bryson: 28.12 cm/s  PulmV A Revs Dur: 117.98 msec    MITRAL VALVE:                       Normal Ranges:  MV Vmax:    1.12 m/s (<=1.3m/s)  MV peak P.1 mmHg (<5mmHg)  MV mean P.6 mmHg (<2mmHg)  MV VTI:     18.32 cm (10-13cm)  MV DT:      208 msec (150-240msec)    AORTIC VALVE:                                    Normal Ranges:  AoV Vmax:                 1.13 m/s (<=1.7m/s)  AoV Peak P.1 mmHg (<20mmHg)  AoV Mean P.7 mmHg (1.7-11.5mmHg)  LVOT Max Bryson:            1.12 m/s (<=1.1m/s)  AoV VTI:                 22.75 cm (18-25cm)  LVOT VTI:                23.64 cm  LVOT Diameter:           2.02 cm  (1.8-2.4cm)  AoV Area, VTI:           3.33 cm2 (2.5-5.5cm2)  AoV Area,Vmax:           3.17 cm2 (2.5-4.5cm2)  AoV Dimensionless Index: 1.04       RIGHT VENTRICLE:  TAPSE: 15.0 mm  RV s'  0.20 m/s    TRICUSPID VALVE/RVSP:                              Normal Ranges:  Peak TR Velocity: 3.32 m/s  RV Syst Pressure: 47.2 mmHg (< 30mmHg)    PULMONIC VALVE:                          Normal Ranges:  PV Accel Time: 67 msec  (>120ms)  PV Max Bryson:    1.1 m/s  (0.6-0.9m/s)  PV Max P.9 mmHg    Pulmonary Veins:  PulmV A Revs Dur: 117.98 msec  PulmV A Revs Bryson: 28.12 cm/s  PulmV Sol Bryson:   40.14 cm/s  PulmV S/D Bryson:    1.43  PulmV Sys Bryson:    57.50 cm/s    AORTA:  Asc Ao Diam 3.10 cm       [id number] Amrit Powell MD  Electronically signed on 2024 at 7:43:00 PM       ** Final **      Physical Exam  Constitutional: Pleasant, conversational, alert active, cooperative not in acute distress  Eyes: PERRLA, clear sclera  ENMT: Moist mucosal membranes, no exudate  Head / Neck: Atraumatic, normocephalic, supple neck, JVP not visualized  Lungs: Patent airways, CTABL  Heart: RRR, S1S2, no murmurs appreciated, palpable pulses in all extremities  GI: Soft, NT, ND, bowel sounds present in all quadrants  MSK: Moves all extremities freely, no restriction  of ROM, no joint edema  Extremities: Left foot ankle and prosthetic wrapping, no peripheral edema  : No Samuel catheter inserted  Breast: Deferred  Neurological: AAO x 3 to person, place and date, facial muscles symmetrical, sensation intact, strength 4/4, no acute focal neurological deficits appreciated  Psychological: Appropriate mood and behavior    Relevant Results             Scheduled  "medications  aspirin, 81 mg, oral, Daily  atorvastatin, 10 mg, oral, Nightly  docusate sodium, 300 mg, oral, Daily  DULoxetine, 30 mg, oral, Daily  enoxaparin, 40 mg, subcutaneous, q24h  gabapentin, 300 mg, oral, TID  levothyroxine, 75 mcg, oral, Daily  multivitamin with minerals, 1 tablet, oral, Daily  polyethylene glycol, 17 g, oral, Daily  valsartan, 40 mg, oral, Daily      Continuous medications     PRN medications  PRN medications: acetaminophen, acetaminophen, albuterol, magnesium hydroxide, melatonin, morphine, morphine, ondansetron ODT **OR** ondansetron, oxyCODONE    Assessment/Plan       79 y.o. female with past medical history of HTN, HLD, hypothyroidism, exertional dyspnea, weight loss with Ozempic, KELLY now presenting s/p fall. Patient was walking up the driveway towards the house, became dizzy and \"passed out\". Daughter was present, was able to help guide mother to the ground however she sustained injury to the left ankle. Orthopedics consulted for ankle xray showing: Displaced fracture of the medial malleolus and distal fibula with disruption of ankle mortise consistent with syndesmotic injury       Closed fracture of left ankle, initial encounter  - reduced in the ED  - ortho consult  - morphine for pain control  - s/p surgery  -PT/OT recommend high intensity level of continue care     Syncope  - pt describes feeling light headed  - denies any associated CP or palpitations with the syncope, however has had 2 episodes of \"heart fluttering\"   - ECHO noted  - telemetry  -  possible event monitor/Holter monitor to detect any arrhythmias that may have caused the syncope  - US carotid arteries wnl  - trend troponin, initial trop neg.      Increased urinary frequency  - denies dysuria  - UA negative     HTN  - Hold diovan in am if still hypotensive     KELLY  - no longer using CPAP.     Code status  - FULL     DVT ppx  -Lovenox 40 mg subcu daily       Disposition: Presenting with left ankle fracture, status " post surgery, attempt to get authorization for acute rehab was denied, will default on SNF discharge pending patient consent for SNF application.         Jamie Rueda, DO

## 2024-08-21 NOTE — CARE PLAN
The patient's goals for the shift include      Problem: Pain - Adult  Goal: Verbalizes/displays adequate comfort level or baseline comfort level  Outcome: Progressing     Problem: Safety - Adult  Goal: Free from fall injury  Outcome: Progressing     Problem: Discharge Planning  Goal: Discharge to home or other facility with appropriate resources  Outcome: Progressing     Problem: Chronic Conditions and Co-morbidities  Goal: Patient's chronic conditions and co-morbidity symptoms are monitored and maintained or improved  Outcome: Progressing

## 2024-08-21 NOTE — CARE PLAN
The patient's goals for the shift include      The clinical goals for the shift include patient safety      Problem: Pain - Adult  Goal: Verbalizes/displays adequate comfort level or baseline comfort level  Outcome: Progressing     Problem: Safety - Adult  Goal: Free from fall injury  Outcome: Progressing     Problem: Discharge Planning  Goal: Discharge to home or other facility with appropriate resources  Outcome: Progressing     Problem: Chronic Conditions and Co-morbidities  Goal: Patient's chronic conditions and co-morbidity symptoms are monitored and maintained or improved  Outcome: Progressing

## 2024-08-21 NOTE — LETTER
Patient: Lucille Holloway  : 1945    Encounter Date: 2024    Subjective  Patient ID: Lucille Holloway is a 79 y.o. female who presents for No chief complaint on file..  HPI  Past medical history of hypothyroidism hypertension KELLY hyperlipidemia syncope  Recent hospital admission status post syncopal episode fall left ankle fracture status post ORIF medial malleolus fibular fracture CT head negative chest x-ray negative    Patient overall doing okay she states before the syncopal episode she was getting lightheaded for about 3 weeks then had unexplained syncope currently resolved grade 0 out of 10 over the years she has had dizziness she states an occasional syncope episodes saw neurologist before and states had brain imaging but no further diagnosis given she feels fine at present  She has a recorder on her chest  Denies focal weakness paralysis paresthesias tongue biting bowel urinary incontinence headaches chest pain dyspnea palpitations              Health Maintenance:      Colonoscopy:      Mammogram:      Pelvic/Pap:      Low dose chest CT:      Aorta duplex:      Optho:      Podiatry:        Vaccines:      Refer to Epic Vaccination Log        ROS:      General: denies fever/chills/weight loss      Head: denies HA/trauma/masses/dizziness      Eyes: denies vision change/loss of vision/blurry vision/diplopia/eye pain      Ears: denies hearing loss/tinnitus/otalgia/otorrhea      Nose: denies nasal drainage/anosmia      Throat: denies dysphagia/odynophagia      Lymphatics: denies lymph node swelling      Breast: denies masses/discharge/dimpling/skin changes      Cardiac: denies CP/palpitations/orthopnea/PND      Pulmonary: denies dyspnea/cough/wheezing      GI: denies abd pain/n/v/diarrhea/melena/hematochezia/hematemesis      : denies dysuria/hematuria/change frequency      Genital: denies genital discharge/lesions      Skin: denies rashes/lesions/masses      MSK: Left ankle pain overall controlled at  "this time denies weakness/swelling/edema/gait imbalance/pain      Neuro: Recent recurrent syncope denies paresthesias/seizures/dysarthria      Psych: denies depression/anxiety/suicidal or homicidal ideations            Objective  There were no vitals taken for this visit.     Physical Exam:     General: AO3, NAD     Head: atraumatic/NC     Eyes: EOMI/PERRLA. Negative APD     Ears: TM pearly gray, EAC clear. No lesions or erythema     Nose: symmetric nares, no discharge     Throat: trachea midline, uvula midline pink mucosa. No thyromegaly     Lymphatics: no cervical/supraclavicular/ant or posterior cervical adenopathy/axillary/inguinal adenopathy     Breast: not examined     Chest: no deformity or tenderness to palpation     Pulm: CTA b/l, no wheeze/rhonchi/rales. nonlabored     Cardiac: RRR +s1s2, no m/r/g.      GI: soft, NT/ND. Normoactive Bsx4. No rebound/guarding.     Rectal: not examined     Genital: not examined     MSK: Left surgical cast clean dry intact 5/5 strength UE LE. No edema/clubbing/cyanosis     Skin: no rashes/lesions     Vascular: 2+ palp DP PT radials b/l. Negative carotid bruit     Neuro: CNII-XII intact. No focal deficits. Reflexes 2/4 brachioradialis bicep tricep patellar achilles. Finger to nose intact.     Psych: appropriate mood/affect                    No results found for: \"BMPR1A\", \"CBCDIF\"      Assessment/Plan  Diagnoses and all orders for this visit:  Primary hypertension  Type 2 diabetes mellitus with other specified complication, unspecified whether long term insulin use (Multi)  Closed fracture of left ankle, initial encounter    PT OT    Follow-up with orthopedics as ordered    Follow-up with endocrinology as ordered October 8    Follow-up with rheumatology as ordered December 2    Follow-up with neurology as ordered January 13    Follow-up with cardiology as ordered January 16    Follow-up with hematology as ordered    Every Tuesday CBC BMP    Jermain Fernandes DO, FACOI Seth " CHIQUITA Fernandes DO      Electronically Signed By: Jermain Fernandes DO   8/21/24  9:59 PM

## 2024-08-21 NOTE — PROGRESS NOTES
08/21/24 0903   Current Planned Discharge Disposition   Current Planned Discharge Disposition SNF     Sw spoke to pt - dtr had  looked at  [facility]  last night despite  pt  telling dtr that the facility  was  full. Pt  said she is looking at  [facility]  today and   asked writer  to come  back in an hour. Pt has  had   SNF list since Monday  afternoon. Pt said she is aware hospital bill might be increasing, aware that she is not  getting the type  of therapy  at the hospital that  she  knows  she needs.     SUREKHA Ivory LSW            8-21-24     1019  Pt spoke to Jose  and told this writer that it is foc   team asked to start  auth  SUREKHA Ivory LSW        8-21     1145  Pt  aware auth received  Waiting on pickup time  SUREKHA Ivory LSW      8-21    1225  Pt told  pickup is  1pm  She said she will tell  her family  SUREKHA Ivory LSW

## 2024-08-22 NOTE — PROGRESS NOTES
Subjective   Patient ID: Lucille Holloway is a 79 y.o. female who presents for No chief complaint on file..  HPI  Past medical history of hypothyroidism hypertension KELLY hyperlipidemia syncope  Recent hospital admission status post syncopal episode fall left ankle fracture status post ORIF medial malleolus fibular fracture CT head negative chest x-ray negative    Patient overall doing okay she states before the syncopal episode she was getting lightheaded for about 3 weeks then had unexplained syncope currently resolved grade 0 out of 10 over the years she has had dizziness she states an occasional syncope episodes saw neurologist before and states had brain imaging but no further diagnosis given she feels fine at present  She has a recorder on her chest  Denies focal weakness paralysis paresthesias tongue biting bowel urinary incontinence headaches chest pain dyspnea palpitations              Health Maintenance:      Colonoscopy:      Mammogram:      Pelvic/Pap:      Low dose chest CT:      Aorta duplex:      Optho:      Podiatry:        Vaccines:      Refer to Epic Vaccination Log        ROS:      General: denies fever/chills/weight loss      Head: denies HA/trauma/masses/dizziness      Eyes: denies vision change/loss of vision/blurry vision/diplopia/eye pain      Ears: denies hearing loss/tinnitus/otalgia/otorrhea      Nose: denies nasal drainage/anosmia      Throat: denies dysphagia/odynophagia      Lymphatics: denies lymph node swelling      Breast: denies masses/discharge/dimpling/skin changes      Cardiac: denies CP/palpitations/orthopnea/PND      Pulmonary: denies dyspnea/cough/wheezing      GI: denies abd pain/n/v/diarrhea/melena/hematochezia/hematemesis      : denies dysuria/hematuria/change frequency      Genital: denies genital discharge/lesions      Skin: denies rashes/lesions/masses      MSK: Left ankle pain overall controlled at this time denies weakness/swelling/edema/gait imbalance/pain      Neuro:  "Recent recurrent syncope denies paresthesias/seizures/dysarthria      Psych: denies depression/anxiety/suicidal or homicidal ideations            Objective   There were no vitals taken for this visit.     Physical Exam:     General: AO3, NAD     Head: atraumatic/NC     Eyes: EOMI/PERRLA. Negative APD     Ears: TM pearly gray, EAC clear. No lesions or erythema     Nose: symmetric nares, no discharge     Throat: trachea midline, uvula midline pink mucosa. No thyromegaly     Lymphatics: no cervical/supraclavicular/ant or posterior cervical adenopathy/axillary/inguinal adenopathy     Breast: not examined     Chest: no deformity or tenderness to palpation     Pulm: CTA b/l, no wheeze/rhonchi/rales. nonlabored     Cardiac: RRR +s1s2, no m/r/g.      GI: soft, NT/ND. Normoactive Bsx4. No rebound/guarding.     Rectal: not examined     Genital: not examined     MSK: Left surgical cast clean dry intact 5/5 strength UE LE. No edema/clubbing/cyanosis     Skin: no rashes/lesions     Vascular: 2+ palp DP PT radials b/l. Negative carotid bruit     Neuro: CNII-XII intact. No focal deficits. Reflexes 2/4 brachioradialis bicep tricep patellar achilles. Finger to nose intact.     Psych: appropriate mood/affect                    No results found for: \"BMPR1A\", \"CBCDIF\"      Assessment/Plan   Diagnoses and all orders for this visit:  Primary hypertension  Type 2 diabetes mellitus with other specified complication, unspecified whether long term insulin use (Multi)  Closed fracture of left ankle, initial encounter    PT OT    Follow-up with orthopedics as ordered    Follow-up with endocrinology as ordered October 8    Follow-up with rheumatology as ordered December 2    Follow-up with neurology as ordered January 13    Follow-up with cardiology as ordered January 16    Follow-up with hematology as ordered    Every Tuesday CBC BMP    Jermain Fernandes DO, FACOI       Jermain Fernandes DO  "

## 2024-08-22 NOTE — OP NOTE
Left Open Reduction Internal Fixation Ankle (L) Operative Note     Date: 2024  OR Location: [facility] A OR    Name: Lucille Holloway, : 1945, Age: 79 y.o., MRN: [id number], Sex: female    Diagnosis  Pre-op Diagnosis      * Closed fracture of left ankle, initial encounter [S82.232A] Post-op Diagnosis     * Closed fracture of left ankle, initial encounter [S82.892A]     Procedures  Open reduction trimalleolar ankle fracture 16820  Open reduction fixation syndesmosis  46335    Surgeons      * John Larsen - Primary    Resident/Fellow/Other Assistant:  Surgeons and Role:     * Ronni Arrington DO - Assisting    Procedure Summary  Anesthesia: Regional, General  ASA: III  Anesthesia Staff: Anesthesiologist: Eduardo Cheema MD  C-AA: SULLY Florez  Estimated Blood Loss: 0mL  Intra-op Medications:   Administrations occurring from 0900 to 1110 on 24:   Medication Name Total Dose   sodium chloride 0.9 % irrigation solution 1,000 mL   aspirin EC tablet 81 mg Cannot be calculated              Anesthesia Record               Intraprocedure I/O Totals          Intake    LR infusion 1000.00 mL    Total Intake 1000 mL       Output    Est. Blood Loss 5 mL    Total Output 5 mL       Net    Net Volume 995 mL          Specimen: No specimens collected     Staff:   Circulator: Judith  Circulator: Laurel Vargas Person: Jocelyn Felder Circulator: Kali         Drains and/or Catheters: * None in log *    Tourniquet Times:   * Missing tourniquet times found for documented tourniquets in lo *     Implants:  Implants       Type Name Action Serial No.      Implant KIT, REPAIR, TIGHTROPE XP, SYNEDESMOSIS - [id number] Implanted      Plate ARTHREX PLATE Implanted N/A     Screw SCREW, LOW PROFILE, LOCKING, 2.7MM X 18MM, SS - SNA - [id number] Wasted NA     Screw SCREW, LOW PROFILE, CORTICAL, 3.5 X 20MM, SS - SN/A - [id number] Implanted N/A     Screw SCREW, LOW PROFILE, CORTICAL, 3.5 X 12MM. SS - SN/A - [id number]  Implanted N/A     Screw ARTHREX SCREW Implanted N/A     Screw ARTHREX SCREW Implanted N/A              Findings: unstable syndesmosis    Indications: Lucille Holloway is an 79 y.o. female who is having surgery for Closed fracture of left ankle, initial encounter [S89.705D].     The patient was seen in the preoperative area. The risks, benefits, complications, treatment options, non-operative alternatives, expected recovery and outcomes were discussed with the patient. The possibilities of reaction to medication, pulmonary aspiration, injury to surrounding structures, bleeding, recurrent infection, the need for additional procedures, failure to diagnose a condition, and creating a complication requiring transfusion or operation were discussed with the patient. The patient concurred with the proposed plan, giving informed consent.  The site of surgery was properly noted/marked if necessary per policy. The patient has been actively warmed in preoperative area. Preoperative antibiotics have been ordered and given within 1 hours of incision. Venous thrombosis prophylaxis have been ordered including unilateral sequential compression device    Procedure Details: Preoperative huddle was performed with patient identification procedure and site verification.  Patient placed under anesthetic.  The left foot and ankle were sterilely prepped in the usual fashion.  Thigh tourniquet was inflated.  Standard lateral approach is made distal fibula fracture was exposed and reduced.  Interfragmentary screw was utilized supplemented by Arthrex distal fibular locking plate.  This was performed under C-arm control with anatomic reduction of the fracture and mortise the small medial Edouard fragment is well aligned but too small to fix a stress view was obtained of the ankle which demonstrated widening therefore a syndesmotic tight rope was placed resulting excellent fixation.  The wound was then irrigated and closed in layers sterile plaster  dressing was placed  Complications:  None; patient tolerated the procedure well.    Disposition: PACU - hemodynamically stable.  Condition: stable         Additional Details:     Attending Attestation: I was present for the entire procedure.    John Larsen  Phone Number: [phone]

## 2024-08-27 ENCOUNTER — APPOINTMENT (OUTPATIENT)
Dept: ENDOCRINOLOGY | Facility: CLINIC | Age: 79
End: 2024-08-27
Payer: MEDICARE

## 2024-08-28 ENCOUNTER — NURSING HOME VISIT (OUTPATIENT)
Dept: POST ACUTE CARE | Facility: EXTERNAL LOCATION | Age: 79
End: 2024-08-28
Payer: MEDICARE

## 2024-08-28 DIAGNOSIS — F33.9 EPISODE OF RECURRENT MAJOR DEPRESSIVE DISORDER, UNSPECIFIED DEPRESSION EPISODE SEVERITY (CMS-HCC): ICD-10-CM

## 2024-08-28 DIAGNOSIS — S82.892A CLOSED FRACTURE OF LEFT ANKLE, INITIAL ENCOUNTER: Primary | ICD-10-CM

## 2024-08-28 DIAGNOSIS — E66.09 CLASS 1 OBESITY DUE TO EXCESS CALORIES WITH SERIOUS COMORBIDITY AND BODY MASS INDEX (BMI) OF 33.0 TO 33.9 IN ADULT: ICD-10-CM

## 2024-08-28 DIAGNOSIS — Z74.09 IMPAIRED FUNCTIONAL MOBILITY, BALANCE, GAIT, AND ENDURANCE: ICD-10-CM

## 2024-08-28 DIAGNOSIS — R52 PAIN: ICD-10-CM

## 2024-08-28 PROCEDURE — 99310 SBSQ NF CARE HIGH MDM 45: CPT | Performed by: PHYSICIAN ASSISTANT

## 2024-08-28 NOTE — LETTER
"Patient: Lucille Holloway  : 1945    Encounter Date: 2024  Name: Lucille Holloway  YOB: 1945    Chief complaint: Syncopal episode, fall with L ankle fracture.    HPI: This is a 79 year old  female who has a medical history remarkable for rheumatoid arthritis, stage 2 CKD, KELLY, hypertension, and falls. She was seen in the ER for evaluation of fall at home. Patient became lightheaded while attempting to enter her home after lunch with her daughter and fell. Daughter reports patient was unconscious for approximately 2 minutes. She did not hit her head but sustained L ankle injury which caused severe pain. She was not able to stand. CT head, CT C spine show no acute fx or abnormality. Xray left ankle shows displaced fracture of the medial malleolus and distal fibula. Patient was taken to the OR for open reduction trimalleolar ankle fracture. Patient was followed by cardiology who were interested in ruling out structural heart disease and arrhythmia. Zio patch was placed prior to discharge. Echocardiogram showed ejection fraction of 55-60 % with Spectral Doppler shows an impaired relaxation pattern of left ventricular diastolic filling. Patient will out patient follow up with cardiology. She was discharged to SNF for rehab.    Syncope  This is a new problem. The current episode started 1 to 4 weeks ago. The problem has been resolved. Nothing aggravates the symptoms. Pertinent negatives include no abdominal pain, auditory change, back pain, bladder incontinence, bowel incontinence, chest pain, confusion, dizziness, fever, light-headedness, palpitations or visual change. Treatments tried: rest. The treatment provided moderate relief. Her past medical history is significant for arrhythmia and HTN.     Review of systems:   ROS negative except were noted in HPI.    Code Status: full code    /74   Pulse 78   Temp 36.4 °C (97.5 °F)   Resp 20   Ht 1.6 m (5' 3\")   " Wt 91.2 kg (201 lb)   SpO2 93%   BMI 35.61 kg/m²      Physical Exam  Constitutional:       General: She is not in acute distress.  HENT:      Head: Normocephalic.      Nose: Nose normal.   Eyes:      Extraocular Movements: Extraocular movements intact.      Pupils: Pupils are equal, round, and reactive to light.   Cardiovascular:      Rate and Rhythm: Normal rate and regular rhythm.      Pulses: Normal pulses.   Pulmonary:      Effort: Pulmonary effort is normal.      Breath sounds: Normal breath sounds. No wheezing or rales.   Abdominal:      General: Bowel sounds are normal. There is no distension.      Palpations: Abdomen is soft.      Tenderness: There is no abdominal tenderness. There is no guarding.   Genitourinary:     Comments: Voiding.   Musculoskeletal:         General: Normal range of motion.      Cervical back: Normal range of motion.      Comments: L lower leg with boot for support. NWB   Lymphadenopathy:      Cervical: No cervical adenopathy.   Skin:     General: Skin is warm and dry.   Neurological:      General: No focal deficit present.      Mental Status: She is oriented to person, place, and time.   Psychiatric:         Mood and Affect: Mood normal.         Behavior: Behavior normal.        Medications reviewed during visit at facility.  Duloxetine 30 mg po daily  Naltrexone-buPROPion HCl ER Oral Tablet Extended Release 12 Hour 8-90 MG two tablet po q 12 hours  Tylenol 500 mg  two tablets po q 8 hours prn  Aspirin 81 mg po q 12 hours   Levothyroxine 75 mcg po daily  Colace 100 mg po daily  Gabapentin 300 mg po q 8 hours  Oxycodone 5 mg po q 6 hours prn  Atorvastatin 10 mg po daily  Valsartan 40 mg po daily    Labs reviewed at facility:  Basic Metabolic Panel  Order: [id number]   Status: Final result       Visible to patient: Yes (not seen)    0 Result Notes            Component  Ref Range & Units 9 d ago  (8/21/24) 10 d ago  (8/20/24) 12 d ago  (8/18/24) 13 d ago  (8/17/24) 2 wk ago  (8/15/24) 2  wk ago  (8/14/24) 2 wk ago  (8/13/24)   Glucose  74 - 99 mg/dL 92 96 100 High  92 95 96 93   Sodium  136 - 145 mmol/L 138 138 140 140 140 141 139   Potassium  3.5 - 5.3 mmol/L 3.9 4.0 4.0 3.9 4.2 4.8 4.0   Chloride  98 - 107 mmol/L 103 104 104 104 108 High  108 High  106   Bicarbonate  21 - 32 mmol/L 27 30 29 29 24 26 23   Anion Gap  10 - 20 mmol/L 12 8 Low  11 11 12 12 14   Urea Nitrogen  6 - 23 mg/dL 19 18 14 13 17 20 25 High    Creatinine  0.50 - 1.05 mg/dL 0.88 0.82 0.85 0.76 0.88 0.90 1.16 High    eGFR  >60 mL/min/1.73m*2 67 73 CM 70 CM 80 CM 67 CM 65 CM 48 Low  CM   Comment: Calculations of estimated GFR are performed using the 2021 CKD-EPI Study Refit equation without the race variable for the IDMS-Traceable creatinine methods.  https://jasn.asnjournals.org/content/early/2021/09/22/ASN.4214687085   Calcium  8.6 - 10.3 mg/dL 8.7 8.3 Low  8.5 Low  8.4 Low  8.0 Low  8.2 Low  8.3 Low    Resulting Agency [facility]              Specimen Collected: 08/21/24 06:30 Last Resulted: 08/21/24 08:30       Contains abnormal data CBC  Order: [id number]   Status: Final result       Visible to patient: Yes (not seen)    0 Result Notes            Component  Ref Range & Units 9 d ago  (8/21/24) 10 d ago  (8/20/24) 12 d ago  (8/18/24) 13 d ago  (8/17/24) 2 wk ago  (8/15/24) 2 wk ago  (8/14/24) 2 wk ago  (8/13/24)   WBC  4.4 - 11.3 x10*3/uL 7.6 7.5 7.5 8.2 8.7 8.2 6.3   nRBC  0.0 - 0.0 /100 WBCs 0.0 0.0 0.0 0.0 0.0 0.0 0.0   RBC  4.00 - 5.20 x10*6/uL 3.53 Low  3.60 Low  3.80 Low  3.88 Low  3.79 Low  3.92 Low  3.94 Low    Hemoglobin  12.0 - 16.0 g/dL 10.8 Low  11.0 Low  11.6 Low  12.1 11.8 Low  12.3 12.3   Hematocrit  36.0 - 46.0 % 32.4 Low  33.4 Low  35.9 Low  37.7 36.8 38.3 37.8   MCV  80 - 100 fL 92 93 95 97 97 98 96   MCH  26.0 - 34.0 pg 30.6 30.6 30.5 31.2 31.1 31.4 31.2   MCHC  32.0 - 36.0 g/dL 33.3 32.9 32.3 32.1 32.1 32.1 32.5   RDW  11.5 - 14.5 % 13.2 13.2 13.2 13.2 13.4 13.4 13.2   Platelets  150 - 450  x10*3/uL 289 261 242 235 234 228 214   Resulting Agency [facility]              Specimen Collected: 08/21/24 06:30 Last Resulted: 08/21/24 07:58          Assessment/Plan   Problem List Items Addressed This Visit       Class 1 obesity due to excess calories with serious comorbidity and body mass index (BMI) of 33.0 to 33.9 in adult     Naltrexone-buPROPion HCl ER Oral Tablet Extended Release 12 Hour 8-90 MG two tablet po q 12 hours         Closed fracture of left ankle - Primary     NWB. Schedule follow up appointment with with Dr Larsen on 8/30/2024.         Impaired functional mobility, balance, gait, and endurance     PT and OT to assess and treat.         Pain     Oxycodone 5 mg po q 6 hours prn, alternate with Tylenol.         Recurrent major depressive disorder (CMS-HCC)     Duloxetine 30 mg po daily.            Time:  I spent 45 minutes or greater with the patient. Greater than 50% of this time was spent in counseling and or coordination of care. The time includes prep time of reviewing vital signs, report from direct nursing staff and or therapists, hospital documentation, reviewing labs, radiographs, diagnostic tests and or consultations, time directly spent with the patient interviewing, examining, and education regarding diagnosis, treatments, and medications, as well as documentation in the electronic medical record, and reviewing the plan of care and any new orders with the patient, nursing staff and other staff directly related to the patients care.      Sree Cabrales PA-C       Electronically Signed By: Sree Cabrales PA-C   8/30/24 12:41 PM

## 2024-08-30 ENCOUNTER — APPOINTMENT (OUTPATIENT)
Dept: ORTHOPEDIC SURGERY | Facility: CLINIC | Age: 79
End: 2024-08-30
Payer: MEDICARE

## 2024-08-30 ENCOUNTER — HOSPITAL ENCOUNTER (OUTPATIENT)
Dept: RADIOLOGY | Facility: CLINIC | Age: 79
Discharge: HOME | End: 2024-08-30
Payer: MEDICARE

## 2024-08-30 VITALS
SYSTOLIC BLOOD PRESSURE: 145 MMHG | WEIGHT: 201 LBS | TEMPERATURE: 97.5 F | HEART RATE: 78 BPM | DIASTOLIC BLOOD PRESSURE: 74 MMHG | HEIGHT: 63 IN | OXYGEN SATURATION: 93 % | BODY MASS INDEX: 35.61 KG/M2 | RESPIRATION RATE: 20 BRPM

## 2024-08-30 VITALS — HEIGHT: 63 IN | WEIGHT: 194 LBS | BODY MASS INDEX: 34.38 KG/M2

## 2024-08-30 DIAGNOSIS — S82.892A CLOSED LEFT ANKLE FRACTURE, INITIAL ENCOUNTER: ICD-10-CM

## 2024-08-30 PROBLEM — R52 PAIN: Status: ACTIVE | Noted: 2024-08-30

## 2024-08-30 PROBLEM — F33.9 RECURRENT MAJOR DEPRESSIVE DISORDER (CMS-HCC): Status: ACTIVE | Noted: 2024-08-30

## 2024-08-30 PROBLEM — Z74.09 IMPAIRED FUNCTIONAL MOBILITY, BALANCE, GAIT, AND ENDURANCE: Status: ACTIVE | Noted: 2024-08-30

## 2024-08-30 PROCEDURE — 1036F TOBACCO NON-USER: CPT | Performed by: ORTHOPAEDIC SURGERY

## 2024-08-30 PROCEDURE — 99024 POSTOP FOLLOW-UP VISIT: CPT | Performed by: ORTHOPAEDIC SURGERY

## 2024-08-30 PROCEDURE — 1123F ACP DISCUSS/DSCN MKR DOCD: CPT | Performed by: ORTHOPAEDIC SURGERY

## 2024-08-30 PROCEDURE — 1111F DSCHRG MED/CURRENT MED MERGE: CPT | Performed by: ORTHOPAEDIC SURGERY

## 2024-08-30 PROCEDURE — 73610 X-RAY EXAM OF ANKLE: CPT | Mod: LT

## 2024-08-30 RX ORDER — OXYCODONE HYDROCHLORIDE 5 MG/1
5 TABLET ORAL EVERY 6 HOURS PRN
Qty: 15 TABLET | Refills: 0 | Status: SHIPPED | OUTPATIENT
Start: 2024-08-30 | End: 2024-09-06

## 2024-08-30 ASSESSMENT — ENCOUNTER SYMPTOMS
BACK PAIN: 0
LIGHT-HEADEDNESS: 0
SYNCOPE: 1
FEVER: 0
CONFUSION: 0
PALPITATIONS: 0
ABDOMINAL PAIN: 0
BOWEL INCONTINENCE: 0
VISUAL CHANGE: 0
DIZZINESS: 0

## 2024-08-30 NOTE — ASSESSMENT & PLAN NOTE
>>ASSESSMENT AND PLAN FOR CLASS 1 OBESITY DUE TO EXCESS CALORIES WITH SERIOUS COMORBIDITY AND BODY MASS INDEX (BMI) OF 33.0 TO 33.9 IN ADULT WRITTEN ON 8/30/2024 12:40 PM BY YANY VILLAFUERTE PA-C    Naltrexone-buPROPion HCl ER Oral Tablet Extended Release 12 Hour 8-90 MG two tablet po q 12 hours

## 2024-08-30 NOTE — PROGRESS NOTES
Postop ankle fracture  Feels well  She has been in a nursing facility  The dressing is removed today she has some raw areas medially and laterally but no active infection sutures are removed x-rays show hardware and fracture mortise intact plan mobility exercises nonweightbearing follow-up 2 weeks prescription for pain medicine

## 2024-08-30 NOTE — PROGRESS NOTES
"8/28/2024  Name: Lucille Holloway  YOB: 1945    Chief complaint: Syncopal episode, fall with L ankle fracture.    HPI: This is a 79 year old  female who has a medical history remarkable for rheumatoid arthritis, stage 2 CKD, KELLY, hypertension, and falls. She was seen in the ER for evaluation of fall at home. Patient became lightheaded while attempting to enter her home after lunch with her daughter and fell. Daughter reports patient was unconscious for approximately 2 minutes. She did not hit her head but sustained L ankle injury which caused severe pain. She was not able to stand. CT head, CT C spine show no acute fx or abnormality. Xray left ankle shows displaced fracture of the medial malleolus and distal fibula. Patient was taken to the OR for open reduction trimalleolar ankle fracture. Patient was followed by cardiology who were interested in ruling out structural heart disease and arrhythmia. Zio patch was placed prior to discharge. Echocardiogram showed ejection fraction of 55-60 % with Spectral Doppler shows an impaired relaxation pattern of left ventricular diastolic filling. Patient will out patient follow up with cardiology. She was discharged to SNF for rehab.    Syncope  This is a new problem. The current episode started 1 to 4 weeks ago. The problem has been resolved. Nothing aggravates the symptoms. Pertinent negatives include no abdominal pain, auditory change, back pain, bladder incontinence, bowel incontinence, chest pain, confusion, dizziness, fever, light-headedness, palpitations or visual change. Treatments tried: rest. The treatment provided moderate relief. Her past medical history is significant for arrhythmia and HTN.     Review of systems:   ROS negative except were noted in HPI.    Code Status: full code    /74   Pulse 78   Temp 36.4 °C (97.5 °F)   Resp 20   Ht 1.6 m (5' 3\")   Wt 91.2 kg (201 lb)   SpO2 93%   BMI 35.61 kg/m²      Physical " Exam  Constitutional:       General: She is not in acute distress.  HENT:      Head: Normocephalic.      Nose: Nose normal.   Eyes:      Extraocular Movements: Extraocular movements intact.      Pupils: Pupils are equal, round, and reactive to light.   Cardiovascular:      Rate and Rhythm: Normal rate and regular rhythm.      Pulses: Normal pulses.   Pulmonary:      Effort: Pulmonary effort is normal.      Breath sounds: Normal breath sounds. No wheezing or rales.   Abdominal:      General: Bowel sounds are normal. There is no distension.      Palpations: Abdomen is soft.      Tenderness: There is no abdominal tenderness. There is no guarding.   Genitourinary:     Comments: Voiding.   Musculoskeletal:         General: Normal range of motion.      Cervical back: Normal range of motion.      Comments: L lower leg with boot for support. NWB   Lymphadenopathy:      Cervical: No cervical adenopathy.   Skin:     General: Skin is warm and dry.   Neurological:      General: No focal deficit present.      Mental Status: She is oriented to person, place, and time.   Psychiatric:         Mood and Affect: Mood normal.         Behavior: Behavior normal.        Medications reviewed during visit at facility.  Duloxetine 30 mg po daily  Naltrexone-buPROPion HCl ER Oral Tablet Extended Release 12 Hour 8-90 MG two tablet po q 12 hours  Tylenol 500 mg  two tablets po q 8 hours prn  Aspirin 81 mg po q 12 hours   Levothyroxine 75 mcg po daily  Colace 100 mg po daily  Gabapentin 300 mg po q 8 hours  Oxycodone 5 mg po q 6 hours prn  Atorvastatin 10 mg po daily  Valsartan 40 mg po daily    Labs reviewed at facility:  Basic Metabolic Panel  Order: [id number]   Status: Final result       Visible to patient: Yes (not seen)    0 Result Notes            Component  Ref Range & Units 9 d ago  (8/21/24) 10 d ago  (8/20/24) 12 d ago  (8/18/24) 13 d ago  (8/17/24) 2 wk ago  (8/15/24) 2 wk ago  (8/14/24) 2 wk ago  (8/13/24)   Glucose  74 - 99 mg/dL 92  96 100 High  92 95 96 93   Sodium  136 - 145 mmol/L 138 138 140 140 140 141 139   Potassium  3.5 - 5.3 mmol/L 3.9 4.0 4.0 3.9 4.2 4.8 4.0   Chloride  98 - 107 mmol/L 103 104 104 104 108 High  108 High  106   Bicarbonate  21 - 32 mmol/L 27 30 29 29 24 26 23   Anion Gap  10 - 20 mmol/L 12 8 Low  11 11 12 12 14   Urea Nitrogen  6 - 23 mg/dL 19 18 14 13 17 20 25 High    Creatinine  0.50 - 1.05 mg/dL 0.88 0.82 0.85 0.76 0.88 0.90 1.16 High    eGFR  >60 mL/min/1.73m*2 67 73 CM 70 CM 80 CM 67 CM 65 CM 48 Low  CM   Comment: Calculations of estimated GFR are performed using the 2021 CKD-EPI Study Refit equation without the race variable for the IDMS-Traceable creatinine methods.  https://jasn.asnjournals.org/content/early/2021/09/22/ASN.1025400387   Calcium  8.6 - 10.3 mg/dL 8.7 8.3 Low  8.5 Low  8.4 Low  8.0 Low  8.2 Low  8.3 Low    Resulting Agency [facility]              Specimen Collected: 08/21/24 06:30 Last Resulted: 08/21/24 08:30       Contains abnormal data CBC  Order: [id number]   Status: Final result       Visible to patient: Yes (not seen)    0 Result Notes            Component  Ref Range & Units 9 d ago  (8/21/24) 10 d ago  (8/20/24) 12 d ago  (8/18/24) 13 d ago  (8/17/24) 2 wk ago  (8/15/24) 2 wk ago  (8/14/24) 2 wk ago  (8/13/24)   WBC  4.4 - 11.3 x10*3/uL 7.6 7.5 7.5 8.2 8.7 8.2 6.3   nRBC  0.0 - 0.0 /100 WBCs 0.0 0.0 0.0 0.0 0.0 0.0 0.0   RBC  4.00 - 5.20 x10*6/uL 3.53 Low  3.60 Low  3.80 Low  3.88 Low  3.79 Low  3.92 Low  3.94 Low    Hemoglobin  12.0 - 16.0 g/dL 10.8 Low  11.0 Low  11.6 Low  12.1 11.8 Low  12.3 12.3   Hematocrit  36.0 - 46.0 % 32.4 Low  33.4 Low  35.9 Low  37.7 36.8 38.3 37.8   MCV  80 - 100 fL 92 93 95 97 97 98 96   MCH  26.0 - 34.0 pg 30.6 30.6 30.5 31.2 31.1 31.4 31.2   MCHC  32.0 - 36.0 g/dL 33.3 32.9 32.3 32.1 32.1 32.1 32.5   RDW  11.5 - 14.5 % 13.2 13.2 13.2 13.2 13.4 13.4 13.2   Platelets  150 - 450 x10*3/uL 289 261 242 235 234 228 214   Resulting Agency [facility]  [facility]              Specimen Collected: 08/21/24 06:30 Last Resulted: 08/21/24 07:58          Assessment/Plan    Problem List Items Addressed This Visit       Class 1 obesity due to excess calories with serious comorbidity and body mass index (BMI) of 33.0 to 33.9 in adult     Naltrexone-buPROPion HCl ER Oral Tablet Extended Release 12 Hour 8-90 MG two tablet po q 12 hours         Closed fracture of left ankle - Primary     NWB. Schedule follow up appointment with with Dr Larsen on 8/30/2024.         Impaired functional mobility, balance, gait, and endurance     PT and OT to assess and treat.         Pain     Oxycodone 5 mg po q 6 hours prn, alternate with Tylenol.         Recurrent major depressive disorder (CMS-HCC)     Duloxetine 30 mg po daily.            Time:  I spent 45 minutes or greater with the patient. Greater than 50% of this time was spent in counseling and or coordination of care. The time includes prep time of reviewing vital signs, report from direct nursing staff and or therapists, hospital documentation, reviewing labs, radiographs, diagnostic tests and or consultations, time directly spent with the patient interviewing, examining, and education regarding diagnosis, treatments, and medications, as well as documentation in the electronic medical record, and reviewing the plan of care and any new orders with the patient, nursing staff and other staff directly related to the patients care.      Sree Cabrales PA-C

## 2024-09-04 DIAGNOSIS — S82.892A CLOSED LEFT ANKLE FRACTURE, INITIAL ENCOUNTER: ICD-10-CM

## 2024-09-08 ENCOUNTER — TELEPHONE (OUTPATIENT)
Dept: PRIMARY CARE | Facility: CLINIC | Age: 79
End: 2024-09-08
Payer: MEDICARE

## 2024-09-09 ENCOUNTER — DOCUMENTATION (OUTPATIENT)
Dept: PRIMARY CARE | Facility: CLINIC | Age: 79
End: 2024-09-09
Payer: MEDICARE

## 2024-09-09 ENCOUNTER — PATIENT OUTREACH (OUTPATIENT)
Dept: PRIMARY CARE | Facility: CLINIC | Age: 79
End: 2024-09-09
Payer: MEDICARE

## 2024-09-09 NOTE — TELEPHONE ENCOUNTER
On-call:    Spoke with patient and her daughter Kristin.  Primarily discussion with the daughter.    She called regarding her medications as she was just discharged from SNF on Friday.    She was thereafter admission from fall with fracture of the left ankle.    While she was at the SNF, she was treated with losartan 25 mg instead of her valsartan 40 mg (approximately equivalent strength ARB dosing).    Kristin is concerned because one of the prior complaints and possibly contributed to the fall was her regular dizziness.  She says she does not have as much dizziness being on the losartan while at the SNF and wants to know if she should be changed from valsartan to losartan instead.    Her last dose of blood pressure medication was Friday prior to discharge, losartan 25 mg.    As of the time of our discussion in the afternoon of 9/8/2024, did not take any ARB on either Saturday, 9/7/2024 or on the day of telephone call.    Her most recent blood pressure is 120/60.    Given her age, recent fall, I recommended continuing to hold ARB altogether for now.    If her blood pressures do start getting higher, she can potentially be started on an even lower dose, deferred to her PCP.

## 2024-09-09 NOTE — PROGRESS NOTES
Discharge Facility:[facility]  Discharge Diagnosis:Fracture of left lover leg routine healing  Admission Date:08/21/24  Discharge Date: 09/06/24    PCP Appointment Date:09/27/24  Specialist Appointment Date:   Hospital Encounter and Summary Linked: No  See discharge assessment below for further details  Engagement  Call Start Time: 0108 (9/9/2024  1:08 PM)    Medications  Medications reviewed with patient/caregiver?: Yes (no new meds) (9/9/2024  1:08 PM)  Is the patient having any side effects they believe may be caused by any medication additions or changes?: No (9/9/2024  1:08 PM)  Does the patient have all medications ordered at discharge?: Not applicable (9/9/2024  1:08 PM)  Is the patient taking all medications as directed (includes completed medication regime)?: Yes (9/9/2024  1:08 PM)    Appointments  Does the patient have a primary care provider?: Yes (9/9/2024  1:08 PM)  Care Management Interventions: Verified appointment date/time/provider (09/27/24 VV scheduledby the office) (9/9/2024  1:08 PM)    Self Management  Has home health visited the patient within 72 hours of discharge?: Yes (9/9/2024  1:08 PM)  Has all Durable Medical Equipment (DME) been delivered?: No (9/9/2024  1:08 PM)    Patient Teaching  Does the patient have access to their discharge instructions?: Yes (9/9/2024  1:08 PM)  Care Management Interventions: Reviewed instructions with patient (9/9/2024  1:08 PM)  What is the patient's perception of their health status since discharge?: Improving (doing a little better PT comimg today) (9/9/2024  1:08 PM)    Wrap Up  Call End Time: 0115 (9/9/2024  1:08 PM)

## 2024-09-10 ENCOUNTER — TELEPHONE (OUTPATIENT)
Dept: PRIMARY CARE | Facility: CLINIC | Age: 79
End: 2024-09-10
Payer: MEDICARE

## 2024-09-10 NOTE — TELEPHONE ENCOUNTER
Called and spoke with Estefanía at [facility]. Per Kina CEJA for the pt to continue to take the valsartan 40mg and to stop the losartan (not on med list). Also to monitor pt Blood Pressure. [facility] facility notified.

## 2024-09-10 NOTE — TELEPHONE ENCOUNTER
Pt was taking losartan, pt meds states Losartan as she was given in the hospital. Med chart states valsartan. Bp was low and she was experiencing dizziness. Please advise    [phone] Bessy José

## 2024-09-12 ENCOUNTER — TELEPHONE (OUTPATIENT)
Dept: PRIMARY CARE | Facility: CLINIC | Age: 79
End: 2024-09-12
Payer: MEDICARE

## 2024-09-12 NOTE — TELEPHONE ENCOUNTER
Estefanía from [facility] is returning your call please give her a call back [phone]

## 2024-09-13 RX ORDER — NALTREXONE HYDROCHLORIDE AND BUPROPION HYDROCHLORIDE 8; 90 MG/1; MG/1
2 TABLET, EXTENDED RELEASE ORAL 2 TIMES DAILY
Qty: 120 TABLET | Refills: 1 | Status: SHIPPED | OUTPATIENT
Start: 2024-09-13

## 2024-09-13 NOTE — TELEPHONE ENCOUNTER
Normal BP's off medication: 118/62.  She is going to hold the valsartan until she sees me in office.  She will notify me if her blood pressure rises over 140/90.

## 2024-09-16 ENCOUNTER — TELEPHONE (OUTPATIENT)
Dept: PRIMARY CARE | Facility: CLINIC | Age: 79
End: 2024-09-16

## 2024-09-16 ENCOUNTER — APPOINTMENT (OUTPATIENT)
Dept: PRIMARY CARE | Facility: CLINIC | Age: 79
End: 2024-09-16
Payer: MEDICARE

## 2024-09-16 NOTE — TELEPHONE ENCOUNTER
SPOKE WITH ERIK FROM [facility] REQUESTING THAT PATIENT CAN GET A HOME HEALTH AID PATIENT FAIL AND BROKE HER HIP PLEASE CALL ERIK BACK [phone]

## 2024-09-19 ENCOUNTER — APPOINTMENT (OUTPATIENT)
Dept: RADIOLOGY | Facility: CLINIC | Age: 79
End: 2024-09-19
Payer: MEDICARE

## 2024-09-19 ENCOUNTER — PATIENT OUTREACH (OUTPATIENT)
Dept: PRIMARY CARE | Facility: CLINIC | Age: 79
End: 2024-09-19

## 2024-09-19 ENCOUNTER — APPOINTMENT (OUTPATIENT)
Dept: ORTHOPEDIC SURGERY | Facility: CLINIC | Age: 79
End: 2024-09-19
Payer: MEDICARE

## 2024-09-19 NOTE — PROGRESS NOTES
Follow up call after hospitalization. Patient did not see PCP within 14 days of discharge. She has an future appt with her PCP on 09/27/24   At time of outreach call the patient feels as if their condition has improved  since last visit.  Addressed any questions or concerns.

## 2024-09-24 ENCOUNTER — TELEPHONE (OUTPATIENT)
Dept: ORTHOPEDIC SURGERY | Facility: CLINIC | Age: 79
End: 2024-09-24

## 2024-09-24 ENCOUNTER — HOSPITAL ENCOUNTER (OUTPATIENT)
Dept: RADIOLOGY | Facility: CLINIC | Age: 79
Discharge: HOME | End: 2024-09-24
Payer: MEDICARE

## 2024-09-24 ENCOUNTER — APPOINTMENT (OUTPATIENT)
Dept: ORTHOPEDIC SURGERY | Facility: CLINIC | Age: 79
End: 2024-09-24
Payer: MEDICARE

## 2024-09-24 VITALS — BODY MASS INDEX: 35.61 KG/M2 | WEIGHT: 201 LBS | HEIGHT: 63 IN

## 2024-09-24 DIAGNOSIS — S82.892A CLOSED LEFT ANKLE FRACTURE, INITIAL ENCOUNTER: Primary | ICD-10-CM

## 2024-09-24 DIAGNOSIS — S82.892A CLOSED LEFT ANKLE FRACTURE, INITIAL ENCOUNTER: ICD-10-CM

## 2024-09-24 PROCEDURE — 1123F ACP DISCUSS/DSCN MKR DOCD: CPT | Performed by: ORTHOPAEDIC SURGERY

## 2024-09-24 PROCEDURE — 99024 POSTOP FOLLOW-UP VISIT: CPT | Performed by: ORTHOPAEDIC SURGERY

## 2024-09-24 PROCEDURE — 1036F TOBACCO NON-USER: CPT | Performed by: ORTHOPAEDIC SURGERY

## 2024-09-24 PROCEDURE — 1159F MED LIST DOCD IN RCRD: CPT | Performed by: ORTHOPAEDIC SURGERY

## 2024-09-24 PROCEDURE — 73610 X-RAY EXAM OF ANKLE: CPT | Mod: LT

## 2024-09-24 PROCEDURE — 73610 X-RAY EXAM OF ANKLE: CPT | Mod: LEFT SIDE | Performed by: RADIOLOGY

## 2024-09-24 ASSESSMENT — PAIN - FUNCTIONAL ASSESSMENT: PAIN_FUNCTIONAL_ASSESSMENT: NO/DENIES PAIN

## 2024-09-24 NOTE — TELEPHONE ENCOUNTER
Patient daughter has question about the boot that was put on patient today. Please give her a call    [phone] Kristin Holloway

## 2024-09-24 NOTE — PROGRESS NOTES
6 weeks status post open duction internal fixation trimalleolar ankle fracture doing very well she has been in therapy working on mobility she has been nonweightbearing  On examination the skin areas are healed up nicely she is got minimal swelling and excellent mobility  X-rays show satisfactory position of hardware and mortise assessment doing well plan outpatient therapy progressive weightbearing in boot follow-up 3 to 4 weeks  All questions answered

## 2024-09-25 NOTE — TELEPHONE ENCOUNTER
Nursing Sumrall called and stated that patient boot does not fit and would like a return call at .

## 2024-09-26 ENCOUNTER — TELEPHONE (OUTPATIENT)
Dept: ORTHOPEDIC SURGERY | Facility: CLINIC | Age: 79
End: 2024-09-26
Payer: MEDICARE

## 2024-09-26 NOTE — TELEPHONE ENCOUNTER
Nurse Maria Esther called again, she wanted to explain her concerns about the current boot Ms. Holloway Has on. She would like for her to get a whole different boot if possible, because the one she has is slippery on the bottom.

## 2024-09-27 ENCOUNTER — APPOINTMENT (OUTPATIENT)
Dept: PRIMARY CARE | Facility: CLINIC | Age: 79
End: 2024-09-27
Payer: MEDICARE

## 2024-09-27 DIAGNOSIS — F33.9 EPISODE OF RECURRENT MAJOR DEPRESSIVE DISORDER, UNSPECIFIED DEPRESSION EPISODE SEVERITY (CMS-HCC): ICD-10-CM

## 2024-09-27 DIAGNOSIS — E66.01 OBESITY, MORBID (MULTI): ICD-10-CM

## 2024-09-27 DIAGNOSIS — N18.31 CHRONIC RENAL IMPAIRMENT, STAGE 3A (MULTI): ICD-10-CM

## 2024-09-27 DIAGNOSIS — E11.69 TYPE 2 DIABETES MELLITUS WITH OTHER SPECIFIED COMPLICATION, UNSPECIFIED WHETHER LONG TERM INSULIN USE (MULTI): ICD-10-CM

## 2024-09-27 DIAGNOSIS — I10 PRIMARY HYPERTENSION: Primary | ICD-10-CM

## 2024-09-27 DIAGNOSIS — I27.20 PULMONARY HYPERTENSION (MULTI): ICD-10-CM

## 2024-09-27 PROCEDURE — 1123F ACP DISCUSS/DSCN MKR DOCD: CPT

## 2024-09-27 PROCEDURE — 99214 OFFICE O/P EST MOD 30 MIN: CPT

## 2024-09-27 PROCEDURE — 1159F MED LIST DOCD IN RCRD: CPT

## 2024-09-27 PROCEDURE — 1036F TOBACCO NON-USER: CPT

## 2024-09-27 PROCEDURE — 1160F RVW MEDS BY RX/DR IN RCRD: CPT

## 2024-09-27 RX ORDER — ASPIRIN 81 MG/1
81 TABLET ORAL DAILY
COMMUNITY

## 2024-09-27 ASSESSMENT — ENCOUNTER SYMPTOMS
SEIZURES: 0
DYSPHORIC MOOD: 0
ANAL BLEEDING: 0
EYES NEGATIVE: 1
COUGH: 0
NERVOUS/ANXIOUS: 0
MYALGIAS: 0
HEMATURIA: 0
TREMORS: 0
UNEXPECTED WEIGHT CHANGE: 0
ARTHRALGIAS: 1
COLOR CHANGE: 0
DIZZINESS: 0
HYPERACTIVE: 0
NECK PAIN: 0
LIGHT-HEADEDNESS: 0
CONSTIPATION: 0
DYSURIA: 0
POLYPHAGIA: 0
HEADACHES: 0
PSYCHIATRIC NEGATIVE: 1
RHINORRHEA: 0
PHOTOPHOBIA: 0
ACTIVITY CHANGE: 0
ABDOMINAL PAIN: 0
CONFUSION: 0
TROUBLE SWALLOWING: 0
DEPRESSION: 0
OCCASIONAL FEELINGS OF UNSTEADINESS: 0
SINUS PRESSURE: 0
STRIDOR: 0
VOICE CHANGE: 0
RESPIRATORY NEGATIVE: 1
DIFFICULTY URINATING: 0
WEAKNESS: 0
BLOOD IN STOOL: 0
HEMATOLOGIC/LYMPHATIC NEGATIVE: 1
LOSS OF SENSATION IN FEET: 0
JOINT SWELLING: 0
SHORTNESS OF BREATH: 0
CHEST TIGHTNESS: 0
DIARRHEA: 0
NEUROLOGICAL NEGATIVE: 1
NAUSEA: 0
SPEECH DIFFICULTY: 0
EYE DISCHARGE: 0
AGITATION: 0
BRUISES/BLEEDS EASILY: 0
GASTROINTESTINAL NEGATIVE: 1
NECK STIFFNESS: 0
FATIGUE: 1
DIAPHORESIS: 0
FEVER: 0
APNEA: 0
SORE THROAT: 0
BACK PAIN: 0
SLEEP DISTURBANCE: 0
FREQUENCY: 0
FLANK PAIN: 0
WHEEZING: 0
APPETITE CHANGE: 0
CHILLS: 0
ENDOCRINE NEGATIVE: 1
PALPITATIONS: 0
CARDIOVASCULAR NEGATIVE: 1
POLYDIPSIA: 0
RECTAL PAIN: 0
NUMBNESS: 0
ABDOMINAL DISTENTION: 0

## 2024-09-27 ASSESSMENT — PATIENT HEALTH QUESTIONNAIRE - PHQ9
1. LITTLE INTEREST OR PLEASURE IN DOING THINGS: NOT AT ALL
2. FEELING DOWN, DEPRESSED OR HOPELESS: NOT AT ALL
SUM OF ALL RESPONSES TO PHQ9 QUESTIONS 1 AND 2: 0

## 2024-09-27 NOTE — PATIENT INSTRUCTIONS

## 2024-09-27 NOTE — TELEPHONE ENCOUNTER
Pt fell during bath, tried to lift leg and fell on her bottom. Iona from [facility] informed they're just going to do sponge baths until she gets her boot off. Pt wasn't hurt. Just wanted to inform her PCP.

## 2024-09-27 NOTE — PROGRESS NOTES
"Consent:  Verbal consent was requested and obtained from patient on this date for a telehealth visit.    Primary Care Provider: Kina Lynch, GETACHEW-CNP    Subjective     Patient ID: Lucille Holloway is a 79 y.o. female who presents for No chief complaint on file..  HPI  VV    MH of RA, chronic back pain, CKD, MGUS, HTN, hearing loss, KELLY.    Had a fall, status post open duction internal fixation trimalleolar ankle fracture; saw orthopedic specialist 9/24/24 had XR that showed satisfactory position of hardware   Pain wel controlled with tylenol   HTN: home BP's 118-126/60's  She has been off of valsartan for 40mg for a few months now  She completed Holter monitor- results are pending  She has a follow up appointment with her cardiologist Dr. Rock  Depression  CKD  Obesity   Mammogram UTD  Colonoscopy last 8/4/21- due 8/4/26  Immunizations: she is going to go to local pharmacy for influenza vaccine and COVID vaccine  Lab Results   Component Value Date    HGBA1C 5.4 06/25/2024   Pulmonary HTN  Lab Results   Component Value Date    CHOL 132 01/04/2024    CHOL 113 06/09/2023    CHOL 115 10/10/2022     Lab Results   Component Value Date    HDL 74.2 01/04/2024    HDL 70.7 06/09/2023    HDL 55.5 10/10/2022     Lab Results   Component Value Date    LDLCALC 39 01/04/2024     Lab Results   Component Value Date    TRIG 93 01/04/2024    TRIG 85 06/09/2023    TRIG 88 10/10/2022     No components found for: \"CHOLHDL\"      Review of Systems   Constitutional:  Positive for fatigue. Negative for activity change, appetite change, chills, diaphoresis, fever and unexpected weight change.   HENT: Negative.  Negative for congestion, dental problem, ear discharge, ear pain, hearing loss, mouth sores, nosebleeds, postnasal drip, rhinorrhea, sinus pressure, sneezing, sore throat, tinnitus, trouble swallowing and voice change.    Eyes: Negative.  Negative for photophobia, discharge and visual disturbance.   Respiratory: Negative.  " Negative for apnea, cough, chest tightness, shortness of breath, wheezing and stridor.    Cardiovascular: Negative.  Negative for chest pain, palpitations and leg swelling.   Gastrointestinal: Negative.  Negative for abdominal distention, abdominal pain, anal bleeding, blood in stool, constipation, diarrhea, nausea and rectal pain.   Endocrine: Negative.  Negative for cold intolerance, heat intolerance, polydipsia, polyphagia and polyuria.   Genitourinary: Negative.  Negative for decreased urine volume, difficulty urinating, dysuria, flank pain, frequency, hematuria and urgency.   Musculoskeletal:  Positive for arthralgias. Negative for back pain, gait problem, joint swelling, myalgias, neck pain and neck stiffness.   Skin: Negative.  Negative for color change and rash.   Neurological: Negative.  Negative for dizziness, tremors, seizures, syncope, speech difficulty, weakness, light-headedness, numbness and headaches.   Hematological: Negative.  Does not bruise/bleed easily.   Psychiatric/Behavioral: Negative.  Negative for agitation, confusion, dysphoric mood, sleep disturbance and suicidal ideas. The patient is not nervous/anxious and is not hyperactive.    All other systems reviewed and are negative.      Objective   Physical Exam  Constitutional:       General: She is not in acute distress.     Appearance: Normal appearance. She is obese. She is not ill-appearing or toxic-appearing.   HENT:      Head: Normocephalic.   Pulmonary:      Effort: Pulmonary effort is normal.   Neurological:      General: No focal deficit present.      Mental Status: She is alert and oriented to person, place, and time. Mental status is at baseline.   Psychiatric:         Mood and Affect: Mood normal.         Behavior: Behavior normal.         Thought Content: Thought content normal.         Judgment: Judgment normal.         Assessment/Plan   Problem List Items Addressed This Visit    VV    Hospital follow up            ICD-10-CM     Chronic renal impairment, stage 3 (moderate) (Multi) N18.30    Relevant Orders    CBC and Auto Differential    Hemoglobin A1C    Comprehensive metabolic panel    Hypertension - Primary I10    Stable off medication  Discontinued valsartan   Relevant Orders    CBC and Auto Differential    Hemoglobin A1C    Comprehensive metabolic panel    Obesity, morbid (Multi) E66.01    Work on healthier eating  Relevant Orders    CBC and Auto Differential    Hemoglobin A1C    Comprehensive metabolic panel    Pulmonary hypertension (Multi) I27.20    stable  Relevant Orders    CBC and Auto Differential    Hemoglobin A1C    Comprehensive metabolic panel    Type 2 diabetes mellitus with other specified complication, unspecified whether long term insulin use (Multi) E11.69    Stable  Lab Results   Component Value Date    HGBA1C 5.4 06/25/2024   Relevant Orders    CBC and Auto Differential    Hemoglobin A1C    Comprehensive metabolic panel    Recurrent major depressive disorder (CMS-HCC) F33.9   Stable      Follow up in 3 months or sooner if needed    Time Spent  Time spent directly with patient, family or caregiver: 16 minutes  Documentation Time: 2 minutes  Other Time Spent: 3 minutes    Patient was identified as a fall risk. Risk prevention instructions provided.

## 2024-10-03 ENCOUNTER — PATIENT OUTREACH (OUTPATIENT)
Dept: PRIMARY CARE | Facility: CLINIC | Age: 79
End: 2024-10-03
Payer: MEDICARE

## 2024-10-03 LAB — BODY SURFACE AREA: 1.98 M2

## 2024-10-07 NOTE — PROGRESS NOTES
Subjective  Lucille Holloway is a 79 y.o. female with a hx of CKD, IGT, HTN, hypothyroidism, goiter, KELLY, obesity, hip replacement, arthritis - osteo and rheumatoid, cataract surgery, who presents for weight management and obesity.  No h/o kidney stones or glaucoma.    Current Plan  1. Nutrition: Mediterranean Diet     2. Sleep:  history of KELLY        3. Stress: Stable     4. Exercise: Limited due to ankle fracture        5. Appetite control: Stable  Obesity medication: Contrave- 2 tablets twice a day      6. Prior Goals: First group meeting     New Goals: Other- Get together with friends     Weight trend:    Wt Readings from Last 3 Encounters:   09/24/24 91.2 kg (201 lb)   08/30/24 88 kg (194 lb)   08/30/24 91.2 kg (201 lb)       Recent weight:      Current Outpatient Medications:     acetaminophen 500 mg capsule, Take 2 capsules (1,000 mg) by mouth 3 times a day as needed., Disp: , Rfl:     albuterol 90 mcg/actuation inhaler, USE 2 INHALATIONS BY MOUTH EVERY 4 HOURS IF NEEDED FOR WHEEZING  OR SHORTNESS OF BREATH (Patient not taking: Reported on 8/14/2024), Disp: 51 g, Rfl: 2    aspirin 81 mg EC tablet, Take 1 tablet (81 mg) by mouth once daily., Disp: , Rfl:     atorvastatin (Lipitor) 10 mg tablet, TAKE 1 TABLET BY MOUTH ONCE  DAILY AT BEDTIME, Disp: 100 tablet, Rfl: 2    Contrave 8-90 mg ER tablet, Take two tablets by mouth twice a day, Disp: 120 tablet, Rfl: 1    docusate sodium (Colace) 100 mg capsule, Take 3 capsules (300 mg) by mouth once daily., Disp: , Rfl:     DULoxetine (Cymbalta) 30 mg DR capsule, Take 1 capsule (30 mg) by mouth once daily. AS DIRECTED, Disp: 90 capsule, Rfl: 2    gabapentin (Neurontin) 300 mg capsule, Take 1 capsule (300 mg) by mouth 3 times a day., Disp: 270 capsule, Rfl: 3    levothyroxine (Synthroid, Levoxyl) 75 mcg tablet, Take 1 tablet (75 mcg) by mouth once daily., Disp: 90 tablet, Rfl: 1    multivitamin with minerals iron-free (Centrum Silver), Take 1 tablet by mouth once daily.,  Disp: , Rfl:     oxyCODONE (Roxicodone) 5 mg immediate release tablet, Take 1 tablet (5 mg) by mouth every 6 hours if needed for moderate pain (4 - 6)., Disp: , Rfl:     ROS:  System: normal  Eyes : no visual changes  Neck : no tenderness, no new lumps/bumps  Respiratory : no SOB  Cardiovascular : no chest pain, no palpitations  Gastro-Intestinal : no abdominal concerns  Neurological : no numbness or tingling in the extremities  Musculoskeletal : no joint paint, no muscle pain  Skin : no unusual rashes  Psychiatric : no depression, no anxiety  See HPI for Endocrine ROS    Past Medical History:   Diagnosis Date    Acute upper respiratory infection, unspecified 03/05/2019    Acute URI    Ankle dislocation     Arthritis     Bariatric surgery status 11/20/2019    Bariatric surgery status    Cervical disc disorder     CTS (carpal tunnel syndrome)     Encounter for general adult medical examination without abnormal findings 08/05/2021    Encounter for preventive health examination    Hypertension     Lumbosacral disc disease     Other general symptoms and signs 03/05/2019    Flu-like symptoms    Other headache syndrome 06/25/2024    Comment on above: HAD A FALL SATURDAY NIGHT / BAD HEADACHE, BUMP HEAD    Other neuromuscular dysfunction of bladder     Hyperactivity of bladder    Pain in right foot 09/17/2015    Foot pain, right    Personal history of other diseases of the circulatory system     History of hypertension    Personal history of other diseases of the respiratory system 03/05/2019    History of acute sinusitis    Personal history of other endocrine, nutritional and metabolic disease     History of thyroid disease    Personal history of other infectious and parasitic diseases 08/28/2019    History of hepatitis    Plantar fascial fibromatosis 09/21/2015    Plantar fasciitis, right    Syncope and collapse 10/16/2015    Vasovagal syncope    Vitamin D deficiency, unspecified 08/28/2019    Mild vitamin D deficiency        Past Surgical History:   Procedure Laterality Date    ANKLE FRACTURE SURGERY      BLADDER SURGERY  08/28/2019    Bladder Surgery    BREAST RECONSTRUCTION  01/01/2019    bilateral breast reduction    CARPAL TUNNEL RELEASE  08/28/2019    Neuroplasty Decompression Median Nerve At Carpal Tunnel    HYSTERECTOMY  08/28/2019    Hysterectomy    OTHER SURGICAL HISTORY  08/28/2019    Carpal tunnel surgery    REDUCTION MAMMAPLASTY      TOTAL HIP ARTHROPLASTY  07/16/2019    Total Hip Replacement    TOTAL KNEE ARTHROPLASTY Right 12/30/2022       Social History     Socioeconomic History    Marital status:      Spouse name: Not on file    Number of children: Not on file    Years of education: Not on file    Highest education level: Not on file   Occupational History    Not on file   Tobacco Use    Smoking status: Former     Current packs/day: 0.25     Average packs/day: 0.3 packs/day for 15.0 years (3.8 ttl pk-yrs)     Types: Cigarettes     Passive exposure: Never    Smokeless tobacco: Never    Tobacco comments:     none   Vaping Use    Vaping status: Never Used   Substance and Sexual Activity    Alcohol use: Not Currently    Drug use: Never    Sexual activity: Not Currently     Birth control/protection: Abstinence   Other Topics Concern    Not on file   Social History Narrative    Not on file     Social Determinants of Health     Financial Resource Strain: Low Risk  (8/16/2024)    Overall Financial Resource Strain (CARDIA)     Difficulty of Paying Living Expenses: Not very hard   Food Insecurity: No Food Insecurity (8/15/2024)    Hunger Vital Sign     Worried About Running Out of Food in the Last Year: Never true     Ran Out of Food in the Last Year: Never true   Transportation Needs: No Transportation Needs (8/16/2024)    PRAPARE - Transportation     Lack of Transportation (Medical): No     Lack of Transportation (Non-Medical): No   Physical Activity: Not on file   Stress: Not on file   Social Connections: Not on  file   Intimate Partner Violence: Not on file   Housing Stability: Low Risk  (8/16/2024)    Housing Stability Vital Sign     Unable to Pay for Housing in the Last Year: No     Number of Times Moved in the Last Year: 0     Homeless in the Last Year: No       Objective      Physical Exam:  There were no vitals taken for this visit.  General : alert and oriented X3, no acute distress  Eyes : EOMI     Assessment/Plan  Lucille Holloway is a 79 y.o. female with a hx of CKD, IGT, HTN, hypothyroidism, goiter, KELLY, obesity, hip replacement, arthritis - osteo and rheumatoid, cataract surgery, who presents for weight management and obesity.  No h/o kidney stones or glaucoma.     1. Weight Management : Reviewed the principles of energy metabolism, caloric intake and expenditure, and rationale for a treatment program.  Also reinforced need for reduced calorie, low fat diet and increased physical activity.     We reviewed the possibility of starting an interdisciplinary lifestyle intervention program involving improvement of the diet, a personalized exercise program, efforts to reduce the stress and the possibility of using appetite suppressant medications in an effort to help with the weight loss process.  The patient expressed interest in the plan.     2. Nutrition : I discussed trying one of the diet approaches we have here in the program : Mediterranean lifestyle, ketosis diet.  Has met Hafsa Lambert RD.     5/13/24: 198lb, 35.23kg/m2  10/08/24: 191.9lb, 33.99kg/m2     3. Sleep : mild KELLY     4. Stress : 7-8/10, worried about weight gain, , 5 kids, 10 grandchildren     5. Exercise : had ankle fracture and surgery, now in a boot.  Encouraged chair upper body exercises.     6. Appetite : high  Was on ozempic for 6 months, loved it, but lost coverage  Discussed other AOMs.  --on contrave - doing ok with this  --she would like to try Semaglutide via [facility] -- gave her the information.    7. Goal: is feeling  depressed since she has been in a boot for her ankle fracture.  Encouraged she start to re-socialize with her friends and avoid isolation.     Follow up in a group visit.  Glenn Brooks MD

## 2024-10-08 ENCOUNTER — APPOINTMENT (OUTPATIENT)
Dept: ENDOCRINOLOGY | Facility: CLINIC | Age: 79
End: 2024-10-08
Payer: MEDICARE

## 2024-10-08 VITALS
SYSTOLIC BLOOD PRESSURE: 136 MMHG | HEIGHT: 63 IN | TEMPERATURE: 97.1 F | HEART RATE: 80 BPM | WEIGHT: 191.9 LBS | BODY MASS INDEX: 34 KG/M2 | DIASTOLIC BLOOD PRESSURE: 85 MMHG

## 2024-10-08 DIAGNOSIS — Z71.3 DIETARY COUNSELING: ICD-10-CM

## 2024-10-08 DIAGNOSIS — Z76.89 ENCOUNTER FOR WEIGHT MANAGEMENT: ICD-10-CM

## 2024-10-08 PROCEDURE — 1123F ACP DISCUSS/DSCN MKR DOCD: CPT | Performed by: INTERNAL MEDICINE

## 2024-10-08 PROCEDURE — 3079F DIAST BP 80-89 MM HG: CPT | Performed by: INTERNAL MEDICINE

## 2024-10-08 PROCEDURE — 99215 OFFICE O/P EST HI 40 MIN: CPT | Performed by: INTERNAL MEDICINE

## 2024-10-08 PROCEDURE — 1159F MED LIST DOCD IN RCRD: CPT | Performed by: INTERNAL MEDICINE

## 2024-10-08 PROCEDURE — 3075F SYST BP GE 130 - 139MM HG: CPT | Performed by: INTERNAL MEDICINE

## 2024-10-08 NOTE — TELEPHONE ENCOUNTER
LOV: 10/08/24  NOV: 11/19/24  Patient requested to try the compounded Semaglutide from [facility]

## 2024-10-17 ENCOUNTER — APPOINTMENT (OUTPATIENT)
Dept: ORTHOPEDIC SURGERY | Facility: CLINIC | Age: 79
End: 2024-10-17
Payer: MEDICARE

## 2024-10-17 ENCOUNTER — HOSPITAL ENCOUNTER (OUTPATIENT)
Dept: RADIOLOGY | Facility: CLINIC | Age: 79
Discharge: HOME | End: 2024-10-17
Payer: MEDICARE

## 2024-10-17 DIAGNOSIS — S82.892A CLOSED LEFT ANKLE FRACTURE, INITIAL ENCOUNTER: ICD-10-CM

## 2024-10-17 PROCEDURE — 1036F TOBACCO NON-USER: CPT | Performed by: ORTHOPAEDIC SURGERY

## 2024-10-17 PROCEDURE — 1123F ACP DISCUSS/DSCN MKR DOCD: CPT | Performed by: ORTHOPAEDIC SURGERY

## 2024-10-17 PROCEDURE — 1159F MED LIST DOCD IN RCRD: CPT | Performed by: ORTHOPAEDIC SURGERY

## 2024-10-17 PROCEDURE — 73610 X-RAY EXAM OF ANKLE: CPT | Mod: LT

## 2024-10-17 PROCEDURE — 99024 POSTOP FOLLOW-UP VISIT: CPT | Performed by: ORTHOPAEDIC SURGERY

## 2024-10-17 NOTE — PROGRESS NOTES
2 months status post fixation ankle fracture doing well she has an area where there is a fracture blister is healing up nicely she has a good early mobility minimal swelling  X-rays show a healed distal fibular fracture mortise in good position plan may be weightbearing as tolerated prescription for outpatient therapy discussed wound care may discontinue boot as tolerated

## 2024-10-22 ENCOUNTER — EVALUATION (OUTPATIENT)
Dept: PHYSICAL THERAPY | Facility: CLINIC | Age: 79
End: 2024-10-22
Payer: MEDICARE

## 2024-10-22 DIAGNOSIS — S82.892A CLOSED LEFT ANKLE FRACTURE, INITIAL ENCOUNTER: ICD-10-CM

## 2024-10-22 DIAGNOSIS — M25.572 LEFT ANKLE PAIN: Primary | ICD-10-CM

## 2024-10-22 PROCEDURE — 97161 PT EVAL LOW COMPLEX 20 MIN: CPT | Mod: GP

## 2024-10-22 PROCEDURE — 97110 THERAPEUTIC EXERCISES: CPT | Mod: GP

## 2024-10-22 ASSESSMENT — PAIN SCALES - GENERAL: PAINLEVEL_OUTOF10: 4

## 2024-10-22 NOTE — PROGRESS NOTES
Physical Therapy  Physical Therapy Orthopedic Evaluation    Patient Name: Lucille Holloway  MRN: [id number]  Encounter Date: 10/22/2024  Time Calculation  Start Time: 1445  Stop Time: 1530  Time Calculation (min): 45 min    Insurance:  Visit number: 1  Approved number of visits: [place]  Insurance: [facility] medicare  Auth needed  $20 co-pay  Medicare cert date 10/22/24  to 1/19/25    General:  Reason for visit: ankle pain  Referred by: kristel    Current Problem  1. Left ankle pain  Follow Up In Physical Therapy      2. Closed left ankle fracture, initial encounter  Referral to Physical Therapy    Follow Up In Physical Therapy            General  Reason for Referral: L ankle ORIF  Referred By: darling  Precautions  Precautions  Precautions Comment: none  Pain  0-10 (Numeric) Pain Score: 4      Subjective:     Chief Complaint: L ankle fracture.    Fainted upon entering her house and collapsed.  Daughter was with her nad helped her collapse to floor but ankle turned on itself and fx.     Called EMS and went to hospital.  Had surgery 8/14/24 day after injury.     Just got out of boot last Wednesday.  Was NWB ing 1 week in [facility] then rehab for 4 weeks.  After started weight bearing with walker.  Also had rehab about 3 weeks.    Onset: 8/14/24  SILVIA:  fall    Current Condition:   better     PAIN  increases pain/difficulty:  standing, walking, stairs,   decrease pain:  NSAIDs, ice    Intensity (0-10): current 4, on a bad day 9, on a good day 3  Location: entire ankle  Description: achy    Relevant Information (PMH & Previous Tests/Imaging): x rays  Previous Interventions/Treatments: rehab    Current level of function/exercise: none  Prior Level of Function (PLOF)  Patient previously independent with all ADLs  Exercise/Physical Activity: gym 2-3 times a week, walking 3-4 miles 2 x a week  Work/School: retired      Self Reported Function (0-100%) = 30%  - 100% being back to PLOF    Work status: retired    Living situation   - house with  basement   - lives  alone   - reviewed and no concern  Personal factors Impacting care:   - language: ENG    Pt stated goal(s) mobility and strength    Medical History Form: Reviewed (scanned into chart)    Red Flags: Do you have any of the following? none  Fever/chills, unexplained weight changes, dizziness/fainting, unexplained change in bowel or bladder functions, unexplained malaise or muscle weakness, night pain/sweats, numbness or tingling    Problem list:  Pain, Decreased flexibility, Decreased strength, Limitations to normal ADL's, and Decreased knowledge of HEP     Objective:    Ankle ROM  DF 13/0  PF 56/40  IV    40/14  EV   30/4    Ankle MMT  DF 5/5  PF 5/5  IV 5/4  EV 5/4    Antalgic gait    Scar well healed laterally, medial skin pink around entire malleolus but healing no open wounds    Outcome Measures:  Other Measures  Lower Extremity Funtional Score (LEFS): 23     EDUCATION: home exercise program, plan of care, activity modifications, pain management, and injury pathology      HEP: Performed and provided:  Access Code: [email or web address]  URL: [email or web address]  Date: 10/22/2024  Prepared by: Clover Pierson    Exercises  - Long Sitting Calf Stretch with Strap  - 1 x daily - 7 x weekly - 2-3 reps - 30-60 seconds hold  - Seated Ankle Inversion Eversion PROM  - 1 x daily - 7 x weekly - 3 sets - 30-60 sec hold  - Seated Ankle Plantarflexion Dorsiflexion PROM  - 1 x daily - 7 x weekly - 3 sets - 30-60 sec hold  - Long Sitting Ankle Plantar Flexion with Resistance  - 1 x daily - 7 x weekly - 2 sets - 20 reps  - Isometric Ankle Inversion  - 1 x daily - 7 x weekly - 3 sets - 10 reps - 2-3 sec hold  - Long Sitting Isometric Ankle Eversion in Dorsiflexion with Ball at Wall  - 1 x daily - 7 x weekly - 3 sets - 10 reps - 2-3 sec hold  - Seated Table Hamstring Stretch  - 1 x daily - 7 x weekly - 2-3 reps - 30-60 seconds. hold  - Seated Great Toe Extension  - 1 x daily - 7 x weekly - 1-2 sets - 20  reps  - Seated Lesser Toes Extension  - 1 x daily - 7 x weekly - 1-2 sets - 20 reps    Charges: eval x 1 TE x 1  Level of Eval Complexity:   Clinical Presentation:   Stable and/or uncomplicated characteristics,         Assessment: Patient is a 78 yo f with s/p  L ORIF following traumatic fall.   Pt presents with signs and symptoms consistent with recent surgery, resulting in limited participation in pain-free ADLs and inability to perform at their prior level of function. Pt would benefit from physical therapy to address the impairments found & listed previously in the objective section in order to return to safe and pain-free ADLs and prior level of function.    Prognosis: Good   Plan:     Planned Interventions include: therapeutic exercise, self-care home management, manual therapy, therapeutic activities, gait training, neuromuscular coordination, aquatic therapy  Frequency: 2 times   Duration: 8 weeks    Goals:  Active       PT Problem       STG       Start:  10/22/24    Expected End:  11/05/24       Independent HEP by 2 weeks         LTG       Start:  10/22/24    Expected End:  12/21/24       Ankle ROM L = R by 8 weeks  MMT 5/5 B LE including ankle by 8-10 weeks  Reciprocal stairs with ease by 3-4 weeks  Normal gait by 3-4 weeks  Stand to perform ADLs > 30 min without increased pain by 6 weeks  Resume working out by 8-10 weeks  LEFS improved by 30 points by 10-12 weeks             Plan of Care was developed with input and agreement by the patient.

## 2024-10-29 ENCOUNTER — TREATMENT (OUTPATIENT)
Dept: PHYSICAL THERAPY | Facility: CLINIC | Age: 79
End: 2024-10-29
Payer: MEDICARE

## 2024-10-29 DIAGNOSIS — M25.572 LEFT ANKLE PAIN: Primary | ICD-10-CM

## 2024-10-29 PROCEDURE — 97110 THERAPEUTIC EXERCISES: CPT | Mod: GP,CQ

## 2024-10-29 PROCEDURE — 97016 VASOPNEUMATIC DEVICE THERAPY: CPT | Mod: GP,CQ

## 2024-10-29 ASSESSMENT — PAIN - FUNCTIONAL ASSESSMENT: PAIN_FUNCTIONAL_ASSESSMENT: 0-10

## 2024-10-29 ASSESSMENT — PAIN SCALES - GENERAL: PAINLEVEL_OUTOF10: 5 - MODERATE PAIN

## 2024-10-31 ENCOUNTER — TREATMENT (OUTPATIENT)
Dept: PHYSICAL THERAPY | Facility: CLINIC | Age: 79
End: 2024-10-31
Payer: MEDICARE

## 2024-10-31 DIAGNOSIS — M25.572 LEFT ANKLE PAIN: Primary | ICD-10-CM

## 2024-10-31 PROCEDURE — 97110 THERAPEUTIC EXERCISES: CPT | Mod: GP,CQ

## 2024-10-31 PROCEDURE — 97016 VASOPNEUMATIC DEVICE THERAPY: CPT | Mod: GP,CQ

## 2024-10-31 ASSESSMENT — PAIN SCALES - GENERAL: PAINLEVEL_OUTOF10: 8

## 2024-10-31 ASSESSMENT — PAIN - FUNCTIONAL ASSESSMENT: PAIN_FUNCTIONAL_ASSESSMENT: 0-10

## 2024-11-05 ENCOUNTER — TREATMENT (OUTPATIENT)
Dept: PHYSICAL THERAPY | Facility: CLINIC | Age: 79
End: 2024-11-05
Payer: MEDICARE

## 2024-11-05 DIAGNOSIS — M25.572 LEFT ANKLE PAIN: Primary | ICD-10-CM

## 2024-11-05 PROCEDURE — 97112 NEUROMUSCULAR REEDUCATION: CPT | Mod: GP

## 2024-11-05 PROCEDURE — 97016 VASOPNEUMATIC DEVICE THERAPY: CPT | Mod: GP

## 2024-11-05 PROCEDURE — 97110 THERAPEUTIC EXERCISES: CPT | Mod: GP

## 2024-11-05 ASSESSMENT — PAIN SCALES - GENERAL: PAINLEVEL_OUTOF10: 3

## 2024-11-05 NOTE — PROGRESS NOTES
Physical Therapy Treatment    Patient Name: Lucille Holloway  MRN: [id number]  Encounter Date: 11/5/2024  Time Calculation  Start Time: 1418  Stop Time: 1515  Time Calculation (min): 57 min    Insurance:   Visit number: 4  Approved number of visits: [place]  Insurance: [facility] medicare  Auth needed  $20 co-pay  Medicare cert date 10/22/24  to 1/19/25    Current Problem  1. Left ankle pain            General  Reason for Referral: L ankle ORIF  Referred By: darling  Precautions  Precautions  Precautions Comment: none  Pain  0-10 (Numeric) Pain Score: 3    Subjective:     Patient reports doing good just stiff.  Mild ankle pain about 3/10.      Compliant with HEP? yes    Objective:     Ankle ROM  DF 5  PF 45  IV 24  EV 11      Treatments:   Bike 5 min  Incline stretch 1 min  PF stretch 1 min x 2  Seated ankle pumps 20 x  Tband 4 way 20 x L ankle   Bosu lunges 20 x B  Valslide groin 2 way B  Modified tandem 20 x cw & ccw R/L, L/R  Calf raise 3 way 20 x   DBE 2' x 2       Charges: te x 2 nm x 1    Assessment: ROM coming along nicely.       Plan: balance stability stairs

## 2024-11-07 ENCOUNTER — TREATMENT (OUTPATIENT)
Dept: PHYSICAL THERAPY | Facility: CLINIC | Age: 79
End: 2024-11-07
Payer: MEDICARE

## 2024-11-07 DIAGNOSIS — M25.572 LEFT ANKLE PAIN: Primary | ICD-10-CM

## 2024-11-07 PROCEDURE — 97016 VASOPNEUMATIC DEVICE THERAPY: CPT | Mod: GP

## 2024-11-07 PROCEDURE — 97110 THERAPEUTIC EXERCISES: CPT | Mod: GP

## 2024-11-07 PROCEDURE — 97112 NEUROMUSCULAR REEDUCATION: CPT | Mod: GP

## 2024-11-07 ASSESSMENT — PAIN SCALES - GENERAL: PAINLEVEL_OUTOF10: 2

## 2024-11-07 NOTE — PROGRESS NOTES
Physical Therapy Treatment    Patient Name: Lucille Holloway  MRN: [id number]  Encounter Date: 11/7/2024  Time Calculation  Start Time: 0745  Stop Time: 0845  Time Calculation (min): 60 min    Insurance:   Visit number: 5  Approved number of visits: [place]  Insurance: [facility] medicare  Auth needed  $20 co-pay  Medicare cert date 10/22/24  to 1/19/25      Current Problem  1. Left ankle pain            General  Reason for Referral: L ankle ORIF  Referred By: darling  Precautions  Precautions  Precautions Comment: none  Pain  0-10 (Numeric) Pain Score: 2    Subjective:     Patient reports just stiffness in ankle and knee.   Ankle 2/10 knee R 4/10.  Knee pain anterior and basically started     Compliant with HEP? yes    Objective:   Ankle ROM  DF 5  PF 45  IV 24  EV 11      Treatments:   Nu step 5 min  Incline stretch 1 min  Bosu lunges 20 x B  Airex march 2 min  Valslide 3 way 10 x L  SS quad stretch 1 min x 2 B  Tandem stance kb pass cw & ccw 20 x R/L , L/R  Calf raise 3 way 20 x   Resisted walking 4 way 10 x black sports cord  Game ready 15 min L ankle mod compression    Charges: te x 2 nm x 1  vaso x 1    Assessment: challenged with resisted walking.      Plan: weight board, ankle stretching, stairs

## 2024-11-11 ENCOUNTER — TREATMENT (OUTPATIENT)
Dept: PHYSICAL THERAPY | Facility: CLINIC | Age: 79
End: 2024-11-11
Payer: MEDICARE

## 2024-11-11 DIAGNOSIS — M25.572 LEFT ANKLE PAIN: Primary | ICD-10-CM

## 2024-11-11 PROCEDURE — 97112 NEUROMUSCULAR REEDUCATION: CPT | Mod: GP

## 2024-11-11 PROCEDURE — 97016 VASOPNEUMATIC DEVICE THERAPY: CPT | Mod: GP

## 2024-11-11 PROCEDURE — 97110 THERAPEUTIC EXERCISES: CPT | Mod: GP

## 2024-11-11 ASSESSMENT — PAIN SCALES - GENERAL: PAINLEVEL_OUTOF10: 0 - NO PAIN

## 2024-11-11 NOTE — PROGRESS NOTES
Physical Therapy Treatment    Patient Name: Lucille Holloway  MRN: [id number]  Encounter Date: 11/11/2024  Time Calculation  Start Time: 1640  Stop Time: 1735  Time Calculation (min): 55 min    Insurance:   Visit number: 6  Approved number of visits: [place]  Insurance: [facility] medicare  Auth needed  $20 co-pay  Medicare cert date 10/22/24  to 1/19/25      Current Problem  1. Left ankle pain            General  Reason for Referral: L ankle ORIF  Referred By: darling  Precautions  Precautions  Precautions Comment: none  Pain  0-10 (Numeric) Pain Score: 0 - No pain    Subjective:     Patient reports ankle ok, just a little pain in back.      Compliant with HEP? yes    Objective:   Ankle ROM  DF 5  PF 45  IV 24  EV 11      Treatments:   Bike 5min  Bosu lunges 20 x 2  Bosu step ups 20 x L  Bosu squats 20 x  Valslides groin 15 x 2 ways B  Eccentric calf raise L 20 x   DBE 2' x 2  Leg press 90# 12 x 3, toe press 90# 20 x 2  PPT 10 x 5 sec hold  SKTC R 1 min x 2   Airex rebounder toss 25 x  Game ready mod compression L ankle 15 min    Charges: te x 2 nm x 1 vaso x 1    Assessment: had some reproduction of old sciatica with some of today's exercises       Plan: balance stability, ROM

## 2024-11-13 ENCOUNTER — TREATMENT (OUTPATIENT)
Dept: PHYSICAL THERAPY | Facility: CLINIC | Age: 79
End: 2024-11-13
Payer: MEDICARE

## 2024-11-13 DIAGNOSIS — M25.572 LEFT ANKLE PAIN: Primary | ICD-10-CM

## 2024-11-13 PROCEDURE — 97016 VASOPNEUMATIC DEVICE THERAPY: CPT | Mod: GP

## 2024-11-13 PROCEDURE — 97112 NEUROMUSCULAR REEDUCATION: CPT | Mod: GP

## 2024-11-13 PROCEDURE — 97110 THERAPEUTIC EXERCISES: CPT | Mod: GP

## 2024-11-13 ASSESSMENT — PAIN SCALES - GENERAL: PAINLEVEL_OUTOF10: 0 - NO PAIN

## 2024-11-13 NOTE — PROGRESS NOTES
Physical Therapy Treatment    Patient Name: Lucille Holloway  MRN: [id number]  Encounter Date: 11/13/2024  Time Calculation  Start Time: 0800  Stop Time: 0855  Time Calculation (min): 55 min    Insurance:     Visit number: 7  Approved number of visits: [place]  Insurance: [facility] medicare  Auth needed  $20 co-pay  Medicare cert date 10/22/24  to 1/19/25    Current Problem  1. Left ankle pain            General  Reason for Referral: L ankle ORIF  Referred By: darling  Precautions  Precautions  Precautions Comment: none  Pain  0-10 (Numeric) Pain Score: 0 - No pain    Subjective:     Patient reports ankle is good today.  No pain currently but it's early in the morning and as the day goes may get pain from activities.     Compliant with HEP? yes    Objective:     Mild antalgic gait    Treatments:   Bike 5 min  Incline stretch 1 min x 2  Retro steps 6 inch 15 x 2 L  Toe walk/heel walk around gym x 2  Airex march 2 min  Eccentric calf raise L 20 x   Seated calf raise  8kg 20 x 2  Staggered sit-stand L 12 x 2  Leg press 90# 12 x 3, calf press 20x 2  Air ex rebounder toss 25 x, tandem 25 x R/L, L/R  Game ready 15 min L ankle    Charges: te x 2 nm x 1 vaso x 1    Assessment: challenged with exercises.       Plan: begin 1 x a week for 4 weeks

## 2024-11-18 NOTE — PROGRESS NOTES
"Subjective  Lucille Holloway is a 79 y.o. female with a hx of CKD, IGT, HTN, hypothyroidism, goiter, KELLY, obesity, hip replacement, arthritis - osteo and rheumatoid, cataract surgery, who presents for weight management and obesity.  No h/o kidney stones or glaucoma.    \"John\"  Current Plan  1. Nutrition: tends to be snacking more when at home.     2. Sleep: Stable      3. Stress: Stable     4. Exercise: None at this time. Is doing outpatient PT      5. Appetite control: Stable  Obesity medication:  Compounding Semaglutide 0.3mg     6. Prior Goals: Not Met     New Goals: Other- Get back into more social settings    Weight trend:    Wt Readings from Last 10 Encounters:   10/08/24 87 kg (191 lb 14.4 oz)   09/24/24 91.2 kg (201 lb)   08/30/24 88 kg (194 lb)   08/30/24 91.2 kg (201 lb)   08/14/24 88 kg (194 lb 0.1 oz)   07/29/24 88 kg (194 lb)   07/27/24 88.5 kg (195 lb)   07/26/24 88.4 kg (194 lb 14.4 oz)   07/22/24 88.5 kg (195 lb)   07/11/24 87.1 kg (192 lb 1 oz)         Current Outpatient Medications:     acetaminophen 500 mg capsule, Take 2 capsules (1,000 mg) by mouth 3 times a day as needed., Disp: , Rfl:     albuterol 90 mcg/actuation inhaler, USE 2 INHALATIONS BY MOUTH EVERY 4 HOURS IF NEEDED FOR WHEEZING  OR SHORTNESS OF BREATH (Patient not taking: Reported on 8/14/2024), Disp: 51 g, Rfl: 2    aspirin 81 mg EC tablet, Take 1 tablet (81 mg) by mouth once daily., Disp: , Rfl:     atorvastatin (Lipitor) 10 mg tablet, TAKE 1 TABLET BY MOUTH ONCE  DAILY AT BEDTIME, Disp: 100 tablet, Rfl: 2    Contrave 8-90 mg ER tablet, Take two tablets by mouth twice a day, Disp: 120 tablet, Rfl: 1    docusate sodium (Colace) 100 mg capsule, Take 3 capsules (300 mg) by mouth once daily., Disp: , Rfl:     DULoxetine (Cymbalta) 30 mg DR capsule, Take 1 capsule (30 mg) by mouth once daily. AS DIRECTED, Disp: 90 capsule, Rfl: 2    gabapentin (Neurontin) 300 mg capsule, Take 1 capsule (300 mg) by mouth 3 times a day., Disp: 270 capsule, " Rfl: 3    levothyroxine (Synthroid, Levoxyl) 75 mcg tablet, Take 1 tablet (75 mcg) by mouth once daily., Disp: 90 tablet, Rfl: 1    multivitamin with minerals iron-free (Centrum Silver), Take 1 tablet by mouth once daily., Disp: , Rfl:     oxyCODONE (Roxicodone) 5 mg immediate release tablet, Take 1 tablet (5 mg) by mouth every 6 hours if needed for moderate pain (4 - 6)., Disp: , Rfl:     semaglutide, Per Buderer protocol inject 0.3mg weekly for 4 weeks, then increase to 0.6mg weekly, Disp: 2 mL, Rfl: 4    ROS:  System: normal  Eyes : no visual changes  Neck : no tenderness, no new lumps/bumps  Respiratory : no SOB  Cardiovascular : no chest pain, no palpitations  Gastro-Intestinal : no abdominal concerns  Neurological : no numbness or tingling in the extremities  Musculoskeletal : no joint paint, no muscle pain  Skin : no unusual rashes  Psychiatric : no depression, no anxiety  See HPI for Endocrine ROS    Past Medical History:   Diagnosis Date    Acute upper respiratory infection, unspecified 03/05/2019    Acute URI    Ankle dislocation     Arthritis     Bariatric surgery status 11/20/2019    Bariatric surgery status    Cervical disc disorder     CTS (carpal tunnel syndrome)     Encounter for general adult medical examination without abnormal findings 08/05/2021    Encounter for preventive health examination    Hypertension     Lumbosacral disc disease     Other general symptoms and signs 03/05/2019    Flu-like symptoms    Other headache syndrome 06/25/2024    Comment on above: HAD A FALL SATURDAY NIGHT / BAD HEADACHE, BUMP HEAD    Other neuromuscular dysfunction of bladder     Hyperactivity of bladder    Pain in right foot 09/17/2015    Foot pain, right    Personal history of other diseases of the circulatory system     History of hypertension    Personal history of other diseases of the respiratory system 03/05/2019    History of acute sinusitis    Personal history of other endocrine, nutritional and metabolic  disease     History of thyroid disease    Personal history of other infectious and parasitic diseases 08/28/2019    History of hepatitis    Plantar fascial fibromatosis 09/21/2015    Plantar fasciitis, right    Syncope and collapse 10/16/2015    Vasovagal syncope    Vitamin D deficiency, unspecified 08/28/2019    Mild vitamin D deficiency       Past Surgical History:   Procedure Laterality Date    ANKLE FRACTURE SURGERY      BLADDER SURGERY  08/28/2019    Bladder Surgery    BREAST RECONSTRUCTION  01/01/2019    bilateral breast reduction    CARPAL TUNNEL RELEASE  08/28/2019    Neuroplasty Decompression Median Nerve At Carpal Tunnel    HYSTERECTOMY  08/28/2019    Hysterectomy    OTHER SURGICAL HISTORY  08/28/2019    Carpal tunnel surgery    REDUCTION MAMMAPLASTY      TOTAL HIP ARTHROPLASTY  07/16/2019    Total Hip Replacement    TOTAL KNEE ARTHROPLASTY Right 12/30/2022       Social History     Socioeconomic History    Marital status:      Spouse name: Not on file    Number of children: Not on file    Years of education: Not on file    Highest education level: Not on file   Occupational History    Not on file   Tobacco Use    Smoking status: Former     Current packs/day: 0.25     Average packs/day: 0.3 packs/day for 15.0 years (3.8 ttl pk-yrs)     Types: Cigarettes     Passive exposure: Never    Smokeless tobacco: Never    Tobacco comments:     none   Vaping Use    Vaping status: Never Used   Substance and Sexual Activity    Alcohol use: Not Currently    Drug use: Never    Sexual activity: Not Currently     Birth control/protection: Abstinence   Other Topics Concern    Not on file   Social History Narrative    Not on file     Social Drivers of Health     Financial Resource Strain: Low Risk  (8/16/2024)    Overall Financial Resource Strain (CARDIA)     Difficulty of Paying Living Expenses: Not very hard   Food Insecurity: No Food Insecurity (8/15/2024)    Hunger Vital Sign     Worried About Running Out of Food  in the Last Year: Never true     Ran Out of Food in the Last Year: Never true   Transportation Needs: No Transportation Needs (8/16/2024)    PRAPARE - Transportation     Lack of Transportation (Medical): No     Lack of Transportation (Non-Medical): No   Physical Activity: Not on file   Stress: Not on file   Social Connections: Not on file   Intimate Partner Violence: Not on file   Housing Stability: Low Risk  (8/16/2024)    Housing Stability Vital Sign     Unable to Pay for Housing in the Last Year: No     Number of Times Moved in the Last Year: 0     Homeless in the Last Year: No       Objective      Physical Exam:  There were no vitals taken for this visit.  General : alert and oriented X3, no acute distress  Eyes : EOMI   BMI: 36.97kg/m2    Assessment/Plan  Lucille Holloway is a 79 y.o. female with a hx of CKD, IGT, HTN, hypothyroidism, goiter, KELLY, obesity, hip replacement, arthritis - osteo and rheumatoid, cataract surgery, who presents for weight management and obesity.  No h/o kidney stones or glaucoma.     1. Weight Management : Reviewed the principles of energy metabolism, caloric intake and expenditure, and rationale for a treatment program.  Also reinforced need for reduced calorie, low fat diet and increased physical activity.     We reviewed the possibility of starting an interdisciplinary lifestyle intervention program involving improvement of the diet, a personalized exercise program, efforts to reduce the stress and the possibility of using appetite suppressant medications in an effort to help with the weight loss process.  The patient expressed interest in the plan.     2. Nutrition : I discussed trying one of the diet approaches we have here in the program : Mediterranean lifestyle, ketosis diet.  Has met Hafsa Lambert RD.     5/13/24: 198lb, 35.23kg/m2  10/08/24: 191.9lb, 33.99kg/m2  11/19/24: 208.7lb, 36.97kg/m2     3. Sleep : mild KELLY     4. Stress : 7-8/10, worried about weight gain, , 5  kids, 10 grandchildren     5. Exercise : had ankle fracture and surgery, now boot is off and she is doing PT.     6. Appetite : high  Was on ozempic for 6 months, loved it, but lost coverage  Discussed other AOMs.  --on contrave - doing ok with this  --she would like to try Semaglutide via [facility] -- gave her the information.     7. Goal: is feeling depressed since she has been in a boot for her ankle fracture.  Encouraged she start to re-socialize with her friends and avoid isolation.     Follow up in a group visit.  Glenn Brooks MD

## 2024-11-19 ENCOUNTER — APPOINTMENT (OUTPATIENT)
Dept: ENDOCRINOLOGY | Facility: CLINIC | Age: 79
End: 2024-11-19
Payer: MEDICARE

## 2024-11-19 VITALS
SYSTOLIC BLOOD PRESSURE: 144 MMHG | HEIGHT: 63 IN | BODY MASS INDEX: 36.98 KG/M2 | WEIGHT: 208.7 LBS | DIASTOLIC BLOOD PRESSURE: 84 MMHG | HEART RATE: 93 BPM

## 2024-11-19 DIAGNOSIS — E66.812 CLASS 2 OBESITY: Primary | ICD-10-CM

## 2024-11-19 DIAGNOSIS — Z76.89 ENCOUNTER FOR WEIGHT MANAGEMENT: ICD-10-CM

## 2024-11-19 PROCEDURE — 99215 OFFICE O/P EST HI 40 MIN: CPT | Performed by: INTERNAL MEDICINE

## 2024-11-19 PROCEDURE — 1159F MED LIST DOCD IN RCRD: CPT | Performed by: INTERNAL MEDICINE

## 2024-11-19 PROCEDURE — 3079F DIAST BP 80-89 MM HG: CPT | Performed by: INTERNAL MEDICINE

## 2024-11-19 PROCEDURE — 3077F SYST BP >= 140 MM HG: CPT | Performed by: INTERNAL MEDICINE

## 2024-11-19 PROCEDURE — 1123F ACP DISCUSS/DSCN MKR DOCD: CPT | Performed by: INTERNAL MEDICINE

## 2024-11-27 ENCOUNTER — APPOINTMENT (OUTPATIENT)
Dept: PHYSICAL THERAPY | Facility: CLINIC | Age: 79
End: 2024-11-27
Payer: MEDICARE

## 2024-11-27 DIAGNOSIS — S82.892A CLOSED LEFT ANKLE FRACTURE, INITIAL ENCOUNTER: ICD-10-CM

## 2024-11-27 DIAGNOSIS — M25.572 LEFT ANKLE PAIN: Primary | ICD-10-CM

## 2024-11-27 PROCEDURE — 97110 THERAPEUTIC EXERCISES: CPT | Mod: GP

## 2024-11-27 PROCEDURE — 97140 MANUAL THERAPY 1/> REGIONS: CPT | Mod: GP

## 2024-11-27 ASSESSMENT — PAIN SCALES - GENERAL: PAINLEVEL_OUTOF10: 0 - NO PAIN

## 2024-11-27 NOTE — PROGRESS NOTES
Physical Therapy Treatment    Patient Name: Lucille Holloway  MRN: [id number]  Encounter Date: 11/27/2024  Time Calculation  Start Time: 1552  Stop Time: 1635  Time Calculation (min): 43 min    Insurance:   Visit number: 8  Approved number of visits: MN  Insurance: [facility] medicare  Auth needed  $20 co-pay  Medicare cert date 10/22/24  to 1/19/25      Current Problem  1. Left ankle pain  Follow Up In Physical Therapy      2. Closed left ankle fracture, initial encounter  Follow Up In Physical Therapy          General  Reason for Referral: L ankle ORIF  Referred By: darling  Precautions  Precautions  Precautions Comment: none  Pain  0-10 (Numeric) Pain Score: 0 - No pain    Subjective:     Patient reports foot is ok.      Compliant with HEP? yes    Objective:   Ankle ROM  DF 3  PF 48  IV  EV    Ankle MMT  DF 5  PF 5  IV 5  EV 5    Hypomobility mid foot, all toes and T/C    Treatments:     Bike 5 min  Eccentric heel lift 20 x L  PF stretch 1 min x 2  Toe flexion 1 min x 2  IV/EV stretch 1 in   Mid foot and toe flexion mobs  A/P to TC joint   Instruct in PF stretch in sitting and kneeling  Incline stretch  1 min x 2  PF stretch on step 1 min x 2     Charges: man x 2 te x 1    Assessment: improved ankle mobility after manual      Plan: 1 for 3-4 weeks manual

## 2024-12-02 ENCOUNTER — OFFICE VISIT (OUTPATIENT)
Dept: RHEUMATOLOGY | Facility: CLINIC | Age: 79
End: 2024-12-02
Payer: MEDICARE

## 2024-12-02 ENCOUNTER — PATIENT OUTREACH (OUTPATIENT)
Dept: PRIMARY CARE | Facility: CLINIC | Age: 79
End: 2024-12-02

## 2024-12-02 VITALS
OXYGEN SATURATION: 96 % | DIASTOLIC BLOOD PRESSURE: 74 MMHG | BODY MASS INDEX: 37.03 KG/M2 | SYSTOLIC BLOOD PRESSURE: 144 MMHG | WEIGHT: 209 LBS | HEIGHT: 63 IN | HEART RATE: 74 BPM | TEMPERATURE: 97.3 F

## 2024-12-02 DIAGNOSIS — G89.29 CHRONIC MIDLINE LOW BACK PAIN WITHOUT SCIATICA: ICD-10-CM

## 2024-12-02 DIAGNOSIS — M54.50 CHRONIC MIDLINE LOW BACK PAIN WITHOUT SCIATICA: ICD-10-CM

## 2024-12-02 DIAGNOSIS — Z13.820 ENCOUNTER FOR OSTEOPOROSIS SCREENING IN ASYMPTOMATIC POSTMENOPAUSAL PATIENT: ICD-10-CM

## 2024-12-02 DIAGNOSIS — Z78.0 ENCOUNTER FOR OSTEOPOROSIS SCREENING IN ASYMPTOMATIC POSTMENOPAUSAL PATIENT: ICD-10-CM

## 2024-12-02 DIAGNOSIS — Z13.820 SCREENING FOR OSTEOPOROSIS: Primary | ICD-10-CM

## 2024-12-02 PROCEDURE — 3078F DIAST BP <80 MM HG: CPT | Performed by: INTERNAL MEDICINE

## 2024-12-02 PROCEDURE — 1036F TOBACCO NON-USER: CPT | Performed by: INTERNAL MEDICINE

## 2024-12-02 PROCEDURE — 3077F SYST BP >= 140 MM HG: CPT | Performed by: INTERNAL MEDICINE

## 2024-12-02 PROCEDURE — 1159F MED LIST DOCD IN RCRD: CPT | Performed by: INTERNAL MEDICINE

## 2024-12-02 PROCEDURE — 99214 OFFICE O/P EST MOD 30 MIN: CPT | Performed by: INTERNAL MEDICINE

## 2024-12-02 PROCEDURE — 1123F ACP DISCUSS/DSCN MKR DOCD: CPT | Performed by: INTERNAL MEDICINE

## 2024-12-02 PROCEDURE — 1125F AMNT PAIN NOTED PAIN PRSNT: CPT | Performed by: INTERNAL MEDICINE

## 2024-12-02 RX ORDER — DULOXETIN HYDROCHLORIDE 30 MG/1
30 CAPSULE, DELAYED RELEASE ORAL DAILY
Qty: 90 CAPSULE | Refills: 2 | Status: SHIPPED | OUTPATIENT
Start: 2024-12-02 | End: 2025-12-02

## 2024-12-02 RX ORDER — GABAPENTIN 300 MG/1
300 CAPSULE ORAL 3 TIMES DAILY
Qty: 270 CAPSULE | Refills: 3 | Status: SHIPPED | OUTPATIENT
Start: 2024-12-02 | End: 2025-12-02

## 2024-12-02 ASSESSMENT — ENCOUNTER SYMPTOMS: DEPRESSION: 0

## 2024-12-02 ASSESSMENT — PAIN SCALES - GENERAL: PAINLEVEL_OUTOF10: 4

## 2024-12-06 ENCOUNTER — APPOINTMENT (OUTPATIENT)
Dept: PHYSICAL THERAPY | Facility: CLINIC | Age: 79
End: 2024-12-06
Payer: MEDICARE

## 2024-12-06 DIAGNOSIS — M25.572 LEFT ANKLE PAIN: Primary | ICD-10-CM

## 2024-12-06 DIAGNOSIS — S82.892A CLOSED LEFT ANKLE FRACTURE, INITIAL ENCOUNTER: ICD-10-CM

## 2024-12-06 PROCEDURE — 97140 MANUAL THERAPY 1/> REGIONS: CPT | Mod: GP

## 2024-12-06 PROCEDURE — 97016 VASOPNEUMATIC DEVICE THERAPY: CPT | Mod: GP

## 2024-12-06 PROCEDURE — 97110 THERAPEUTIC EXERCISES: CPT | Mod: GP

## 2024-12-06 ASSESSMENT — PAIN SCALES - GENERAL: PAINLEVEL_OUTOF10: 0 - NO PAIN

## 2024-12-06 NOTE — PROGRESS NOTES
Physical Therapy Treatment    Patient Name: Lucille Holloway  MRN: [id number]  Encounter Date: 12/6/2024  Time Calculation  Start Time: 0745  Stop Time: 0840  Time Calculation (min): 55 min    Insurance:   Visit number: 9  Approved number of visits: MN  Insurance: [facility] medicare  Auth needed  $20 co-pay  Medicare cert date 10/22/24  to 1/19/25      Current Problem  1. Left ankle pain  Follow Up In Physical Therapy      2. Closed left ankle fracture, initial encounter  Follow Up In Physical Therapy          General  Reason for Referral: L ankle ORIF  Referred By: darling Mancia  Precautions  Precautions Comment: none  Pain  0-10 (Numeric) Pain Score: 0 - No pain    Subjective:     Patient reports had extreme relief after manual work from last visit.  Still gets some discomfort with certain motions but not nearly as bad.  No pain currently only with motions.  Went back to gym and walking on TM noticed her stamina is really poor, used to walk 30-45 min on TM but was only able to do 8 min and noticed her inside arch was becoming painful.      Compliant with HEP? yes    Objective:     Ttp distal achilles post tib and plantar fascia L ankle    Treatments:     Bike 5 min  A/P to TC joint   IASTM to achilles and plantar fascia  PF stretch 1 min x 2  Mobs to mid foot and toes  Toe flexion stretch  1min x 2  Incline stretch 1 min  Standing PF stretch on step 1 min  Bosu lunges 20 x B  Tandem stance kb pass cw & ccw 20 x 4 kg  Toe walk/heel walk around gym     Charges: te x 2 man x 1 vaso x 1    Assessment: improved ROM after manual      Plan: repeat IASTM passive stretch balance

## 2024-12-09 ENCOUNTER — APPOINTMENT (OUTPATIENT)
Dept: ENDOCRINOLOGY | Facility: CLINIC | Age: 79
End: 2024-12-09
Payer: MEDICARE

## 2024-12-09 DIAGNOSIS — E78.5 DYSLIPIDEMIA: ICD-10-CM

## 2024-12-10 ENCOUNTER — APPOINTMENT (OUTPATIENT)
Dept: RHEUMATOLOGY | Facility: CLINIC | Age: 79
End: 2024-12-10
Payer: MEDICARE

## 2024-12-10 RX ORDER — ATORVASTATIN CALCIUM 10 MG/1
10 TABLET, FILM COATED ORAL NIGHTLY
Qty: 100 TABLET | Refills: 2 | Status: SHIPPED | OUTPATIENT
Start: 2024-12-10

## 2024-12-11 ENCOUNTER — APPOINTMENT (OUTPATIENT)
Dept: PHYSICAL THERAPY | Facility: CLINIC | Age: 79
End: 2024-12-11
Payer: MEDICARE

## 2024-12-11 DIAGNOSIS — M25.572 LEFT ANKLE PAIN: Primary | ICD-10-CM

## 2024-12-11 DIAGNOSIS — S82.892A CLOSED LEFT ANKLE FRACTURE, INITIAL ENCOUNTER: ICD-10-CM

## 2024-12-11 PROCEDURE — 97112 NEUROMUSCULAR REEDUCATION: CPT | Mod: GP

## 2024-12-11 PROCEDURE — 97110 THERAPEUTIC EXERCISES: CPT | Mod: GP

## 2024-12-11 ASSESSMENT — PAIN SCALES - GENERAL: PAINLEVEL_OUTOF10: 0 - NO PAIN

## 2024-12-11 NOTE — PROGRESS NOTES
Physical Therapy Treatment    Patient Name: Lucille Holloway  MRN: [id number]  Encounter Date: 12/11/2024  Time Calculation  Start Time: 0800  Stop Time: 0845  Time Calculation (min): 45 min    Insurance:   Visit number: 10  Approved number of visits: MN  Insurance: [facility] medicare  Auth needed  $20 co-pay  Medicare cert date 10/22/24  to 1/19/25      Current Problem  1. Left ankle pain  Follow Up In Physical Therapy      2. Closed left ankle fracture, initial encounter  Follow Up In Physical Therapy          General  Reason for Referral: L ankle ORIF  Referred By: darling  Precautions  Precautions  Precautions Comment: none  Pain  0-10 (Numeric) Pain Score: 0 - No pain    Subjective:     Patient reports foot is pretty good.  Very little pain and only in certain areas and not constant.      Compliant with HEP? yes    Objective:     Mild hypomobility mid foot L     Treatments:   Bike 5 min  Incline stretch 1 min   Valslide 3 way 15 x R  Bosu lunges 20 x B  Tandem walk in // bars 4 x, forward and back  Leg press 70# 12 x 3   Toe press 70# 20 x 2  Airex wide tandem stance rebounder toss 25x R/L, L/R  PF stretch 1 min x 3     Charges: te x 2 nm x 1    Assessment: mobility in ankle and foot continues to improve.  Still challenged with balance exercises.       Plan: IASTM plantar fascia and achilles balance RDL, sit-stand recheck ankle ROM determine need for continued PT.

## 2024-12-19 ENCOUNTER — TREATMENT (OUTPATIENT)
Dept: PHYSICAL THERAPY | Facility: CLINIC | Age: 79
End: 2024-12-19
Payer: MEDICARE

## 2024-12-19 ENCOUNTER — APPOINTMENT (OUTPATIENT)
Dept: ORTHOPEDIC SURGERY | Facility: CLINIC | Age: 79
End: 2024-12-19
Payer: MEDICARE

## 2024-12-19 DIAGNOSIS — S82.892A CLOSED LEFT ANKLE FRACTURE, INITIAL ENCOUNTER: Primary | ICD-10-CM

## 2024-12-19 DIAGNOSIS — S82.892A CLOSED LEFT ANKLE FRACTURE, INITIAL ENCOUNTER: ICD-10-CM

## 2024-12-19 DIAGNOSIS — M25.572 LEFT ANKLE PAIN: Primary | ICD-10-CM

## 2024-12-19 PROCEDURE — 1123F ACP DISCUSS/DSCN MKR DOCD: CPT | Performed by: ORTHOPAEDIC SURGERY

## 2024-12-19 PROCEDURE — 99213 OFFICE O/P EST LOW 20 MIN: CPT | Performed by: ORTHOPAEDIC SURGERY

## 2024-12-19 PROCEDURE — 97110 THERAPEUTIC EXERCISES: CPT | Mod: GP,CQ

## 2024-12-19 PROCEDURE — 1036F TOBACCO NON-USER: CPT | Performed by: ORTHOPAEDIC SURGERY

## 2024-12-19 ASSESSMENT — PAIN SCALES - GENERAL: PAINLEVEL_OUTOF10: 0 - NO PAIN

## 2024-12-19 ASSESSMENT — PAIN - FUNCTIONAL ASSESSMENT: PAIN_FUNCTIONAL_ASSESSMENT: 0-10

## 2024-12-19 NOTE — PROGRESS NOTES
"Physical Therapy Treatment    Patient Name: Lucille Holloway  MRN: [id number]  Encounter Date: 12/19/2024  Time Calculation  Start Time: 0730  Stop Time: 0814  Time Calculation (min): 44 min    Insurance:   Visit number: 11  Approved number of visits: MN  Insurance: [facility] medicare  Auth needed  $20 co-pay  Medicare cert date 10/22/24  to 1/19/25    Current Problem  1. Left ankle pain  Follow Up In Physical Therapy      2. Closed left ankle fracture, initial encounter  Follow Up In Physical Therapy        General  Reason for Referral: L ankle ORIF  Referred By: darling Mancia  Precautions  Precautions Comment: none  Pain  Pain Assessment: 0-10  0-10 (Numeric) Pain Score: 0 - No pain    Subjective:   Patient reports that they are feeling good and feel comfortable with today being the last day.   Compliant with HEP? yes    Objective:     Ankle ROM  DF 13/10  PF 50/48  IV    21/21  EV   30/4     Ankle MMT  DF 5/5  PF 5/5  IV 5/5  EV 5/5    Outcome Measures:  Other Measures  Lower Extremity Funtional Score (LEFS): 51    Self Reported Function (0-100%) = 80-90%  - 100% being back to PLOF    Treatments:   Bike 5 min  Incline stretch 1 min   HS stretch 1' ea   30\" x2 SL balance   Hip abduction 3x10 40#   Marching in bars 1' x2     Charges: te x 3    Assessment:   Pt tolerated treatment session fairly still displaying challenge with SL balance and was given exercises to perform independently with HEP. Pt was also given 1 month free pass to [facility] and a list of general pool exercises as well.     Plan:   discharge per evaluating physical therapist .   "

## 2024-12-19 NOTE — PROGRESS NOTES
Surgery pressure surgery back further patient is 4 months status post fixation ankle fracture she is generally doing very well she is still in therapy she still has some swelling and stiffness  Patient is walking without ambulatory aids.  Examination awake alert oriented appropriate incisions look fine she is got some minor's soreness along the plate but she has fairly good mobility lacks several degrees of dorsiflexion compared to contralateral side assessment doing well discussed indications for hardware removal follow-up as needed

## 2024-12-26 ASSESSMENT — ENCOUNTER SYMPTOMS
HYPERTENSION: 1
SWEATS: 1
SHORTNESS OF BREATH: 1

## 2024-12-27 ENCOUNTER — LAB (OUTPATIENT)
Dept: LAB | Facility: LAB | Age: 79
End: 2024-12-27
Payer: MEDICARE

## 2024-12-27 ENCOUNTER — APPOINTMENT (OUTPATIENT)
Dept: PRIMARY CARE | Facility: CLINIC | Age: 79
End: 2024-12-27
Payer: MEDICARE

## 2024-12-27 VITALS
BODY MASS INDEX: 37.56 KG/M2 | DIASTOLIC BLOOD PRESSURE: 86 MMHG | OXYGEN SATURATION: 95 % | WEIGHT: 212 LBS | HEART RATE: 87 BPM | HEIGHT: 63 IN | SYSTOLIC BLOOD PRESSURE: 146 MMHG

## 2024-12-27 DIAGNOSIS — I27.20 PULMONARY HYPERTENSION (MULTI): ICD-10-CM

## 2024-12-27 DIAGNOSIS — R06.09 DOE (DYSPNEA ON EXERTION): ICD-10-CM

## 2024-12-27 DIAGNOSIS — E03.9 HYPOTHYROIDISM, UNSPECIFIED TYPE: ICD-10-CM

## 2024-12-27 DIAGNOSIS — E78.5 HYPERLIPIDEMIA, UNSPECIFIED HYPERLIPIDEMIA TYPE: ICD-10-CM

## 2024-12-27 DIAGNOSIS — N18.31 CHRONIC RENAL IMPAIRMENT, STAGE 3A (MULTI): ICD-10-CM

## 2024-12-27 DIAGNOSIS — E66.01 OBESITY, MORBID (MULTI): ICD-10-CM

## 2024-12-27 DIAGNOSIS — I10 PRIMARY HYPERTENSION: ICD-10-CM

## 2024-12-27 DIAGNOSIS — E11.69 TYPE 2 DIABETES MELLITUS WITH OTHER SPECIFIED COMPLICATION, UNSPECIFIED WHETHER LONG TERM INSULIN USE (MULTI): ICD-10-CM

## 2024-12-27 DIAGNOSIS — I10 PRIMARY HYPERTENSION: Primary | ICD-10-CM

## 2024-12-27 LAB
ALBUMIN SERPL BCP-MCNC: 3.5 G/DL (ref 3.4–5)
ALP SERPL-CCNC: 64 U/L (ref 33–136)
ALT SERPL W P-5'-P-CCNC: 27 U/L (ref 7–45)
ANION GAP SERPL CALC-SCNC: 8 MMOL/L (ref 10–20)
AST SERPL W P-5'-P-CCNC: 32 U/L (ref 9–39)
BASOPHILS # BLD AUTO: 0.03 X10*3/UL (ref 0–0.1)
BASOPHILS NFR BLD AUTO: 0.4 %
BILIRUB SERPL-MCNC: 0.4 MG/DL (ref 0–1.2)
BUN SERPL-MCNC: 16 MG/DL (ref 6–23)
CALCIUM SERPL-MCNC: 8.7 MG/DL (ref 8.6–10.3)
CHLORIDE SERPL-SCNC: 107 MMOL/L (ref 98–107)
CHOLEST SERPL-MCNC: 123 MG/DL (ref 0–199)
CHOLESTEROL/HDL RATIO: 1.7
CO2 SERPL-SCNC: 30 MMOL/L (ref 21–32)
CREAT SERPL-MCNC: 0.93 MG/DL (ref 0.5–1.05)
EGFRCR SERPLBLD CKD-EPI 2021: 63 ML/MIN/1.73M*2
EOSINOPHIL # BLD AUTO: 0.1 X10*3/UL (ref 0–0.4)
EOSINOPHIL NFR BLD AUTO: 1.2 %
ERYTHROCYTE [DISTWIDTH] IN BLOOD BY AUTOMATED COUNT: 13.2 % (ref 11.5–14.5)
EST. AVERAGE GLUCOSE BLD GHB EST-MCNC: 103 MG/DL
GLUCOSE SERPL-MCNC: 92 MG/DL (ref 74–99)
HBA1C MFR BLD: 5.2 %
HCT VFR BLD AUTO: 40.8 % (ref 36–46)
HDLC SERPL-MCNC: 74 MG/DL
HGB BLD-MCNC: 12.9 G/DL (ref 12–16)
IMM GRANULOCYTES # BLD AUTO: 0.02 X10*3/UL (ref 0–0.5)
IMM GRANULOCYTES NFR BLD AUTO: 0.2 % (ref 0–0.9)
LDLC SERPL CALC-MCNC: 28 MG/DL
LYMPHOCYTES # BLD AUTO: 3.55 X10*3/UL (ref 0.8–3)
LYMPHOCYTES NFR BLD AUTO: 43.7 %
MCH RBC QN AUTO: 29.3 PG (ref 26–34)
MCHC RBC AUTO-ENTMCNC: 31.6 G/DL (ref 32–36)
MCV RBC AUTO: 93 FL (ref 80–100)
MONOCYTES # BLD AUTO: 0.62 X10*3/UL (ref 0.05–0.8)
MONOCYTES NFR BLD AUTO: 7.6 %
NEUTROPHILS # BLD AUTO: 3.81 X10*3/UL (ref 1.6–5.5)
NEUTROPHILS NFR BLD AUTO: 46.9 %
NON HDL CHOLESTEROL: 49 MG/DL (ref 0–149)
NRBC BLD-RTO: 0 /100 WBCS (ref 0–0)
PLATELET # BLD AUTO: 270 X10*3/UL (ref 150–450)
POTASSIUM SERPL-SCNC: 4.7 MMOL/L (ref 3.5–5.3)
PROT SERPL-MCNC: 6.8 G/DL (ref 6.4–8.2)
RBC # BLD AUTO: 4.4 X10*6/UL (ref 4–5.2)
SODIUM SERPL-SCNC: 140 MMOL/L (ref 136–145)
TRIGL SERPL-MCNC: 107 MG/DL (ref 0–149)
TSH SERPL-ACNC: 1.14 MIU/L (ref 0.44–3.98)
VLDL: 21 MG/DL (ref 0–40)
WBC # BLD AUTO: 8.1 X10*3/UL (ref 4.4–11.3)

## 2024-12-27 PROCEDURE — 3079F DIAST BP 80-89 MM HG: CPT

## 2024-12-27 PROCEDURE — 1123F ACP DISCUSS/DSCN MKR DOCD: CPT

## 2024-12-27 PROCEDURE — 80061 LIPID PANEL: CPT

## 2024-12-27 PROCEDURE — 99214 OFFICE O/P EST MOD 30 MIN: CPT

## 2024-12-27 PROCEDURE — 3077F SYST BP >= 140 MM HG: CPT

## 2024-12-27 PROCEDURE — 83036 HEMOGLOBIN GLYCOSYLATED A1C: CPT

## 2024-12-27 PROCEDURE — 93000 ELECTROCARDIOGRAM COMPLETE: CPT

## 2024-12-27 PROCEDURE — 1160F RVW MEDS BY RX/DR IN RCRD: CPT

## 2024-12-27 PROCEDURE — 1159F MED LIST DOCD IN RCRD: CPT

## 2024-12-27 PROCEDURE — 85025 COMPLETE CBC W/AUTO DIFF WBC: CPT

## 2024-12-27 PROCEDURE — 80053 COMPREHEN METABOLIC PANEL: CPT

## 2024-12-27 PROCEDURE — 84443 ASSAY THYROID STIM HORMONE: CPT

## 2024-12-27 RX ORDER — AMLODIPINE BESYLATE 2.5 MG/1
2.5 TABLET ORAL DAILY
Qty: 90 TABLET | Refills: 1 | Status: SHIPPED | OUTPATIENT
Start: 2024-12-27 | End: 2025-06-25

## 2024-12-27 ASSESSMENT — ENCOUNTER SYMPTOMS
HYPERTENSION: 1
SWEATS: 1
SHORTNESS OF BREATH: 1

## 2024-12-27 NOTE — PROGRESS NOTES
Primary Care Provider: Kina Lynch, GETACHEW-CNP    Subjective   Lucille Holloway is a 79 y.o. female who presents for Follow-up (6 month follow up/Concerned with higher BP/Exhausted all the time /No chest pain ).    Hypertension  This is a chronic problem. The current episode started more than 1 year ago. The problem has been waxing and waning since onset. The problem is resistant. Associated symptoms include anxiety, malaise/fatigue, peripheral edema, shortness of breath and sweats. Agents associated with hypertension include no associated agents and NSAIDs. There are no associated agents and NSAIDs to hypertension. Risk factors for coronary artery disease include obesity. Compliance problems include diet and exercise.       FUV    HTN  SOB with activity  Fatigue  No chest pain, no dizziness, no lightheadedness  Sedentary, not exercising ; she states she finally started doing her own ADL's again  Home BP's lately have been around 140/90  She has some swelling that comes and goes, but only at where she injured her left ankle    CKD 3      HLD- lipitor      No longer having any tremors or brain fog; Doing much better since coming off Contrave; since coming off of this, these symptoms have resolved    Answers submitted by the patient for this visit:  High Blood Pressure Questionnaire (Submitted on 12/26/2024)  Chief Complaint: Hypertension  Chronicity: chronic  Onset: more than 1 year ago  Progression since onset: waxing and waning  Condition status: resistant  anxiety: Yes  malaise/fatigue: Yes  peripheral edema: Yes  shortness of breath: Yes  sweats: Yes  Agents associated with hypertension: no associated agents, NSAIDs  CAD risks: obesity  Compliance problems: diet, exercise    Review of Systems   Constitutional:  Positive for malaise/fatigue.   Respiratory:  Positive for shortness of breath.      The remainder of the ROS was negative unless otherwise stated in the HPI.     Objective   /86   Pulse 87   Ht  "1.6 m (5' 3\")   Wt 96.2 kg (212 lb)   SpO2 95%   BMI 37.55 kg/m²     Physical Exam  Vitals reviewed.   Constitutional:       General: She is not in acute distress.     Appearance: Normal appearance. She is normal weight. She is not ill-appearing, toxic-appearing or diaphoretic.   HENT:      Head: Normocephalic and atraumatic.      Nose: Nose normal.   Eyes:      Conjunctiva/sclera: Conjunctivae normal.   Cardiovascular:      Rate and Rhythm: Normal rate and regular rhythm.      Pulses: Normal pulses.      Heart sounds: Normal heart sounds. No murmur heard.     No friction rub. No gallop.   Pulmonary:      Effort: Pulmonary effort is normal. No respiratory distress.      Breath sounds: Normal breath sounds.   Abdominal:      General: Abdomen is flat. Bowel sounds are normal.      Palpations: Abdomen is soft.   Musculoskeletal:         General: Normal range of motion.      Cervical back: Normal range of motion and neck supple.   Skin:     General: Skin is warm and dry.      Capillary Refill: Capillary refill takes less than 2 seconds.   Neurological:      General: No focal deficit present.      Mental Status: She is alert and oriented to person, place, and time. Mental status is at baseline.   Psychiatric:         Mood and Affect: Mood normal.         Behavior: Behavior normal.         Thought Content: Thought content normal.         Judgment: Judgment normal.         Assessment/Plan   Problem List Items Addressed This Visit             ICD-10-CM    Chronic renal insufficiency N18.9    stable  Relevant Orders    ECG 12 lead (Clinic Performed)    Hypertension - Primary I10    Above goal  SOB possibly r/t her elevated BP  Relevant Medications    START amLODIPine (Norvasc) 2.5 mg tablet    Other Relevant Orders    ECG 12 lead (Clinic Performed)    LILLY (dyspnea on exertion) R06.09    SOB possibly r/t her elevated BP  Follow up in 4 weeks or sooner if needed; if symptoms persisting further testing to be ordered (stress " "testing, etc)  Echocardiogram: 8/17/24  CONCLUSIONS:   1. Left ventricular ejection fraction is normal, by visual estimate at 55-60%.   2. Spectral Doppler shows an impaired relaxation pattern of left ventricular diastolic filling.   3. There is normal right ventricular global systolic function.   4. Moderate to severely elevated right ventricular systolic pressure.    Relevant Orders    ECG 12 lead (Clinic Performed)- SR and without acute findings      Hyperlipidemia E78.5    Stable  C/w atorvastatin 10mg nightly  Lab Results   Component Value Date    CHOL 123 12/27/2024    CHOL 132 01/04/2024    CHOL 113 06/09/2023     Lab Results   Component Value Date    HDL 74.0 12/27/2024    HDL 74.2 01/04/2024    HDL 70.7 06/09/2023     Lab Results   Component Value Date    LDLCALC 28 12/27/2024    LDLCALC 39 01/04/2024     Lab Results   Component Value Date    TRIG 107 12/27/2024    TRIG 93 01/04/2024    TRIG 85 06/09/2023     No components found for: \"CHOLHDL\"    Relevant Orders    Lipid Panel (Completed)    ECG 12 lead (Clinic Performed)     Removed prior dx of DM II from Dr. Fernandes from 8/14/2024  Patient was identified as a fall risk. Risk prevention instructions provided.    Follow up in 4 weeks or sooner if needed  "

## 2024-12-31 ENCOUNTER — HOSPITAL ENCOUNTER (OUTPATIENT)
Dept: RADIOLOGY | Facility: CLINIC | Age: 79
Discharge: HOME | End: 2024-12-31
Payer: MEDICARE

## 2024-12-31 DIAGNOSIS — Z13.820 SCREENING FOR OSTEOPOROSIS: ICD-10-CM

## 2024-12-31 DIAGNOSIS — Z78.0 ENCOUNTER FOR OSTEOPOROSIS SCREENING IN ASYMPTOMATIC POSTMENOPAUSAL PATIENT: ICD-10-CM

## 2024-12-31 DIAGNOSIS — Z13.820 ENCOUNTER FOR OSTEOPOROSIS SCREENING IN ASYMPTOMATIC POSTMENOPAUSAL PATIENT: ICD-10-CM

## 2024-12-31 PROCEDURE — 77080 DXA BONE DENSITY AXIAL: CPT

## 2024-12-31 PROCEDURE — 77080 DXA BONE DENSITY AXIAL: CPT | Performed by: RADIOLOGY

## 2025-01-06 NOTE — PROGRESS NOTES
Subjective  Lucille Holloway is a 79 y.o. female with a hx of CKD, IGT, HTN, hypothyroidism, goiter, KELLY, obesity, hip replacement, arthritis - osteo and rheumatoid, cataract surgery, who presents for weight management and obesity.  No h/o kidney stones or glaucoma.    Current Plan  1. Nutrition: Has been off track over the holidays.      2. Sleep: Stable      3. Stress: Stable     4. Exercise: None at this time- just finished PT. Wants to start back with water aerobics.      5. Appetite control: Stable  Obesity medication:  0.6mg compounded Semaglutide weekly      6. Prior Goals: Not Met     New Goals: Exercise- Go back to water aerobics.     Weight trend:    Wt Readings from Last 10 Encounters:   12/27/24 96.2 kg (212 lb)   12/02/24 94.8 kg (209 lb)   11/19/24 94.7 kg (208 lb 11.2 oz)   10/08/24 87 kg (191 lb 14.4 oz)   09/24/24 91.2 kg (201 lb)   08/30/24 88 kg (194 lb)   08/30/24 91.2 kg (201 lb)   08/14/24 88 kg (194 lb 0.1 oz)   07/29/24 88 kg (194 lb)   07/27/24 88.5 kg (195 lb)         Current Outpatient Medications:     acetaminophen 500 mg capsule, Take 2 capsules (1,000 mg) by mouth 3 times a day as needed., Disp: , Rfl:     albuterol 90 mcg/actuation inhaler, USE 2 INHALATIONS BY MOUTH EVERY 4 HOURS IF NEEDED FOR WHEEZING  OR SHORTNESS OF BREATH (Patient not taking: Reported on 12/2/2024), Disp: 51 g, Rfl: 2    amLODIPine (Norvasc) 2.5 mg tablet, Take 1 tablet (2.5 mg) by mouth once daily., Disp: 90 tablet, Rfl: 1    aspirin 81 mg EC tablet, Take 1 tablet (81 mg) by mouth once daily., Disp: , Rfl:     atorvastatin (Lipitor) 10 mg tablet, TAKE 1 TABLET BY MOUTH ONCE  DAILY AT BEDTIME, Disp: 100 tablet, Rfl: 2    docusate sodium (Colace) 100 mg capsule, Take 3 capsules (300 mg) by mouth once daily., Disp: , Rfl:     DULoxetine (Cymbalta) 30 mg DR capsule, Take 1 capsule (30 mg) by mouth once daily. AS DIRECTED, Disp: 90 capsule, Rfl: 2    gabapentin (Neurontin) 300 mg capsule, Take 1 capsule (300 mg) by  mouth 3 times a day., Disp: 270 capsule, Rfl: 3    levothyroxine (Synthroid, Levoxyl) 75 mcg tablet, Take 1 tablet (75 mcg) by mouth once daily., Disp: 90 tablet, Rfl: 1    multivitamin with minerals iron-free (Centrum Silver), Take 1 tablet by mouth once daily., Disp: , Rfl:     semaglutide, Per [facility] protocol inject 0.3mg weekly for 4 weeks, then increase to 0.6mg weekly, Disp: 2 mL, Rfl: 4    semaglutide, Inject 0.6mg under the skin once a week, Disp: 2 mL, Rfl: 2    ROS:  System: normal  Eyes : no visual changes  Neck : no tenderness, no new lumps/bumps  Respiratory : no SOB  Cardiovascular : no chest pain, no palpitations  Gastro-Intestinal : no abdominal concerns  Neurological : no numbness or tingling in the extremities  Musculoskeletal : no joint paint, no muscle pain  Skin : no unusual rashes  Psychiatric : no depression, no anxiety  See HPI for Endocrine ROS    Past Medical History:   Diagnosis Date    Acute upper respiratory infection, unspecified 03/05/2019    Acute URI    Ankle dislocation     Arthritis     Bariatric surgery status 11/20/2019    Bariatric surgery status    Cervical disc disorder     CTS (carpal tunnel syndrome)     Encounter for general adult medical examination without abnormal findings 08/05/2021    Encounter for preventive health examination    Hypertension     Lumbosacral disc disease     Other general symptoms and signs 03/05/2019    Flu-like symptoms    Other headache syndrome 06/25/2024    Comment on above: HAD A FALL SATURDAY NIGHT / BAD HEADACHE, BUMP HEAD    Other neuromuscular dysfunction of bladder     Hyperactivity of bladder    Pain in right foot 09/17/2015    Foot pain, right    Personal history of other diseases of the circulatory system     History of hypertension    Personal history of other diseases of the respiratory system 03/05/2019    History of acute sinusitis    Personal history of other endocrine, nutritional and metabolic disease     History of thyroid  disease    Personal history of other infectious and parasitic diseases 08/28/2019    History of hepatitis    Plantar fascial fibromatosis 09/21/2015    Plantar fasciitis, right    Syncope and collapse 10/16/2015    Vasovagal syncope    Vitamin D deficiency, unspecified 08/28/2019    Mild vitamin D deficiency       Past Surgical History:   Procedure Laterality Date    ANKLE FRACTURE SURGERY      BLADDER SURGERY  08/28/2019    Bladder Surgery    BREAST RECONSTRUCTION  01/01/2019    bilateral breast reduction    CARPAL TUNNEL RELEASE  08/28/2019    Neuroplasty Decompression Median Nerve At Carpal Tunnel    HYSTERECTOMY  08/28/2019    Hysterectomy    OTHER SURGICAL HISTORY  08/28/2019    Carpal tunnel surgery    REDUCTION MAMMAPLASTY      TOTAL HIP ARTHROPLASTY  07/16/2019    Total Hip Replacement    TOTAL KNEE ARTHROPLASTY Right 12/30/2022       Social History     Socioeconomic History    Marital status:      Spouse name: Not on file    Number of children: Not on file    Years of education: Not on file    Highest education level: Not on file   Occupational History    Not on file   Tobacco Use    Smoking status: Former     Current packs/day: 0.25     Average packs/day: 0.3 packs/day for 15.0 years (3.8 ttl pk-yrs)     Types: Cigarettes     Passive exposure: Never    Smokeless tobacco: Never    Tobacco comments:     none   Vaping Use    Vaping status: Never Used   Substance and Sexual Activity    Alcohol use: Not Currently    Drug use: Never    Sexual activity: Not Currently     Birth control/protection: Abstinence   Other Topics Concern    Not on file   Social History Narrative    Not on file     Social Drivers of Health     Financial Resource Strain: Low Risk  (8/16/2024)    Overall Financial Resource Strain (CARDIA)     Difficulty of Paying Living Expenses: Not very hard   Food Insecurity: No Food Insecurity (8/15/2024)    Hunger Vital Sign     Worried About Running Out of Food in the Last Year: Never true      Ran Out of Food in the Last Year: Never true   Transportation Needs: No Transportation Needs (8/16/2024)    PRAPARE - Transportation     Lack of Transportation (Medical): No     Lack of Transportation (Non-Medical): No   Physical Activity: Not on file   Stress: Not on file   Social Connections: Not on file   Intimate Partner Violence: Not on file   Housing Stability: Low Risk  (8/16/2024)    Housing Stability Vital Sign     Unable to Pay for Housing in the Last Year: No     Number of Times Moved in the Last Year: 0     Homeless in the Last Year: No       Objective      Physical Exam:  There were no vitals taken for this visit.  General : alert and oriented X3, no acute distress  Eyes : EOMI   BMI: 37.20kg/m2    Assessment/Plan  Lucille Holloway is a 79 y.o. female with a hx of CKD, IGT, HTN, hypothyroidism, goiter, KELLY, obesity, hip replacement, arthritis - osteo and rheumatoid, cataract surgery, who presents for weight management and obesity.  No h/o kidney stones or glaucoma.     1. Weight Management : Reviewed the principles of energy metabolism, caloric intake and expenditure, and rationale for a treatment program.  Also reinforced need for reduced calorie, low fat diet and increased physical activity.     We reviewed the possibility of starting an interdisciplinary lifestyle intervention program involving improvement of the diet, a personalized exercise program, efforts to reduce the stress and the possibility of using appetite suppressant medications in an effort to help with the weight loss process.  The patient expressed interest in the plan.     2. Nutrition : I discussed trying one of the diet approaches we have here in the program : Mediterranean lifestyle, ketosis diet.  Has met Hafsa Lambert RD.     5/13/24: 198lb, 35.23kg/m2  10/08/24: 191.9lb, 33.99kg/m2  11/19/24: 208.7lb, 36.97kg/m2  1/7/25: 210lb, 37.20kg/m2     3. Sleep : mild KELLY     4. Stress : 7-8/10, worried about weight gain, , 5 kids,  "10 grandchildren     5. Exercise : had ankle fracture and surgery, now boot is off and she is doing PT.     6. Appetite : high  Was on ozempic for 6 months, loved it, but lost coverage  Discussed other AOMs.  --on contrave - doing ok with this  --on Semaglutide via [facility] -- up to 1.2mg/wk     7. Goal: to re-start at the gym.  Was given the \"thumbs up\" to get back to water aerobics after her ankle fracture.     Follow up in a group visit.  Revawilda Brooks MD     "

## 2025-01-07 ENCOUNTER — APPOINTMENT (OUTPATIENT)
Dept: ENDOCRINOLOGY | Facility: CLINIC | Age: 80
End: 2025-01-07
Payer: MEDICARE

## 2025-01-07 VITALS
BODY MASS INDEX: 37.21 KG/M2 | HEART RATE: 84 BPM | TEMPERATURE: 98.3 F | HEIGHT: 63 IN | WEIGHT: 210 LBS | DIASTOLIC BLOOD PRESSURE: 85 MMHG | SYSTOLIC BLOOD PRESSURE: 147 MMHG

## 2025-01-07 DIAGNOSIS — Z76.89 ENCOUNTER FOR WEIGHT MANAGEMENT: ICD-10-CM

## 2025-01-07 DIAGNOSIS — E66.812 CLASS 2 OBESITY: Primary | ICD-10-CM

## 2025-01-07 PROCEDURE — 99215 OFFICE O/P EST HI 40 MIN: CPT | Performed by: INTERNAL MEDICINE

## 2025-01-07 PROCEDURE — 1123F ACP DISCUSS/DSCN MKR DOCD: CPT | Performed by: INTERNAL MEDICINE

## 2025-01-07 PROCEDURE — 3079F DIAST BP 80-89 MM HG: CPT | Performed by: INTERNAL MEDICINE

## 2025-01-07 PROCEDURE — 3077F SYST BP >= 140 MM HG: CPT | Performed by: INTERNAL MEDICINE

## 2025-01-07 PROCEDURE — 1159F MED LIST DOCD IN RCRD: CPT | Performed by: INTERNAL MEDICINE

## 2025-01-13 ENCOUNTER — APPOINTMENT (OUTPATIENT)
Dept: NEUROLOGY | Facility: CLINIC | Age: 80
End: 2025-01-13
Payer: MEDICARE

## 2025-01-13 VITALS
HEART RATE: 67 BPM | HEIGHT: 63 IN | BODY MASS INDEX: 37.2 KG/M2 | SYSTOLIC BLOOD PRESSURE: 131 MMHG | DIASTOLIC BLOOD PRESSURE: 82 MMHG

## 2025-01-13 DIAGNOSIS — R41.3 COMPLAINTS OF MEMORY DISTURBANCE: Primary | ICD-10-CM

## 2025-01-13 DIAGNOSIS — Z87.820 HISTORY OF CONCUSSION: ICD-10-CM

## 2025-01-13 DIAGNOSIS — R25.2 NOCTURNAL MUSCLE CRAMPS: ICD-10-CM

## 2025-01-13 PROCEDURE — 99214 OFFICE O/P EST MOD 30 MIN: CPT | Performed by: PSYCHIATRY & NEUROLOGY

## 2025-01-13 PROCEDURE — 1036F TOBACCO NON-USER: CPT | Performed by: PSYCHIATRY & NEUROLOGY

## 2025-01-13 PROCEDURE — 3079F DIAST BP 80-89 MM HG: CPT | Performed by: PSYCHIATRY & NEUROLOGY

## 2025-01-13 PROCEDURE — 1160F RVW MEDS BY RX/DR IN RCRD: CPT | Performed by: PSYCHIATRY & NEUROLOGY

## 2025-01-13 PROCEDURE — 1123F ACP DISCUSS/DSCN MKR DOCD: CPT | Performed by: PSYCHIATRY & NEUROLOGY

## 2025-01-13 PROCEDURE — 3075F SYST BP GE 130 - 139MM HG: CPT | Performed by: PSYCHIATRY & NEUROLOGY

## 2025-01-13 PROCEDURE — 1159F MED LIST DOCD IN RCRD: CPT | Performed by: PSYCHIATRY & NEUROLOGY

## 2025-01-13 ASSESSMENT — ENCOUNTER SYMPTOMS
PALPITATIONS: 0
HEADACHES: 0
ORTHOPNEA: 0
SHORTNESS OF BREATH: 1
PND: 0
HYPERTENSION: 1
SWEATS: 0
NECK PAIN: 1
BLURRED VISION: 0

## 2025-01-13 NOTE — PATIENT INSTRUCTIONS
We discussed that your performance on memory/cognitive testing today is improved compared to when you saw me initially in March 2024.  This is a good sign.  It suggests that likely some of the memory difficulty you noted previously was related to the concussion.    Please see Sleep Medicine again regarding alternatives to CPAP.  You have had difficulty tolerating CPAP but it is important to treat the sleep apnea.  As we discussed, not only can untreated sleep apnea make your memory and cognitive functioning worse, but it can put you at increased risk of stroke and other vascular events.    Continue activities to keep your mind active, and we discussed that it is also important to be physically active, even if just a regular walking program, for brain health.    You mentioned nocturnal muscle cramps.  I recommend you try a few ounces of tonic water at bedtime.  This is a good way to prevent muscle cramps.    I am providing a referral for you to see general neurology again, and it would be reasonable to do so in 1 year.

## 2025-01-13 NOTE — PROGRESS NOTES
"Subjective     Lucille Holloway is a 79 y.o. year old female seen in follow-up for postconcussion cognitive dysfunction.    HPI    She has past medical history significant for hypertension, hypothyroidism on replacement, chronic kidney disease, MGUS, left hip replacement, obstructive sleep apnea, bilateral carpal tunnel syndrome, cataract surgery, knee replacement.     I evaluated her initially and most recently on 3/4/2024.  She presented with memory concerns after a concussion sustained at home in June 2023, and scored 30/38 on Short Test of Mental Status.  She additionally noted transient ptosis OS which had resolved by the time she saw me.  I sent her for a brain MRI, vitamin B12 level and acetylcholine receptor antibodies.    MRI showed moderate parenchymal volume loss and nonspecific white matter hyperintensities.  Labs were unrevealing.    She is evaluated again today in the office.    She has noted no recurrence of ptosis.  Indicates diplopia \"once or twice\" since the last visit, most recently a few months ago, but on further questioning she is endorsing OS monocular diplopia resolving with eyedrops.    She feels her mental clarity has returned to baseline and thinks it was affected adversely by a weight loss medication that she is no longer on.  That medication made her feel foggy and lightheaded.    She is driving without incident.  She remembers to take her medications.  She keeps her mind active with reading and does Bible study.  She is taking vitamins.    She denies headaches.    She reports reasonable quality of sleep.  She is supposed to be wearing CPAP but unable to tolerate it.  It sounds as if her sleep specialist had suggested an inspire device but she did not want to consider it.  She does indicate nocturnal muscle cramps, most recently in the left thigh and foreleg.  She gets out of bed and uses a Voltaren topical application with limited relief for these episodes.    She fell in August as a " result of a syncopal event, not sustaining another head trauma.  She did fracture her left ankle requiring surgery.  She is back to walking without assistive devices.    She indicates her blood pressure has been labile.    Review of Systems    As per the history of present illness    Patient Active Problem List   Diagnosis    Abnormal ECG    Abnormal LFTs    Abnormal urine findings    Acquired pes planus    Chronic back pain    Anemia    Alteration in nutrition    Rheumatoid arthritis, involving unspecified site, unspecified whether rheumatoid factor present (Multi)    Brittle nails    Bruit    Carotid atherosclerosis    Carpal tunnel syndrome of left wrist    Carpal tunnel syndrome on both sides    Coccydynia    Chronic constipation    Hepatitis C virus    Chronic renal impairment, stage 3 (moderate) (Multi)    Chronic renal insufficiency    Chronic rhinitis    Colon polyp    Contact dermatitis    Eczema    Fall    Head trauma    Cervicalgia    Headache    Hearing loss    Hypertension    IGT (impaired glucose tolerance)    Cervical disc disease    Lumbar degenerative disc disease    Multilevel degenerative disc disease    Lumbosacral radiculitis    Memory loss    Mild aortic insufficiency    Monoclonal gammopathy of undetermined significance    Class 1 obesity due to excess calories with serious comorbidity and body mass index (BMI) of 33.0 to 33.9 in adult    Obstructive sleep apnea of adult    Pain in both hands    Hip pain, left    Pain of left lower extremity    Peripheral neuropathy    Polyphagia    Primary osteoarthritis of both hands    Proteinuria    Obesity, morbid (Multi)    Pulmonary hypertension (Multi)    Right upper quadrant abdominal pain    Sciatica of right side    Segmental and somatic dysfunction    LILLY (dyspnea on exertion)    Simple goiter    Status post total replacement of left hip    Hypothyroidism    Thyroid nodule    Urinary incontinence    Psoas tendonitis    Wrist tendonitis     Osteoarthritis of hip    Osteoarthritis    Pleurisy    Primary localized osteoarthritis of right knee    CKD (chronic kidney disease)    Acute exacerbation of chronic low back pain    Other specified hypothyroidism    Ptosis of left eyelid    Ingrown nail of great toe    Excess skin of abdominal wall    Lumbar radiculopathy, chronic    Screening mammogram for breast cancer    Hyperlipidemia    Breast asymmetry    Abnormal mammogram of right breast    Closed fracture of left ankle    Closed fracture of left ankle, initial encounter    Impaired mobility and ADLs    Impaired functional mobility, balance, gait, and endurance    Pain    Recurrent major depressive disorder (CMS-HCC)    Left ankle pain     Past Medical History:   Diagnosis Date    Acute upper respiratory infection, unspecified 03/05/2019    Acute URI    Ankle dislocation     Arthritis     Bariatric surgery status 11/20/2019    Bariatric surgery status    Cervical disc disorder     CTS (carpal tunnel syndrome)     Encounter for general adult medical examination without abnormal findings 08/05/2021    Encounter for preventive health examination    Hypertension     Lumbosacral disc disease     Other general symptoms and signs 03/05/2019    Flu-like symptoms    Other headache syndrome 06/25/2024    Comment on above: HAD A FALL SATURDAY NIGHT / BAD HEADACHE, BUMP HEAD    Other neuromuscular dysfunction of bladder     Hyperactivity of bladder    Pain in right foot 09/17/2015    Foot pain, right    Personal history of other diseases of the circulatory system     History of hypertension    Personal history of other diseases of the respiratory system 03/05/2019    History of acute sinusitis    Personal history of other endocrine, nutritional and metabolic disease     History of thyroid disease    Personal history of other infectious and parasitic diseases 08/28/2019    History of hepatitis    Plantar fascial fibromatosis 09/21/2015    Plantar fasciitis, right     Syncope and collapse 10/16/2015    Vasovagal syncope    Vitamin D deficiency, unspecified 08/28/2019    Mild vitamin D deficiency     Past Surgical History:   Procedure Laterality Date    ANKLE FRACTURE SURGERY      BLADDER SURGERY  08/28/2019    Bladder Surgery    BREAST RECONSTRUCTION  01/01/2019    bilateral breast reduction    CARPAL TUNNEL RELEASE  08/28/2019    Neuroplasty Decompression Median Nerve At Carpal Tunnel    HYSTERECTOMY  08/28/2019    Hysterectomy    OTHER SURGICAL HISTORY  08/28/2019    Carpal tunnel surgery    REDUCTION MAMMAPLASTY      TOTAL HIP ARTHROPLASTY  07/16/2019    Total Hip Replacement    TOTAL KNEE ARTHROPLASTY Right 12/30/2022     Social History     Tobacco Use    Smoking status: Former     Current packs/day: 0.25     Average packs/day: 0.3 packs/day for 15.0 years (3.8 ttl pk-yrs)     Types: Cigarettes     Passive exposure: Never    Smokeless tobacco: Never    Tobacco comments:     none   Substance Use Topics    Alcohol use: Not Currently     family history includes Diabetes in her mother; Hypertension in her mother; Rheumatologic disease in her mother; Thyroid disease in her mother.    Current Outpatient Medications:     acetaminophen 500 mg capsule, Take 2 capsules (1,000 mg) by mouth 3 times a day as needed., Disp: , Rfl:     albuterol 90 mcg/actuation inhaler, USE 2 INHALATIONS BY MOUTH EVERY 4 HOURS IF NEEDED FOR WHEEZING  OR SHORTNESS OF BREATH (Patient not taking: Reported on 12/2/2024), Disp: 51 g, Rfl: 2    amLODIPine (Norvasc) 2.5 mg tablet, Take 1 tablet (2.5 mg) by mouth once daily., Disp: 90 tablet, Rfl: 1    aspirin 81 mg EC tablet, Take 1 tablet (81 mg) by mouth once daily., Disp: , Rfl:     atorvastatin (Lipitor) 10 mg tablet, TAKE 1 TABLET BY MOUTH ONCE  DAILY AT BEDTIME, Disp: 100 tablet, Rfl: 2    docusate sodium (Colace) 100 mg capsule, Take 3 capsules (300 mg) by mouth once daily., Disp: , Rfl:     DULoxetine (Cymbalta) 30 mg DR capsule, Take 1 capsule (30 mg) by  mouth once daily. AS DIRECTED, Disp: 90 capsule, Rfl: 2    gabapentin (Neurontin) 300 mg capsule, Take 1 capsule (300 mg) by mouth 3 times a day., Disp: 270 capsule, Rfl: 3    levothyroxine (Synthroid, Levoxyl) 75 mcg tablet, Take 1 tablet (75 mcg) by mouth once daily., Disp: 90 tablet, Rfl: 1    multivitamin with minerals iron-free (Centrum Silver), Take 1 tablet by mouth once daily., Disp: , Rfl:     semaglutide, Inject 1.2mg under the skin once a week, Disp: 2 mL, Rfl: 3  Allergies   Allergen Reactions    Soap Rash, Swelling and Other     Oil of [place]       Objective   Neurological Exam  Physical Exam    Physical Examination:    General: Alert woman who was ambulatory without assistive devices.      Mental Status: Clear sensorium without fluctuation.  Appropriate in conversation.  Fluent unremarkable speech without paraphasic errors or hesitancy.  33/38 on Short Test of Mental Status.  7/8 orientation (gave the suburb as [place] rather than [place]).  7/7 attention (digit span), 4/4 registration with 1 learning trial, 3/4 calculation, 3/3 abstraction, 2/2 clock, 1/2 cube, 4/4 fund of knowledge, 2/4 delayed recall without cueing.    Cranial Nerves:  Funduscopic exam was not well visualized bilaterally on nondilated exam.  Pupils were equal, round and reactive to light with no relative afferent pupillary defect.  Extraocular movements were intact and conjugate without nystagmus.  No ptosis.  Visual fields were full to confrontation tested binocularly.   Facial motor function was symmetrically intact.  Hearing was grossly intact.  No dysarthria.  Shoulder shrug was symmetric.  Tongue protrusion was midline.    Coordination: There was no postural or rest tremor, myoclonus or dystonic posturing.    Sensation: Romberg sign was absent.     Station: Intact and stable.    Gait: Stable and unremarkable.  Mild difficulty with tandem, status post left ankle fracture.      Assessment/Plan     Her score has  improved on Short Test of Mental Status.  This may be due to postconcussion recovery, medication change or other factors.  In any event I encouraged her to continue the present measures and additionally to try walking more for exercise.  I discussed that physical exercise is beneficial for brain health.    For nocturnal cramps I suggested 3-4 ounces of tonic water at bedtime.    I advised her to see her sleep specialist again to discuss alternatives to CPAP.  I discussed that untreated sleep apnea is associated not only with daytime drowsiness and daytime cognitive dysfunction, but also with an increased risk of stroke and other vascular events.    I provided a referral to general neurology and advised that she be seen in 1 year.

## 2025-01-15 PROBLEM — E11.69 TYPE 2 DIABETES MELLITUS WITH OTHER SPECIFIED COMPLICATION: Status: ACTIVE | Noted: 2025-01-15

## 2025-01-16 ENCOUNTER — APPOINTMENT (OUTPATIENT)
Dept: CARDIOLOGY | Facility: CLINIC | Age: 80
End: 2025-01-16
Payer: MEDICARE

## 2025-01-16 VITALS
DIASTOLIC BLOOD PRESSURE: 75 MMHG | OXYGEN SATURATION: 93 % | SYSTOLIC BLOOD PRESSURE: 119 MMHG | BODY MASS INDEX: 36.77 KG/M2 | WEIGHT: 207.5 LBS | HEART RATE: 75 BPM | HEIGHT: 63 IN

## 2025-01-16 DIAGNOSIS — R06.09 DOE (DYSPNEA ON EXERTION): Primary | ICD-10-CM

## 2025-01-16 DIAGNOSIS — I10 PRIMARY HYPERTENSION: ICD-10-CM

## 2025-01-16 DIAGNOSIS — E78.2 MIXED HYPERLIPIDEMIA: ICD-10-CM

## 2025-01-16 PROCEDURE — 1159F MED LIST DOCD IN RCRD: CPT | Performed by: INTERNAL MEDICINE

## 2025-01-16 PROCEDURE — 3074F SYST BP LT 130 MM HG: CPT | Performed by: INTERNAL MEDICINE

## 2025-01-16 PROCEDURE — 1123F ACP DISCUSS/DSCN MKR DOCD: CPT | Performed by: INTERNAL MEDICINE

## 2025-01-16 PROCEDURE — 1036F TOBACCO NON-USER: CPT | Performed by: INTERNAL MEDICINE

## 2025-01-16 PROCEDURE — 99213 OFFICE O/P EST LOW 20 MIN: CPT | Performed by: INTERNAL MEDICINE

## 2025-01-16 PROCEDURE — G2211 COMPLEX E/M VISIT ADD ON: HCPCS | Performed by: INTERNAL MEDICINE

## 2025-01-16 PROCEDURE — 3078F DIAST BP <80 MM HG: CPT | Performed by: INTERNAL MEDICINE

## 2025-01-16 PROCEDURE — 1126F AMNT PAIN NOTED NONE PRSNT: CPT | Performed by: INTERNAL MEDICINE

## 2025-01-16 ASSESSMENT — COLUMBIA-SUICIDE SEVERITY RATING SCALE - C-SSRS
1. IN THE PAST MONTH, HAVE YOU WISHED YOU WERE DEAD OR WISHED YOU COULD GO TO SLEEP AND NOT WAKE UP?: NO
2. HAVE YOU ACTUALLY HAD ANY THOUGHTS OF KILLING YOURSELF?: NO
6. HAVE YOU EVER DONE ANYTHING, STARTED TO DO ANYTHING, OR PREPARED TO DO ANYTHING TO END YOUR LIFE?: NO

## 2025-01-16 ASSESSMENT — ENCOUNTER SYMPTOMS
DEPRESSION: 0
OCCASIONAL FEELINGS OF UNSTEADINESS: 0
LOSS OF SENSATION IN FEET: 0

## 2025-01-16 ASSESSMENT — PAIN SCALES - GENERAL: PAINLEVEL_OUTOF10: 0-NO PAIN

## 2025-01-16 NOTE — PROGRESS NOTES
Subjective   Lucille Holloway is a 79 y.o. female who presents to the [facility] for follow up of abnormal echocardiogram findings and dyspnea on exertion. Last seen in July 2024.     Since our last vist, Ms. Holloway had episode of syncope (likely vasovagal) in August 2024 complicated by traumatic left ankle fracture requiring surgery. No subsequent syncope. Today, she has no active cardiac symptoms of chest pain, PND, orthopnea, MICHAEL, palpitations, or claudication.     Her exertional dyspnea has improved compared to 4 months ago. Fully recovered from knee surgery with completion of post-op PT program. Now doing walking and daily exercise. 3 times a week on treadmill at the gym for 30 minutes and walking outside.    Lost 50 lb taking Ozempic 1 mg injections since early 2022 through end of 2023.     BP was low normal at summer 2022 visit with Dr. Cardona and HCTZ/lisinopril combo held since then with SBP < 130 mm Hg. Recent office BP readings 130s/70s mm Hg. Home readings now 120s-130 mm Hg.     Prior dyspnea history:  She had a 6/29/2021 echocardiogram that showed slight enlargement of the right side of her heart with increased estimated PA pressure (RVSP 46 mm Hg) with mild-moderate TR. These findings were new compared to prior testing in 2019. Of note, she was diagnosed with obstructive sleep apnea on 2019 sleep study with AHI of 67 and SpO2 george of 82%. Repeat echocardiogram today shows normal RV/RA size and function and reduction in RVSP to borderline level of 35 mm Hg. Reduction due to improved KELLY treatment.     Past Medical History:  1. Hypertension  2. KELLY  3. Syncope in August 2024.     Social History:  Nonsmoker     Family History:  No premature CAD in 1st degree relatives ( 55 years of age for male relatives, 65 years of age for female relatives)     Review of Systems    A 14 point review of systems was asked. All questions were negative except for pertinent positives listed in the  "HPI.     Current Outpatient Medications on File Prior to Visit   Medication Sig Dispense Refill    acetaminophen 500 mg capsule Take 2 capsules (1,000 mg) by mouth 3 times a day as needed.      albuterol 90 mcg/actuation inhaler USE 2 INHALATIONS BY MOUTH EVERY 4 HOURS IF NEEDED FOR WHEEZING  OR SHORTNESS OF BREATH 51 g 2    amLODIPine (Norvasc) 2.5 mg tablet Take 1 tablet (2.5 mg) by mouth once daily. 90 tablet 1    aspirin 81 mg EC tablet Take 1 tablet (81 mg) by mouth once daily.      atorvastatin (Lipitor) 10 mg tablet TAKE 1 TABLET BY MOUTH ONCE  DAILY AT BEDTIME 100 tablet 2    docusate sodium (Colace) 100 mg capsule Take 3 capsules (300 mg) by mouth once daily.      DULoxetine (Cymbalta) 30 mg DR capsule Take 1 capsule (30 mg) by mouth once daily. AS DIRECTED 90 capsule 2    gabapentin (Neurontin) 300 mg capsule Take 1 capsule (300 mg) by mouth 3 times a day. 270 capsule 3    levothyroxine (Synthroid, Levoxyl) 75 mcg tablet Take 1 tablet (75 mcg) by mouth once daily. 90 tablet 1    multivitamin with minerals iron-free (Centrum Silver) Take 1 tablet by mouth once daily.      semaglutide Inject 1.2mg under the skin once a week 2 mL 3     No current facility-administered medications on file prior to visit.          Objective   Physical Exam  BP Readings from Last 3 Encounters:   01/16/25 119/75   01/13/25 131/82   01/07/25 147/85      Wt Readings from Last 3 Encounters:   01/16/25 94.1 kg (207 lb 8 oz)   01/07/25 95.3 kg (210 lb)   12/27/24 96.2 kg (212 lb)      BMI: Estimated body mass index is 36.76 kg/m² as calculated from the following:    Height as of this encounter: 1.6 m (5' 3\").    Weight as of this encounter: 94.1 kg (207 lb 8 oz).  BSA: Estimated body surface area is 2.05 meters squared as calculated from the following:    Height as of this encounter: 1.6 m (5' 3\").    Weight as of this encounter: 94.1 kg (207 lb 8 oz).    General: no acute distress  HEENT: EOMI, no scleral icterus.  Lungs: Clear to " auscultation bilaterally without wheezing, rales, or rhonchi.  Cardiovascular: Regular rhythm and rate. Normal S1 and S2. No murmurs, rubs, or gallops are appreciated. JVP normal.  Abdomen: Soft, nontender, nondistended. Bowel sounds present.  Extremities: Warm and well perfused with equal 2+ pulses bilaterally.  No edema present.  Neurologic: Alert and oriented x3.    I have personally reviewed the following images and laboratory findings:  Last echocardiogram:   2024 echocardiogram: LV EF 55-60%, no LVH (LVMI 56 gm/m2), impaired relaxation diastology (E/e' not reported), normal LA size (KRUPA 29 ml/m2), normal RV/RA, no AI, trace MR, trace TR, RVSP 47 mm hg (RAP 3 mm Hg).     2021 TTE: LV EF 65-70%, LV conc remodeling (LVMI 72 gm/m2), impaired relaxation diastology (E/e' 10), RV size upper limits of normal, normal RV function (TAPSE 2.1 cm), mild-mod AI, mild MR, mild-mod TR, RVSP 46 mm Hg, normal IVC size (RA pressure 3 mm Hg).    Last cath / stress test / CACS:   10/21/2019 CT chest: protocol negative for PE, no coronary artery calcification, normal PA size, normal heart size, no pulmonary edema or fibrosis     Dyspnea Work up:  1. 2019 PFTs: FEV1 1.94 L (108% pred), FVC 2.72 L (117% pred), FEV1/FVC ratio 71%, TLC 4.38 L (104%), DLCO 12.95 (65% pred)    Most recent EC2024 ECG: Sinus rhythm, 74 bpm, normal ECG. Personally reviewed in office.    2024 ECG: Sinus rhythm, 67 bpm, normal ECG. Personally reviewed in office.     - 2024 2 week HR monitor: Average HR 76 bpm (57 - 161 bpm range), rare PAC/PVCs, no sinus pauses or abnormal tachyarrhythmia.    Lab Results   Component Value Date    CHOL 123 2024    CHOL 132 2024    CHOL 113 2023     Lab Results   Component Value Date    HDL 74.0 2024    HDL 74.2 2024    HDL 70.7 2023     Lab Results   Component Value Date    LDLCALC 28 2024    LDLCALC 39 2024     Lab Results   Component  "Value Date    TRIG 107 12/27/2024    TRIG 93 01/04/2024    TRIG 85 06/09/2023     No components found for: \"CHOLHDL\"   6/9/2023 LDL 25     Assessment/Plan   1. Shortness of breath on exertion:  Multi-factorial: KELLY, diet controlled hypertension, physical deconditioning.     Exertional dyspnea has improved with treating KELLY with nightly CPAP, good blood pressure control, and 50 lb weight loss from Ozempic. She had R TKR 12/30/2022 and is now fully recovered after doing post-surgery PT program. Repeat July 2022 echocardiogram showed that her RV size and function were normal and RVSP was reduced to 35 mm Hg from 46 mm Hg on prior study.     Will monitor for future sleep apnea symptoms. Off CPAP for last year from mask intolerance.    Continue amlodipine 2.5 mg a day for BP goal range 120-130 mm Hg.      2. CAD risk factors / Dyslipidemia:  No coronary artery calcification on October 2019 CT chest scan. Blood pressure at goal. Cholesterol at goal for given risk level taking atorvastatin 10 mg a day. Low (< 5%) risk of MI in the next 10 years given current risks. December 2024 LDL well controlled at 38.     3. Syncope:  Syncope in August 2024 with normal follow up cardiac testing. Likely was a vasovagal episode. Hydration instructions reviewed today.    Follow up with Dr. Rock in 6 months.           SIGNATURE: Chas Rock MD PATIENT NAME: Lucille Holloway   DATE/TIME: January 16, 2025 8:41 AM MRN: [id number]     "

## 2025-01-16 NOTE — PATIENT INSTRUCTIONS
You were seen in the [facility] for your shortness of breath and recent echocardiogram findings.     Your August 2024 echocardiogram pictures showed that the right side of your heart is now normal sized. Continue to treat your obstructive sleep apnea to protect your heart. Your 2019 sleep study showed that your night time oxygen levels dropped to 82% during apnea events. During apnea, the soft tissues of the neck relax and block your airway. This causes the muscles of your chest wall to place negative pressure on your heart and lungs. You used the CPAP mask to help the right side of your heart to return to normal size in [id number]. Your sleep doctor is monitoring you for new symptoms that would require you to restart use of CPAP mask. doing a great job helping you treat your sleep apnea.      Aerobic exercise 30 minutes 3-5 times a week can help lower blood pressure and protect your heart. Start with walking a few minutes a day and slowly build up your muscle conditioning after you complete the physical therapy program you are doing for after December right knee replacement surgery.     Your blood pressure at goal at our visit today taking amlodipine 2.5 mg a day. Goal range 120-130 mm Hg.      Atorvastatin 10 mg a day will help lower your cholesterol levels and protect your heart arteries. Your 2024 cholesterol lab work is at goal.     Your August 2024 passing out episode likely was due to vasovagal syncope. Vasovagal episodes happen when the vagal tone is high (this is the opposite of the adrenaline system, your vagal  tone relaxes blood vessels). Relaxed blood vessels can cause passing out because of not enough blood being pumped up to the brain.    I recommend the following steps to address vasovagal reactions:  1. Drink at least 8 8 ounce glasses of water a day including a 16 ounce glass at the bedside to drink first thing in the morning.     2. If you feel lightheadedness episodes  during the day, eat or drink 200-300 mg of sodium from an electrolyte solution or eat a salty snack such as pretzels or salted peanuts and drink 12-16 ounces of fluids to help the  symptoms go away by expanding your blood volume. If they get worse, sit down if standing or lay down if sitting and contract your leg muscles to increase blood flow back to the  heart.    3. When you stand up from sitting down for a long period of time.  place for 30-60 seconds and contract your leg muscles to get the blood flowing back to your heart and  head.    4. If the above steps are not enough, we can prescribe you 20-20 mm Hg knee high compression stockings. You can wear compression stockings on days when you will be sitting for long periods of time. These stockings will help prevent blood from pooling in your leg veins and help  keep the blood flowing toward your head when you stand up.     Follow up with Dr. Rock in 6 months.

## 2025-01-17 ENCOUNTER — APPOINTMENT (OUTPATIENT)
Dept: PRIMARY CARE | Facility: CLINIC | Age: 80
End: 2025-01-17
Payer: MEDICARE

## 2025-01-17 VITALS
DIASTOLIC BLOOD PRESSURE: 64 MMHG | BODY MASS INDEX: 36.68 KG/M2 | HEART RATE: 84 BPM | SYSTOLIC BLOOD PRESSURE: 126 MMHG | HEIGHT: 63 IN | OXYGEN SATURATION: 97 % | WEIGHT: 207 LBS

## 2025-01-17 DIAGNOSIS — E78.2 MIXED HYPERLIPIDEMIA: ICD-10-CM

## 2025-01-17 DIAGNOSIS — I27.20 PULMONARY HYPERTENSION (MULTI): ICD-10-CM

## 2025-01-17 DIAGNOSIS — E11.69 TYPE 2 DIABETES MELLITUS WITH OTHER SPECIFIED COMPLICATION, UNSPECIFIED WHETHER LONG TERM INSULIN USE (MULTI): ICD-10-CM

## 2025-01-17 DIAGNOSIS — N18.31 CHRONIC RENAL IMPAIRMENT, STAGE 3A (MULTI): ICD-10-CM

## 2025-01-17 DIAGNOSIS — I10 PRIMARY HYPERTENSION: Primary | ICD-10-CM

## 2025-01-17 DIAGNOSIS — E66.01 OBESITY, MORBID (MULTI): ICD-10-CM

## 2025-01-17 DIAGNOSIS — M06.9 RHEUMATOID ARTHRITIS, INVOLVING UNSPECIFIED SITE, UNSPECIFIED WHETHER RHEUMATOID FACTOR PRESENT (MULTI): ICD-10-CM

## 2025-01-17 PROBLEM — R06.09 DOE (DYSPNEA ON EXERTION): Status: RESOLVED | Noted: 2023-04-10 | Resolved: 2025-01-17

## 2025-01-17 PROCEDURE — 1159F MED LIST DOCD IN RCRD: CPT

## 2025-01-17 PROCEDURE — 1170F FXNL STATUS ASSESSED: CPT

## 2025-01-17 PROCEDURE — 3074F SYST BP LT 130 MM HG: CPT

## 2025-01-17 PROCEDURE — 1160F RVW MEDS BY RX/DR IN RCRD: CPT

## 2025-01-17 PROCEDURE — 1123F ACP DISCUSS/DSCN MKR DOCD: CPT

## 2025-01-17 PROCEDURE — 1036F TOBACCO NON-USER: CPT

## 2025-01-17 PROCEDURE — 3078F DIAST BP <80 MM HG: CPT

## 2025-01-17 PROCEDURE — 99213 OFFICE O/P EST LOW 20 MIN: CPT

## 2025-01-17 ASSESSMENT — ACTIVITIES OF DAILY LIVING (ADL)
DRESSING: INDEPENDENT
DOING_HOUSEWORK: INDEPENDENT
MANAGING_FINANCES: INDEPENDENT
TAKING_MEDICATION: INDEPENDENT
BATHING: INDEPENDENT
GROCERY_SHOPPING: INDEPENDENT

## 2025-01-17 ASSESSMENT — PATIENT HEALTH QUESTIONNAIRE - PHQ9
1. LITTLE INTEREST OR PLEASURE IN DOING THINGS: NOT AT ALL
SUM OF ALL RESPONSES TO PHQ9 QUESTIONS 1 AND 2: 0
2. FEELING DOWN, DEPRESSED OR HOPELESS: NOT AT ALL
1. LITTLE INTEREST OR PLEASURE IN DOING THINGS: NOT AT ALL
2. FEELING DOWN, DEPRESSED OR HOPELESS: NOT AT ALL
SUM OF ALL RESPONSES TO PHQ9 QUESTIONS 1 AND 2: 0

## 2025-01-17 ASSESSMENT — ENCOUNTER SYMPTOMS
NECK PAIN: 1
PND: 0
HEADACHES: 0
SHORTNESS OF BREATH: 1
DEPRESSION: 0
LOSS OF SENSATION IN FEET: 0
PALPITATIONS: 0
OCCASIONAL FEELINGS OF UNSTEADINESS: 0
BLURRED VISION: 0
SWEATS: 0
ORTHOPNEA: 0
HYPERTENSION: 1

## 2025-01-17 NOTE — PROGRESS NOTES
Primary Care Provider: Kina Lynch, APRN-CNP    Subjective   Lucille Holloway is a 79 y.o. female who presents for Follow-up (4 week follow up /MCW due/No concerns per pt).    Hypertension  This is a chronic problem. The current episode started more than 1 year ago. The problem has been waxing and waning since onset. The problem is resistant. Associated symptoms include malaise/fatigue, neck pain, peripheral edema and shortness of breath. Pertinent negatives include no anxiety, blurred vision, chest pain, headaches, orthopnea, palpitations, PND or sweats. Agents associated with hypertension include no associated agents and NSAIDs. There are no associated agents and NSAIDs to hypertension. Risk factors for coronary artery disease include dyslipidemia and obesity. Compliance problems include diet and exercise.         FUV    HTN  On amlodipine 2.5mg daily  Checking BP at home regularly  Home BP around 130's/70-80    Answers submitted by the patient for this visit:  High Blood Pressure Questionnaire (Submitted on 1/13/2025)  Chief Complaint: Hypertension  Chronicity: chronic  Onset: more than 1 year ago  Progression since onset: waxing and waning  Condition status: resistant  anxiety: No  blurred vision: No  chest pain: No  headaches: No  malaise/fatigue: Yes  neck pain: Yes  orthopnea: No  palpitations: No  peripheral edema: Yes  PND: No  shortness of breath: Yes  sweats: No  Agents associated with hypertension: no associated agents, NSAIDs  CAD risks: dyslipidemia, obesity  Compliance problems: diet, exercise    Episode of syncope (likely vasovagal) in August 2024 complicated by traumatic left ankle fracture requiring surgery ; no further syncope. No chest pain, no SOB, no dizziness, no swelling.    Pulm HTN    HLD    CKD 3    RA    Obesity  Exercise: walking daily, biking, treadmill; at least 3 times a week - sometimes more; 30 minutes.    DM II  Lab Results   Component Value Date    HGBA1C 5.2 12/27/2024   Lost  "50 lb taking Ozempic 1.2 mg injections weekly now; on medication since early 2022 through end of 2023. Following with Dr. Adamson.     Review of Systems   Constitutional:  Positive for malaise/fatigue.   Eyes:  Negative for blurred vision.   Respiratory:  Positive for shortness of breath.    Cardiovascular:  Negative for chest pain, palpitations, orthopnea and PND.   Musculoskeletal:  Positive for neck pain.   Neurological:  Negative for headaches.     The remainder of the ROS was negative unless otherwise stated in the HPI.       Objective   /64   Pulse 84   Ht 1.6 m (5' 3\")   Wt 93.9 kg (207 lb)   SpO2 97%   BMI 36.67 kg/m²     Physical Exam  Vitals reviewed.   Constitutional:       General: She is not in acute distress.     Appearance: Normal appearance. She is normal weight. She is not ill-appearing, toxic-appearing or diaphoretic.   HENT:      Head: Normocephalic and atraumatic.      Nose: Nose normal.   Eyes:      Conjunctiva/sclera: Conjunctivae normal.   Cardiovascular:      Rate and Rhythm: Normal rate and regular rhythm.      Pulses: Normal pulses.      Heart sounds: Normal heart sounds. No murmur heard.     No friction rub. No gallop.   Pulmonary:      Effort: Pulmonary effort is normal. No respiratory distress.      Breath sounds: Normal breath sounds.   Abdominal:      General: Abdomen is flat. Bowel sounds are normal.      Palpations: Abdomen is soft.   Musculoskeletal:         General: Normal range of motion.      Cervical back: Normal range of motion and neck supple.   Skin:     General: Skin is warm and dry.      Capillary Refill: Capillary refill takes less than 2 seconds.   Neurological:      General: No focal deficit present.      Mental Status: She is alert and oriented to person, place, and time. Mental status is at baseline.   Psychiatric:         Mood and Affect: Mood normal.         Behavior: Behavior normal.         Thought Content: Thought content normal.         Judgment: " "Judgment normal.         Assessment/Plan   Problem List Items Addressed This Visit    FUV         ICD-10-CM    Rheumatoid arthritis, involving unspecified site, unspecified whether rheumatoid factor   present (Multi)  Stable  Some knee pain ; hx of TKR M06.9    Chronic renal insufficiency  Stable  C/w current treatments  N18.9    Hypertension - Primary  Stable  C/w amlodipine 2.5mg daily I10    Obesity, morbid (Multi)  Stable  Continue working on healthier eating and regular physical activity  Encourage her to get back on wearing CPAP nightly E66.01    Pulmonary hypertension (Multi)  Stable  Continue with current treatments  Encourage her to get back on wearing CPAP nightly I27.20    Hyperlipidemia  Stable  C/w atorvastatin 10mg nightly  Lab Results   Component Value Date    CHOL 123 12/27/2024    CHOL 132 01/04/2024    CHOL 113 06/09/2023     Lab Results   Component Value Date    HDL 74.0 12/27/2024    HDL 74.2 01/04/2024    HDL 70.7 06/09/2023     Lab Results   Component Value Date    LDLCALC 28 12/27/2024    LDLCALC 39 01/04/2024     Lab Results   Component Value Date    TRIG 107 12/27/2024    TRIG 93 01/04/2024    TRIG 85 06/09/2023     No components found for: \"CHOLHDL\"   E78.5    Type 2 diabetes mellitus with other specified complication  Stable  Continue semaglutide  Continue following with Dr. Adamson  Lab Results   Component Value Date    HGBA1C 5.2 12/27/2024   Continue with healthier eating and regular physical activity E11.69     Continue with home balance exercises      Follow up in 3-4 months or sooner if needed  "

## 2025-01-28 ENCOUNTER — APPOINTMENT (OUTPATIENT)
Dept: ENDOCRINOLOGY | Facility: CLINIC | Age: 80
End: 2025-01-28
Payer: MEDICARE

## 2025-01-28 VITALS — WEIGHT: 210 LBS | HEIGHT: 63 IN | BODY MASS INDEX: 37.21 KG/M2

## 2025-01-28 DIAGNOSIS — Z71.3 DIETARY COUNSELING: Primary | ICD-10-CM

## 2025-01-28 PROCEDURE — 97803 MED NUTRITION INDIV SUBSEQ: CPT | Performed by: DIETITIAN, REGISTERED

## 2025-01-28 NOTE — PROGRESS NOTES
Follow-up Nutrition Assessment    Interval History: Patient presents for follow-up nutrition appointment for weight management/desire to lose weight. Also considered with nutrition education provided is history of CKD. Since last nutrition visit on 7/29/24, pt with a 16 lbs weight gain. Pt feeling discouraged with this weight gain. Since last visit, medication Contrave was discontinued and medication Semaglutide was started just recently. Has some appetite suppression by report. States that since the holidays have been over, trying to now concentrate more on incorporating healthier eating as she admits eating was not very healthy during the holiday season with increased sweets consumed. With regards to activity, reports a terrible ankle break in August which required surgery. This further caused some weight gain with inability to be active. States she was recently given medical clearance to be active again so going to the gym and walking on the treadmill and riding the exercise bike as tolerated. See all interventions/recommendations below as discussed during visit this day.     Nutrition Interventions/Recommendations from last visit scheduled on 7/29/24:  - Continue to follow the Mediterranean Meal Plan guidelines for healthy eating..  - Use the Healthy Plate style of eating for incorporating healthy balanced meals in appropriate portions.   - Continue to aim for a good source of fiber and protein at meal times to increase satiety at meals.  - Aim to pre-plan healthy meals for the week.   - Practice good sleep hygiene as reviewed during visit.  - Aim for 64 ounces of water daily.  - Aim for weekly physical activity either at the gym or with walking exercise videos at home as discussed.  - Follow-up as scheduled for the group classes with Dr. Dada Brooks.  - Follow-up with nutrition in one month.      Adherence to recommendations and patient stated goals: Partially met goals    Anthropometrics:  Height:   Ht  "Readings from Last 1 Encounters:   01/17/25 1.6 m (5' 3\")      Weight:   Wt Readings from Last 10 Encounters:   01/17/25 93.9 kg (207 lb)   01/16/25 94.1 kg (207 lb 8 oz)   01/07/25 95.3 kg (210 lb)   12/27/24 96.2 kg (212 lb)   12/02/24 94.8 kg (209 lb)   11/19/24 94.7 kg (208 lb 11.2 oz)   10/08/24 87 kg (191 lb 14.4 oz)   09/24/24 91.2 kg (201 lb)   08/30/24 88 kg (194 lb)   08/30/24 91.2 kg (201 lb)      Current BMI:   BMI Readings from Last 1 Encounters:   01/17/25 36.67 kg/m²        Malnutrition Screening:  Significant Unintentional weight loss: No  Eating less than 75% of usual intake for more than 2 weeks: No  Potential Signs of Inflammation: No    Recommended Malnutrition Diagnosis: No malnutrition identified    Diet Recall-  Breakfast- oatmeal with a small amount of nuts and dried fruit and an egg on the side   Lunch- none if out and busy OR has salad with a small amount of cheese sprinkled on top with also chicken on top with olive oil and balsamic vinegar.   Dinner- various proteins (mostly turkey, chicken and fish), starches and vegetables   Snacks- SF jello, cottage cheese and fruit, popcorn (Skinny pop)   Beverages- coffee in am, water throughout the day with SF flavoring or plain  Physical Activity- treadmill and exercise bike several times per week at the gym    Other pertinent patient reported Information:  - Feeling discouraged with the 16 lbs weight gain experienced since the last visit with this writer.   - Medication Contrave was discontinued and medication Semaglutide was started just recently. Has some appetite suppression by report.   - Since the holidays have been over, trying to now concentrate more on incorporating healthier eating.  - Admits eating was not very healthy during the holiday season with increased sweets consumed.  - Also reports a terrible ankle break in August which required surgery. This further caused some weight gain with inability to be active.   - Was recently given " medical clearance to be active again so going to the gym and walking on the treadmill and riding the exercise bike as tolerated.     Nutrition Diagnosis: Food and nutrition-related knowledge deficit related to lack of recent exposure to information as evidenced by verbalizes inaccurate or incomplete information.     Readiness to Learn:  Cognitive ability: Alert and oriented  Motivation to learn: Interested  Family Support: Unable to assess- family not present  Instruction provided to: Patient  Patient learns best by: Multiple methods  Factors affecting learning: None   Physical limitations affecting learning: None    Education Materials Provided:   None this visit    Nutrition Interventions/Recommendations for 1/28/2025:  - Continue to follow the Mediterranean Meal Plan guidelines for healthy eating.  - Use the Healthy Plate style of eating for incorporating healthy balanced meals in appropriate portions.   - Continue to aim for a good source of fiber and protein at meal times to increase satiety at meals.  - Aim to pre-plan healthy meals for the week.   - Please use Fairlife skim milk in oatmeal in replace of the hard boiled egg and please eliminate the dried fruit in the oatmeal.  - Please aim to not skip meals as this can lead to increased hunger as discussed.   - Aim for 64 ounces of water daily.  - Aim for weekly physical activity either at the gym.  - Follow-up as scheduled for the group classes with Dr. Dada Brooks.  - Follow-up with nutrition in March.      Nutrition Monitoring & Evaluation: adherence to recommendations and patient stated goals    Need for follow-up: As scheduled for Dr. Samuels's Shared Medical Appointment (SMA) and nutrition in March    Referred by: Dr. Dada Brooks    MNT Billing Type: Medical Nutrition Re-Assessment, each 15 min increment, for 3 increments.    SIGNATURE:   Hafsa Lambert RD, CSOWM, LD, CDCES                                                           DATE:   1/28/2025

## 2025-01-28 NOTE — PATIENT INSTRUCTIONS
- Continue to follow the Mediterranean Meal Plan guidelines for healthy eating.  - Use the Healthy Plate style of eating for incorporating healthy balanced meals in appropriate portions.   - Continue to aim for a good source of fiber and protein at meal times to increase satiety at meals.  - Aim to pre-plan healthy meals for the week.   - Please use Fairlife skim milk in oatmeal in replace of the hard boiled egg and please eliminate the dried fruit in the oatmeal.  - Please aim to not skip meals as this can lead to increased hunger as discussed.   - Aim for 64 ounces of water daily.  - Aim for weekly physical activity either at the gym.  - Follow-up as scheduled for the group classes with Dr. Dada Brooks.  - Follow-up with nutrition in March.

## 2025-02-03 DIAGNOSIS — E03.9 HYPOTHYROIDISM, UNSPECIFIED TYPE: ICD-10-CM

## 2025-02-03 RX ORDER — LEVOTHYROXINE SODIUM 75 UG/1
75 TABLET ORAL DAILY
Qty: 90 TABLET | Refills: 1 | Status: SHIPPED | OUTPATIENT
Start: 2025-02-03

## 2025-03-04 ENCOUNTER — APPOINTMENT (OUTPATIENT)
Dept: ENDOCRINOLOGY | Facility: CLINIC | Age: 80
End: 2025-03-04
Payer: MEDICARE

## 2025-03-04 NOTE — PROGRESS NOTES
Subjective  Lucille Holloway is a 79 y.o. female with a hx of CKD, IGT, HTN, hypothyroidism, goiter, KELLY, obesity, hip replacement, arthritis - osteo and rheumatoid, cataract surgery, who presents for weight management and obesity.  No h/o kidney stones or glaucoma.    Current Plan  1. Nutrition: Turkey sausage, english muffin, egg and coffee in the morning. Having difficulty finding lunch when she is on the go- MD gave suggestions.      2. Sleep: Stable      3. Stress: Stable     4. Exercise: Incorporating consistently- Has been to water aerobics 3 times since last meeting.        5. Appetite control:  increased   Obesity medication:  Semaglutide 1.2mg weekly      6. Prior Goals: Met     New Goals: increase water intake and find healthy snacks for when she is out running errands    Weight trend:    Wt Readings from Last 10 Encounters:   01/28/25 95.3 kg (210 lb)   01/17/25 93.9 kg (207 lb)   01/16/25 94.1 kg (207 lb 8 oz)   01/07/25 95.3 kg (210 lb)   12/27/24 96.2 kg (212 lb)   12/02/24 94.8 kg (209 lb)   11/19/24 94.7 kg (208 lb 11.2 oz)   10/08/24 87 kg (191 lb 14.4 oz)   09/24/24 91.2 kg (201 lb)   08/30/24 88 kg (194 lb)         Current Outpatient Medications:     acetaminophen 500 mg capsule, Take 2 capsules (1,000 mg) by mouth 3 times a day as needed., Disp: , Rfl:     albuterol 90 mcg/actuation inhaler, USE 2 INHALATIONS BY MOUTH EVERY 4 HOURS IF NEEDED FOR WHEEZING  OR SHORTNESS OF BREATH, Disp: 51 g, Rfl: 2    amLODIPine (Norvasc) 2.5 mg tablet, Take 1 tablet (2.5 mg) by mouth once daily., Disp: 90 tablet, Rfl: 1    aspirin 81 mg EC tablet, Take 1 tablet (81 mg) by mouth once daily., Disp: , Rfl:     atorvastatin (Lipitor) 10 mg tablet, TAKE 1 TABLET BY MOUTH ONCE  DAILY AT BEDTIME, Disp: 100 tablet, Rfl: 2    docusate sodium (Colace) 100 mg capsule, Take 3 capsules (300 mg) by mouth once daily., Disp: , Rfl:     DULoxetine (Cymbalta) 30 mg DR capsule, Take 1 capsule (30 mg) by mouth once daily. AS  DIRECTED, Disp: 90 capsule, Rfl: 2    gabapentin (Neurontin) 300 mg capsule, Take 1 capsule (300 mg) by mouth 3 times a day., Disp: 270 capsule, Rfl: 3    levothyroxine (Synthroid, Levoxyl) 75 mcg tablet, Take 1 tablet (75 mcg) by mouth once daily., Disp: 90 tablet, Rfl: 1    multivitamin with minerals iron-free (Centrum Silver), Take 1 tablet by mouth once daily., Disp: , Rfl:     semaglutide, Inject 1.2mg under the skin once a week, Disp: 2 mL, Rfl: 3    ROS:  System: normal  Eyes : no visual changes  Neck : no tenderness, no new lumps/bumps  Respiratory : no SOB  Cardiovascular : no chest pain, no palpitations  Gastro-Intestinal : no abdominal concerns  Neurological : no numbness or tingling in the extremities  Musculoskeletal : no joint paint, no muscle pain  Skin : no unusual rashes  Psychiatric : no depression, no anxiety  See HPI for Endocrine ROS    Past Medical History:   Diagnosis Date    Acute upper respiratory infection, unspecified 03/05/2019    Acute URI    Ankle dislocation     Arthritis     Bariatric surgery status 11/20/2019    Bariatric surgery status    Cervical disc disorder     CTS (carpal tunnel syndrome)     Encounter for general adult medical examination without abnormal findings 08/05/2021    Encounter for preventive health examination    Hypertension     Lumbosacral disc disease     Other general symptoms and signs 03/05/2019    Flu-like symptoms    Other headache syndrome 06/25/2024    Comment on above: HAD A FALL SATURDAY NIGHT / BAD HEADACHE, BUMP HEAD    Other neuromuscular dysfunction of bladder     Hyperactivity of bladder    Pain in right foot 09/17/2015    Foot pain, right    Personal history of other diseases of the circulatory system     History of hypertension    Personal history of other diseases of the respiratory system 03/05/2019    History of acute sinusitis    Personal history of other endocrine, nutritional and metabolic disease     History of thyroid disease    Personal  history of other infectious and parasitic diseases 08/28/2019    History of hepatitis    Plantar fascial fibromatosis 09/21/2015    Plantar fasciitis, right    Syncope and collapse 10/16/2015    Vasovagal syncope    Vitamin D deficiency, unspecified 08/28/2019    Mild vitamin D deficiency       Past Surgical History:   Procedure Laterality Date    ANKLE FRACTURE SURGERY      BLADDER SURGERY  08/28/2019    Bladder Surgery    BREAST RECONSTRUCTION  01/01/2019    bilateral breast reduction    CARPAL TUNNEL RELEASE  08/28/2019    Neuroplasty Decompression Median Nerve At Carpal Tunnel    HYSTERECTOMY  08/28/2019    Hysterectomy    OTHER SURGICAL HISTORY  08/28/2019    Carpal tunnel surgery    REDUCTION MAMMAPLASTY      TOTAL HIP ARTHROPLASTY  07/16/2019    Total Hip Replacement    TOTAL KNEE ARTHROPLASTY Right 12/30/2022       Social History     Socioeconomic History    Marital status:      Spouse name: Not on file    Number of children: Not on file    Years of education: Not on file    Highest education level: Not on file   Occupational History    Not on file   Tobacco Use    Smoking status: Former     Current packs/day: 0.25     Average packs/day: 0.3 packs/day for 15.0 years (3.8 ttl pk-yrs)     Types: Cigarettes     Passive exposure: Never    Smokeless tobacco: Never    Tobacco comments:     none   Vaping Use    Vaping status: Never Used   Substance and Sexual Activity    Alcohol use: Not Currently    Drug use: Never    Sexual activity: Not Currently     Birth control/protection: Abstinence   Other Topics Concern    Not on file   Social History Narrative    Not on file     Social Drivers of Health     Financial Resource Strain: Low Risk  (8/16/2024)    Overall Financial Resource Strain (CARDIA)     Difficulty of Paying Living Expenses: Not very hard   Food Insecurity: No Food Insecurity (8/15/2024)    Hunger Vital Sign     Worried About Running Out of Food in the Last Year: Never true     Ran Out of Food in  the Last Year: Never true   Transportation Needs: No Transportation Needs (8/16/2024)    PRAPARE - Transportation     Lack of Transportation (Medical): No     Lack of Transportation (Non-Medical): No   Physical Activity: Not on file   Stress: Not on file   Social Connections: Not on file   Intimate Partner Violence: Not on file   Housing Stability: Low Risk  (8/16/2024)    Housing Stability Vital Sign     Unable to Pay for Housing in the Last Year: No     Number of Times Moved in the Last Year: 0     Homeless in the Last Year: No       Objective      Physical Exam:  There were no vitals taken for this visit.  General : alert and oriented X3, no acute distress  Eyes : EOMI   BMI: 36.28kg/m2    Assessment/Plan  Lucille Holloway is a 79 y.o. female with a hx of CKD, IGT, HTN, hypothyroidism, goiter, KELLY, obesity, hip replacement, arthritis - osteo and rheumatoid, cataract surgery, who presents for weight management and obesity.  No h/o kidney stones or glaucoma.     1. Weight Management : Reviewed the principles of energy metabolism, caloric intake and expenditure, and rationale for a treatment program.  Also reinforced need for reduced calorie, low fat diet and increased physical activity.     We reviewed the possibility of starting an interdisciplinary lifestyle intervention program involving improvement of the diet, a personalized exercise program, efforts to reduce the stress and the possibility of using appetite suppressant medications in an effort to help with the weight loss process.  The patient expressed interest in the plan.     2. Nutrition : I discussed trying one of the diet approaches we have here in the program : Mediterranean lifestyle, ketosis diet.  Has met Hafsa Lambert RD.     5/13/24: 198lb, 35.23kg/m2  10/08/24: 191.9lb, 33.99kg/m2  11/19/24: 208.7lb, 36.97kg/m2  1/7/25: 210lb, 37.20kg/m2  3/5/25: 204.8lb, 36.28kg/m2     3. Sleep : mild KELLY     4. Stress : 7-8/10, worried about weight gain,  , 5 kids, 10 grandchildren     5. Exercise : had ankle fracture and surgery, now boot is off and she is doing PT.     6. Appetite : high  Was on ozempic for 6 months, loved it, but lost coverage  Discussed other AOMs.  --on contrave - doing ok with this  --on Semaglutide via [facility] -- up to 1.2mg/wk --> will up to 1.8mg/wk     7. Goal: to up her water intake.     Follow up in a group visit.  Glenn Brooks MD

## 2025-03-05 ENCOUNTER — OFFICE VISIT (OUTPATIENT)
Dept: ENDOCRINOLOGY | Facility: CLINIC | Age: 80
End: 2025-03-05
Payer: MEDICARE

## 2025-03-05 VITALS
SYSTOLIC BLOOD PRESSURE: 136 MMHG | WEIGHT: 204.8 LBS | HEART RATE: 77 BPM | HEIGHT: 63 IN | TEMPERATURE: 96.6 F | BODY MASS INDEX: 36.29 KG/M2 | DIASTOLIC BLOOD PRESSURE: 83 MMHG

## 2025-03-05 DIAGNOSIS — E66.812 CLASS 2 OBESITY: Primary | ICD-10-CM

## 2025-03-05 DIAGNOSIS — Z76.89 ENCOUNTER FOR WEIGHT MANAGEMENT: ICD-10-CM

## 2025-03-05 PROCEDURE — 99215 OFFICE O/P EST HI 40 MIN: CPT | Performed by: INTERNAL MEDICINE

## 2025-03-05 PROCEDURE — 1123F ACP DISCUSS/DSCN MKR DOCD: CPT | Performed by: INTERNAL MEDICINE

## 2025-03-05 PROCEDURE — 3079F DIAST BP 80-89 MM HG: CPT | Performed by: INTERNAL MEDICINE

## 2025-03-05 PROCEDURE — 1159F MED LIST DOCD IN RCRD: CPT | Performed by: INTERNAL MEDICINE

## 2025-03-05 PROCEDURE — 3075F SYST BP GE 130 - 139MM HG: CPT | Performed by: INTERNAL MEDICINE

## 2025-03-11 ENCOUNTER — OFFICE VISIT (OUTPATIENT)
Dept: PRIMARY CARE | Facility: CLINIC | Age: 80
End: 2025-03-11
Payer: MEDICARE

## 2025-03-11 ENCOUNTER — TELEPHONE (OUTPATIENT)
Dept: PRIMARY CARE | Facility: CLINIC | Age: 80
End: 2025-03-11

## 2025-03-11 VITALS
SYSTOLIC BLOOD PRESSURE: 123 MMHG | HEART RATE: 93 BPM | WEIGHT: 204.8 LBS | BODY MASS INDEX: 36.28 KG/M2 | TEMPERATURE: 97.1 F | DIASTOLIC BLOOD PRESSURE: 76 MMHG | OXYGEN SATURATION: 97 %

## 2025-03-11 DIAGNOSIS — R79.9 ABNORMAL FINDING OF BLOOD CHEMISTRY, UNSPECIFIED: ICD-10-CM

## 2025-03-11 DIAGNOSIS — H65.01 NON-RECURRENT ACUTE SEROUS OTITIS MEDIA OF RIGHT EAR: ICD-10-CM

## 2025-03-11 DIAGNOSIS — I10 PRIMARY HYPERTENSION: ICD-10-CM

## 2025-03-11 DIAGNOSIS — N18.31 CHRONIC RENAL IMPAIRMENT, STAGE 3A (MULTI): ICD-10-CM

## 2025-03-11 DIAGNOSIS — M06.9 RHEUMATOID ARTHRITIS, INVOLVING UNSPECIFIED SITE, UNSPECIFIED WHETHER RHEUMATOID FACTOR PRESENT (MULTI): ICD-10-CM

## 2025-03-11 DIAGNOSIS — E78.2 MIXED HYPERLIPIDEMIA: ICD-10-CM

## 2025-03-11 DIAGNOSIS — Z00.00 MEDICARE ANNUAL WELLNESS VISIT, SUBSEQUENT: Primary | ICD-10-CM

## 2025-03-11 DIAGNOSIS — E03.9 HYPOTHYROIDISM, UNSPECIFIED TYPE: ICD-10-CM

## 2025-03-11 PROBLEM — E11.69 TYPE 2 DIABETES MELLITUS WITH OTHER SPECIFIED COMPLICATION: Status: RESOLVED | Noted: 2025-01-15 | Resolved: 2025-03-11

## 2025-03-11 PROCEDURE — 1123F ACP DISCUSS/DSCN MKR DOCD: CPT

## 2025-03-11 PROCEDURE — 1036F TOBACCO NON-USER: CPT

## 2025-03-11 PROCEDURE — 99213 OFFICE O/P EST LOW 20 MIN: CPT

## 2025-03-11 PROCEDURE — 1158F ADVNC CARE PLAN TLK DOCD: CPT

## 2025-03-11 PROCEDURE — 3074F SYST BP LT 130 MM HG: CPT

## 2025-03-11 PROCEDURE — 3078F DIAST BP <80 MM HG: CPT

## 2025-03-11 RX ORDER — AMOXICILLIN AND CLAVULANATE POTASSIUM 875; 125 MG/1; MG/1
875 TABLET, FILM COATED ORAL 2 TIMES DAILY
Qty: 20 TABLET | Refills: 0 | Status: SHIPPED | OUTPATIENT
Start: 2025-03-11 | End: 2025-03-21

## 2025-03-11 NOTE — TELEPHONE ENCOUNTER
PATIENT ASK FOR A CALL BACK FROM [facility] STATED SHE DOESN'T FEEL GOOD BAD COUGH EAR ACHE CHILLS BUT WILL TAKE A COVID TEST AND GIVE A CALL BACK   
Statement Selected

## 2025-03-11 NOTE — PROGRESS NOTES
Primary Care Provider: Kina Lynch, GETACHEW-CNP    Subjective   Lucille Holloway is a 79 y.o. female who presents for Earache ( Home Covid test negative/cough).    Earache          PMH of: HTN, HLD, hypothyroidism, depression, CKD 3, chronic back pain    SICK VISIT    Symptoms started Sunday  Symptoms are: hoarseness, headache, feeling tired/ no energy, having right ear pain and ear popping, non-productive cough, some frontal sinus pressure, had chills on Sunday & Monday   No fevers, no chest pain, no N/V/D, no shortness of breath  Taking over the counter Mucinex  Drinking good fluids  Home COVID tested today and was negative  Has not needed inhaler      Healthcare POA- Kristin Poa, daughter    Review of Systems   HENT:  Positive for ear pain.      The remainder of the ROS was negative unless otherwise stated in the HPI.       Objective   /76 (BP Location: Right arm, Patient Position: Sitting, BP Cuff Size: Large adult)   Pulse 93   Temp 36.2 °C (97.1 °F) (Temporal)   Wt 92.9 kg (204 lb 12.8 oz)   SpO2 97%   BMI 36.28 kg/m²     Physical Exam  Vitals reviewed.   Constitutional:       General: She is not in acute distress.     Appearance: Normal appearance. She is normal weight. She is not ill-appearing, toxic-appearing or diaphoretic.   HENT:      Head: Normocephalic and atraumatic.      Right Ear: Tympanic membrane is erythematous and bulging.      Left Ear: Tympanic membrane, ear canal and external ear normal. There is no impacted cerumen.      Nose: Nose normal.   Eyes:      Conjunctiva/sclera: Conjunctivae normal.   Cardiovascular:      Rate and Rhythm: Normal rate and regular rhythm.      Pulses: Normal pulses.      Heart sounds: Normal heart sounds. No murmur heard.     No friction rub. No gallop.   Pulmonary:      Effort: Pulmonary effort is normal. No respiratory distress.      Breath sounds: Normal breath sounds.   Abdominal:      General: Abdomen is flat. Bowel sounds are normal.       Palpations: Abdomen is soft.   Musculoskeletal:         General: Normal range of motion.      Cervical back: Normal range of motion and neck supple.   Lymphadenopathy:      Cervical: No cervical adenopathy.   Skin:     General: Skin is warm and dry.      Capillary Refill: Capillary refill takes less than 2 seconds.   Neurological:      General: No focal deficit present.      Mental Status: She is alert and oriented to person, place, and time. Mental status is at baseline.   Psychiatric:         Mood and Affect: Mood normal.         Behavior: Behavior normal.         Thought Content: Thought content normal.         Judgment: Judgment normal.         Assessment/Plan   Problem List Items Addressed This Visit             ICD-10-CM    Rheumatoid arthritis, involving unspecified site, unspecified whether rheumatoid factor present (Multi) M06.9    Relevant Orders    Albumin-Creatinine Ratio, Urine Random    Hemoglobin A1c    Lipid Panel    CBC and Auto Differential    Comprehensive metabolic panel    TSH with reflex to Free T4 if abnormal    Chronic renal insufficiency N18.9    Relevant Orders    Albumin-Creatinine Ratio, Urine Random    Hemoglobin A1c    Lipid Panel    CBC and Auto Differential    Comprehensive metabolic panel    TSH with reflex to Free T4 if abnormal    Hypertension I10    Relevant Orders    Albumin-Creatinine Ratio, Urine Random    Hemoglobin A1c    Lipid Panel    CBC and Auto Differential    Comprehensive metabolic panel    TSH with reflex to Free T4 if abnormal    Hypothyroidism E03.9    Relevant Orders    TSH with reflex to Free T4 if abnormal    Hyperlipidemia E78.5    Relevant Orders    Albumin-Creatinine Ratio, Urine Random    Hemoglobin A1c    Lipid Panel    CBC and Auto Differential    Comprehensive metabolic panel    TSH with reflex to Free T4 if abnormal     Other Visit Diagnoses         Codes    Medicare annual wellness visit, subsequent    -  Primary Z00.00    Relevant Orders     Albumin-Creatinine Ratio, Urine Random    Hemoglobin A1c    Lipid Panel    CBC and Auto Differential    Comprehensive metabolic panel    TSH with reflex to Free T4 if abnormal    Abnormal finding of blood chemistry, unspecified     R79.9    Relevant Orders    Hemoglobin A1c    Non-recurrent acute serous otitis media of right ear     H65.01    Relevant Medications    amoxicillin-pot clavulanate (Augmentin) 875-125 mg tablet            Sick visit today; placed labs orders for MWE next month

## 2025-03-17 ENCOUNTER — APPOINTMENT (OUTPATIENT)
Dept: PRIMARY CARE | Facility: CLINIC | Age: 80
End: 2025-03-17
Payer: MEDICARE

## 2025-04-11 ENCOUNTER — APPOINTMENT (OUTPATIENT)
Dept: SLEEP MEDICINE | Facility: CLINIC | Age: 80
End: 2025-04-11
Payer: MEDICARE

## 2025-04-11 VITALS
DIASTOLIC BLOOD PRESSURE: 87 MMHG | BODY MASS INDEX: 35.86 KG/M2 | WEIGHT: 202.4 LBS | HEIGHT: 63 IN | HEART RATE: 74 BPM | SYSTOLIC BLOOD PRESSURE: 127 MMHG | TEMPERATURE: 98.4 F

## 2025-04-11 DIAGNOSIS — E66.01 OBESITY, MORBID (MULTI): ICD-10-CM

## 2025-04-11 DIAGNOSIS — I10 PRIMARY HYPERTENSION: ICD-10-CM

## 2025-04-11 DIAGNOSIS — G47.33 OBSTRUCTIVE SLEEP APNEA OF ADULT: Primary | ICD-10-CM

## 2025-04-11 DIAGNOSIS — G47.30 SLEEP APNEA, UNSPECIFIED TYPE: ICD-10-CM

## 2025-04-11 PROCEDURE — 1159F MED LIST DOCD IN RCRD: CPT | Performed by: STUDENT IN AN ORGANIZED HEALTH CARE EDUCATION/TRAINING PROGRAM

## 2025-04-11 PROCEDURE — 1123F ACP DISCUSS/DSCN MKR DOCD: CPT | Performed by: STUDENT IN AN ORGANIZED HEALTH CARE EDUCATION/TRAINING PROGRAM

## 2025-04-11 PROCEDURE — 1160F RVW MEDS BY RX/DR IN RCRD: CPT | Performed by: STUDENT IN AN ORGANIZED HEALTH CARE EDUCATION/TRAINING PROGRAM

## 2025-04-11 PROCEDURE — 99214 OFFICE O/P EST MOD 30 MIN: CPT | Performed by: STUDENT IN AN ORGANIZED HEALTH CARE EDUCATION/TRAINING PROGRAM

## 2025-04-11 PROCEDURE — 3079F DIAST BP 80-89 MM HG: CPT | Performed by: STUDENT IN AN ORGANIZED HEALTH CARE EDUCATION/TRAINING PROGRAM

## 2025-04-11 PROCEDURE — 3074F SYST BP LT 130 MM HG: CPT | Performed by: STUDENT IN AN ORGANIZED HEALTH CARE EDUCATION/TRAINING PROGRAM

## 2025-04-11 PROCEDURE — G2211 COMPLEX E/M VISIT ADD ON: HCPCS | Performed by: STUDENT IN AN ORGANIZED HEALTH CARE EDUCATION/TRAINING PROGRAM

## 2025-04-11 PROCEDURE — 1036F TOBACCO NON-USER: CPT | Performed by: STUDENT IN AN ORGANIZED HEALTH CARE EDUCATION/TRAINING PROGRAM

## 2025-04-11 ASSESSMENT — SLEEP AND FATIGUE QUESTIONNAIRES
HOW LIKELY ARE YOU TO NOD OFF OR FALL ASLEEP WHILE SITTING AND READING: SLIGHT CHANCE OF DOZING
SITING INACTIVE IN A PUBLIC PLACE LIKE A CLASS ROOM OR A MOVIE THEATER: WOULD NEVER DOZE
SLEEP_PROBLEM_INTERFERES_DAILY_ACTIVITIES: NOT AT ALL NOTICEABLE
HOW LIKELY ARE YOU TO NOD OFF OR FALL ASLEEP WHEN YOU ARE A PASSENGER IN A CAR FOR AN HOUR WITHOUT A BREAK: WOULD NEVER DOZE
DIFFICULTY_STAYING_ASLEEP: MILD
SLEEP_PROBLEM_NOTICEABLE_TO_OTHERS: A LITTLE
SATISFACTION_WITH_CURRENT_SLEEP_PATTERN: VERY SATISFIED
HOW LIKELY ARE YOU TO NOD OFF OR FALL ASLEEP WHILE WATCHING TV: WOULD NEVER DOZE
HOW LIKELY ARE YOU TO NOD OFF OR FALL ASLEEP WHILE SITTING AND TALKING TO SOMEONE: WOULD NEVER DOZE
HOW LIKELY ARE YOU TO NOD OFF OR FALL ASLEEP WHILE SITTING QUIETLY AFTER LUNCH WITHOUT ALCOHOL: WOULD NEVER DOZE
ESS-CHAD TOTAL SCORE: 3
HOW LIKELY ARE YOU TO NOD OFF OR FALL ASLEEP WHILE LYING DOWN TO REST IN THE AFTERNOON WHEN CIRCUMSTANCES PERMIT: MODERATE CHANCE OF DOZING
HOW LIKELY ARE YOU TO NOD OFF OR FALL ASLEEP IN A CAR, WHILE STOPPED FOR A FEW MINUTES IN TRAFFIC: WOULD NEVER DOZE
WORRIED_DISTRESSED_DUE_TO_SLEEP: NOT AT ALL NOTICEABLE

## 2025-04-11 NOTE — PROGRESS NOTES
Patient: Lucille Holloway    [id number]  : 1945 -- AGE 79 y.o.    Provider: Cornelio Jurado MD     Location [facility]   Service Date: 2025              [facility]  Followup Visit Note     ASSESSMENT AND PLAN   Ms. Holloway is a 79 y.o. female and she returns in followup to the [facility] for the problems listed below on 25     Problem List, Orders, Assessment, Recommendations:  Assessment & Plan    Obstructive Sleep Apnea  Mild obstructive sleep apnea diagnosed in 2019. Potential severity changes due to weight gain. Current symptoms stable but re-evaluation needed.  - Order home sleep apnea test to assess current severity.  - Interpret results to determine need for follow-up.    Obesity  Obesity with weight regain after discontinuing Ozempic. Currently on less effective injectable medication and working with a nutritionist.  - Continue working with a nutritionist for weight management.  - Continue current injectable medication for weight loss.  BMI Readings from Last 1 Encounters:   25 35.85 kg/m²   - encouraged healthy weight loss via diet and exercise    Hypertension  Hypertension with recent dizziness and blood pressure fluctuations. Currently well-managed with medication adjustments.  - Continue current antihypertensive regimen.  - Monitor blood pressure regularly at home.    Recording duration: 12 minutes    Disposition  Will call with sleep study results and determine F/U plan        HISTORY OF PRESENT ILLNESS     HISTORY OF PRESENT ILLNESS   Lucille Holloway is a 79 y.o. female with history of Hypertension and Obesity presents to [facility] for followup.     Assessment and plan from last visit: 2023  78 year old female PMHx HTN, hypothyroidism, KELLY presenting for follow up.      # KELLY  - we discussed that if weight loss can continue to progress, we can consider PAP holiday vs repeat  testing to decide if we were to continue PAP therapy  - she has not been using her PAP therapy for a while now but doesn't find her sleep any worse than before.  - since she has been doing well with her sleep without PAP, BP is under good control, we will discontinue PAP therapy.     #Obesity   - current weight at 181#, from 220#  - BMI 32, working on weight loss, however, it seems like her weight loss s/p surgery has plateaued at this time.  - Encouraged continuing healthy weight loss via diet and exercise      # HTN  - /76 today  - no headache, blurry vision, chest pain, palpitation, dizziness, syncopal episodes   - continue to f/u with PCP      RTC PRN     Current History    History of Present Illness  Lucille Holloway is a 79 year old female with KELLY who presents for follow-up visit. She was referred by Dr. Rock for evaluation of her sleep apnea.    She has a history of sleep apnea, initially diagnosed as mild in 2019, for which she used CPAP therapy. She discontinued CPAP after a mutual decision with me. Despite no significant changes in her sleep patterns, there is concern about the potential impact of her weight changes on her obstructive sleep apnea and potentially use of PAP therapy.    She experienced weight gain following a successful weight loss period. Initially, she lost about 41 pounds with the help of Ozempic, which she started in 2022. However, after discontinuation due to insurance issues, she experienced weight regain despite regular exercise and dietary efforts. She is currently on a medication similar to Ozempic, but notes it is less effective.    She has a history of knee replacement surgery, which was preceded by a weight loss requirement. Post-surgery, she experienced a fall resulting in a broken ankle, which required surgical intervention with plates and pins. This injury significantly impacted her mobility and contributed to her weight gain as she was unable to maintain her usual  "physical activity.    She also reports episodes of dizziness and fluctuating blood pressure, which have been managed with medication adjustments. She monitors her blood pressure regularly and notes improvement with a reduced medication dose. No changes in sleep patterns, headaches, or blurred vision.    Daytime Symptoms  Patient reports: no daytime symptoms  Patient denies: excessive daytime sleepiness  Napping habit: Patient denies taking naps.  Fatigue concerns: Patient denies feeling fatigue    ESS: 3  CARLOS: 2  FOSQ: 40    PHYSICAL EXAM     VITAL SIGNS: /87 (BP Location: Right arm, Patient Position: Sitting, BP Cuff Size: Adult)   Pulse 74   Temp 36.9 °C (98.4 °F) (Oral)   Ht 1.6 m (5' 3\")   Wt 91.8 kg (202 lb 6.4 oz)   BMI 35.85 kg/m²      PREVIOUS WEIGHTS:  Wt Readings from Last 3 Encounters:   04/11/25 91.8 kg (202 lb 6.4 oz)   03/11/25 92.9 kg (204 lb 12.8 oz)   03/05/25 92.9 kg (204 lb 12.8 oz)       This medical note was created with the assistance of artificial intelligence (AI) for documentation purposes. The content has been reviewed and confirmed by the healthcare provider for accuracy and completeness. Patient consented to the use of audio recording and use of AI during their visit.     "

## 2025-04-11 NOTE — PATIENT INSTRUCTIONS
VISIT SUMMARY:  Today, we discussed your weight gain, sleep apnea, and blood pressure management. We reviewed your history of sleep apnea, weight changes, and recent health events, including your knee replacement surgery and subsequent ankle injury.    INSTRUCTIONS:  Please complete the home sleep apnea test as ordered. Continue working with your nutritionist and taking your current injectable medication for weight loss. Keep monitoring your blood pressure regularly at home and follow your current medication regimen. Follow up with us after completing the sleep apnea test to discuss the results and any further steps.

## 2025-04-12 LAB
ALBUMIN SERPL-MCNC: 3.6 G/DL (ref 3.6–5.1)
ALP SERPL-CCNC: 57 U/L (ref 37–153)
ALT SERPL-CCNC: 27 U/L (ref 6–29)
ANION GAP SERPL CALCULATED.4IONS-SCNC: 5 MMOL/L (CALC) (ref 7–17)
AST SERPL-CCNC: 33 U/L (ref 10–35)
BASOPHILS # BLD AUTO: 20 CELLS/UL (ref 0–200)
BASOPHILS NFR BLD AUTO: 0.3 %
BILIRUB SERPL-MCNC: 0.4 MG/DL (ref 0.2–1.2)
BUN SERPL-MCNC: 16 MG/DL (ref 7–25)
CALCIUM SERPL-MCNC: 8.7 MG/DL (ref 8.6–10.4)
CHLORIDE SERPL-SCNC: 107 MMOL/L (ref 98–110)
CHOLEST SERPL-MCNC: 117 MG/DL
CHOLEST/HDLC SERPL: 1.5 (CALC)
CO2 SERPL-SCNC: 27 MMOL/L (ref 20–32)
CREAT SERPL-MCNC: 0.95 MG/DL (ref 0.6–1)
EGFRCR SERPLBLD CKD-EPI 2021: 61 ML/MIN/1.73M2
EOSINOPHIL # BLD AUTO: 182 CELLS/UL (ref 15–500)
EOSINOPHIL NFR BLD AUTO: 2.8 %
ERYTHROCYTE [DISTWIDTH] IN BLOOD BY AUTOMATED COUNT: 13 % (ref 11–15)
EST. AVERAGE GLUCOSE BLD GHB EST-MCNC: 105 MG/DL
EST. AVERAGE GLUCOSE BLD GHB EST-SCNC: 5.8 MMOL/L
GLUCOSE SERPL-MCNC: 88 MG/DL (ref 65–99)
HBA1C MFR BLD: 5.3 % OF TOTAL HGB
HCT VFR BLD AUTO: 39.5 % (ref 35–45)
HDLC SERPL-MCNC: 77 MG/DL
HGB BLD-MCNC: 13 G/DL (ref 11.7–15.5)
LDLC SERPL CALC-MCNC: 22 MG/DL (CALC)
LYMPHOCYTES # BLD AUTO: 3569 CELLS/UL (ref 850–3900)
LYMPHOCYTES NFR BLD AUTO: 54.9 %
MCH RBC QN AUTO: 30.6 PG (ref 27–33)
MCHC RBC AUTO-ENTMCNC: 32.9 G/DL (ref 32–36)
MCV RBC AUTO: 92.9 FL (ref 80–100)
MONOCYTES # BLD AUTO: 423 CELLS/UL (ref 200–950)
MONOCYTES NFR BLD AUTO: 6.5 %
NEUTROPHILS # BLD AUTO: 2308 CELLS/UL (ref 1500–7800)
NEUTROPHILS NFR BLD AUTO: 35.5 %
NONHDLC SERPL-MCNC: 40 MG/DL (CALC)
PLATELET # BLD AUTO: 243 THOUSAND/UL (ref 140–400)
PMV BLD REES-ECKER: 10.5 FL (ref 7.5–12.5)
POTASSIUM SERPL-SCNC: 4.4 MMOL/L (ref 3.5–5.3)
PROT SERPL-MCNC: 6.7 G/DL (ref 6.1–8.1)
RBC # BLD AUTO: 4.25 MILLION/UL (ref 3.8–5.1)
SODIUM SERPL-SCNC: 139 MMOL/L (ref 135–146)
TRIGL SERPL-MCNC: 93 MG/DL
TSH SERPL-ACNC: 0.61 MIU/L (ref 0.4–4.5)
WBC # BLD AUTO: 6.5 THOUSAND/UL (ref 3.8–10.8)

## 2025-04-14 DIAGNOSIS — E78.5 DYSLIPIDEMIA: ICD-10-CM

## 2025-04-14 RX ORDER — ATORVASTATIN CALCIUM 10 MG/1
5 TABLET, FILM COATED ORAL NIGHTLY
Qty: 50 TABLET | Refills: 2 | Status: SHIPPED | OUTPATIENT
Start: 2025-04-14

## 2025-04-16 ENCOUNTER — APPOINTMENT (OUTPATIENT)
Dept: ENDOCRINOLOGY | Facility: CLINIC | Age: 80
End: 2025-04-16
Payer: MEDICARE

## 2025-04-16 VITALS — BODY MASS INDEX: 35.61 KG/M2 | HEIGHT: 63 IN | WEIGHT: 201 LBS

## 2025-04-16 DIAGNOSIS — Z71.3 DIETARY COUNSELING: Primary | ICD-10-CM

## 2025-04-16 PROCEDURE — 97803 MED NUTRITION INDIV SUBSEQ: CPT | Performed by: DIETITIAN, REGISTERED

## 2025-04-16 NOTE — PROGRESS NOTES
"Follow-up Nutrition Assessment    Interval History: Patient presents for follow-up nutrition appointment for weight management/desire to lose weight. Since last nutrition visit on 1/28/25, pt with a 9 lbs weight loss. States walking is limited due to pain in the ankle from previous break but planning to walk as tolerated and also then add chair exercises to add to activity. Still taking medication Semaglutide and with positive appetite suppression. Has made changes to breakfast meal as last recommended to add more protein. Also states she has made it an effort to not skip lunch with a much better level of satiety as a result leading into dinner. See all interventions/recommendations below as discussed during visit this day.     Nutrition Interventions/Recommendations from last visit scheduled on 1/28/25:  - Continue to follow the Mediterranean Meal Plan guidelines for healthy eating.  - Use the Healthy Plate style of eating for incorporating healthy balanced meals in appropriate portions.   - Continue to aim for a good source of fiber and protein at meal times to increase satiety at meals.  - Aim to pre-plan healthy meals for the week.   - Please use Fairlife skim milk in oatmeal in replace of the hard boiled egg and please eliminate the dried fruit in the oatmeal.  - Please aim to not skip meals as this can lead to increased hunger as discussed.   - Aim for 64 ounces of water daily.  - Aim for weekly physical activity either at the gym.  - Follow-up as scheduled for the group classes with Dr. Dada Brooks.  - Follow-up with nutrition in March.      Adherence to recommendations and patient stated goals: Yes    Anthropometrics:  Height:   Ht Readings from Last 1 Encounters:   04/11/25 1.6 m (5' 3\")      Weight:   Wt Readings from Last 10 Encounters:   04/11/25 91.8 kg (202 lb 6.4 oz)   03/11/25 92.9 kg (204 lb 12.8 oz)   03/05/25 92.9 kg (204 lb 12.8 oz)   01/28/25 95.3 kg (210 lb)   01/17/25 93.9 kg (207 lb) "   01/16/25 94.1 kg (207 lb 8 oz)   01/07/25 95.3 kg (210 lb)   12/27/24 96.2 kg (212 lb)   12/02/24 94.8 kg (209 lb)   11/19/24 94.7 kg (208 lb 11.2 oz)      Current BMI:   BMI Readings from Last 1 Encounters:   04/11/25 35.85 kg/m²        Malnutrition Screening:  Significant Unintentional weight loss: No  Eating less than 75% of usual intake for more than 2 weeks: No  Potential Signs of Inflammation: No    Recommended Malnutrition Diagnosis: No malnutrition identified    Diet Recall-  Breakfast- oatmeal made with Fairlife milk and sometimes with walniuts but not always  Lunch- Fairlife protein shake with a serving of fruit OR tuna and a salad  Dinner- various proteins such as chicken or salmon along with non-starchy vegetables and sometimes with a starch but not always OR turkey burger with various vegetables   Snacks- none or popcorn  Beverages- coffee in am, water throughout the day, occasional diet soda  Alcohol- 1-2 glasses of wine per week    Patient reported Information:  - Walking is limited due to pain in the ankle but planning to walk as tolerated and add chair exercises to add to activity.  - Still taking medication Semaglutide and with positive appetite suppression.  - Has made it an effort to not skip lunch with a much better level of satiety as a result leading into dinner.     Nutrition Diagnosis: Food and nutrition-related knowledge deficit related to lack of recent exposure to information as evidenced by verbalizes inaccurate or incomplete information.      Readiness to Learn:  Cognitive ability: Alert and oriented  Motivation to learn: Interested  Family Support: Unable to assess- family not present  Instruction provided to: Patient  Patient learns best by: Multiple methods  Factors affecting learning: None   Physical limitations affecting learning: None    Education Materials Provided:   None this visit    Nutrition Interventions/Recommendations for 4/16/2025:  - Continue to follow the  Mediterranean Meal Plan guidelines for healthy eating.  - Continue to aim for a good source of fiber and protein at meal times to increase satiety at meals and continue to aim to not skip meals.  - Continue to aim to pre-plan healthy meals for the week.   - Aim for 64 ounces of water daily.  - Aim for weekly physical activity either at the gym or with chair exercises at home as discussed.  - Can consider tracking daily food intake for several weeks to see what average caloric intake is to help determine if any changes could be beneficial to maintain consistent weight loss.  - Follow-up as scheduled for the group classes with Dr. Dada Brooks.     Nutrition Monitoring & Evaluation: adherence to recommendations and patient stated goals    Need for follow-up: As scheduled for Dr. Samuels's Shared Medical Appointment (SMA) and nutrition PRN    Referred by: Dr. Dada Brooks    MNT Billing Type: Medical Nutrition Re-Assessment, each 15 min increment, for 2 increments.    SIGNATURE:   Hafsa Lambert RD, ANKUR, LD, CDCES                                                          DATE:   4/16/2025

## 2025-04-16 NOTE — PATIENT INSTRUCTIONS
- Continue to follow the Mediterranean Meal Plan guidelines for healthy eating.  - Continue to aim for a good source of fiber and protein at meal times to increase satiety at meals and continue to aim to not skip meals.  - Continue to aim to pre-plan healthy meals for the week.   - Aim for 64 ounces of water daily.  - Aim for weekly physical activity either at the gym or with chair exercises at home as discussed.  - Can consider tracking daily food intake for several weeks to see what average caloric intake is to help determine if any changes could be beneficial to maintain consistent weight loss.  - Follow-up as scheduled for the group classes with Dr. Dada Brooks.

## 2025-04-18 ENCOUNTER — APPOINTMENT (OUTPATIENT)
Dept: PRIMARY CARE | Facility: CLINIC | Age: 80
End: 2025-04-18
Payer: MEDICARE

## 2025-04-18 VITALS
DIASTOLIC BLOOD PRESSURE: 81 MMHG | BODY MASS INDEX: 35.44 KG/M2 | WEIGHT: 200 LBS | OXYGEN SATURATION: 95 % | HEART RATE: 80 BPM | HEIGHT: 63 IN | SYSTOLIC BLOOD PRESSURE: 116 MMHG

## 2025-04-18 DIAGNOSIS — N18.31 CHRONIC RENAL IMPAIRMENT, STAGE 3A (MULTI): ICD-10-CM

## 2025-04-18 DIAGNOSIS — E03.9 HYPOTHYROIDISM, UNSPECIFIED TYPE: ICD-10-CM

## 2025-04-18 DIAGNOSIS — G89.29 CHRONIC MIDLINE LOW BACK PAIN WITHOUT SCIATICA: ICD-10-CM

## 2025-04-18 DIAGNOSIS — Z12.31 SCREENING MAMMOGRAM FOR BREAST CANCER: ICD-10-CM

## 2025-04-18 DIAGNOSIS — R73.02 IGT (IMPAIRED GLUCOSE TOLERANCE): ICD-10-CM

## 2025-04-18 DIAGNOSIS — M54.50 CHRONIC MIDLINE LOW BACK PAIN WITHOUT SCIATICA: ICD-10-CM

## 2025-04-18 DIAGNOSIS — M06.9 RHEUMATOID ARTHRITIS, INVOLVING UNSPECIFIED SITE, UNSPECIFIED WHETHER RHEUMATOID FACTOR PRESENT (MULTI): ICD-10-CM

## 2025-04-18 DIAGNOSIS — E78.2 MIXED HYPERLIPIDEMIA: ICD-10-CM

## 2025-04-18 DIAGNOSIS — I10 PRIMARY HYPERTENSION: ICD-10-CM

## 2025-04-18 DIAGNOSIS — H69.91 EUSTACHIAN TUBE DYSFUNCTION, RIGHT: ICD-10-CM

## 2025-04-18 DIAGNOSIS — Z00.00 MEDICARE ANNUAL WELLNESS VISIT, SUBSEQUENT: Primary | ICD-10-CM

## 2025-04-18 PROBLEM — N18.30 CHRONIC RENAL IMPAIRMENT, STAGE 3 (MODERATE) (MULTI): Status: ACTIVE | Noted: 2023-01-02

## 2025-04-18 PROBLEM — M25.559 HIP PAIN: Status: ACTIVE | Noted: 2023-04-10

## 2025-04-18 PROBLEM — F33.9 RECURRENT MAJOR DEPRESSIVE DISORDER: Status: RESOLVED | Noted: 2024-08-30 | Resolved: 2025-04-18

## 2025-04-18 PROCEDURE — 3079F DIAST BP 80-89 MM HG: CPT

## 2025-04-18 PROCEDURE — 3074F SYST BP LT 130 MM HG: CPT

## 2025-04-18 PROCEDURE — 1123F ACP DISCUSS/DSCN MKR DOCD: CPT

## 2025-04-18 PROCEDURE — 1170F FXNL STATUS ASSESSED: CPT

## 2025-04-18 PROCEDURE — 1159F MED LIST DOCD IN RCRD: CPT

## 2025-04-18 PROCEDURE — 1160F RVW MEDS BY RX/DR IN RCRD: CPT

## 2025-04-18 PROCEDURE — 99397 PER PM REEVAL EST PAT 65+ YR: CPT

## 2025-04-18 PROCEDURE — 1036F TOBACCO NON-USER: CPT

## 2025-04-18 PROCEDURE — G0439 PPPS, SUBSEQ VISIT: HCPCS

## 2025-04-18 RX ORDER — FLUTICASONE PROPIONATE 50 MCG
1 SPRAY, SUSPENSION (ML) NASAL DAILY
Qty: 16 G | Refills: 0 | Status: SHIPPED | OUTPATIENT
Start: 2025-04-18 | End: 2025-06-17

## 2025-04-18 ASSESSMENT — PATIENT HEALTH QUESTIONNAIRE - PHQ9
SUM OF ALL RESPONSES TO PHQ9 QUESTIONS 1 AND 2: 0
SUM OF ALL RESPONSES TO PHQ9 QUESTIONS 1 AND 2: 0
2. FEELING DOWN, DEPRESSED OR HOPELESS: NOT AT ALL
2. FEELING DOWN, DEPRESSED OR HOPELESS: NOT AT ALL
1. LITTLE INTEREST OR PLEASURE IN DOING THINGS: NOT AT ALL
1. LITTLE INTEREST OR PLEASURE IN DOING THINGS: NOT AT ALL

## 2025-04-18 ASSESSMENT — ACTIVITIES OF DAILY LIVING (ADL)
MANAGING_FINANCES: INDEPENDENT
DRESSING: INDEPENDENT
DOING_HOUSEWORK: INDEPENDENT
TAKING_MEDICATION: INDEPENDENT
GROCERY_SHOPPING: INDEPENDENT
BATHING: INDEPENDENT

## 2025-04-18 ASSESSMENT — ENCOUNTER SYMPTOMS
LOSS OF SENSATION IN FEET: 0
OCCASIONAL FEELINGS OF UNSTEADINESS: 0
DEPRESSION: 0

## 2025-04-18 NOTE — PROGRESS NOTES
Primary Care Provider: GETACHEW Matos-JEREMY    Chief Complaint: Medicare Wellness Exam/Comprehensive Problem Focused Follow Up and Physical Exam    HPI:  MWE & CPE    PMH of: IFG, HTN, HLD, hypothyroidism, CKD 3, chronic back pain       Eustachian tube dysfunction, right ear    HTN  On amlodipine 2.5mg daily  Checking BP at home regularly  Home BP around 130's/70-80    Hypothyroidism  On levothyroxine 74mcg daily    No Depression- Mood is good     HLD  On atorvastatin 10mg daily; 1/2 tablet daily     CKD 3  Lab Results   Component Value Date    CREATININE 0.95 04/11/2025    BUN 16 04/11/2025     04/11/2025    K 4.4 04/11/2025     04/11/2025    CO2 27 04/11/2025        RA  Knee pain  On duloxetine for MSK pain     Obesity  Exercise: walking daily, biking, treadmill; at least 3 times a week - sometimes more; 30 minutes.     IFG  On semaglutide  Lab Results   Component Value Date    HGBA1C 5.3 04/11/2025           Lab Results   Component Value Date     HGBA1C 5.2 12/27/2024   Following with Dr. Dada Adamson in endocrinology.     She is going to get a sleep study- might have KELLY again since weight gain.    HM:  Exercise regularly  Healthcare POA- Kristin Jonasa, daughter   Labs reviewed  Immunizations: RSV due  Colonoscopy: last 8/4/2021- with 5 year repeat  Mammogram due 7/2025      Active Problem List  Problem List[1]    Comprehensive Medical/Surgical/Social/Family History  Medical History[2]  Surgical History[3]  Social History[4]  Family History[5]      Allergies and Medications  Soap  Medications Ordered Prior to Encounter[6]    Medications and Supplements  prescribed by me and other practitioners or clinical pharmacist (such as prescriptions, OTC's, herbal therapies and supplements) were reviewed and documented in the medical record.    Tobacco/Alcohol/Opioid use, as well as Illicit Drug Use was screened for/reviewed and documented in Social History section and medication list as appropriate    "    Advance directives  Advanced Care Planning (including a Living Will, Healthcare POA, as well as specific end of life choices and/or directives), was discussed  with the patient and/or surrogate, voluntarily, and documented in the medical record.   ROS otherwise negative aside from what was mentioned above in HPI.    Vitals  /81   Pulse 80   Ht 1.6 m (5' 3\")   Wt 90.7 kg (200 lb)   SpO2 95%   BMI 35.43 kg/m²   Body mass index is 35.43 kg/m².  Physical Exam  Gen: Alert, NAD  HEENT:  PERRLA, EOMI, conjunctiva and sclera normal in appearance. External auditory canals/TMs normal; Oral cavity and posterior pharynx without lesions/exudate  Neck:  Supple with FROM; No masses/nodes palpable; Thyroid nontender and without nodules; No MARCE  Respiratory:  Lungs CTAB  Cardiovascular:  Heart RRR. No M/R/G. Peripheral pulses equal bilaterally  Abdomen:  Soft, nontender, BS present throughout; No R/G/R; No HSM or masses palpated  Extremities:  FROM all extremities; Muscle strength grossly normal with good tone  Neuro:  CN II-XII intact; Reflexes 2+/2+; Gross motor and sensory intact  Skin:  warm, dry    Assessment and Plan:  Problem List Items Addressed This Visit       Chronic back pain  Stable  On duloxetine for MSK pain    Rheumatoid arthritis, involving unspecified site, unspecified whether rheumatoid factor present (Multi)  Stable  Some Knee pain  On duloxetine for MSK pain    Chronic renal impairment, stage 3 (moderate) (Multi)    Stable  BP at goal  Lab Results   Component Value Date    CREATININE 0.95 04/11/2025    BUN 16 04/11/2025     04/11/2025    K 4.4 04/11/2025     04/11/2025    CO2 27 04/11/2025     Relevant Orders    Albumin-Creatinine Ratio, Urine Random    Hypertension  On amlodipine 2.5mg daily  Checking BP at home regularly  Home BP around 130's/70-80  DASH diet    IGT (impaired glucose tolerance)  stable  On semaglutide  Lab Results   Component Value Date    HGBA1C 5.3 04/11/2025       " "    Lab Results   Component Value Date     HGBA1C 5.2 12/27/2024   Following with Dr. Dada Adamson in endocrinology.     Hypothyroidism  Stable  On levothyroxine 74mcg daily  Lab Results   Component Value Date    TSH 0.61 04/11/2025       Screening mammogram for breast cancer    Relevant Orders    BI mammo bilateral screening tomosynthesis    Hyperlipidemia  Stable  On atorvastatin 10mg 1/2 tablet daily  Lab Results   Component Value Date    CHOL 117 04/11/2025    CHOL 123 12/27/2024    CHOL 132 01/04/2024     Lab Results   Component Value Date    HDL 77 04/11/2025    HDL 74.0 12/27/2024    HDL 74.2 01/04/2024     Lab Results   Component Value Date    LDLCALC 22 04/11/2025    LDLCALC 28 12/27/2024    LDLCALC 39 01/04/2024     Lab Results   Component Value Date    TRIG 93 04/11/2025    TRIG 107 12/27/2024    TRIG 93 01/04/2024     No components found for: \"CHOLHDL\"       Other Visit Diagnoses         Medicare annual wellness visit, subsequent    -  Primary      Eustachian tube dysfunction, right        persisting  Relevant Medications    START fluticasone (Flonase) 50 mcg/actuation nasal spray          MWE & CPE    PMH of: IFG, HTN, HLD, hypothyroidism, CKD 3, chronic back pain   No Depression- Mood is good    HM:  Exercise regularly  Healthcare POA- Kristin Poa, daughter   Labs reviewed  Immunizations: RSV due  Colonoscopy: last 8/4/2021- with 5 year repeat  Mammogram due 7/2025      During the course of the visit the patient was educated and counseled about age appropriate screening and preventive services. Completed preventive screenings were documented in the chart and orders were placed for outstanding screenings/procedures as documented in the Assessment and Plan.      Patient Instructions (the written plan) was given to the patient at check out.    Follow up in 6 months or sooner if needed    GETACHEW Matos-JEREMY       [1]   Patient Active Problem List  Diagnosis    Abnormal ECG    Abnormal LFTs    " Abnormal urine findings    Acquired pes planus    Chronic back pain    Anemia    Alteration in nutrition    Rheumatoid arthritis, involving unspecified site, unspecified whether rheumatoid factor present (Multi)    Brittle nails    Bruit    Carotid atherosclerosis    Carpal tunnel syndrome of left wrist    Carpal tunnel syndrome on both sides    Coccydynia    Chronic constipation    Hepatitis C virus    Chronic renal impairment, stage 3 (moderate) (Multi)    Chronic rhinitis    Colon polyp    Contact dermatitis    Eczema    Fall    Head trauma    Cervicalgia    Headache    Hearing loss    Hypertension    IGT (impaired glucose tolerance)    Cervical disc disease    Lumbar degenerative disc disease    Multilevel degenerative disc disease    Lumbosacral radiculitis    Memory loss    Mild aortic insufficiency    Monoclonal gammopathy of undetermined significance (MGUS)    Obstructive sleep apnea of adult    Pain in both hands    Hip pain    Pain of left lower extremity    Peripheral neuropathy    Polyphagia    Primary osteoarthritis of both hands    Proteinuria    Obesity, morbid (Multi)    Pulmonary hypertension (Multi)    Right upper quadrant abdominal pain    Sciatica of right side    Segmental and somatic dysfunction    Simple goiter    Status post total replacement of left hip    Hypothyroidism    Thyroid nodule    Urinary incontinence    Psoas tendonitis    Wrist tendonitis    Osteoarthritis of hip    Osteoarthritis    Pleurisy    Primary localized osteoarthritis of right knee    Acute exacerbation of chronic low back pain    Other specified hypothyroidism    Ptosis of left eyelid    Ingrown nail of great toe    Excess skin of abdominal wall    Lumbar radiculopathy, chronic    Screening mammogram for breast cancer    Hyperlipidemia    Breast asymmetry    Abnormal mammogram of right breast    Closed fracture of left ankle    Closed fracture of left ankle, initial encounter    Impaired mobility and ADLs    Impaired  functional mobility, balance, gait, and endurance    Pain    Left ankle pain   [2]   Past Medical History:  Diagnosis Date    Acute upper respiratory infection, unspecified 03/05/2019    Acute URI    Ankle dislocation     Arthritis     Bariatric surgery status 11/20/2019    Bariatric surgery status    Cervical disc disorder     CTS (carpal tunnel syndrome)     Encounter for general adult medical examination without abnormal findings 08/05/2021    Encounter for preventive health examination    Hypertension     Lumbosacral disc disease     Other general symptoms and signs 03/05/2019    Flu-like symptoms    Other headache syndrome 06/25/2024    Comment on above: HAD A FALL SATURDAY NIGHT / BAD HEADACHE, BUMP HEAD    Other neuromuscular dysfunction of bladder     Hyperactivity of bladder    Pain in right foot 09/17/2015    Foot pain, right    Personal history of other diseases of the circulatory system     History of hypertension    Personal history of other diseases of the respiratory system 03/05/2019    History of acute sinusitis    Personal history of other endocrine, nutritional and metabolic disease     History of thyroid disease    Personal history of other infectious and parasitic diseases 08/28/2019    History of hepatitis    Plantar fascial fibromatosis 09/21/2015    Plantar fasciitis, right    Recurrent major depressive disorder 08/30/2024    Syncope and collapse 10/16/2015    Vasovagal syncope    Vitamin D deficiency, unspecified 08/28/2019    Mild vitamin D deficiency   [3]   Past Surgical History:  Procedure Laterality Date    ANKLE FRACTURE SURGERY      BLADDER SURGERY  08/28/2019    Bladder Surgery    BREAST RECONSTRUCTION  01/01/2019    bilateral breast reduction    CARPAL TUNNEL RELEASE  08/28/2019    Neuroplasty Decompression Median Nerve At Carpal Tunnel    HYSTERECTOMY  08/28/2019    Hysterectomy    OTHER SURGICAL HISTORY  08/28/2019    Carpal tunnel surgery    REDUCTION MAMMAPLASTY      TOTAL HIP  ARTHROPLASTY  07/16/2019    Total Hip Replacement    TOTAL KNEE ARTHROPLASTY Right 12/30/2022   [4]   Social History  Tobacco Use    Smoking status: Former     Current packs/day: 0.25     Average packs/day: 0.3 packs/day for 15.0 years (3.8 ttl pk-yrs)     Types: Cigarettes     Passive exposure: Never    Smokeless tobacco: Never    Tobacco comments:     none   Vaping Use    Vaping status: Never Used   Substance Use Topics    Alcohol use: Not Currently    Drug use: Never   [5]   Family History  Problem Relation Name Age of Onset    Diabetes Mother mom     Hypertension Mother mom     Thyroid disease Mother mom     Rheumatologic disease Mother mom    [6]   Current Outpatient Medications on File Prior to Visit   Medication Sig Dispense Refill    acetaminophen 500 mg capsule Take 2 capsules (1,000 mg) by mouth 3 times a day as needed.      albuterol 90 mcg/actuation inhaler USE 2 INHALATIONS BY MOUTH EVERY 4 HOURS IF NEEDED FOR WHEEZING  OR SHORTNESS OF BREATH 51 g 2    amLODIPine (Norvasc) 2.5 mg tablet Take 1 tablet (2.5 mg) by mouth once daily. 90 tablet 1    aspirin 81 mg EC tablet Take 1 tablet (81 mg) by mouth once daily.      atorvastatin (Lipitor) 10 mg tablet Take 0.5 tablets (5 mg) by mouth once daily at bedtime. 50 tablet 2    docusate sodium (Colace) 100 mg capsule Take 3 capsules (300 mg) by mouth once daily.      DULoxetine (Cymbalta) 30 mg DR capsule Take 1 capsule (30 mg) by mouth once daily. AS DIRECTED 90 capsule 2    gabapentin (Neurontin) 300 mg capsule Take 1 capsule (300 mg) by mouth 3 times a day. 270 capsule 3    levothyroxine (Synthroid, Levoxyl) 75 mcg tablet Take 1 tablet (75 mcg) by mouth once daily. 90 tablet 1    multivitamin with minerals iron-free (Centrum Silver) Take 1 tablet by mouth once daily.      semaglutide Inject 1.8mg under the skin once a week 2 mL 4    [DISCONTINUED] atorvastatin (Lipitor) 10 mg tablet TAKE 1 TABLET BY MOUTH ONCE  DAILY AT BEDTIME 100 tablet 2     No current  facility-administered medications on file prior to visit.

## 2025-04-21 ENCOUNTER — PATIENT MESSAGE (OUTPATIENT)
Dept: PRIMARY CARE | Facility: CLINIC | Age: 80
End: 2025-04-21
Payer: MEDICARE

## 2025-04-21 DIAGNOSIS — M62.838 MUSCLE SPASM: ICD-10-CM

## 2025-04-21 DIAGNOSIS — G89.29 CHRONIC MIDLINE LOW BACK PAIN WITHOUT SCIATICA: Primary | ICD-10-CM

## 2025-04-21 DIAGNOSIS — M54.50 CHRONIC MIDLINE LOW BACK PAIN WITHOUT SCIATICA: Primary | ICD-10-CM

## 2025-04-21 RX ORDER — CYCLOBENZAPRINE HCL 5 MG
5 TABLET ORAL NIGHTLY PRN
Qty: 10 TABLET | Refills: 0 | Status: SHIPPED | OUTPATIENT
Start: 2025-04-21 | End: 2025-05-01

## 2025-04-21 RX ORDER — METHYLPREDNISOLONE 4 MG/1
TABLET ORAL
Qty: 21 TABLET | Refills: 0 | Status: SHIPPED | OUTPATIENT
Start: 2025-04-21 | End: 2025-04-27

## 2025-04-28 ENCOUNTER — APPOINTMENT (OUTPATIENT)
Dept: RADIOLOGY | Facility: CLINIC | Age: 80
End: 2025-04-28
Payer: MEDICARE

## 2025-04-28 NOTE — TELEPHONE ENCOUNTER
Patient needs refill of ozempic through [facility]. With new formulation, she needs a new Rx sent in/called in.

## 2025-04-28 NOTE — TELEPHONE ENCOUNTER
Can one of you call [facility] pharmacy and see how we can do this request  I am happy to refill it

## 2025-04-29 ENCOUNTER — HOSPITAL ENCOUNTER (OUTPATIENT)
Dept: RADIOLOGY | Facility: CLINIC | Age: 80
Discharge: HOME | End: 2025-04-29
Payer: MEDICARE

## 2025-04-29 ENCOUNTER — OFFICE VISIT (OUTPATIENT)
Dept: ORTHOPEDIC SURGERY | Facility: CLINIC | Age: 80
End: 2025-04-29
Payer: MEDICARE

## 2025-04-29 DIAGNOSIS — Z96.651 S/P TOTAL KNEE ARTHROPLASTY, RIGHT: ICD-10-CM

## 2025-04-29 DIAGNOSIS — M16.12 PRIMARY LOCALIZED OSTEOARTHRITIS OF LEFT HIP: ICD-10-CM

## 2025-04-29 PROCEDURE — 73562 X-RAY EXAM OF KNEE 3: CPT | Mod: RT

## 2025-04-29 PROCEDURE — 73502 X-RAY EXAM HIP UNI 2-3 VIEWS: CPT | Mod: LEFT SIDE | Performed by: STUDENT IN AN ORGANIZED HEALTH CARE EDUCATION/TRAINING PROGRAM

## 2025-04-29 PROCEDURE — 73502 X-RAY EXAM HIP UNI 2-3 VIEWS: CPT | Mod: LT

## 2025-04-29 PROCEDURE — 1159F MED LIST DOCD IN RCRD: CPT | Performed by: STUDENT IN AN ORGANIZED HEALTH CARE EDUCATION/TRAINING PROGRAM

## 2025-04-29 PROCEDURE — 1123F ACP DISCUSS/DSCN MKR DOCD: CPT | Performed by: STUDENT IN AN ORGANIZED HEALTH CARE EDUCATION/TRAINING PROGRAM

## 2025-04-29 PROCEDURE — 73562 X-RAY EXAM OF KNEE 3: CPT | Mod: RIGHT SIDE | Performed by: STUDENT IN AN ORGANIZED HEALTH CARE EDUCATION/TRAINING PROGRAM

## 2025-04-29 PROCEDURE — 99213 OFFICE O/P EST LOW 20 MIN: CPT | Performed by: STUDENT IN AN ORGANIZED HEALTH CARE EDUCATION/TRAINING PROGRAM

## 2025-04-29 PROCEDURE — G2211 COMPLEX E/M VISIT ADD ON: HCPCS | Performed by: STUDENT IN AN ORGANIZED HEALTH CARE EDUCATION/TRAINING PROGRAM

## 2025-04-29 NOTE — PROGRESS NOTES
HPI:  Diagnosis: End stage osteoarthritis Right Knee  Procedure Performed: Right Total Knee Arthroplasty   Date of Surgery: December 30, 2022       Lucille Holloway is a pleasant 79 y.o. year-old female here for follow-up of their bilateral total knee arthroplasty by Dr. Maciel.  The patient is approximately 2.5 years postop. She has had an uneventful postoperative recovering. She was doing well with only occasional discomfort until she had a fall in August 2024 where she unfortunately broke her left ankle. She underwent surgery with Dr. Larsen and was nonweightbearing for several months on her left extremity and relied heavily on her right leg. She now complains of knee pain. The pain is diffuse along the knee and worse at night. She patient has no mechanical symptoms. The patient has mild swelling and moderate pain. The patient uses Tylenol for pain control PRN. They continue to work out at home on strengthening and stability exercises. The patient is ambulatory without assitance. The patient has had no interval fall or trauma. The patients wound has healed uneventfully. The patient denies: fevers, chills,  joint instability, chest or calf pain, shortness of breath.    Review of systems:  There has been no interval change in this patient's past medical, surgical, medications, allergies, family history or social history since the most recent visit to a provider within our department. 14 point review of systems was performed, reviewed, and negative except for pertinent positives documented in the history of present illness.    Medical History[1]  Problem List[2]  Medication Documentation Review Audit       Reviewed by Laura Dai MA (Medical Assistant) on 04/29/25 at 1109      Medication Order Taking? Sig Documenting Provider Last Dose Status   acetaminophen 500 mg capsule [id number] No Take 2 capsules (1,000 mg) by mouth 3 times a day as needed. Historical Provider, MD 8/13/2024 0900 Active   albuterol  90 mcg/actuation inhaler [id number] No USE 2 INHALATIONS BY MOUTH EVERY 4 HOURS IF NEEDED FOR WHEEZING  OR SHORTNESS OF BREATH Vero Man MD More than a month Active   amLODIPine (Norvasc) 2.5 mg tablet [id number]  Take 1 tablet (2.5 mg) by mouth once daily. BRENNA Matos  Active   aspirin 81 mg EC tablet [id number]  Take 1 tablet (81 mg) by mouth once daily. Historical Provider, MD  Active   atorvastatin (Lipitor) 10 mg tablet [id number]  Take 0.5 tablets (5 mg) by mouth once daily at bedtime. BRENNA Matos  Active   cyclobenzaprine (Flexeril) 5 mg tablet [id number]  Take 1 tablet (5 mg) by mouth as needed at bedtime for muscle spasms for up to 10 days. BRENNA Matos  Active   docusate sodium (Colace) 100 mg capsule [id number] No Take 3 capsules (300 mg) by mouth once daily. Josias Provider, MD 2024 0900 Active   DULoxetine (Cymbalta) 30 mg DR capsule [id number]  Take 1 capsule (30 mg) by mouth once daily. AS DIRECTED Sai Jimenez MD  Active   fluticasone (Flonase) 50 mcg/actuation nasal spray [id number]  Administer 1 spray into each nostril once daily. Shake gently. Before first use, prime pump. After use, clean tip and replace cap. BRENNA Matos  Active   gabapentin (Neurontin) 300 mg capsule [id number]  Take 1 capsule (300 mg) by mouth 3 times a day. Sai Jimenez MD  Active   levothyroxine (Synthroid, Levoxyl) 75 mcg tablet [id number]  Take 1 tablet (75 mcg) by mouth once daily. BRENNA Matos  Active   methylPREDNISolone (Medrol Dospak) 4 mg tablets [id number]  Take as directed on package. BRENNA Matos   25 2359   multivitamin with minerals iron-free (Centrum Silver) [id number] No Take 1 tablet by mouth once daily. Josias Johns MD 2024 0900 Active   semaglutide [id number]  Inject 1.8mg under the skin once a week Revital MALCOLM Brooks MD  Active                  RX Allergies[3]  Social History      Socioeconomic History    Marital status:      Spouse name: Not on file    Number of children: Not on file    Years of education: Not on file    Highest education level: Not on file   Occupational History    Not on file   Tobacco Use    Smoking status: Former     Current packs/day: 0.25     Average packs/day: 0.3 packs/day for 15.0 years (3.8 ttl pk-yrs)     Types: Cigarettes     Passive exposure: Never    Smokeless tobacco: Never    Tobacco comments:     none   Vaping Use    Vaping status: Never Used   Substance and Sexual Activity    Alcohol use: Not Currently    Drug use: Never    Sexual activity: Not Currently     Birth control/protection: Abstinence   Other Topics Concern    Not on file   Social History Narrative    Not on file     Social Drivers of Health     Financial Resource Strain: Low Risk  (8/16/2024)    Overall Financial Resource Strain (CARDIA)     Difficulty of Paying Living Expenses: Not very hard   Food Insecurity: No Food Insecurity (8/15/2024)    Hunger Vital Sign     Worried About Running Out of Food in the Last Year: Never true     Ran Out of Food in the Last Year: Never true   Transportation Needs: No Transportation Needs (8/16/2024)    PRAPARE - Transportation     Lack of Transportation (Medical): No     Lack of Transportation (Non-Medical): No   Physical Activity: Not on file   Stress: Not on file   Social Connections: Not on file   Intimate Partner Violence: Not on file   Housing Stability: Low Risk  (8/16/2024)    Housing Stability Vital Sign     Unable to Pay for Housing in the Last Year: No     Number of Times Moved in the Last Year: 0     Homeless in the Last Year: No     Surgical History[4]    Physical Exam:  General: Well-appearing female is alert and oriented x 3 and appears comfortable. NAD. Pleasant and cooperative.  Mood: Euthymic  Respirations: non-labored  Gait: Slight Normal  Assistive Device: no device. Coordination and balance intact.    right Knee  No other  overlying lesion  Wound: Incision is well healed, midline with no signs of surrounding infection, erythema, or fluctuance.   Range of motion: 0 - 130  Stable to varus and valgus stress at 0 and 30 degrees.   Patella tracks midline.  Calf: No swelling or tenderness. Lower extremity warm and well perfused.  Able to dorsi-flex and plantar-flex the ankle with appropriate strength. Able to wiggle all toes.   The foot is warm and well perfused with palpable pulses.   Sensation is intact to light touch.   Pulses: Palpable DP pulse    Imaging:  Radiographs of the operative knee were independently obtained and reviewed in clinic. They demonstrate the implant is well fixed and well positioned. No radiographic evidence of jason-implant fracture, lucency or dislocation. Patella is tracking centrally. Compared to immediate post-operative x-rays, no change in appearance.     Impression/Plan:  Lucille Holloway is doing well and progressing well approximately 2.5 year s/p right total knee arthroplasty. She unfortunately had a fall several months ago that resulted in a left ankle fracture. During her recovery process, she has been placing a lot of additional stress on her right leg that is likely the cause of her symptoms. We discussed that she may begin taking anti-inflammatories and icing her right knee throughout the day. We will see her back in clinic in 6 months with radiographs of the right hip at that time.     All questions answered.    Ant Woody MD  PGY-2, Orthopaedic Surgery       [1]   Past Medical History:  Diagnosis Date    Acute upper respiratory infection, unspecified 03/05/2019    Acute URI    Ankle dislocation     Arthritis     Bariatric surgery status 11/20/2019    Bariatric surgery status    Cervical disc disorder     CTS (carpal tunnel syndrome)     Encounter for general adult medical examination without abnormal findings 08/05/2021    Encounter for preventive health examination    Hypertension      Lumbosacral disc disease     Other general symptoms and signs 03/05/2019    Flu-like symptoms    Other headache syndrome 06/25/2024    Comment on above: HAD A FALL SATURDAY NIGHT / BAD HEADACHE, BUMP HEAD    Other neuromuscular dysfunction of bladder     Hyperactivity of bladder    Pain in right foot 09/17/2015    Foot pain, right    Personal history of other diseases of the circulatory system     History of hypertension    Personal history of other diseases of the respiratory system 03/05/2019    History of acute sinusitis    Personal history of other endocrine, nutritional and metabolic disease     History of thyroid disease    Personal history of other infectious and parasitic diseases 08/28/2019    History of hepatitis    Plantar fascial fibromatosis 09/21/2015    Plantar fasciitis, right    Recurrent major depressive disorder 08/30/2024    Syncope and collapse 10/16/2015    Vasovagal syncope    Vitamin D deficiency, unspecified 08/28/2019    Mild vitamin D deficiency   [2]   Patient Active Problem List  Diagnosis    Abnormal ECG    Abnormal LFTs    Abnormal urine findings    Acquired pes planus    Chronic back pain    Anemia    Alteration in nutrition    Rheumatoid arthritis, involving unspecified site, unspecified whether rheumatoid factor present (Multi)    Brittle nails    Bruit    Carotid atherosclerosis    Carpal tunnel syndrome of left wrist    Carpal tunnel syndrome on both sides    Coccydynia    Chronic constipation    Hepatitis C virus    Chronic renal impairment, stage 3 (moderate) (Multi)    Chronic rhinitis    Colon polyp    Contact dermatitis    Eczema    Fall    Head trauma    Cervicalgia    Headache    Hearing loss    Hypertension    IGT (impaired glucose tolerance)    Cervical disc disease    Lumbar degenerative disc disease    Multilevel degenerative disc disease    Lumbosacral radiculitis    Memory loss    Mild aortic insufficiency    Monoclonal gammopathy of undetermined significance (MGUS)     Obstructive sleep apnea of adult    Pain in both hands    Hip pain    Pain of left lower extremity    Peripheral neuropathy    Polyphagia    Primary osteoarthritis of both hands    Proteinuria    Obesity, morbid (Multi)    Pulmonary hypertension (Multi)    Right upper quadrant abdominal pain    Sciatica of right side    Segmental and somatic dysfunction    Simple goiter    Status post total replacement of left hip    Hypothyroidism    Thyroid nodule    Urinary incontinence    Psoas tendonitis    Wrist tendonitis    Osteoarthritis of hip    Osteoarthritis    Pleurisy    Primary localized osteoarthritis of right knee    Acute exacerbation of chronic low back pain    Other specified hypothyroidism    Ptosis of left eyelid    Ingrown nail of great toe    Excess skin of abdominal wall    Lumbar radiculopathy, chronic    Screening mammogram for breast cancer    Hyperlipidemia    Breast asymmetry    Abnormal mammogram of right breast    Closed fracture of left ankle    Closed fracture of left ankle, initial encounter    Impaired mobility and ADLs    Impaired functional mobility, balance, gait, and endurance    Pain    Left ankle pain   [3]   Allergies  Allergen Reactions    Soap Rash, Swelling and Other     Oil of [place]   [4]   Past Surgical History:  Procedure Laterality Date    ANKLE FRACTURE SURGERY      BLADDER SURGERY  08/28/2019    Bladder Surgery    BREAST RECONSTRUCTION  01/01/2019    bilateral breast reduction    CARPAL TUNNEL RELEASE  08/28/2019    Neuroplasty Decompression Median Nerve At Carpal Tunnel    HYSTERECTOMY  08/28/2019    Hysterectomy    OTHER SURGICAL HISTORY  08/28/2019    Carpal tunnel surgery    REDUCTION MAMMAPLASTY      TOTAL HIP ARTHROPLASTY  07/16/2019    Total Hip Replacement    TOTAL KNEE ARTHROPLASTY Right 12/30/2022

## 2025-04-30 ENCOUNTER — DOCUMENTATION (OUTPATIENT)
Dept: ENDOCRINOLOGY | Facility: CLINIC | Age: 80
End: 2025-04-30
Payer: MEDICARE

## 2025-04-30 LAB
ALBUMIN/CREAT UR: 14 MG/G CREAT
CREAT UR-MCNC: 91 MG/DL (ref 20–275)
MICROALBUMIN UR-MCNC: 1.3 MG/DL

## 2025-05-16 ENCOUNTER — APPOINTMENT (OUTPATIENT)
Dept: SLEEP MEDICINE | Facility: CLINIC | Age: 80
End: 2025-05-16
Payer: MEDICARE

## 2025-06-03 NOTE — PROGRESS NOTES
Subjective  Lucille Holloway is a 79 y.o. female with a hx of CKD, IGT, HTN, hypothyroidism, goiter, KELLY, obesity, hip replacement, arthritis - osteo and rheumatoid, cataract surgery, who presents for weight management and obesity.  No h/o kidney stones or glaucoma.    Current Plan  1. Nutrition: Has increased protein and water intake.      2. Sleep: Stable      3. Stress: Stable     4. Exercise: not at this time- needs to find alternative to walking due to ankle pain      5. Appetite control: Stable  Obesity medication: Compounded Semaglutide 1.8mg weekly      6. Prior Goals: Met     New Goals: Start exercising     Weight trend:    Wt Readings from Last 10 Encounters:   04/18/25 90.7 kg (200 lb)   04/16/25 91.2 kg (201 lb)   04/11/25 91.8 kg (202 lb 6.4 oz)   03/11/25 92.9 kg (204 lb 12.8 oz)   03/05/25 92.9 kg (204 lb 12.8 oz)   01/28/25 95.3 kg (210 lb)   01/17/25 93.9 kg (207 lb)   01/16/25 94.1 kg (207 lb 8 oz)   01/07/25 95.3 kg (210 lb)   12/27/24 96.2 kg (212 lb)       Current Medications[1]    ROS:  System: normal  Eyes : no visual changes  Neck : no tenderness, no new lumps/bumps  Respiratory : no SOB  Cardiovascular : no chest pain, no palpitations  Gastro-Intestinal : no abdominal concerns  Neurological : no numbness or tingling in the extremities  Musculoskeletal : no joint paint, no muscle pain  Skin : no unusual rashes  Psychiatric : no depression, no anxiety  See HPI for Endocrine ROS    Medical History[2]    Surgical History[3]    Social History     Socioeconomic History    Marital status:      Spouse name: Not on file    Number of children: Not on file    Years of education: Not on file    Highest education level: Not on file   Occupational History    Not on file   Tobacco Use    Smoking status: Former     Current packs/day: 0.25     Average packs/day: 0.3 packs/day for 15.0 years (3.8 ttl pk-yrs)     Types: Cigarettes     Passive exposure: Never    Smokeless tobacco: Never    Tobacco  comments:     none   Vaping Use    Vaping status: Never Used   Substance and Sexual Activity    Alcohol use: Not Currently    Drug use: Never    Sexual activity: Not Currently     Birth control/protection: Abstinence   Other Topics Concern    Not on file   Social History Narrative    Not on file     Social Drivers of Health     Financial Resource Strain: Low Risk  (8/16/2024)    Overall Financial Resource Strain (CARDIA)     Difficulty of Paying Living Expenses: Not very hard   Food Insecurity: No Food Insecurity (8/15/2024)    Hunger Vital Sign     Worried About Running Out of Food in the Last Year: Never true     Ran Out of Food in the Last Year: Never true   Transportation Needs: No Transportation Needs (8/16/2024)    PRAPARE - Transportation     Lack of Transportation (Medical): No     Lack of Transportation (Non-Medical): No   Physical Activity: Not on file   Stress: Not on file   Social Connections: Not on file   Intimate Partner Violence: Not on file   Housing Stability: Low Risk  (8/16/2024)    Housing Stability Vital Sign     Unable to Pay for Housing in the Last Year: No     Number of Times Moved in the Last Year: 0     Homeless in the Last Year: No       Objective      Physical Exam:  There were no vitals taken for this visit.  General : alert and oriented X3, no acute distress  Eyes : EOMI   BMI: 35.22kg/m2    Assessment/Plan  Lucille Holloway is a 79 y.o. female with a hx of CKD, IGT, HTN, hypothyroidism, goiter, KELLY, obesity, hip replacement, arthritis - osteo and rheumatoid, cataract surgery, who presents for weight management and obesity.  No h/o kidney stones or glaucoma.     1. Weight Management : Reviewed the principles of energy metabolism, caloric intake and expenditure, and rationale for a treatment program.  Also reinforced need for reduced calorie, low fat diet and increased physical activity.     We reviewed the possibility of starting an interdisciplinary lifestyle intervention program  involving improvement of the diet, a personalized exercise program, efforts to reduce the stress and the possibility of using appetite suppressant medications in an effort to help with the weight loss process.  The patient expressed interest in the plan.     2. Nutrition : I discussed trying one of the diet approaches we have here in the program : Mediterranean lifestyle, ketosis diet.  Has met Hafsa Lambert RD.     5/13/24: 198lb, 35.23kg/m2  10/08/24: 191.9lb, 33.99kg/m2  11/19/24: 208.7lb, 36.97kg/m2  1/7/25: 210lb, 37.20kg/m2  3/5/25: 204.8lb, 36.28kg/m2  6/4/25: 198.8lb, 35.22kg/m2     3. Sleep : mild KELLY     4. Stress : 7-8/10, worried about weight gain, , 5 kids, 10 grandchildren     5. Exercise : had ankle fracture and surgery, now boot is off and she is doing PT.     6. Appetite : high  Was on ozempic for 6 months, loved it, but lost coverage  Discussed other AOMs.  --on contrave - doing ok with this  --on Semaglutide via [facility] 1.8mg/wk     7. Goal: re-start exercise, meet with Hafsa Lambert RD.     Follow up in a group visit.  Glenn Brooks MD         [1]   Current Outpatient Medications:     acetaminophen 500 mg capsule, Take 2 capsules (1,000 mg) by mouth 3 times a day as needed., Disp: , Rfl:     albuterol 90 mcg/actuation inhaler, USE 2 INHALATIONS BY MOUTH EVERY 4 HOURS IF NEEDED FOR WHEEZING  OR SHORTNESS OF BREATH, Disp: 51 g, Rfl: 2    amLODIPine (Norvasc) 2.5 mg tablet, Take 1 tablet (2.5 mg) by mouth once daily., Disp: 90 tablet, Rfl: 1    aspirin 81 mg EC tablet, Take 1 tablet (81 mg) by mouth once daily., Disp: , Rfl:     atorvastatin (Lipitor) 10 mg tablet, Take 0.5 tablets (5 mg) by mouth once daily at bedtime., Disp: 50 tablet, Rfl: 2    docusate sodium (Colace) 100 mg capsule, Take 3 capsules (300 mg) by mouth once daily., Disp: , Rfl:     DULoxetine (Cymbalta) 30 mg DR capsule, Take 1 capsule (30 mg) by mouth once daily. AS DIRECTED, Disp: 90 capsule,  Rfl: 2    fluticasone (Flonase) 50 mcg/actuation nasal spray, Administer 1 spray into each nostril once daily. Shake gently. Before first use, prime pump. After use, clean tip and replace cap., Disp: 16 g, Rfl: 0    gabapentin (Neurontin) 300 mg capsule, Take 1 capsule (300 mg) by mouth 3 times a day., Disp: 270 capsule, Rfl: 3    levothyroxine (Synthroid, Levoxyl) 75 mcg tablet, Take 1 tablet (75 mcg) by mouth once daily., Disp: 90 tablet, Rfl: 1    multivitamin with minerals iron-free (Centrum Silver), Take 1 tablet by mouth once daily., Disp: , Rfl:     semaglutide, Inject 1.8mg under the skin once a week, Disp: 2 mL, Rfl: 4  [2]   Past Medical History:  Diagnosis Date    Acute upper respiratory infection, unspecified 03/05/2019    Acute URI    Ankle dislocation     Arthritis     Bariatric surgery status 11/20/2019    Bariatric surgery status    Cervical disc disorder     CTS (carpal tunnel syndrome)     Encounter for general adult medical examination without abnormal findings 08/05/2021    Encounter for preventive health examination    Hypertension     Lumbosacral disc disease     Other general symptoms and signs 03/05/2019    Flu-like symptoms    Other headache syndrome 06/25/2024    Comment on above: HAD A FALL SATURDAY NIGHT / BAD HEADACHE, BUMP HEAD    Other neuromuscular dysfunction of bladder     Hyperactivity of bladder    Pain in right foot 09/17/2015    Foot pain, right    Personal history of other diseases of the circulatory system     History of hypertension    Personal history of other diseases of the respiratory system 03/05/2019    History of acute sinusitis    Personal history of other endocrine, nutritional and metabolic disease     History of thyroid disease    Personal history of other infectious and parasitic diseases 08/28/2019    History of hepatitis    Plantar fascial fibromatosis 09/21/2015    Plantar fasciitis, right    Recurrent major depressive disorder 08/30/2024    Syncope and  collapse 10/16/2015    Vasovagal syncope    Vitamin D deficiency, unspecified 08/28/2019    Mild vitamin D deficiency   [3]   Past Surgical History:  Procedure Laterality Date    ANKLE FRACTURE SURGERY      BLADDER SURGERY  08/28/2019    Bladder Surgery    BREAST RECONSTRUCTION  01/01/2019    bilateral breast reduction    CARPAL TUNNEL RELEASE  08/28/2019    Neuroplasty Decompression Median Nerve At Carpal Tunnel    HYSTERECTOMY  08/28/2019    Hysterectomy    OTHER SURGICAL HISTORY  08/28/2019    Carpal tunnel surgery    REDUCTION MAMMAPLASTY      TOTAL HIP ARTHROPLASTY  07/16/2019    Total Hip Replacement    TOTAL KNEE ARTHROPLASTY Right 12/30/2022

## 2025-06-04 ENCOUNTER — APPOINTMENT (OUTPATIENT)
Dept: ENDOCRINOLOGY | Facility: CLINIC | Age: 80
End: 2025-06-04
Payer: MEDICARE

## 2025-06-04 ENCOUNTER — CLINICAL SUPPORT (OUTPATIENT)
Dept: SLEEP MEDICINE | Facility: CLINIC | Age: 80
End: 2025-06-04
Payer: MEDICARE

## 2025-06-04 VITALS
SYSTOLIC BLOOD PRESSURE: 124 MMHG | WEIGHT: 198.8 LBS | BODY MASS INDEX: 35.22 KG/M2 | HEIGHT: 63 IN | HEART RATE: 77 BPM | DIASTOLIC BLOOD PRESSURE: 82 MMHG | TEMPERATURE: 96.6 F

## 2025-06-04 DIAGNOSIS — G47.30 SLEEP APNEA, UNSPECIFIED TYPE: ICD-10-CM

## 2025-06-04 DIAGNOSIS — E66.812 CLASS 2 OBESITY: Primary | ICD-10-CM

## 2025-06-04 DIAGNOSIS — Z76.89 ENCOUNTER FOR WEIGHT MANAGEMENT: ICD-10-CM

## 2025-06-04 PROCEDURE — 3074F SYST BP LT 130 MM HG: CPT | Performed by: INTERNAL MEDICINE

## 2025-06-04 PROCEDURE — 1159F MED LIST DOCD IN RCRD: CPT | Performed by: INTERNAL MEDICINE

## 2025-06-04 PROCEDURE — 99215 OFFICE O/P EST HI 40 MIN: CPT | Performed by: INTERNAL MEDICINE

## 2025-06-04 PROCEDURE — 3079F DIAST BP 80-89 MM HG: CPT | Performed by: INTERNAL MEDICINE

## 2025-06-04 NOTE — PROGRESS NOTES
Type of Study: HOME SLEEP STUDY - NOMAD     The patient received equipment and instructions for use of the [facility] HSAT device. The patient was instructed how to apply the effort belts, cannula, thermistor. It was also explained how the Nomad and oximeter components work.  The patient was asked to record their sleep for an 8-hour period.     The patient was informed of their responsibility for the device and acknowledged this by signing the HSAT device contract. The patient was asked to return the device on 6/5/2025 between the hours of [id number] to the [facility].     The patient was instructed to call 911 as usual for any medical- emergencies while at home.  The patient was also given a phone number for troubleshooting when using the device in case there were additional questions.

## 2025-06-06 ENCOUNTER — APPOINTMENT (OUTPATIENT)
Dept: RHEUMATOLOGY | Facility: CLINIC | Age: 80
End: 2025-06-06
Payer: MEDICARE

## 2025-06-11 ENCOUNTER — PATIENT MESSAGE (OUTPATIENT)
Dept: PRIMARY CARE | Facility: CLINIC | Age: 80
End: 2025-06-11

## 2025-06-11 ENCOUNTER — APPOINTMENT (OUTPATIENT)
Dept: ENDOCRINOLOGY | Facility: CLINIC | Age: 80
End: 2025-06-11
Payer: MEDICARE

## 2025-06-12 ASSESSMENT — ENCOUNTER SYMPTOMS
RHINORRHEA: 0
SORE THROAT: 0
SHORTNESS OF BREATH: 0
EYE PAIN: 0
COUGH: 0
FATIGUE: 0
NAIL CHANGES: 0
FEVER: 0
VOMITING: 0
DIARRHEA: 0
ANOREXIA: 0

## 2025-06-17 ENCOUNTER — APPOINTMENT (OUTPATIENT)
Dept: PRIMARY CARE | Facility: CLINIC | Age: 80
End: 2025-06-17
Payer: MEDICARE

## 2025-06-17 VITALS
SYSTOLIC BLOOD PRESSURE: 136 MMHG | BODY MASS INDEX: 35.08 KG/M2 | OXYGEN SATURATION: 95 % | HEIGHT: 63 IN | WEIGHT: 198 LBS | DIASTOLIC BLOOD PRESSURE: 83 MMHG | HEART RATE: 76 BPM

## 2025-06-17 DIAGNOSIS — R13.10 DYSPHAGIA, UNSPECIFIED TYPE: ICD-10-CM

## 2025-06-17 DIAGNOSIS — L50.6 ALLERGIC CONTACT URTICARIA: Primary | ICD-10-CM

## 2025-06-17 DIAGNOSIS — K21.9 GASTROESOPHAGEAL REFLUX DISEASE WITHOUT ESOPHAGITIS: ICD-10-CM

## 2025-06-17 PROCEDURE — 3079F DIAST BP 80-89 MM HG: CPT

## 2025-06-17 PROCEDURE — 99213 OFFICE O/P EST LOW 20 MIN: CPT

## 2025-06-17 PROCEDURE — 1160F RVW MEDS BY RX/DR IN RCRD: CPT

## 2025-06-17 PROCEDURE — 1159F MED LIST DOCD IN RCRD: CPT

## 2025-06-17 PROCEDURE — 1036F TOBACCO NON-USER: CPT

## 2025-06-17 PROCEDURE — 3075F SYST BP GE 130 - 139MM HG: CPT

## 2025-06-17 PROCEDURE — G2211 COMPLEX E/M VISIT ADD ON: HCPCS

## 2025-06-17 RX ORDER — HYDROXYZINE HYDROCHLORIDE 25 MG/1
25 TABLET, FILM COATED ORAL NIGHTLY PRN
Qty: 14 TABLET | Refills: 0 | Status: SHIPPED | OUTPATIENT
Start: 2025-06-17 | End: 2025-07-01

## 2025-06-17 RX ORDER — FAMOTIDINE 20 MG/1
20 TABLET, FILM COATED ORAL DAILY
Qty: 90 TABLET | Refills: 0 | Status: SHIPPED | OUTPATIENT
Start: 2025-06-17 | End: 2025-09-15

## 2025-06-17 RX ORDER — CLOTRIMAZOLE AND BETAMETHASONE DIPROPIONATE 10; .64 MG/G; MG/G
1 CREAM TOPICAL 2 TIMES DAILY
Qty: 30 G | Refills: 0 | Status: SHIPPED | OUTPATIENT
Start: 2025-06-17 | End: 2025-06-24

## 2025-06-17 ASSESSMENT — ENCOUNTER SYMPTOMS
LOSS OF SENSATION IN FEET: 0
SHORTNESS OF BREATH: 0
FATIGUE: 0
NAIL CHANGES: 0
DIARRHEA: 0
COUGH: 0
DEPRESSION: 0
OCCASIONAL FEELINGS OF UNSTEADINESS: 0
FEVER: 0
SORE THROAT: 0
EYE PAIN: 0
RHINORRHEA: 0
VOMITING: 0
ANOREXIA: 0

## 2025-06-17 NOTE — PROGRESS NOTES
"Primary Care Provider: Kina Lynch, GETACHEW-CNP    Subjective   Lucille Holloway is a 79 y.o. female who presents for Follow-up (Eczema neck ).    Rash  This is a recurrent problem. The current episode started 1 to 4 weeks ago. The problem has been waxing and waning since onset. The affected locations include the neck. The rash is characterized by itchiness. It is nothing and unknown if there was an exposure to a precipitant. She was exposed to nothing and unknown. Pertinent negatives include no anorexia, cough, diarrhea, eye pain, facial edema, fatigue, fever, joint pain, nail changes, rhinorrhea, shortness of breath, sore throat or vomiting.     Itching around neck  discoloration  Worse in the heat  Occurred after wearing costume jewelry- about 8 weeks ago    Review of Systems   Constitutional:  Negative for fatigue and fever.   HENT:  Negative for rhinorrhea and sore throat.    Eyes:  Negative for pain.   Respiratory:  Negative for cough and shortness of breath.    Gastrointestinal:  Negative for anorexia, diarrhea and vomiting.   Musculoskeletal:  Negative for joint pain.   Skin:  Positive for rash. Negative for nail changes.         Objective   /83   Pulse 76   Ht 1.6 m (5' 3\")   Wt 89.8 kg (198 lb)   SpO2 95%   BMI 35.07 kg/m²     Physical Exam  Constitutional:       General: She is not in acute distress.     Appearance: Normal appearance. She is not ill-appearing, toxic-appearing or diaphoretic.   Cardiovascular:      Rate and Rhythm: Normal rate and regular rhythm.      Heart sounds: Normal heart sounds.   Pulmonary:      Effort: Pulmonary effort is normal. No respiratory distress.      Breath sounds: Normal breath sounds.   Skin:     Findings: Rash present.          Neurological:      General: No focal deficit present.      Mental Status: She is alert and oriented to person, place, and time. Mental status is at baseline.   Psychiatric:         Mood and Affect: Mood normal.         Behavior: " Behavior normal.         Thought Content: Thought content normal.         Judgment: Judgment normal.         Assessment/Plan   Problem List Items Addressed This Visit    None  Visit Diagnoses         Codes      Allergic contact urticaria    -  Primary L50.6    persisting  Relevant Medications    clotrimazole-betamethasone (Lotrisone) cream- do not use on face- use sparingly     hydrOXYzine HCL (Atarax) 25 mg tablet      Dysphagia, unspecified type     R13.10    Persisting  Going on for over 1 year now  Reflux?  Relevant Orders    FL GI esophagram      Gastroesophageal reflux disease without esophagitis     K21.9    Persisting  Going on for over 1 year now  Reflux?  Relevant Medications    START famotidine (Pepcid) 20 mg tablet        Follow up in 3 months or sooner if needed

## 2025-06-20 ENCOUNTER — APPOINTMENT (OUTPATIENT)
Dept: RHEUMATOLOGY | Facility: CLINIC | Age: 80
End: 2025-06-20
Payer: MEDICARE

## 2025-07-01 ENCOUNTER — APPOINTMENT (OUTPATIENT)
Dept: ENDOCRINOLOGY | Facility: CLINIC | Age: 80
End: 2025-07-01
Payer: MEDICARE

## 2025-07-02 ENCOUNTER — APPOINTMENT (OUTPATIENT)
Dept: ENDOCRINOLOGY | Facility: CLINIC | Age: 80
End: 2025-07-02
Payer: MEDICARE

## 2025-07-02 DIAGNOSIS — I10 PRIMARY HYPERTENSION: ICD-10-CM

## 2025-07-03 RX ORDER — AMLODIPINE BESYLATE 2.5 MG/1
2.5 TABLET ORAL DAILY
Qty: 90 TABLET | Refills: 1 | Status: SHIPPED | OUTPATIENT
Start: 2025-07-03 | End: 2025-12-30

## 2025-07-07 DIAGNOSIS — G89.29 CHRONIC MIDLINE LOW BACK PAIN WITHOUT SCIATICA: ICD-10-CM

## 2025-07-07 DIAGNOSIS — M54.50 CHRONIC MIDLINE LOW BACK PAIN WITHOUT SCIATICA: ICD-10-CM

## 2025-07-08 RX ORDER — DULOXETIN HYDROCHLORIDE 30 MG/1
30 CAPSULE, DELAYED RELEASE ORAL DAILY
Qty: 90 CAPSULE | Refills: 3 | Status: SHIPPED | OUTPATIENT
Start: 2025-07-08

## 2025-07-17 ENCOUNTER — APPOINTMENT (OUTPATIENT)
Dept: CARDIOLOGY | Facility: CLINIC | Age: 80
End: 2025-07-17
Payer: MEDICARE

## 2025-07-21 ENCOUNTER — APPOINTMENT (OUTPATIENT)
Dept: RADIOLOGY | Facility: HOSPITAL | Age: 80
End: 2025-07-21
Payer: MEDICARE

## 2025-07-21 ENCOUNTER — LAB (OUTPATIENT)
Dept: LAB | Facility: CLINIC | Age: 80
End: 2025-07-21
Payer: MEDICARE

## 2025-07-21 ENCOUNTER — APPOINTMENT (OUTPATIENT)
Dept: HEMATOLOGY/ONCOLOGY | Facility: CLINIC | Age: 80
End: 2025-07-21
Payer: MEDICARE

## 2025-07-21 DIAGNOSIS — D47.2 MONOCLONAL GAMMOPATHY OF UNDETERMINED SIGNIFICANCE (MGUS): ICD-10-CM

## 2025-07-21 LAB
ALBUMIN SERPL BCP-MCNC: 3.5 G/DL (ref 3.4–5)
ALP SERPL-CCNC: 74 U/L (ref 33–136)
ALT SERPL W P-5'-P-CCNC: 26 U/L (ref 7–45)
ANION GAP SERPL CALC-SCNC: 11 MMOL/L (ref 10–20)
AST SERPL W P-5'-P-CCNC: 35 U/L (ref 9–39)
BASOPHILS # BLD AUTO: 0.04 X10*3/UL (ref 0–0.1)
BASOPHILS NFR BLD AUTO: 0.6 %
BILIRUB SERPL-MCNC: 0.4 MG/DL (ref 0–1.2)
BUN SERPL-MCNC: 16 MG/DL (ref 6–23)
CALCIUM SERPL-MCNC: 8.7 MG/DL (ref 8.6–10.6)
CHLORIDE SERPL-SCNC: 103 MMOL/L (ref 98–107)
CO2 SERPL-SCNC: 29 MMOL/L (ref 21–32)
CREAT SERPL-MCNC: 0.94 MG/DL (ref 0.5–1.05)
EGFRCR SERPLBLD CKD-EPI 2021: 61 ML/MIN/1.73M*2
EOSINOPHIL # BLD AUTO: 0.14 X10*3/UL (ref 0–0.4)
EOSINOPHIL NFR BLD AUTO: 2 %
ERYTHROCYTE [DISTWIDTH] IN BLOOD BY AUTOMATED COUNT: 13 % (ref 11.5–14.5)
GLUCOSE SERPL-MCNC: 93 MG/DL (ref 74–99)
HCT VFR BLD AUTO: 38.3 % (ref 36–46)
HGB BLD-MCNC: 12.4 G/DL (ref 12–16)
IGA SERPL-MCNC: 47 MG/DL (ref 70–400)
IGG SERPL-MCNC: 1690 MG/DL (ref 700–1600)
IGM SERPL-MCNC: 49 MG/DL (ref 40–230)
IMM GRANULOCYTES # BLD AUTO: 0.01 X10*3/UL (ref 0–0.5)
IMM GRANULOCYTES NFR BLD AUTO: 0.1 % (ref 0–0.9)
LYMPHOCYTES # BLD AUTO: 3.17 X10*3/UL (ref 0.8–3)
LYMPHOCYTES NFR BLD AUTO: 45 %
MCH RBC QN AUTO: 30.7 PG (ref 26–34)
MCHC RBC AUTO-ENTMCNC: 32.4 G/DL (ref 32–36)
MCV RBC AUTO: 95 FL (ref 80–100)
MONOCYTES # BLD AUTO: 0.61 X10*3/UL (ref 0.05–0.8)
MONOCYTES NFR BLD AUTO: 8.7 %
NEUTROPHILS # BLD AUTO: 3.08 X10*3/UL (ref 1.6–5.5)
NEUTROPHILS NFR BLD AUTO: 43.6 %
NRBC BLD-RTO: ABNORMAL /100{WBCS}
PLATELET # BLD AUTO: 247 X10*3/UL (ref 150–450)
POTASSIUM SERPL-SCNC: 4 MMOL/L (ref 3.5–5.3)
PROT SERPL-MCNC: 6.6 G/DL (ref 6.4–8.2)
PROT SERPL-MCNC: 6.6 G/DL (ref 6.4–8.2)
RBC # BLD AUTO: 4.04 X10*6/UL (ref 4–5.2)
SODIUM SERPL-SCNC: 139 MMOL/L (ref 136–145)
WBC # BLD AUTO: 7.1 X10*3/UL (ref 4.4–11.3)

## 2025-07-21 PROCEDURE — 82784 ASSAY IGA/IGD/IGG/IGM EACH: CPT

## 2025-07-21 PROCEDURE — 84165 PROTEIN E-PHORESIS SERUM: CPT | Performed by: STUDENT IN AN ORGANIZED HEALTH CARE EDUCATION/TRAINING PROGRAM

## 2025-07-21 PROCEDURE — 80053 COMPREHEN METABOLIC PANEL: CPT

## 2025-07-21 PROCEDURE — 84155 ASSAY OF PROTEIN SERUM: CPT | Mod: 59

## 2025-07-21 PROCEDURE — 84165 PROTEIN E-PHORESIS SERUM: CPT

## 2025-07-21 PROCEDURE — 85025 COMPLETE CBC W/AUTO DIFF WBC: CPT

## 2025-07-21 PROCEDURE — 36415 COLL VENOUS BLD VENIPUNCTURE: CPT

## 2025-07-21 PROCEDURE — 83521 IG LIGHT CHAINS FREE EACH: CPT

## 2025-07-22 LAB
ALBUMIN: 3.3 G/DL (ref 3.4–5)
ALPHA 1 GLOBULIN: 0.3 G/DL (ref 0.2–0.6)
ALPHA 2 GLOBULIN: 0.8 G/DL (ref 0.4–1.1)
BETA GLOBULIN: 0.6 G/DL (ref 0.5–1.2)
GAMMA GLOBULIN: 1.6 G/DL (ref 0.5–1.4)
KAPPA LC SERPL-MCNC: 3.49 MG/DL (ref 0.33–1.94)
KAPPA LC/LAMBDA SER: 0.73 {RATIO} (ref 0.26–1.65)
LAMBDA LC SERPL-MCNC: 4.79 MG/DL (ref 0.57–2.63)
M-PROTEIN 1: 0.3 G/DL (ref ?–0)
M-PROTEIN 2: 0.3 G/DL (ref ?–0)
PATH REVIEW-SERUM PROTEIN ELECTROPHORESIS: ABNORMAL
PROTEIN ELECTROPHORESIS COMMENT: ABNORMAL

## 2025-07-28 ENCOUNTER — APPOINTMENT (OUTPATIENT)
Dept: RADIOLOGY | Facility: CLINIC | Age: 80
End: 2025-07-28
Payer: MEDICARE

## 2025-07-28 DIAGNOSIS — Z12.31 SCREENING MAMMOGRAM FOR BREAST CANCER: ICD-10-CM

## 2025-07-28 PROCEDURE — 77067 SCR MAMMO BI INCL CAD: CPT

## 2025-07-28 PROCEDURE — 77067 SCR MAMMO BI INCL CAD: CPT | Performed by: RADIOLOGY

## 2025-07-28 PROCEDURE — 77063 BREAST TOMOSYNTHESIS BI: CPT | Performed by: RADIOLOGY

## 2025-07-29 NOTE — PROGRESS NOTES
Subjective  Lucille Holloway is a 80 y.o. female with a hx of CKD, IGT, HTN, hypothyroidism, goiter, KELLY, obesity, hip replacement, arthritis - osteo and rheumatoid, cataract surgery, who presents for weight management and obesity.  No h/o kidney stones or glaucoma.    Current Plan  1. Nutrition: Mediterranean Diet     2. Sleep: Stable      3. Stress: Stable     4. Exercise: Going to the gym- using treadmill. Purchased weights recently.       5. Appetite control: Stable  Obesity medication: Semaglutide 1.8mg weekly      6. Prior Goals: Partially Met     New Goals: increase strength training exercises     Weight trend:    Wt Readings from Last 10 Encounters:   06/17/25 89.8 kg (198 lb)   06/04/25 90.2 kg (198 lb 12.8 oz)   04/18/25 90.7 kg (200 lb)   04/16/25 91.2 kg (201 lb)   04/11/25 91.8 kg (202 lb 6.4 oz)   03/11/25 92.9 kg (204 lb 12.8 oz)   03/05/25 92.9 kg (204 lb 12.8 oz)   01/28/25 95.3 kg (210 lb)   01/17/25 93.9 kg (207 lb)   01/16/25 94.1 kg (207 lb 8 oz)       Current Medications[1]    ROS:  System: normal  Eyes : no visual changes  Neck : no tenderness, no new lumps/bumps  Respiratory : no SOB  Cardiovascular : no chest pain, no palpitations  Gastro-Intestinal : constipation  Neurological : no numbness or tingling in the extremities  Musculoskeletal : no joint paint, no muscle pain  Skin : no unusual rashes  Psychiatric : no depression, no anxiety  See HPI for Endocrine ROS    Medical History[2]    Surgical History[3]    Social History     Socioeconomic History    Marital status:      Spouse name: Not on file    Number of children: Not on file    Years of education: Not on file    Highest education level: Not on file   Occupational History    Not on file   Tobacco Use    Smoking status: Former     Current packs/day: 0.25     Average packs/day: 0.7 packs/day for 40.3 years (29.0 ttl pk-yrs)     Types: Cigarettes     Start date: 9/20/1962     Quit date: 1/1/1988     Passive exposure: Never     Smokeless tobacco: Never    Tobacco comments:     none   Vaping Use    Vaping status: Never Used   Substance and Sexual Activity    Alcohol use: Yes     Alcohol/week: 3.0 standard drinks of alcohol     Types: 3 Glasses of wine per week     Comment: occasionally    Drug use: Never    Sexual activity: Not Currently     Partners: Male     Birth control/protection: Abstinence, Female Sterilization   Other Topics Concern    Not on file   Social History Narrative    Not on file     Social Drivers of Health     Financial Resource Strain: Low Risk  (8/16/2024)    Overall Financial Resource Strain (CARDIA)     Difficulty of Paying Living Expenses: Not very hard   Food Insecurity: No Food Insecurity (8/15/2024)    Hunger Vital Sign     Worried About Running Out of Food in the Last Year: Never true     Ran Out of Food in the Last Year: Never true   Transportation Needs: No Transportation Needs (8/16/2024)    PRAPARE - Transportation     Lack of Transportation (Medical): No     Lack of Transportation (Non-Medical): No   Physical Activity: Not on file   Stress: Not on file   Social Connections: Not on file   Intimate Partner Violence: Not on file   Housing Stability: Low Risk  (8/16/2024)    Housing Stability Vital Sign     Unable to Pay for Housing in the Last Year: No     Number of Times Moved in the Last Year: 0     Homeless in the Last Year: No       Objective      Physical Exam:  There were no vitals taken for this visit.  General : alert and oriented X3, no acute distress  Eyes : EOMI   BMI: 36.00kg/m2    Assessment/Plan  Lucille Holloway is a 80 y.o. female with a hx of CKD, IGT, HTN, hypothyroidism, goiter, KELLY, obesity, hip replacement, arthritis - osteo and rheumatoid, cataract surgery, who presents for weight management and obesity.  No h/o kidney stones or glaucoma.     1. Weight Management : Reviewed the principles of energy metabolism, caloric intake and expenditure, and rationale for a treatment program.  Also  reinforced need for reduced calorie, low fat diet and increased physical activity.     We reviewed the possibility of starting an interdisciplinary lifestyle intervention program involving improvement of the diet, a personalized exercise program, efforts to reduce the stress and the possibility of using appetite suppressant medications in an effort to help with the weight loss process.  The patient expressed interest in the plan.     2. Nutrition : I discussed trying one of the diet approaches we have here in the program : Mediterranean lifestyle, ketosis diet.  Has met Hafsa Lambert RD.     5/13/24: 198lb, 35.23kg/m2  10/08/24: 191.9lb, 33.99kg/m2  11/19/24: 208.7lb, 36.97kg/m2  1/7/25: 210lb, 37.20kg/m2  3/5/25: 204.8lb, 36.28kg/m2  6/4/25: 198.8lb, 35.22kg/m2  7/30/25: 203.2lb, 36.00kg/m2     3. Sleep : mild KELLY     4. Stress : 7-8/10, worried about weight gain, , 5 kids, 10 grandchildren     5. Exercise : had ankle fracture and surgery, now boot is off and she is doing PT.     6. Appetite : high  Was on ozempic for 6 months, loved it, but lost coverage  Discussed other AOMs.  --on contrave - doing ok with this  --on Semaglutide via [facility] pharmacy 1.8mg/wk --> raise to 2.268mg/wk.     7. Goal: re-start exercise.     Follow up in a group visit.  Revawilda Brooks MD            [1]   Current Outpatient Medications:     acetaminophen 500 mg capsule, Take 2 capsules (1,000 mg) by mouth 3 times a day as needed., Disp: , Rfl:     albuterol 90 mcg/actuation inhaler, USE 2 INHALATIONS BY MOUTH EVERY 4 HOURS IF NEEDED FOR WHEEZING  OR SHORTNESS OF BREATH, Disp: 51 g, Rfl: 2    amLODIPine (Norvasc) 2.5 mg tablet, Take 1 tablet (2.5 mg) by mouth once daily., Disp: 90 tablet, Rfl: 1    aspirin 81 mg EC tablet, Take 1 tablet (81 mg) by mouth once daily., Disp: , Rfl:     atorvastatin (Lipitor) 10 mg tablet, Take 0.5 tablets (5 mg) by mouth once daily at bedtime., Disp: 50 tablet, Rfl: 2    docusate sodium  (Colace) 100 mg capsule, Take 3 capsules (300 mg) by mouth once daily., Disp: , Rfl:     DULoxetine (Cymbalta) 30 mg DR capsule, TAKE 1 CAPSULE BY MOUTH ONCE  DAILY AS DIRECTED, Disp: 90 capsule, Rfl: 3    famotidine (Pepcid) 20 mg tablet, Take 1 tablet (20 mg) by mouth once daily., Disp: 90 tablet, Rfl: 0    fluticasone (Flonase) 50 mcg/actuation nasal spray, Administer 1 spray into each nostril once daily. Shake gently. Before first use, prime pump. After use, clean tip and replace cap., Disp: 16 g, Rfl: 0    gabapentin (Neurontin) 300 mg capsule, Take 1 capsule (300 mg) by mouth 3 times a day., Disp: 270 capsule, Rfl: 3    hydrOXYzine HCL (Atarax) 25 mg tablet, Take 1 tablet (25 mg) by mouth as needed at bedtime for itching or allergies for up to 14 days., Disp: 14 tablet, Rfl: 0    levothyroxine (Synthroid, Levoxyl) 75 mcg tablet, Take 1 tablet (75 mcg) by mouth once daily., Disp: 90 tablet, Rfl: 1    multivitamin with minerals iron-free (Centrum Silver), Take 1 tablet by mouth once daily., Disp: , Rfl:     semaglutide, Inject 1.8mg under the skin once a week, Disp: 2 mL, Rfl: 4  [2]   Past Medical History:  Diagnosis Date    Acute upper respiratory infection, unspecified 03/05/2019    Acute URI    Ankle dislocation     Arthritis     Bariatric surgery status 11/20/2019    Bariatric surgery status    Cervical disc disorder     CTS (carpal tunnel syndrome)     Disease of thyroid gland     Encounter for general adult medical examination without abnormal findings 08/05/2021    Encounter for preventive health examination    Head injury     Hypertension     Hypothyroidism     Lumbosacral disc disease     Memory loss     Obesity     Other general symptoms and signs 03/05/2019    Flu-like symptoms    Other headache syndrome 06/25/2024    Comment on above: HAD A FALL SATURDAY NIGHT / BAD HEADACHE, BUMP HEAD    Other neuromuscular dysfunction of bladder     Hyperactivity of bladder    Pain in right foot 09/17/2015    Foot  pain, right    Personal history of other diseases of the circulatory system     History of hypertension    Personal history of other diseases of the respiratory system 03/05/2019    History of acute sinusitis    Personal history of other endocrine, nutritional and metabolic disease     History of thyroid disease    Personal history of other infectious and parasitic diseases 08/28/2019    History of hepatitis    Plantar fascial fibromatosis 09/21/2015    Plantar fasciitis, right    Recurrent major depressive disorder 08/30/2024    Syncope and collapse 10/16/2015    Vasovagal syncope    Vitamin D deficiency, unspecified 08/28/2019    Mild vitamin D deficiency   [3]   Past Surgical History:  Procedure Laterality Date    ANKLE FRACTURE SURGERY      BLADDER SURGERY  08/28/2019    Bladder Surgery    BREAST RECONSTRUCTION  01/01/2019    bilateral breast reduction    CARPAL TUNNEL RELEASE  08/28/2019    Neuroplasty Decompression Median Nerve At Carpal Tunnel    HYSTERECTOMY  08/28/2019    Hysterectomy    OTHER SURGICAL HISTORY  08/28/2019    Carpal tunnel surgery    REDUCTION MAMMAPLASTY      TONSILLECTOMY      TOTAL HIP ARTHROPLASTY  07/16/2019    Total Hip Replacement    TOTAL KNEE ARTHROPLASTY Right 12/30/2022    WISDOM TOOTH EXTRACTION

## 2025-07-30 ENCOUNTER — APPOINTMENT (OUTPATIENT)
Dept: ENDOCRINOLOGY | Facility: CLINIC | Age: 80
End: 2025-07-30
Payer: MEDICARE

## 2025-07-30 VITALS
SYSTOLIC BLOOD PRESSURE: 147 MMHG | HEIGHT: 63 IN | TEMPERATURE: 98.1 F | WEIGHT: 203.2 LBS | HEART RATE: 70 BPM | DIASTOLIC BLOOD PRESSURE: 85 MMHG | BODY MASS INDEX: 36 KG/M2

## 2025-07-30 DIAGNOSIS — Z76.89 ENCOUNTER FOR WEIGHT MANAGEMENT: ICD-10-CM

## 2025-07-30 DIAGNOSIS — E66.812 CLASS 2 OBESITY: Primary | ICD-10-CM

## 2025-07-30 PROCEDURE — 3077F SYST BP >= 140 MM HG: CPT | Performed by: INTERNAL MEDICINE

## 2025-07-30 PROCEDURE — 99215 OFFICE O/P EST HI 40 MIN: CPT | Performed by: INTERNAL MEDICINE

## 2025-07-30 PROCEDURE — 3079F DIAST BP 80-89 MM HG: CPT | Performed by: INTERNAL MEDICINE

## 2025-08-04 ENCOUNTER — TELEMEDICINE (OUTPATIENT)
Dept: HEMATOLOGY/ONCOLOGY | Facility: CLINIC | Age: 80
End: 2025-08-04
Payer: MEDICARE

## 2025-08-04 ENCOUNTER — TELEPHONE (OUTPATIENT)
Dept: HEMATOLOGY/ONCOLOGY | Facility: CLINIC | Age: 80
End: 2025-08-04

## 2025-08-04 DIAGNOSIS — R76.8 ELEVATED SERUM IMMUNOGLOBULIN FREE LIGHT CHAIN LEVEL: Primary | ICD-10-CM

## 2025-08-04 PROCEDURE — 99215 OFFICE O/P EST HI 40 MIN: CPT

## 2025-08-04 NOTE — TELEPHONE ENCOUNTER
Call placed to patient regarding being scheduled for a follow up visit in 1 year. Not able to speak with patient, left VM with phone number for the office to call and to to schedule the appointment (option 1) and she was made aware to call with any additional questions or concerns (option 5)

## 2025-08-04 NOTE — PROGRESS NOTES
History   Patient ID: Lucille Holloway is a 80 y.o. female.  Interval Notes: Ms. Holloway comes in her normal state of health to obtain MG[facility] labs and review symptoms. No new issues since last being seen.   Unchanged from last visit  1. MG[facility]  IgG Kappa and IgG lambda, roughly 0.2 and 0.3g/dL       Exam   Physical Exam  Vitals reviewed.   Constitutional:       Appearance: Normal appearance. She is normal weight.   HENT:      Head: Normocephalic and atraumatic.      Nose: Nose normal.      Mouth/Throat:      Mouth: Mucous membranes are moist.      Pharynx: Oropharynx is clear.     Eyes:      Conjunctiva/sclera: Conjunctivae normal.      Pupils: Pupils are equal, round, and reactive to light.       Cardiovascular:      Rate and Rhythm: Normal rate and regular rhythm.      Pulses: Normal pulses.      Heart sounds: Normal heart sounds.   Pulmonary:      Effort: Pulmonary effort is normal.      Breath sounds: Normal breath sounds.   Abdominal:      General: Abdomen is flat. Bowel sounds are normal.     Musculoskeletal:         General: Normal range of motion.      Cervical back: Normal range of motion.     Skin:     General: Skin is warm and dry.     Neurological:      General: No focal deficit present.      Mental Status: She is alert and oriented to person, place, and time. Mental status is at baseline.     Psychiatric:         Mood and Affect: Mood normal.         Behavior: Behavior normal.         Thought Content: Thought content normal.         Judgment: Judgment normal.       ECOG Performance Status:   Asymptomatic    Assessment/Plan   Diagnoses and all orders for this visit:  Elevated serum immunoglobulin free light chain level  -     Clinic Appointment Request [facility]; [facility] [id number] MEDONC1; Future  -     CBC and Auto Differential; Standing  -     Comprehensive Metabolic Panel; Standing  -     Immunoglobulins (IgG, IgA, IgM); Standing  -     [place]/Lambda Free Light Chain, Serum; Standing  -     Serum Protein  Electrophoresis + Immunofixation; Standing  -     Urine Protein Electrophoresis + Immunofixation; Standing  - plan for yearly follow up with [facility] clinic    GETACHEW Kraft-CNP

## 2025-08-06 DIAGNOSIS — E03.9 HYPOTHYROIDISM, UNSPECIFIED TYPE: ICD-10-CM

## 2025-08-07 PROCEDURE — 88305 TISSUE EXAM BY PATHOLOGIST: CPT | Performed by: DERMATOLOGY

## 2025-08-08 ENCOUNTER — APPOINTMENT (OUTPATIENT)
Dept: NEUROLOGY | Facility: CLINIC | Age: 80
End: 2025-08-08
Payer: MEDICARE

## 2025-08-08 ENCOUNTER — HOSPITAL ENCOUNTER (OUTPATIENT)
Dept: RADIOLOGY | Facility: HOSPITAL | Age: 80
Discharge: HOME | End: 2025-08-08
Payer: MEDICARE

## 2025-08-08 DIAGNOSIS — R13.10 DYSPHAGIA, UNSPECIFIED TYPE: ICD-10-CM

## 2025-08-08 DIAGNOSIS — R13.10 DYSPHAGIA, UNSPECIFIED TYPE: Primary | ICD-10-CM

## 2025-08-08 DIAGNOSIS — K21.9 GASTROESOPHAGEAL REFLUX DISEASE WITHOUT ESOPHAGITIS: ICD-10-CM

## 2025-08-08 PROCEDURE — 74230 X-RAY XM SWLNG FUNCJ C+: CPT

## 2025-08-08 PROCEDURE — 92611 MOTION FLUOROSCOPY/SWALLOW: CPT | Mod: GN

## 2025-08-08 PROCEDURE — 2500000005 HC RX 250 GENERAL PHARMACY W/O HCPCS

## 2025-08-08 RX ORDER — LEVOTHYROXINE SODIUM 75 UG/1
75 TABLET ORAL DAILY
Qty: 90 TABLET | Refills: 1 | Status: SHIPPED | OUTPATIENT
Start: 2025-08-08

## 2025-08-08 RX ADMIN — BARIUM SULFATE 65 ML: 400 SUSPENSION ORAL at 10:52

## 2025-08-08 RX ADMIN — BARIUM SULFATE 5 ML: 400 SUSPENSION ORAL at 10:52

## 2025-08-08 RX ADMIN — BARIUM SULFATE 10 ML: 400 PASTE ORAL at 10:52

## 2025-08-08 RX ADMIN — BARIUM SULFATE 90 ML: 0.81 POWDER, FOR SUSPENSION ORAL at 10:53

## 2025-08-08 RX ADMIN — BARIUM SULFATE 700 MG: 700 TABLET ORAL at 10:51

## 2025-08-08 NOTE — PROCEDURES
"Speech-Language Pathology    SLP Adult Outpatient Modified Barium Swallow Study    Patient Name: Lucille Holloway  MRN: [id number]  : 1945  Today's Date: 25   [id number]        Modified Barium Swallow Study completed this date. Informed verbal consent obtained prior to completion of exam. Trials of thin (3x 5ml, 2x 20ml, 40ml), nectar/mildly thick liquid (2x 5ml, 20ml, 40ml), honey/moderately thick liquid (5ml), puree (2x 5ml), regular solids (1/8th jere cracker with 5ml paste), and barium tablet (x1) with thin liquid were given.     The study was completed per protocol with various liquid barium consistencies, pudding, solids, and a 13mm barium tablet. A 1.9 cm or .75 inch (outer diameter) ring was placed on the chin in the lateral view and on the lateral, left side of the neck in the a-p view in order to complete objective measurements during swallowing. The anatomic structures and function of the oropharynx, larynx, hypopharynx, and cervical esophagus were evaluated.    SLP: Selene Milian, SLP   Contact info: [employer] secure chat      Reason for Referral: Dysphagia, GERD  Pt c/o occasional sticking of (small) pills or tough food pieces in throat, occasional episodes of coughing/\"choking\" sensation; Pt indicates area of mid-throat for sticking sensation, with broader jaw to base of throat area effected.  Patient Hx: dysphagia, GERD, RA, HL, CDD, LDD, KELLY; see chart for further dx  Respiratory Status: RA  Current diet: Regular      Pain:  Pt did not report or demo evidence of pain or discomfort during study.       DIET RECOMMENDATIONS:   - Regular (IDDSI Level 7)  - Thin Liquids (IDDSI Level 0)      Safe Swallow STRATEGIES:  - Don't talk during meals  - Rec'd pills with puree (whole)  - Small bites  - Alternate consistencies  - Add moisture to dry foods  - Upright for all PO intake  - Reflux Precautions        Plan:  Treatment/Interventions: Pharyngeal exercises, Patient/family education, Bolus trials, " Compensatory strategy training, Diet tolerance/advancement  SLP Plan: Skilled SLP warranted  SLP Frequency: To be determined by treating SLP in outpatient setting  Duration: To be determined by treating SLP in outpatient setting    Discussed POC: Patient  Discussed Risks/Benefits: Yes  Patient/Caregiver Agreeable: Yes      Short term goals established 08/08/25:   - Patient will independently implement safe swallowing strategies to reduce risk of aspiration with use of compensatory strategies during 90% of therapeutic trials.  - Patient will complete a home exercise program consisting of oral and pharyngeal exercises (e.g. Effortful Swallow) to improve/maintain oral-pharyngeal strength, mobility and endurance for swallow mechanisms as measured by adequate weight and patient's self-report.      Long term goals 08/08/25:   Patient will tolerate current diet without overt difficulty in 90% of opportunities or further pulmonary compromise.      Education Provided: Results and recommendations per MBSS, with video review; recommendations and POC at this time. Verbal understanding and agreement given on all accounts.     Treatment Provided Today: ST provided education to Pt regarding anatomy/physiology of swallow function, risk of airway aspiration/aspiration pna & how to mitigate factors, diet modifications, and the use of compensatory swallow strategies to promote Pt safety upon PO intake, including small bites/sips, use of liquid wash, pills taken in puree, and avoid talking with food in mouth.       Additional Medical Consults Suggested:   - No new disciplines indicated    Repeat study/ dc plan: per physician or treating SLP      Mechanics of the Swallow Summary:  ORAL PHASE:  Lip Closure - Intact  Tongue Control During Bolus Hold - Impaired  Bolus prep/mastication - Impaired  Bolus transport/lingual motion - Intact  Oral residue - absent    Oral anatomy appears WFL.   Loss of bolus to pharynx noted prior to swallow  initiation with: all consistencies      PHARYNGEAL PHASE:  Initiation of pharyngeal swallow - Impaired  Soft palate elevation - Intact  Laryngeal elevation - Intact  Anterior hyoid excursion - Intact  Epiglottic movement - Absent  Laryngeal vestibule closure - Impaired  Pharyngeal stripping wave - Intact  Pharyngeal contraction (A/P view) - Intact  Pharyngoesophageal segment opening - Intact  Tongue base retraction - Intact  Pharyngeal residue - absent    Pharyngeal anatomy appears WFL.  Loss of bolus to valleculae prior to swallow initiation with: all consistencies trialed  Loss of bolus to pyriform sinuses prior to swallow initiation with: Thin and Mildly/Nectar-thick liquids   No laryngeal penetration/aspiration noted      ESOPHAGEAL PHASE:  Adequate UES opening with appropriate clearance of bolus trials.   No significant residue or retention noted in cervical esophagus.  Esophageal clearance - Complete clearance       *Of note: The oblique/A-P bolus follow-through is not intended to be utilized as a diagnostic assessment of the esophagus, rather a tool to observe the biomechanic aspects of the swallow continuum and to inform the need for further evaluation by medical specialists, as applicable.       Strategies Attempted-    These strategies resulted in improved clearance:  Effortful Swallow    Did not trial following further strategies d/t not being indicated/appropriate during study.        SLP Impressions with Severity Rating:   Pt presents with mild-moderate oral-pharyngeal dysphagia, as evidenced by bolus loss to pharynx prior to swallow initiation and impaired epiglottal movement. No laryngeal penetration/aspiration noted, although Pt remains at increased risk thereof d/t the above.  Pt may benefit from outpatient ST services to address oral-pharyngeal deficits noted.    Swallowing physiology is detailed above.         OUTCOME MEASURES:    Eating Assessment Tool (EAT-10)   0=No problem, 1=Mild problem,  2=Mild to moderate problem, 3=Moderate problem, 4=Severe problem    My swallowing problem has caused me to lose weight.: 0  My swallowing problem interferes with my ability to go out for meals.: 0  Swallowing liquids takes extra effort.: 2  Swallowing solids takes extra effort.: 2  Swallowing pills takes extra effort.: 2  Swallowing is painful: 0  The pleasure of eating is affected by my swallowing.: 0  When I swallow food sticks in my throat.: 2  I cough when I eat.: 3  Swallowing is stressful: 2  EAT-10 TOTAL SCORE:: 13     A total score of 3 or above may indicate difficulty with swallowing safely and/or efficiently       Rosenbek's Penetration Aspiration Scale  Thin Liquids: 1. NO ASPIRATION & NO PENETRATION - no aspiration, contrast does not enter airway  Alvan Thick Liquids: 1. NO ASPIRATION & NO PENETRATION - no aspiration, contrast does not enter airway  Honey Thick Liquids: 1. NO ASPIRATION & NO PENETRATION - no aspiration, contrast does not enter airway  Puree: 1. NO ASPIRATION & NO PENETRATION - no aspiration, contrast does not enter airway  Solids: 1. NO ASPIRATION & NO PENETRATION - no aspiration, contrast does not enter airway

## 2025-08-11 ENCOUNTER — LAB REQUISITION (OUTPATIENT)
Dept: DERMATOPATHOLOGY | Facility: CLINIC | Age: 80
End: 2025-08-11
Payer: MEDICARE

## 2025-08-11 DIAGNOSIS — D48.5 NEOPLASM OF UNCERTAIN BEHAVIOR OF SKIN: ICD-10-CM

## 2025-08-12 LAB
LABORATORY COMMENT REPORT: NORMAL
PATH REPORT.FINAL DX SPEC: NORMAL
PATH REPORT.GROSS SPEC: NORMAL
PATH REPORT.MICROSCOPIC SPEC OTHER STN: NORMAL
PATH REPORT.RELEVANT HX SPEC: NORMAL
PATH REPORT.TOTAL CANCER: NORMAL

## 2025-08-14 ENCOUNTER — APPOINTMENT (OUTPATIENT)
Dept: CARDIOLOGY | Facility: CLINIC | Age: 80
End: 2025-08-14
Payer: MEDICARE

## 2025-08-27 ENCOUNTER — APPOINTMENT (OUTPATIENT)
Dept: ENDOCRINOLOGY | Facility: CLINIC | Age: 80
End: 2025-08-27
Payer: MEDICARE

## 2025-08-28 ENCOUNTER — APPOINTMENT (OUTPATIENT)
Dept: CARDIOLOGY | Facility: CLINIC | Age: 80
End: 2025-08-28
Payer: MEDICARE

## 2025-08-28 VITALS
WEIGHT: 201 LBS | DIASTOLIC BLOOD PRESSURE: 75 MMHG | BODY MASS INDEX: 35.61 KG/M2 | OXYGEN SATURATION: 95 % | SYSTOLIC BLOOD PRESSURE: 115 MMHG | HEART RATE: 64 BPM

## 2025-08-28 DIAGNOSIS — E78.2 MIXED HYPERLIPIDEMIA: Primary | ICD-10-CM

## 2025-08-28 DIAGNOSIS — I10 PRIMARY HYPERTENSION: ICD-10-CM

## 2025-08-28 PROCEDURE — 1160F RVW MEDS BY RX/DR IN RCRD: CPT | Performed by: INTERNAL MEDICINE

## 2025-08-28 PROCEDURE — 99214 OFFICE O/P EST MOD 30 MIN: CPT | Performed by: INTERNAL MEDICINE

## 2025-08-28 PROCEDURE — 3074F SYST BP LT 130 MM HG: CPT | Performed by: INTERNAL MEDICINE

## 2025-08-28 PROCEDURE — 1036F TOBACCO NON-USER: CPT | Performed by: INTERNAL MEDICINE

## 2025-08-28 PROCEDURE — 1159F MED LIST DOCD IN RCRD: CPT | Performed by: INTERNAL MEDICINE

## 2025-08-28 PROCEDURE — G2211 COMPLEX E/M VISIT ADD ON: HCPCS | Performed by: INTERNAL MEDICINE

## 2025-08-28 PROCEDURE — 3078F DIAST BP <80 MM HG: CPT | Performed by: INTERNAL MEDICINE

## 2025-08-28 PROCEDURE — 99212 OFFICE O/P EST SF 10 MIN: CPT

## 2025-08-28 PROCEDURE — 1125F AMNT PAIN NOTED PAIN PRSNT: CPT | Performed by: INTERNAL MEDICINE

## 2025-08-28 RX ORDER — CHLORHEXIDINE GLUCONATE ORAL RINSE 1.2 MG/ML
SOLUTION DENTAL
COMMUNITY
Start: 2025-08-05

## 2025-08-28 RX ORDER — AMOXICILLIN 500 MG/1
CAPSULE ORAL
COMMUNITY
Start: 2025-08-05

## 2025-08-28 RX ORDER — FLUOCINONIDE 0.5 MG/G
CREAM TOPICAL
COMMUNITY
Start: 2025-08-03

## 2025-08-28 ASSESSMENT — PAIN SCALES - GENERAL: PAINLEVEL_OUTOF10: 6

## 2025-08-28 ASSESSMENT — ENCOUNTER SYMPTOMS
OCCASIONAL FEELINGS OF UNSTEADINESS: 0
DEPRESSION: 0
LOSS OF SENSATION IN FEET: 0

## 2025-09-02 ENCOUNTER — APPOINTMENT (OUTPATIENT)
Dept: RHEUMATOLOGY | Facility: CLINIC | Age: 80
End: 2025-09-02
Payer: MEDICARE

## 2025-09-02 ASSESSMENT — PAIN SCALES - GENERAL: PAINLEVEL_OUTOF10: 4

## 2025-09-09 ENCOUNTER — APPOINTMENT (OUTPATIENT)
Dept: ENDOCRINOLOGY | Facility: CLINIC | Age: 80
End: 2025-09-09
Payer: MEDICARE

## 2025-09-10 ENCOUNTER — APPOINTMENT (OUTPATIENT)
Dept: ENDOCRINOLOGY | Facility: CLINIC | Age: 80
End: 2025-09-10
Payer: MEDICARE

## 2025-09-24 ENCOUNTER — APPOINTMENT (OUTPATIENT)
Dept: ENDOCRINOLOGY | Facility: CLINIC | Age: 80
End: 2025-09-24
Payer: MEDICARE

## 2025-10-15 ENCOUNTER — APPOINTMENT (OUTPATIENT)
Dept: ENDOCRINOLOGY | Facility: CLINIC | Age: 80
End: 2025-10-15
Payer: MEDICARE

## 2025-10-29 ENCOUNTER — APPOINTMENT (OUTPATIENT)
Dept: ENDOCRINOLOGY | Facility: CLINIC | Age: 80
End: 2025-10-29
Payer: MEDICARE

## 2025-11-04 ENCOUNTER — APPOINTMENT (OUTPATIENT)
Dept: ORTHOPEDIC SURGERY | Facility: CLINIC | Age: 80
End: 2025-11-04
Payer: MEDICARE

## 2025-11-12 ENCOUNTER — APPOINTMENT (OUTPATIENT)
Dept: ENDOCRINOLOGY | Facility: CLINIC | Age: 80
End: 2025-11-12
Payer: MEDICARE

## 2025-11-26 ENCOUNTER — APPOINTMENT (OUTPATIENT)
Dept: ENDOCRINOLOGY | Facility: CLINIC | Age: 80
End: 2025-11-26
Payer: MEDICARE

## 2025-12-01 ENCOUNTER — APPOINTMENT (OUTPATIENT)
Dept: RHEUMATOLOGY | Facility: CLINIC | Age: 80
End: 2025-12-01
Payer: MEDICARE

## 2025-12-10 ENCOUNTER — APPOINTMENT (OUTPATIENT)
Dept: ENDOCRINOLOGY | Facility: CLINIC | Age: 80
End: 2025-12-10
Payer: MEDICARE

## 2025-12-24 ENCOUNTER — APPOINTMENT (OUTPATIENT)
Dept: ENDOCRINOLOGY | Facility: CLINIC | Age: 80
End: 2025-12-24
Payer: MEDICARE

## 2026-01-06 ENCOUNTER — APPOINTMENT (OUTPATIENT)
Dept: RHEUMATOLOGY | Facility: CLINIC | Age: 81
End: 2026-01-06
Payer: MEDICARE

## 2026-04-03 ENCOUNTER — APPOINTMENT (OUTPATIENT)
Dept: RHEUMATOLOGY | Facility: CLINIC | Age: 81
End: 2026-04-03
Payer: MEDICARE

## (undated) DEVICE — DRESSING, GAUZE, 16 PLY, 4 X 4 IN, STERILE

## (undated) DEVICE — GLOVE, SURGICAL, PROTEXIS PI MICRO, 8.0, PF, LF

## (undated) DEVICE — Device

## (undated) DEVICE — DRAPE, SHEET, ARTHROSCOPY, W/POUCH, 92 X 102 IN, DISPOSABLE, LF, STERILE

## (undated) DEVICE — STAPLER, SKIN, FIXED HEAD, WIDE, 35

## (undated) DEVICE — DRESSING, GAUZE, PETROLATUM, PATCH, XEROFORM, 1 X 8 IN, STERILE

## (undated) DEVICE — GUIDEWIRE, 1.35MM, W/ TROCAR TIP

## (undated) DEVICE — SOLUTION, IRRIGATION, SODIUM CHLORIDE 0.9%, 1000 ML, POUR BOTTLE

## (undated) DEVICE — GLOVE, SURGICAL, PROTEXIS PI ORTHO, 8.0, PF, LF

## (undated) DEVICE — DRILL BIT, 2.0MM CALIBRATED

## (undated) DEVICE — GLOVE, SURGICAL, PROTEXIS PI MICRO, 8.5, PF, LF

## (undated) DEVICE — COVER, C-ARM W/CLIPS, OEC GE

## (undated) DEVICE — PADDING, WEBRIL, UNDERCAST, STERILE, 6 IN

## (undated) DEVICE — SUTURE, VICRYL, 4-0, 18 IN, UNDYED BR PS-2

## (undated) DEVICE — SUTURE, VICRYL, 3-0, 27 IN, SH

## (undated) DEVICE — CAUTERY, PENCIL, PUSH BUTTON, SMOKE EVAC, 70MM

## (undated) DEVICE — COVER, MAYO STAND, W/PAD, 23 IN, DISPOSABLE, PLASTIC, LF, STERILE

## (undated) DEVICE — PADDING, UNDERCAST, WEBRIL, 4 IN X 4 YD, REG, NS

## (undated) DEVICE — DRAPE, SHEET, THREE QUARTER, FAN FOLD, 57 X 77 IN

## (undated) DEVICE — DRILL BIT, 2.7 MM

## (undated) DEVICE — DRILL BIT, 3.5MM

## (undated) DEVICE — DRILL BIT, 2.5 MM

## (undated) DEVICE — SUTURE, VICRYL, 4-0, 18 IN, PS2, UNDYED

## (undated) DEVICE — IMPLANTABLE DEVICE: Type: IMPLANTABLE DEVICE | Site: ANKLE | Status: NON-FUNCTIONAL

## (undated) DEVICE — SUTURE, VICRYL PLUS 3-0, SH, 27IN

## (undated) DEVICE — TIP, SUCTION, YANKAUER, FLEXIBLE